# Patient Record
Sex: FEMALE | Race: OTHER | HISPANIC OR LATINO | URBAN - METROPOLITAN AREA
[De-identification: names, ages, dates, MRNs, and addresses within clinical notes are randomized per-mention and may not be internally consistent; named-entity substitution may affect disease eponyms.]

---

## 2023-01-06 ENCOUNTER — EMERGENCY (EMERGENCY)
Facility: HOSPITAL | Age: 63
LOS: 1 days | Discharge: HOME | End: 2023-01-06
Attending: STUDENT IN AN ORGANIZED HEALTH CARE EDUCATION/TRAINING PROGRAM
Payer: COMMERCIAL

## 2023-01-06 VITALS
OXYGEN SATURATION: 99 % | SYSTOLIC BLOOD PRESSURE: 128 MMHG | HEART RATE: 68 BPM | RESPIRATION RATE: 16 BRPM | HEIGHT: 59 IN | WEIGHT: 138.01 LBS | DIASTOLIC BLOOD PRESSURE: 64 MMHG | TEMPERATURE: 99 F

## 2023-01-06 LAB
ALBUMIN SERPL ELPH-MCNC: 3.2 G/DL — LOW (ref 3.5–5)
ALP SERPL-CCNC: 62 U/L — SIGNIFICANT CHANGE UP (ref 40–120)
ALT FLD-CCNC: 29 U/L DA — SIGNIFICANT CHANGE UP (ref 10–60)
ANION GAP SERPL CALC-SCNC: 10 MMOL/L — SIGNIFICANT CHANGE UP (ref 5–17)
APPEARANCE UR: CLEAR — SIGNIFICANT CHANGE UP
AST SERPL-CCNC: 17 U/L — SIGNIFICANT CHANGE UP (ref 10–40)
BACTERIA # UR AUTO: ABNORMAL /HPF
BASOPHILS # BLD AUTO: 0.06 K/UL — SIGNIFICANT CHANGE UP (ref 0–0.2)
BASOPHILS NFR BLD AUTO: 0.9 % — SIGNIFICANT CHANGE UP (ref 0–2)
BILIRUB SERPL-MCNC: 0.2 MG/DL — SIGNIFICANT CHANGE UP (ref 0.2–1.2)
BILIRUB UR-MCNC: NEGATIVE — SIGNIFICANT CHANGE UP
BUN SERPL-MCNC: 28 MG/DL — HIGH (ref 7–18)
CALCIUM SERPL-MCNC: 9.3 MG/DL — SIGNIFICANT CHANGE UP (ref 8.4–10.5)
CHLORIDE SERPL-SCNC: 104 MMOL/L — SIGNIFICANT CHANGE UP (ref 96–108)
CO2 SERPL-SCNC: 29 MMOL/L — SIGNIFICANT CHANGE UP (ref 22–31)
COD CRY URNS QL: ABNORMAL /HPF
COLOR SPEC: YELLOW — SIGNIFICANT CHANGE UP
CREAT SERPL-MCNC: 1.2 MG/DL — SIGNIFICANT CHANGE UP (ref 0.5–1.3)
DIFF PNL FLD: ABNORMAL
EGFR: 51 ML/MIN/1.73M2 — LOW
EOSINOPHIL # BLD AUTO: 0.17 K/UL — SIGNIFICANT CHANGE UP (ref 0–0.5)
EOSINOPHIL NFR BLD AUTO: 2.7 % — SIGNIFICANT CHANGE UP (ref 0–6)
EPI CELLS # UR: ABNORMAL /HPF
GLUCOSE SERPL-MCNC: 157 MG/DL — HIGH (ref 70–99)
GLUCOSE UR QL: NEGATIVE — SIGNIFICANT CHANGE UP
HCT VFR BLD CALC: 43.5 % — SIGNIFICANT CHANGE UP (ref 34.5–45)
HGB BLD-MCNC: 14 G/DL — SIGNIFICANT CHANGE UP (ref 11.5–15.5)
IMM GRANULOCYTES NFR BLD AUTO: 0.3 % — SIGNIFICANT CHANGE UP (ref 0–0.9)
KETONES UR-MCNC: NEGATIVE — SIGNIFICANT CHANGE UP
LEUKOCYTE ESTERASE UR-ACNC: NEGATIVE — SIGNIFICANT CHANGE UP
LIDOCAIN IGE QN: 244 U/L — SIGNIFICANT CHANGE UP (ref 73–393)
LYMPHOCYTES # BLD AUTO: 2.45 K/UL — SIGNIFICANT CHANGE UP (ref 1–3.3)
LYMPHOCYTES # BLD AUTO: 38.7 % — SIGNIFICANT CHANGE UP (ref 13–44)
MAGNESIUM SERPL-MCNC: 1.6 MG/DL — SIGNIFICANT CHANGE UP (ref 1.6–2.6)
MCHC RBC-ENTMCNC: 27.8 PG — SIGNIFICANT CHANGE UP (ref 27–34)
MCHC RBC-ENTMCNC: 32.2 GM/DL — SIGNIFICANT CHANGE UP (ref 32–36)
MCV RBC AUTO: 86.5 FL — SIGNIFICANT CHANGE UP (ref 80–100)
MONOCYTES # BLD AUTO: 0.51 K/UL — SIGNIFICANT CHANGE UP (ref 0–0.9)
MONOCYTES NFR BLD AUTO: 8.1 % — SIGNIFICANT CHANGE UP (ref 2–14)
NEUTROPHILS # BLD AUTO: 3.12 K/UL — SIGNIFICANT CHANGE UP (ref 1.8–7.4)
NEUTROPHILS NFR BLD AUTO: 49.3 % — SIGNIFICANT CHANGE UP (ref 43–77)
NITRITE UR-MCNC: NEGATIVE — SIGNIFICANT CHANGE UP
NRBC # BLD: 0 /100 WBCS — SIGNIFICANT CHANGE UP (ref 0–0)
PH UR: 5 — SIGNIFICANT CHANGE UP (ref 5–8)
PLATELET # BLD AUTO: 302 K/UL — SIGNIFICANT CHANGE UP (ref 150–400)
POTASSIUM SERPL-MCNC: 3.7 MMOL/L — SIGNIFICANT CHANGE UP (ref 3.5–5.3)
POTASSIUM SERPL-SCNC: 3.7 MMOL/L — SIGNIFICANT CHANGE UP (ref 3.5–5.3)
PROT SERPL-MCNC: 7.6 G/DL — SIGNIFICANT CHANGE UP (ref 6–8.3)
PROT UR-MCNC: NEGATIVE — SIGNIFICANT CHANGE UP
RBC # BLD: 5.03 M/UL — SIGNIFICANT CHANGE UP (ref 3.8–5.2)
RBC # FLD: 13 % — SIGNIFICANT CHANGE UP (ref 10.3–14.5)
RBC CASTS # UR COMP ASSIST: ABNORMAL /HPF (ref 0–2)
SODIUM SERPL-SCNC: 143 MMOL/L — SIGNIFICANT CHANGE UP (ref 135–145)
SP GR SPEC: 1.01 — SIGNIFICANT CHANGE UP (ref 1.01–1.02)
UROBILINOGEN FLD QL: NEGATIVE — SIGNIFICANT CHANGE UP
WBC # BLD: 6.33 K/UL — SIGNIFICANT CHANGE UP (ref 3.8–10.5)
WBC # FLD AUTO: 6.33 K/UL — SIGNIFICANT CHANGE UP (ref 3.8–10.5)
WBC UR QL: SIGNIFICANT CHANGE UP /HPF (ref 0–5)

## 2023-01-06 PROCEDURE — 76705 ECHO EXAM OF ABDOMEN: CPT | Mod: 26

## 2023-01-06 PROCEDURE — 99284 EMERGENCY DEPT VISIT MOD MDM: CPT

## 2023-01-06 RX ORDER — ESOMEPRAZOLE MAGNESIUM 40 MG/1
1 CAPSULE, DELAYED RELEASE ORAL
Qty: 14 | Refills: 0
Start: 2023-01-06 | End: 2023-01-19

## 2023-01-06 RX ORDER — FAMOTIDINE 10 MG/ML
20 INJECTION INTRAVENOUS ONCE
Refills: 0 | Status: COMPLETED | OUTPATIENT
Start: 2023-01-06 | End: 2023-01-06

## 2023-01-06 RX ORDER — PANTOPRAZOLE SODIUM 20 MG/1
40 TABLET, DELAYED RELEASE ORAL ONCE
Refills: 0 | Status: COMPLETED | OUTPATIENT
Start: 2023-01-06 | End: 2023-01-06

## 2023-01-06 RX ADMIN — Medication 30 MILLILITER(S): at 22:13

## 2023-01-06 RX ADMIN — FAMOTIDINE 20 MILLIGRAM(S): 10 INJECTION INTRAVENOUS at 22:14

## 2023-01-06 NOTE — ED PROVIDER NOTE - PROGRESS NOTE DETAILS
Romeo: feels improved. US with fatty liver, otherwise unremarkable. symptoms likely 2/2 gastritis. will dc with PPI and maalox. f/u with GI. return precautions discussed.

## 2023-01-06 NOTE — ED PROVIDER NOTE - OBJECTIVE STATEMENT
62-year-old female presenting with several days of abdominal pain.  Patient reports pain is worse at night after she eats and when she lays down.  Mild nausea and vomiting and today developed diarrhea.  No fever, chest pain, shortness of breath.  Only abdominal surgeries are  sections.  Denies any pain or burning with urination.

## 2023-01-06 NOTE — ED ADULT TRIAGE NOTE - AS TEMP SITE
Name: Netta Avila  : 1978  MRN: [id number]    Oncology Navigation Follow-Up Note    Contact Type:  Telephone    Notes:Writer spoke with pt who called to have [place] appt moved to a different time. Writer notified Jazzy Villanueva, [address] [place] [address] , of pt's request and asked that she contact pt to coordinate a different appt date/time. Pt denied further barriers and any unanswered questions at this time. Pt has writer's contact information and encouraged to contact writer with any concerns. Will continue to follow.      Electronically signed by Jody Severino RN on 2022 at 11:46 AM
oral

## 2023-01-06 NOTE — ED PROVIDER NOTE - PHYSICAL EXAMINATION
General: well appearing female, no acute distress   HEENT: normocephalic, atraumatic   Respiratory: normal work of breathing, lungs clear to auscultation bilaterally   Cardiac: regular rate and rhythm   Abdomen: soft, epigastric tenderness to palpation    MSK: no swelling or tenderness of lower extremities, moving all extremities spontaneously   Skin: warm, dry   Neuro: A&Ox3  Psych: appropriate affect

## 2023-01-06 NOTE — ED PROVIDER NOTE - CLINICAL SUMMARY MEDICAL DECISION MAKING FREE TEXT BOX
63-year-old female presenting with abdominal pain.  Concern for gastritis versus pancreatitis versus cholecystitis. Will attempt to get ultrasound but may need to get CT scan at this time of night.  Labs and urine.

## 2023-01-06 NOTE — ED PROVIDER NOTE - PATIENT PORTAL LINK FT
You can access the FollowMyHealth Patient Portal offered by [facility] by registering at the following website: [email or web address]. By joining [employer]’s FollowMyHealth portal, you will also be able to view your health information using other applications (apps) compatible with our system.

## 2023-01-07 VITALS
SYSTOLIC BLOOD PRESSURE: 126 MMHG | TEMPERATURE: 98 F | OXYGEN SATURATION: 97 % | RESPIRATION RATE: 16 BRPM | HEART RATE: 61 BPM | DIASTOLIC BLOOD PRESSURE: 74 MMHG

## 2023-01-07 PROCEDURE — 83735 ASSAY OF MAGNESIUM: CPT

## 2023-01-07 PROCEDURE — 80053 COMPREHEN METABOLIC PANEL: CPT

## 2023-01-07 PROCEDURE — 83690 ASSAY OF LIPASE: CPT

## 2023-01-07 PROCEDURE — 99284 EMERGENCY DEPT VISIT MOD MDM: CPT | Mod: 25

## 2023-01-07 PROCEDURE — 87086 URINE CULTURE/COLONY COUNT: CPT

## 2023-01-07 PROCEDURE — 76705 ECHO EXAM OF ABDOMEN: CPT

## 2023-01-07 PROCEDURE — 96374 THER/PROPH/DIAG INJ IV PUSH: CPT

## 2023-01-07 PROCEDURE — 85025 COMPLETE CBC W/AUTO DIFF WBC: CPT

## 2023-01-07 PROCEDURE — 36415 COLL VENOUS BLD VENIPUNCTURE: CPT

## 2023-01-07 PROCEDURE — 81001 URINALYSIS AUTO W/SCOPE: CPT

## 2023-01-07 RX ADMIN — PANTOPRAZOLE SODIUM 40 MILLIGRAM(S): 20 TABLET, DELAYED RELEASE ORAL at 00:01

## 2023-01-07 NOTE — ED ADULT NURSE NOTE - OBJECTIVE STATEMENT
62-year-old female presenting with several days of abdominal pain.  Patient reports pain is worse at night after she eats and when she lays down.  Mild nausea and vomiting and today developed diarrhea.  No fever, chest pain, shortness of breath.

## 2023-01-08 LAB
CULTURE RESULTS: SIGNIFICANT CHANGE UP
SPECIMEN SOURCE: SIGNIFICANT CHANGE UP

## 2023-09-22 ENCOUNTER — EMERGENCY (EMERGENCY)
Facility: HOSPITAL | Age: 63
LOS: 1 days | Discharge: HOME | End: 2023-09-22
Attending: EMERGENCY MEDICINE
Payer: COMMERCIAL

## 2023-09-22 VITALS
RESPIRATION RATE: 18 BRPM | HEART RATE: 60 BPM | OXYGEN SATURATION: 97 % | TEMPERATURE: 97 F | DIASTOLIC BLOOD PRESSURE: 64 MMHG | SYSTOLIC BLOOD PRESSURE: 165 MMHG

## 2023-09-22 VITALS
HEART RATE: 63 BPM | OXYGEN SATURATION: 97 % | RESPIRATION RATE: 18 BRPM | TEMPERATURE: 99 F | SYSTOLIC BLOOD PRESSURE: 113 MMHG | HEIGHT: 59 IN | WEIGHT: 125 LBS | DIASTOLIC BLOOD PRESSURE: 68 MMHG

## 2023-09-22 LAB
ALBUMIN SERPL ELPH-MCNC: 3.7 G/DL — SIGNIFICANT CHANGE UP (ref 3.5–5)
ALP SERPL-CCNC: 76 U/L — SIGNIFICANT CHANGE UP (ref 40–120)
ALT FLD-CCNC: 27 U/L DA — SIGNIFICANT CHANGE UP (ref 10–60)
ANION GAP SERPL CALC-SCNC: 8 MMOL/L — SIGNIFICANT CHANGE UP (ref 5–17)
APPEARANCE UR: CLEAR — SIGNIFICANT CHANGE UP
AST SERPL-CCNC: 19 U/L — SIGNIFICANT CHANGE UP (ref 10–40)
BASOPHILS # BLD AUTO: 0.04 K/UL — SIGNIFICANT CHANGE UP (ref 0–0.2)
BASOPHILS NFR BLD AUTO: 0.7 % — SIGNIFICANT CHANGE UP (ref 0–2)
BILIRUB SERPL-MCNC: 0.7 MG/DL — SIGNIFICANT CHANGE UP (ref 0.2–1.2)
BILIRUB UR-MCNC: NEGATIVE — SIGNIFICANT CHANGE UP
BUN SERPL-MCNC: 12 MG/DL — SIGNIFICANT CHANGE UP (ref 7–18)
CALCIUM SERPL-MCNC: 9.1 MG/DL — SIGNIFICANT CHANGE UP (ref 8.4–10.5)
CHLORIDE SERPL-SCNC: 104 MMOL/L — SIGNIFICANT CHANGE UP (ref 96–108)
CO2 SERPL-SCNC: 27 MMOL/L — SIGNIFICANT CHANGE UP (ref 22–31)
COLOR SPEC: YELLOW — SIGNIFICANT CHANGE UP
CREAT SERPL-MCNC: 0.92 MG/DL — SIGNIFICANT CHANGE UP (ref 0.5–1.3)
DIFF PNL FLD: ABNORMAL
EGFR: 70 ML/MIN/1.73M2 — SIGNIFICANT CHANGE UP
EOSINOPHIL # BLD AUTO: 0.01 K/UL — SIGNIFICANT CHANGE UP (ref 0–0.5)
EOSINOPHIL NFR BLD AUTO: 0.2 % — SIGNIFICANT CHANGE UP (ref 0–6)
GLUCOSE SERPL-MCNC: 109 MG/DL — HIGH (ref 70–99)
GLUCOSE UR QL: NEGATIVE MG/DL — SIGNIFICANT CHANGE UP
HCT VFR BLD CALC: 42.7 % — SIGNIFICANT CHANGE UP (ref 34.5–45)
HGB BLD-MCNC: 14.2 G/DL — SIGNIFICANT CHANGE UP (ref 11.5–15.5)
IMM GRANULOCYTES NFR BLD AUTO: 0.2 % — SIGNIFICANT CHANGE UP (ref 0–0.9)
KETONES UR-MCNC: 80 MG/DL
LACTATE SERPL-SCNC: 0.9 MMOL/L — SIGNIFICANT CHANGE UP (ref 0.7–2)
LEUKOCYTE ESTERASE UR-ACNC: NEGATIVE — SIGNIFICANT CHANGE UP
LIDOCAIN IGE QN: 45 U/L — SIGNIFICANT CHANGE UP (ref 13–75)
LYMPHOCYTES # BLD AUTO: 1.77 K/UL — SIGNIFICANT CHANGE UP (ref 1–3.3)
LYMPHOCYTES # BLD AUTO: 31.9 % — SIGNIFICANT CHANGE UP (ref 13–44)
MCHC RBC-ENTMCNC: 27.8 PG — SIGNIFICANT CHANGE UP (ref 27–34)
MCHC RBC-ENTMCNC: 33.3 GM/DL — SIGNIFICANT CHANGE UP (ref 32–36)
MCV RBC AUTO: 83.7 FL — SIGNIFICANT CHANGE UP (ref 80–100)
MONOCYTES # BLD AUTO: 0.4 K/UL — SIGNIFICANT CHANGE UP (ref 0–0.9)
MONOCYTES NFR BLD AUTO: 7.2 % — SIGNIFICANT CHANGE UP (ref 2–14)
NEUTROPHILS # BLD AUTO: 3.32 K/UL — SIGNIFICANT CHANGE UP (ref 1.8–7.4)
NEUTROPHILS NFR BLD AUTO: 59.8 % — SIGNIFICANT CHANGE UP (ref 43–77)
NITRITE UR-MCNC: NEGATIVE — SIGNIFICANT CHANGE UP
NRBC # BLD: 0 /100 WBCS — SIGNIFICANT CHANGE UP (ref 0–0)
PH UR: 6.5 — SIGNIFICANT CHANGE UP (ref 5–8)
PLATELET # BLD AUTO: 325 K/UL — SIGNIFICANT CHANGE UP (ref 150–400)
POTASSIUM SERPL-MCNC: 3.6 MMOL/L — SIGNIFICANT CHANGE UP (ref 3.5–5.3)
POTASSIUM SERPL-SCNC: 3.6 MMOL/L — SIGNIFICANT CHANGE UP (ref 3.5–5.3)
PROT SERPL-MCNC: 7.9 G/DL — SIGNIFICANT CHANGE UP (ref 6–8.3)
PROT UR-MCNC: ABNORMAL MG/DL
RBC # BLD: 5.1 M/UL — SIGNIFICANT CHANGE UP (ref 3.8–5.2)
RBC # FLD: 13.4 % — SIGNIFICANT CHANGE UP (ref 10.3–14.5)
SODIUM SERPL-SCNC: 139 MMOL/L — SIGNIFICANT CHANGE UP (ref 135–145)
SP GR SPEC: 1.02 — SIGNIFICANT CHANGE UP (ref 1–1.03)
TROPONIN I, HIGH SENSITIVITY RESULT: 10.9 NG/L — SIGNIFICANT CHANGE UP
UROBILINOGEN FLD QL: 1 MG/DL — SIGNIFICANT CHANGE UP (ref 0.2–1)
WBC # BLD: 5.55 K/UL — SIGNIFICANT CHANGE UP (ref 3.8–10.5)
WBC # FLD AUTO: 5.55 K/UL — SIGNIFICANT CHANGE UP (ref 3.8–10.5)

## 2023-09-22 PROCEDURE — 87077 CULTURE AEROBIC IDENTIFY: CPT

## 2023-09-22 PROCEDURE — 96374 THER/PROPH/DIAG INJ IV PUSH: CPT | Mod: XU

## 2023-09-22 PROCEDURE — 84484 ASSAY OF TROPONIN QUANT: CPT

## 2023-09-22 PROCEDURE — 99285 EMERGENCY DEPT VISIT HI MDM: CPT

## 2023-09-22 PROCEDURE — 81001 URINALYSIS AUTO W/SCOPE: CPT

## 2023-09-22 PROCEDURE — 76705 ECHO EXAM OF ABDOMEN: CPT

## 2023-09-22 PROCEDURE — 93005 ELECTROCARDIOGRAM TRACING: CPT

## 2023-09-22 PROCEDURE — 36415 COLL VENOUS BLD VENIPUNCTURE: CPT

## 2023-09-22 PROCEDURE — 74177 CT ABD & PELVIS W/CONTRAST: CPT | Mod: MA

## 2023-09-22 PROCEDURE — 80053 COMPREHEN METABOLIC PANEL: CPT

## 2023-09-22 PROCEDURE — 96375 TX/PRO/DX INJ NEW DRUG ADDON: CPT

## 2023-09-22 PROCEDURE — 83605 ASSAY OF LACTIC ACID: CPT

## 2023-09-22 PROCEDURE — 85025 COMPLETE CBC W/AUTO DIFF WBC: CPT

## 2023-09-22 PROCEDURE — 93010 ELECTROCARDIOGRAM REPORT: CPT

## 2023-09-22 PROCEDURE — 87086 URINE CULTURE/COLONY COUNT: CPT

## 2023-09-22 PROCEDURE — 83690 ASSAY OF LIPASE: CPT

## 2023-09-22 PROCEDURE — 99285 EMERGENCY DEPT VISIT HI MDM: CPT | Mod: 25

## 2023-09-22 PROCEDURE — 76705 ECHO EXAM OF ABDOMEN: CPT | Mod: 26

## 2023-09-22 PROCEDURE — 74177 CT ABD & PELVIS W/CONTRAST: CPT | Mod: 26,MA

## 2023-09-22 RX ORDER — SODIUM CHLORIDE 9 MG/ML
1000 INJECTION INTRAMUSCULAR; INTRAVENOUS; SUBCUTANEOUS ONCE
Refills: 0 | Status: COMPLETED | OUTPATIENT
Start: 2023-09-22 | End: 2023-09-22

## 2023-09-22 RX ORDER — ONDANSETRON 8 MG/1
4 TABLET, FILM COATED ORAL ONCE
Refills: 0 | Status: COMPLETED | OUTPATIENT
Start: 2023-09-22 | End: 2023-09-22

## 2023-09-22 RX ORDER — FAMOTIDINE 10 MG/ML
1 INJECTION INTRAVENOUS
Qty: 30 | Refills: 0
Start: 2023-09-22

## 2023-09-22 RX ORDER — FAMOTIDINE 10 MG/ML
20 INJECTION INTRAVENOUS ONCE
Refills: 0 | Status: COMPLETED | OUTPATIENT
Start: 2023-09-22 | End: 2023-09-22

## 2023-09-22 RX ADMIN — ONDANSETRON 4 MILLIGRAM(S): 8 TABLET, FILM COATED ORAL at 11:46

## 2023-09-22 RX ADMIN — FAMOTIDINE 20 MILLIGRAM(S): 10 INJECTION INTRAVENOUS at 14:17

## 2023-09-22 RX ADMIN — SODIUM CHLORIDE 1000 MILLILITER(S): 9 INJECTION INTRAMUSCULAR; INTRAVENOUS; SUBCUTANEOUS at 11:45

## 2023-09-22 RX ADMIN — Medication 30 MILLILITER(S): at 14:17

## 2023-09-22 NOTE — ED PROVIDER NOTE - PATIENT PORTAL LINK FT
You can access the FollowMyHealth Patient Portal offered by [facility] by registering at the following website: [email or web address]. By joining [employer]’s FollowMyHealth portal, you will also be able to view your health information using other applications (apps) compatible with our system.

## 2023-09-22 NOTE — ED PROVIDER NOTE - OBJECTIVE STATEMENT
61 y/o woman, h/o H. pylori, completed antibiotics, approx. Feb 2023, c/o 2 days of copious vomiting and epigastric pain.  No fever/diarrhea/constipation/dysuria.  Prior C-S but no other abdominal surgeries.

## 2023-09-22 NOTE — ED PROVIDER NOTE - CLINICAL SUMMARY MEDICAL DECISION MAKING FREE TEXT BOX
61 y/o woman, h/o H. pylori, completed antibiotics, approx. Feb 2023, c/o 2 days of copious vomiting and epigastric pain--labs, urine, IVF, abdominal US, will consider CT A/P.

## 2023-09-22 NOTE — ED ADULT NURSE NOTE - NSFALLUNIVINTERV_ED_ALL_ED
Bed/Stretcher in lowest position, wheels locked, appropriate side rails in place/Call bell, personal items and telephone in reach/Instruct patient to call for assistance before getting out of bed/chair/stretcher/Non-slip footwear applied when patient is off stretcher/[place] to call system/Physically safe environment - no spills, clutter or unnecessary equipment/Purposeful proactive rounding/Room/bathroom lighting operational, light cord in reach

## 2023-09-22 NOTE — ED PROVIDER NOTE - NSFOLLOWUPINSTRUCTIONS_ED_ALL_ED_FT
Dolor abdominal en los adultos  Abdominal Pain, Adult  El dolor en el abdomen (dolor abdominal) puede tener muchas causas. A menudo, el dolor abdominal no es grave y mejora sin tratamiento o con tratamiento en la casa. Sin embargo, a veces el dolor abdominal es grave.    El médico le hará preguntas sobre awa antecedentes médicos y le hará un examen físico para tratar de determinar la causa del dolor abdominal.    Siga estas instrucciones en love casa:    Medicamentos    Use los medicamentos de venta luis y los recetados solamente robinson se lo haya indicado el médico.  No tome un laxante a menos que se lo haya indicado el médico.  Instrucciones generales    Controle love afección para detectar cualquier cambio.  Jessica suficiente líquido robinson para mantener la orina de color amarillo pálido.  Concurra a todas las visitas de seguimiento robinson se lo haya indicado el médico. Dekorra es importante.  Comuníquese con un médico si:  El dolor abdominal cambia o empeora.  No tiene apetito o baja de peso sin proponérselo.  Está estreñido o tiene diarrea rosa más de 2 o 3 nikky.  Tiene dolor cuando orina o defeca.  El dolor abdominal lo despierta de noche.  El dolor empeora con las comidas, después de comer o con determinados alimentos.  Tiene vómitos y no puede retener nada de lo que ingiere.  Tiene fiebre.  Observa june en la orina.  Solicite ayuda inmediatamente si:  El dolor no desaparece tan pronto robinson el médico le dijo que era esperable.  No puede dejar de vomitar.  El dolor se siente solo en zonas del abdomen, robinson el lado derecho o la parte inferior izquierda del abdomen. Si se localiza en la alin derecha, posiblemente podría tratarse de apendicitis.  Las heces son sanguinolentas o de color carmela, o de aspecto alquitranado.  Tiene dolor intenso, cólicos o distensión abdominal.  Tiene signos de deshidratación, robinson los siguientes:  Orina de color oscuro, muy escasa o falta de orina.  Labios agrietados.  Sequedad de boca.  Ojos hundidos.  Somnolencia.  Debilidad.  Tiene dificultad para respirar o dolor en el pecho.  Resumen  A menudo, el dolor abdominal no es grave y mejora sin tratamiento o con tratamiento en la casa. Sin embargo, a veces el dolor abdominal es grave.  Controle love afección para detectar cualquier cambio.  Use los medicamentos de venta luis y los recetados solamente robinson se lo haya indicado el médico.  Comuníquese con un médico si el dolor abdominal cambia o empeora.  Busque ayuda de inmediato si tiene dolor intenso, cólicos o distensión abdominal.  Esta información no tiene robinson fin reemplazar el consejo del médico. Asegúrese de hacerle al médico cualquier pregunta que tenga.    Document Revised: 06/24/2020 Document Reviewed: 06/24/2020

## 2023-09-22 NOTE — ED ADULT NURSE NOTE - OBJECTIVE STATEMENT
pt came in the er with C/o epigastric pain and vomiting since y/d.no acute distress noted /safety maintained /family at bed side /pain 9/10 /will continue to monitor .

## 2023-09-25 LAB
CULTURE RESULTS: SIGNIFICANT CHANGE UP
SPECIMEN SOURCE: SIGNIFICANT CHANGE UP

## 2023-10-17 ENCOUNTER — EMERGENCY (EMERGENCY)
Facility: HOSPITAL | Age: 63
LOS: 1 days | Discharge: ANOTHER HOSPITAL | End: 2023-10-17
Attending: EMERGENCY MEDICINE
Payer: COMMERCIAL

## 2023-10-17 VITALS
TEMPERATURE: 99 F | OXYGEN SATURATION: 69 % | DIASTOLIC BLOOD PRESSURE: 77 MMHG | HEART RATE: 69 BPM | HEIGHT: 59 IN | SYSTOLIC BLOOD PRESSURE: 136 MMHG | RESPIRATION RATE: 18 BRPM | WEIGHT: 119.05 LBS

## 2023-10-17 PROCEDURE — 99291 CRITICAL CARE FIRST HOUR: CPT

## 2023-10-17 NOTE — ED ADULT TRIAGE NOTE - TEMPERATURE IN FAHRENHEIT (DEGREES F)
Daughter reports pt has claustrophobia. The mask makes it worse.  Pt. Just wants it off. Explained that need to wear it till fully awake and getting good oxygen levels on abg's.  Pt. Still wants bipap off. Told daughter would give her pain med and b/p med due discomfort from moving all around and has her b/p up. Once thosemeds givne. Pt did settle down and go to sleep.   98.7

## 2023-10-18 ENCOUNTER — INPATIENT (INPATIENT)
Facility: HOSPITAL | Age: 63
LOS: 8 days | Discharge: HOME | End: 2023-10-27
Attending: SURGERY | Admitting: SURGERY
Payer: COMMERCIAL

## 2023-10-18 VITALS
OXYGEN SATURATION: 96 % | HEART RATE: 51 BPM | DIASTOLIC BLOOD PRESSURE: 59 MMHG | RESPIRATION RATE: 18 BRPM | TEMPERATURE: 98 F | SYSTOLIC BLOOD PRESSURE: 139 MMHG

## 2023-10-18 VITALS
TEMPERATURE: 98 F | SYSTOLIC BLOOD PRESSURE: 103 MMHG | DIASTOLIC BLOOD PRESSURE: 65 MMHG | HEIGHT: 59 IN | OXYGEN SATURATION: 100 % | WEIGHT: 119.05 LBS | RESPIRATION RATE: 18 BRPM | HEART RATE: 55 BPM

## 2023-10-18 DIAGNOSIS — K55.069 ACUTE INFARCTION OF INTESTINE, PART AND EXTENT UNSPECIFIED: ICD-10-CM

## 2023-10-18 PROBLEM — A04.8 OTHER SPECIFIED BACTERIAL INTESTINAL INFECTIONS: Chronic | Status: ACTIVE | Noted: 2023-09-22

## 2023-10-18 LAB
ALBUMIN SERPL ELPH-MCNC: 3.8 G/DL — SIGNIFICANT CHANGE UP (ref 3.5–5)
ALBUMIN SERPL ELPH-MCNC: 3.8 G/DL — SIGNIFICANT CHANGE UP (ref 3.5–5)
ALP SERPL-CCNC: 82 U/L — SIGNIFICANT CHANGE UP (ref 40–120)
ALP SERPL-CCNC: 82 U/L — SIGNIFICANT CHANGE UP (ref 40–120)
ALT FLD-CCNC: 38 U/L DA — SIGNIFICANT CHANGE UP (ref 10–60)
ALT FLD-CCNC: 38 U/L DA — SIGNIFICANT CHANGE UP (ref 10–60)
ANION GAP SERPL CALC-SCNC: 10 MMOL/L — SIGNIFICANT CHANGE UP (ref 5–17)
ANION GAP SERPL CALC-SCNC: 10 MMOL/L — SIGNIFICANT CHANGE UP (ref 5–17)
ANION GAP SERPL CALC-SCNC: 15 MMOL/L — HIGH (ref 7–14)
ANION GAP SERPL CALC-SCNC: 15 MMOL/L — HIGH (ref 7–14)
APPEARANCE UR: CLEAR — SIGNIFICANT CHANGE UP
APPEARANCE UR: CLEAR — SIGNIFICANT CHANGE UP
APTT BLD: 29.6 SEC — SIGNIFICANT CHANGE UP (ref 24.5–35.6)
APTT BLD: 29.6 SEC — SIGNIFICANT CHANGE UP (ref 24.5–35.6)
APTT BLD: 86.4 SEC — HIGH (ref 24.5–35.6)
APTT BLD: 86.4 SEC — HIGH (ref 24.5–35.6)
APTT BLD: 99.1 SEC — HIGH (ref 24.5–35.6)
APTT BLD: 99.1 SEC — HIGH (ref 24.5–35.6)
APTT BLD: >200 SEC — CRITICAL HIGH (ref 24.5–35.6)
APTT BLD: >200 SEC — CRITICAL HIGH (ref 24.5–35.6)
AST SERPL-CCNC: 20 U/L — SIGNIFICANT CHANGE UP (ref 10–40)
AST SERPL-CCNC: 20 U/L — SIGNIFICANT CHANGE UP (ref 10–40)
BASOPHILS # BLD AUTO: 0.03 K/UL — SIGNIFICANT CHANGE UP (ref 0–0.2)
BASOPHILS # BLD AUTO: 0.03 K/UL — SIGNIFICANT CHANGE UP (ref 0–0.2)
BASOPHILS NFR BLD AUTO: 0.5 % — SIGNIFICANT CHANGE UP (ref 0–2)
BASOPHILS NFR BLD AUTO: 0.5 % — SIGNIFICANT CHANGE UP (ref 0–2)
BILIRUB SERPL-MCNC: 0.5 MG/DL — SIGNIFICANT CHANGE UP (ref 0.2–1.2)
BILIRUB SERPL-MCNC: 0.5 MG/DL — SIGNIFICANT CHANGE UP (ref 0.2–1.2)
BILIRUB UR-MCNC: NEGATIVE — SIGNIFICANT CHANGE UP
BILIRUB UR-MCNC: NEGATIVE — SIGNIFICANT CHANGE UP
BUN SERPL-MCNC: 11 MG/DL — SIGNIFICANT CHANGE UP (ref 7–23)
BUN SERPL-MCNC: 11 MG/DL — SIGNIFICANT CHANGE UP (ref 7–23)
BUN SERPL-MCNC: 14 MG/DL — SIGNIFICANT CHANGE UP (ref 7–18)
BUN SERPL-MCNC: 14 MG/DL — SIGNIFICANT CHANGE UP (ref 7–18)
CALCIUM SERPL-MCNC: 8.9 MG/DL — SIGNIFICANT CHANGE UP (ref 8.4–10.5)
CALCIUM SERPL-MCNC: 8.9 MG/DL — SIGNIFICANT CHANGE UP (ref 8.4–10.5)
CALCIUM SERPL-MCNC: 9.2 MG/DL — SIGNIFICANT CHANGE UP (ref 8.4–10.5)
CALCIUM SERPL-MCNC: 9.2 MG/DL — SIGNIFICANT CHANGE UP (ref 8.4–10.5)
CHLORIDE SERPL-SCNC: 104 MMOL/L — SIGNIFICANT CHANGE UP (ref 98–107)
CHLORIDE SERPL-SCNC: 104 MMOL/L — SIGNIFICANT CHANGE UP (ref 98–107)
CHLORIDE SERPL-SCNC: 105 MMOL/L — SIGNIFICANT CHANGE UP (ref 96–108)
CHLORIDE SERPL-SCNC: 105 MMOL/L — SIGNIFICANT CHANGE UP (ref 96–108)
CO2 SERPL-SCNC: 22 MMOL/L — SIGNIFICANT CHANGE UP (ref 22–31)
CO2 SERPL-SCNC: 22 MMOL/L — SIGNIFICANT CHANGE UP (ref 22–31)
CO2 SERPL-SCNC: 27 MMOL/L — SIGNIFICANT CHANGE UP (ref 22–31)
CO2 SERPL-SCNC: 27 MMOL/L — SIGNIFICANT CHANGE UP (ref 22–31)
COLOR SPEC: YELLOW — SIGNIFICANT CHANGE UP
COLOR SPEC: YELLOW — SIGNIFICANT CHANGE UP
CREAT SERPL-MCNC: 0.77 MG/DL — SIGNIFICANT CHANGE UP (ref 0.5–1.3)
CREAT SERPL-MCNC: 0.77 MG/DL — SIGNIFICANT CHANGE UP (ref 0.5–1.3)
CREAT SERPL-MCNC: 1.01 MG/DL — SIGNIFICANT CHANGE UP (ref 0.5–1.3)
CREAT SERPL-MCNC: 1.01 MG/DL — SIGNIFICANT CHANGE UP (ref 0.5–1.3)
DIFF PNL FLD: ABNORMAL
DIFF PNL FLD: ABNORMAL
EGFR: 62 ML/MIN/1.73M2 — SIGNIFICANT CHANGE UP
EGFR: 62 ML/MIN/1.73M2 — SIGNIFICANT CHANGE UP
EGFR: 86 ML/MIN/1.73M2 — SIGNIFICANT CHANGE UP
EGFR: 86 ML/MIN/1.73M2 — SIGNIFICANT CHANGE UP
EOSINOPHIL # BLD AUTO: 0 K/UL — SIGNIFICANT CHANGE UP (ref 0–0.5)
EOSINOPHIL # BLD AUTO: 0 K/UL — SIGNIFICANT CHANGE UP (ref 0–0.5)
EOSINOPHIL NFR BLD AUTO: 0 % — SIGNIFICANT CHANGE UP (ref 0–6)
EOSINOPHIL NFR BLD AUTO: 0 % — SIGNIFICANT CHANGE UP (ref 0–6)
GLUCOSE SERPL-MCNC: 133 MG/DL — HIGH (ref 70–99)
GLUCOSE SERPL-MCNC: 133 MG/DL — HIGH (ref 70–99)
GLUCOSE SERPL-MCNC: 147 MG/DL — HIGH (ref 70–99)
GLUCOSE SERPL-MCNC: 147 MG/DL — HIGH (ref 70–99)
GLUCOSE UR QL: NEGATIVE MG/DL — SIGNIFICANT CHANGE UP
GLUCOSE UR QL: NEGATIVE MG/DL — SIGNIFICANT CHANGE UP
HCT VFR BLD CALC: 39.2 % — SIGNIFICANT CHANGE UP (ref 34.5–45)
HCT VFR BLD CALC: 39.2 % — SIGNIFICANT CHANGE UP (ref 34.5–45)
HCT VFR BLD CALC: 42.8 % — SIGNIFICANT CHANGE UP (ref 34.5–45)
HCT VFR BLD CALC: 42.8 % — SIGNIFICANT CHANGE UP (ref 34.5–45)
HCT VFR BLD CALC: 43.5 % — SIGNIFICANT CHANGE UP (ref 34.5–45)
HCT VFR BLD CALC: 43.5 % — SIGNIFICANT CHANGE UP (ref 34.5–45)
HGB BLD-MCNC: 13.1 G/DL — SIGNIFICANT CHANGE UP (ref 11.5–15.5)
HGB BLD-MCNC: 13.1 G/DL — SIGNIFICANT CHANGE UP (ref 11.5–15.5)
HGB BLD-MCNC: 13.6 G/DL — SIGNIFICANT CHANGE UP (ref 11.5–15.5)
HGB BLD-MCNC: 13.6 G/DL — SIGNIFICANT CHANGE UP (ref 11.5–15.5)
HGB BLD-MCNC: 14.5 G/DL — SIGNIFICANT CHANGE UP (ref 11.5–15.5)
HGB BLD-MCNC: 14.5 G/DL — SIGNIFICANT CHANGE UP (ref 11.5–15.5)
IMM GRANULOCYTES NFR BLD AUTO: 0.2 % — SIGNIFICANT CHANGE UP (ref 0–0.9)
IMM GRANULOCYTES NFR BLD AUTO: 0.2 % — SIGNIFICANT CHANGE UP (ref 0–0.9)
INR BLD: 1.03 RATIO — SIGNIFICANT CHANGE UP (ref 0.85–1.18)
INR BLD: 1.03 RATIO — SIGNIFICANT CHANGE UP (ref 0.85–1.18)
INR BLD: 1.3 RATIO — HIGH (ref 0.85–1.18)
INR BLD: 1.3 RATIO — HIGH (ref 0.85–1.18)
KETONES UR-MCNC: 15 MG/DL
KETONES UR-MCNC: 15 MG/DL
LACTATE SERPL-SCNC: 1.3 MMOL/L — SIGNIFICANT CHANGE UP (ref 0.7–2)
LACTATE SERPL-SCNC: 1.3 MMOL/L — SIGNIFICANT CHANGE UP (ref 0.7–2)
LACTATE SERPL-SCNC: 1.7 MMOL/L — SIGNIFICANT CHANGE UP (ref 0.5–2)
LACTATE SERPL-SCNC: 1.7 MMOL/L — SIGNIFICANT CHANGE UP (ref 0.5–2)
LEUKOCYTE ESTERASE UR-ACNC: NEGATIVE — SIGNIFICANT CHANGE UP
LEUKOCYTE ESTERASE UR-ACNC: NEGATIVE — SIGNIFICANT CHANGE UP
LIDOCAIN IGE QN: 32 U/L — SIGNIFICANT CHANGE UP (ref 13–75)
LIDOCAIN IGE QN: 32 U/L — SIGNIFICANT CHANGE UP (ref 13–75)
LYMPHOCYTES # BLD AUTO: 1.19 K/UL — SIGNIFICANT CHANGE UP (ref 1–3.3)
LYMPHOCYTES # BLD AUTO: 1.19 K/UL — SIGNIFICANT CHANGE UP (ref 1–3.3)
LYMPHOCYTES # BLD AUTO: 20.5 % — SIGNIFICANT CHANGE UP (ref 13–44)
LYMPHOCYTES # BLD AUTO: 20.5 % — SIGNIFICANT CHANGE UP (ref 13–44)
MAGNESIUM SERPL-MCNC: 2.2 MG/DL — SIGNIFICANT CHANGE UP (ref 1.6–2.6)
MAGNESIUM SERPL-MCNC: 2.2 MG/DL — SIGNIFICANT CHANGE UP (ref 1.6–2.6)
MCHC RBC-ENTMCNC: 27.6 PG — SIGNIFICANT CHANGE UP (ref 27–34)
MCHC RBC-ENTMCNC: 27.6 PG — SIGNIFICANT CHANGE UP (ref 27–34)
MCHC RBC-ENTMCNC: 27.9 PG — SIGNIFICANT CHANGE UP (ref 27–34)
MCHC RBC-ENTMCNC: 27.9 PG — SIGNIFICANT CHANGE UP (ref 27–34)
MCHC RBC-ENTMCNC: 28.4 PG — SIGNIFICANT CHANGE UP (ref 27–34)
MCHC RBC-ENTMCNC: 28.4 PG — SIGNIFICANT CHANGE UP (ref 27–34)
MCHC RBC-ENTMCNC: 31.8 GM/DL — LOW (ref 32–36)
MCHC RBC-ENTMCNC: 31.8 GM/DL — LOW (ref 32–36)
MCHC RBC-ENTMCNC: 33.3 GM/DL — SIGNIFICANT CHANGE UP (ref 32–36)
MCHC RBC-ENTMCNC: 33.3 GM/DL — SIGNIFICANT CHANGE UP (ref 32–36)
MCHC RBC-ENTMCNC: 33.4 GM/DL — SIGNIFICANT CHANGE UP (ref 32–36)
MCHC RBC-ENTMCNC: 33.4 GM/DL — SIGNIFICANT CHANGE UP (ref 32–36)
MCV RBC AUTO: 83.6 FL — SIGNIFICANT CHANGE UP (ref 80–100)
MCV RBC AUTO: 83.6 FL — SIGNIFICANT CHANGE UP (ref 80–100)
MCV RBC AUTO: 85.1 FL — SIGNIFICANT CHANGE UP (ref 80–100)
MCV RBC AUTO: 85.1 FL — SIGNIFICANT CHANGE UP (ref 80–100)
MCV RBC AUTO: 86.8 FL — SIGNIFICANT CHANGE UP (ref 80–100)
MCV RBC AUTO: 86.8 FL — SIGNIFICANT CHANGE UP (ref 80–100)
MONOCYTES # BLD AUTO: 0.23 K/UL — SIGNIFICANT CHANGE UP (ref 0–0.9)
MONOCYTES # BLD AUTO: 0.23 K/UL — SIGNIFICANT CHANGE UP (ref 0–0.9)
MONOCYTES NFR BLD AUTO: 4 % — SIGNIFICANT CHANGE UP (ref 2–14)
MONOCYTES NFR BLD AUTO: 4 % — SIGNIFICANT CHANGE UP (ref 2–14)
NEUTROPHILS # BLD AUTO: 4.35 K/UL — SIGNIFICANT CHANGE UP (ref 1.8–7.4)
NEUTROPHILS # BLD AUTO: 4.35 K/UL — SIGNIFICANT CHANGE UP (ref 1.8–7.4)
NEUTROPHILS NFR BLD AUTO: 74.8 % — SIGNIFICANT CHANGE UP (ref 43–77)
NEUTROPHILS NFR BLD AUTO: 74.8 % — SIGNIFICANT CHANGE UP (ref 43–77)
NITRITE UR-MCNC: NEGATIVE — SIGNIFICANT CHANGE UP
NITRITE UR-MCNC: NEGATIVE — SIGNIFICANT CHANGE UP
NRBC # BLD: 0 /100 WBCS — SIGNIFICANT CHANGE UP (ref 0–0)
NRBC # FLD: 0 K/UL — SIGNIFICANT CHANGE UP (ref 0–0)
PH UR: 8 — SIGNIFICANT CHANGE UP (ref 5–8)
PH UR: 8 — SIGNIFICANT CHANGE UP (ref 5–8)
PHOSPHATE SERPL-MCNC: 3.6 MG/DL — SIGNIFICANT CHANGE UP (ref 2.5–4.5)
PHOSPHATE SERPL-MCNC: 3.6 MG/DL — SIGNIFICANT CHANGE UP (ref 2.5–4.5)
PLATELET # BLD AUTO: 304 K/UL — SIGNIFICANT CHANGE UP (ref 150–400)
PLATELET # BLD AUTO: 304 K/UL — SIGNIFICANT CHANGE UP (ref 150–400)
PLATELET # BLD AUTO: 305 K/UL — SIGNIFICANT CHANGE UP (ref 150–400)
PLATELET # BLD AUTO: 305 K/UL — SIGNIFICANT CHANGE UP (ref 150–400)
PLATELET # BLD AUTO: 314 K/UL — SIGNIFICANT CHANGE UP (ref 150–400)
PLATELET # BLD AUTO: 314 K/UL — SIGNIFICANT CHANGE UP (ref 150–400)
POTASSIUM SERPL-MCNC: 3.5 MMOL/L — SIGNIFICANT CHANGE UP (ref 3.5–5.3)
POTASSIUM SERPL-MCNC: 3.5 MMOL/L — SIGNIFICANT CHANGE UP (ref 3.5–5.3)
POTASSIUM SERPL-MCNC: 3.7 MMOL/L — SIGNIFICANT CHANGE UP (ref 3.5–5.3)
POTASSIUM SERPL-MCNC: 3.7 MMOL/L — SIGNIFICANT CHANGE UP (ref 3.5–5.3)
POTASSIUM SERPL-SCNC: 3.5 MMOL/L — SIGNIFICANT CHANGE UP (ref 3.5–5.3)
POTASSIUM SERPL-SCNC: 3.5 MMOL/L — SIGNIFICANT CHANGE UP (ref 3.5–5.3)
POTASSIUM SERPL-SCNC: 3.7 MMOL/L — SIGNIFICANT CHANGE UP (ref 3.5–5.3)
POTASSIUM SERPL-SCNC: 3.7 MMOL/L — SIGNIFICANT CHANGE UP (ref 3.5–5.3)
PROT SERPL-MCNC: 8.3 G/DL — SIGNIFICANT CHANGE UP (ref 6–8.3)
PROT SERPL-MCNC: 8.3 G/DL — SIGNIFICANT CHANGE UP (ref 6–8.3)
PROT UR-MCNC: NEGATIVE MG/DL — SIGNIFICANT CHANGE UP
PROT UR-MCNC: NEGATIVE MG/DL — SIGNIFICANT CHANGE UP
PROTHROM AB SERPL-ACNC: 11.7 SEC — SIGNIFICANT CHANGE UP (ref 9.5–13)
PROTHROM AB SERPL-ACNC: 11.7 SEC — SIGNIFICANT CHANGE UP (ref 9.5–13)
PROTHROM AB SERPL-ACNC: 14.5 SEC — HIGH (ref 9.5–13)
PROTHROM AB SERPL-ACNC: 14.5 SEC — HIGH (ref 9.5–13)
RBC # BLD: 4.69 M/UL — SIGNIFICANT CHANGE UP (ref 3.8–5.2)
RBC # BLD: 4.69 M/UL — SIGNIFICANT CHANGE UP (ref 3.8–5.2)
RBC # BLD: 4.93 M/UL — SIGNIFICANT CHANGE UP (ref 3.8–5.2)
RBC # BLD: 4.93 M/UL — SIGNIFICANT CHANGE UP (ref 3.8–5.2)
RBC # BLD: 5.11 M/UL — SIGNIFICANT CHANGE UP (ref 3.8–5.2)
RBC # BLD: 5.11 M/UL — SIGNIFICANT CHANGE UP (ref 3.8–5.2)
RBC # FLD: 13.8 % — SIGNIFICANT CHANGE UP (ref 10.3–14.5)
RBC # FLD: 13.8 % — SIGNIFICANT CHANGE UP (ref 10.3–14.5)
RBC # FLD: 14 % — SIGNIFICANT CHANGE UP (ref 10.3–14.5)
RBC # FLD: 14 % — SIGNIFICANT CHANGE UP (ref 10.3–14.5)
RBC # FLD: 14.1 % — SIGNIFICANT CHANGE UP (ref 10.3–14.5)
RBC # FLD: 14.1 % — SIGNIFICANT CHANGE UP (ref 10.3–14.5)
SODIUM SERPL-SCNC: 141 MMOL/L — SIGNIFICANT CHANGE UP (ref 135–145)
SODIUM SERPL-SCNC: 141 MMOL/L — SIGNIFICANT CHANGE UP (ref 135–145)
SODIUM SERPL-SCNC: 142 MMOL/L — SIGNIFICANT CHANGE UP (ref 135–145)
SODIUM SERPL-SCNC: 142 MMOL/L — SIGNIFICANT CHANGE UP (ref 135–145)
SP GR SPEC: 1.03 — HIGH (ref 1–1.03)
SP GR SPEC: 1.03 — HIGH (ref 1–1.03)
UROBILINOGEN FLD QL: 0.2 MG/DL — SIGNIFICANT CHANGE UP (ref 0.2–1)
UROBILINOGEN FLD QL: 0.2 MG/DL — SIGNIFICANT CHANGE UP (ref 0.2–1)
WBC # BLD: 5.81 K/UL — SIGNIFICANT CHANGE UP (ref 3.8–10.5)
WBC # BLD: 5.81 K/UL — SIGNIFICANT CHANGE UP (ref 3.8–10.5)
WBC # BLD: 6.82 K/UL — SIGNIFICANT CHANGE UP (ref 3.8–10.5)
WBC # BLD: 6.82 K/UL — SIGNIFICANT CHANGE UP (ref 3.8–10.5)
WBC # BLD: 7.29 K/UL — SIGNIFICANT CHANGE UP (ref 3.8–10.5)
WBC # BLD: 7.29 K/UL — SIGNIFICANT CHANGE UP (ref 3.8–10.5)
WBC # FLD AUTO: 5.81 K/UL — SIGNIFICANT CHANGE UP (ref 3.8–10.5)
WBC # FLD AUTO: 5.81 K/UL — SIGNIFICANT CHANGE UP (ref 3.8–10.5)
WBC # FLD AUTO: 6.82 K/UL — SIGNIFICANT CHANGE UP (ref 3.8–10.5)
WBC # FLD AUTO: 6.82 K/UL — SIGNIFICANT CHANGE UP (ref 3.8–10.5)
WBC # FLD AUTO: 7.29 K/UL — SIGNIFICANT CHANGE UP (ref 3.8–10.5)
WBC # FLD AUTO: 7.29 K/UL — SIGNIFICANT CHANGE UP (ref 3.8–10.5)

## 2023-10-18 PROCEDURE — 83690 ASSAY OF LIPASE: CPT

## 2023-10-18 PROCEDURE — 74174 CTA ABD&PLVS W/CONTRAST: CPT | Mod: 26

## 2023-10-18 PROCEDURE — 99253 IP/OBS CNSLTJ NEW/EST LOW 45: CPT

## 2023-10-18 PROCEDURE — 74177 CT ABD & PELVIS W/CONTRAST: CPT | Mod: 26,MA,59

## 2023-10-18 PROCEDURE — 74177 CT ABD & PELVIS W/CONTRAST: CPT | Mod: MA

## 2023-10-18 PROCEDURE — 96374 THER/PROPH/DIAG INJ IV PUSH: CPT

## 2023-10-18 PROCEDURE — 93005 ELECTROCARDIOGRAM TRACING: CPT

## 2023-10-18 PROCEDURE — 87086 URINE CULTURE/COLONY COUNT: CPT

## 2023-10-18 PROCEDURE — 85730 THROMBOPLASTIN TIME PARTIAL: CPT

## 2023-10-18 PROCEDURE — 85610 PROTHROMBIN TIME: CPT

## 2023-10-18 PROCEDURE — 96375 TX/PRO/DX INJ NEW DRUG ADDON: CPT

## 2023-10-18 PROCEDURE — 80053 COMPREHEN METABOLIC PANEL: CPT

## 2023-10-18 PROCEDURE — 99291 CRITICAL CARE FIRST HOUR: CPT | Mod: 25

## 2023-10-18 PROCEDURE — 36415 COLL VENOUS BLD VENIPUNCTURE: CPT

## 2023-10-18 PROCEDURE — 96376 TX/PRO/DX INJ SAME DRUG ADON: CPT

## 2023-10-18 PROCEDURE — 81001 URINALYSIS AUTO W/SCOPE: CPT

## 2023-10-18 PROCEDURE — 99285 EMERGENCY DEPT VISIT HI MDM: CPT

## 2023-10-18 PROCEDURE — 83605 ASSAY OF LACTIC ACID: CPT

## 2023-10-18 PROCEDURE — 85025 COMPLETE CBC W/AUTO DIFF WBC: CPT

## 2023-10-18 RX ORDER — PANTOPRAZOLE SODIUM 20 MG/1
40 TABLET, DELAYED RELEASE ORAL EVERY 24 HOURS
Refills: 0 | Status: DISCONTINUED | OUTPATIENT
Start: 2023-10-18 | End: 2023-10-27

## 2023-10-18 RX ORDER — HEPARIN SODIUM 5000 [USP'U]/ML
4000 INJECTION INTRAVENOUS; SUBCUTANEOUS ONCE
Refills: 0 | Status: COMPLETED | OUTPATIENT
Start: 2023-10-18 | End: 2023-10-18

## 2023-10-18 RX ORDER — ONDANSETRON 8 MG/1
4 TABLET, FILM COATED ORAL ONCE
Refills: 0 | Status: COMPLETED | OUTPATIENT
Start: 2023-10-18 | End: 2023-10-18

## 2023-10-18 RX ORDER — SODIUM CHLORIDE 9 MG/ML
1000 INJECTION, SOLUTION INTRAVENOUS
Refills: 0 | Status: DISCONTINUED | OUTPATIENT
Start: 2023-10-18 | End: 2023-10-19

## 2023-10-18 RX ORDER — MORPHINE SULFATE 50 MG/1
2 CAPSULE, EXTENDED RELEASE ORAL ONCE
Refills: 0 | Status: DISCONTINUED | OUTPATIENT
Start: 2023-10-18 | End: 2023-10-18

## 2023-10-18 RX ORDER — AMLODIPINE BESYLATE 2.5 MG/1
1 TABLET ORAL
Refills: 0 | DISCHARGE

## 2023-10-18 RX ORDER — HEPARIN SODIUM 5000 [USP'U]/ML
2000 INJECTION INTRAVENOUS; SUBCUTANEOUS EVERY 6 HOURS
Refills: 0 | Status: DISCONTINUED | OUTPATIENT
Start: 2023-10-18 | End: 2023-10-21

## 2023-10-18 RX ORDER — HEPARIN SODIUM 5000 [USP'U]/ML
4000 INJECTION INTRAVENOUS; SUBCUTANEOUS EVERY 6 HOURS
Refills: 0 | Status: DISCONTINUED | OUTPATIENT
Start: 2023-10-18 | End: 2023-10-21

## 2023-10-18 RX ORDER — SODIUM CHLORIDE 9 MG/ML
1000 INJECTION INTRAMUSCULAR; INTRAVENOUS; SUBCUTANEOUS ONCE
Refills: 0 | Status: COMPLETED | OUTPATIENT
Start: 2023-10-18 | End: 2023-10-18

## 2023-10-18 RX ORDER — METOCLOPRAMIDE HCL 10 MG
10 TABLET ORAL ONCE
Refills: 0 | Status: COMPLETED | OUTPATIENT
Start: 2023-10-18 | End: 2023-10-18

## 2023-10-18 RX ORDER — HEPARIN SODIUM 5000 [USP'U]/ML
INJECTION INTRAVENOUS; SUBCUTANEOUS
Qty: 25000 | Refills: 0 | Status: DISCONTINUED | OUTPATIENT
Start: 2023-10-18 | End: 2023-10-21

## 2023-10-18 RX ORDER — LISINOPRIL 2.5 MG/1
1 TABLET ORAL
Refills: 0 | DISCHARGE

## 2023-10-18 RX ORDER — FAMOTIDINE 10 MG/ML
1 INJECTION INTRAVENOUS
Refills: 0 | DISCHARGE

## 2023-10-18 RX ORDER — HEPARIN SODIUM 5000 [USP'U]/ML
1000 INJECTION INTRAVENOUS; SUBCUTANEOUS
Qty: 25000 | Refills: 0 | Status: DISCONTINUED | OUTPATIENT
Start: 2023-10-18 | End: 2023-10-20

## 2023-10-18 RX ADMIN — HEPARIN SODIUM 4000 UNIT(S): 5000 INJECTION INTRAVENOUS; SUBCUTANEOUS at 04:58

## 2023-10-18 RX ADMIN — HEPARIN SODIUM 8 UNIT(S)/HR: 5000 INJECTION INTRAVENOUS; SUBCUTANEOUS at 11:40

## 2023-10-18 RX ADMIN — SODIUM CHLORIDE 1000 MILLILITER(S): 9 INJECTION INTRAMUSCULAR; INTRAVENOUS; SUBCUTANEOUS at 01:56

## 2023-10-18 RX ADMIN — Medication 10 MILLIGRAM(S): at 05:46

## 2023-10-18 RX ADMIN — HEPARIN SODIUM 8 UNIT(S)/HR: 5000 INJECTION INTRAVENOUS; SUBCUTANEOUS at 20:17

## 2023-10-18 RX ADMIN — SODIUM CHLORIDE 100 MILLILITER(S): 9 INJECTION, SOLUTION INTRAVENOUS at 09:35

## 2023-10-18 RX ADMIN — HEPARIN SODIUM 10 UNIT(S)/HR: 5000 INJECTION INTRAVENOUS; SUBCUTANEOUS at 09:49

## 2023-10-18 RX ADMIN — PANTOPRAZOLE SODIUM 40 MILLIGRAM(S): 20 TABLET, DELAYED RELEASE ORAL at 11:26

## 2023-10-18 RX ADMIN — ONDANSETRON 4 MILLIGRAM(S): 8 TABLET, FILM COATED ORAL at 01:57

## 2023-10-18 RX ADMIN — MORPHINE SULFATE 2 MILLIGRAM(S): 50 CAPSULE, EXTENDED RELEASE ORAL at 01:56

## 2023-10-18 RX ADMIN — HEPARIN SODIUM 1000 UNIT(S)/HR: 5000 INJECTION INTRAVENOUS; SUBCUTANEOUS at 04:59

## 2023-10-18 RX ADMIN — MORPHINE SULFATE 2 MILLIGRAM(S): 50 CAPSULE, EXTENDED RELEASE ORAL at 02:34

## 2023-10-18 RX ADMIN — ONDANSETRON 4 MILLIGRAM(S): 8 TABLET, FILM COATED ORAL at 03:30

## 2023-10-18 RX ADMIN — SODIUM CHLORIDE 1000 MILLILITER(S): 9 INJECTION INTRAMUSCULAR; INTRAVENOUS; SUBCUTANEOUS at 02:03

## 2023-10-18 NOTE — H&P ADULT - HISTORY OF PRESENT ILLNESS
63-year-old female with history of hypertension, diabetes presents with recurrent abdominal pain for last 1 year. Associated with nausea and vomiting. Notes usually feel olive after grain/or rice ingestion. Reports 40 lbs weight loss over last year. 1 loose stool, denies blood in stool. Denies numbness and tingling in lower extremities. Seen in Sept for similar pain. Reports that in early 2023 she had an endoscopy that showed gastritis. Vascular Surgery consulted for r/o mesenteric ischemia.     Not on any blood thinners  no prev vascular surgeries  PSH- c-sections x3, Pfannenstiel incision

## 2023-10-18 NOTE — H&P ADULT - NSHPPHYSICALEXAM_GEN_ALL_CORE
PHYSICAL EXAM  General: WN/WD NAD  Neurology: A&Ox3, nonfocal, GABRIEL x 4  Respiratory: CTA B/L  CV: RRR, S1S2  Abdominal: Soft, NT, ND   mild epigastric pain to palpation  Extremities: No edema, + peripheral pulses  palp fem pulses b/l

## 2023-10-18 NOTE — H&P ADULT - ATTENDING COMMENTS
Patient transferred from [facility] with acute on chronic mesenteric ischemia. 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 SMA with evidence of new soft plaque. Afebrile, WBC/lactate WNL,   -NPO  -IVF  -Heparin gtt  -Cardiology c/s  -OR planning for stent vs mesenteric bypass

## 2023-10-18 NOTE — ED PROVIDER NOTE - CLINICAL SUMMARY MEDICAL DECISION MAKING FREE TEXT BOX
63-year-old female with history of hypertension, diabetes presents with 3 days of abdominal pain.   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 No small bowel obstruction or active bowel inflammation. No cholelithiasis or CT evidence of acute cholecystitis.  Surgical house officer consulted  Discussed above with patient/family who agree with plan to initiate heparin for anticoagulation.  patient stable for admission 63-year-old female with history of hypertension, diabetes presents with 3 days of abdominal pain.   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 No small bowel obstruction or active bowel inflammation. No cholelithiasis or CT evidence of acute cholecystitis.  Surgical house officer consulted  Discussed above with patient/family who agree with plan to initiate heparin for anticoagulation.  Surgical house officer discussed case with vascular surgeon attending Dr. Morales who recommends transfer to [facility] ED for mesenteric bypass.  Discussed above with patient and family who agree with plan for transfer.  patient stable for transfer

## 2023-10-18 NOTE — ED ADULT NURSE NOTE - NSFALLUNIVINTERV_ED_ALL_ED
Bed/Stretcher in lowest position, wheels locked, appropriate side rails in place/Call bell, personal items and telephone in reach/Instruct patient to call for assistance before getting out of bed/chair/stretcher/Non-slip footwear applied when patient is off stretcher/[place] to call system/Physically safe environment - no spills, clutter or unnecessary equipment/Purposeful proactive rounding/Room/bathroom lighting operational, light cord in reach

## 2023-10-18 NOTE — ED ADULT NURSE REASSESSMENT NOTE - NS ED NURSE REASSESS COMMENT FT1
Received pt in stretcher- received as transfer from [place] for GI/surgical consult after CT was found to have mesenteric thrombus. On assessment, Patient is A/O x 4, primarily [place] speaking, history provided by son.  Denies abdominal pain at this time, denies excessive bleeding. Patient received with heparin 25,000 U in dextrose 5% 250 mL infusing at 10 ml/hr: Dose 1000U. (order seen in chart that was printed from [place] page 12/13). Surgery at bedside, pt will remain on heparin. KATERYNA Karimi will place new order in. New PTT with be drawn at 11 am- 6 hours after heparin was originally started. Pending bed assignment Received pt in stretcher- received as transfer from [place] for GI/surgical consult after CT was found to have mesenteric thrombus. On assessment, Patient is A/O x 4, primarily Korean speaking, history provided by son.  Denies abdominal pain at this time, denies excessive bleeding. Patient received with heparin 25,000 U in dextrose 5% 250 mL infusing at 10 ml/hr: Dose 1000U- based on aPTT of 29.6 . (order seen in chart that was printed from [place] page 12/13). Surgery at bedside, pt will remain on heparin. KATERYNA Karimi will place new order in. New PTT with be drawn at 11 am- 6 hours after heparin was originally started. Pending bed assignment

## 2023-10-18 NOTE — ED PROVIDER NOTE - CLINICAL SUMMARY MEDICAL DECISION MAKING FREE TEXT BOX
63-year-old female with past medical history of hypertension, diabetes, presenting to the ED with abdominal pain for 3 days.  Patient is a transfer from [place], was found to have SMA occlusion on CT abdomen pelvis, and started on IV heparin. HD stable. Imp: Dx of SMA occlusion. Vascular surgery consulted.

## 2023-10-18 NOTE — ED PROVIDER NOTE - OBJECTIVE STATEMENT
63-year-old female with past medical history of hypertension, diabetes, presenting to the ED with abdominal pain for 3 days.  Patient is a transfer from [place], was found to have SMA occlusion on CT abdomen pelvis, and started on IV heparin.  Patient states he has been having pain over the past year, had seen a GI doctor with multiple work-up outpatient that have been unremarkable.

## 2023-10-18 NOTE — ED PROVIDER NOTE - OBJECTIVE STATEMENT
63-year-old female with history of hypertension, diabetes presents with 3 days of abdominal pain.  Reports pain mostly on the right side of the abdomen.  Associated with nausea and vomiting.  Reports 1 loose stool a few days ago.  Denies fever, urinary symptoms.  Reports similar symptoms in the past and reports she was seen in the ED 1 month ago but they did not find a cause for pain.  Reports she is followed by gastroenterology and has been prescribed Zofran to take at home for her pain.  Reports that in early 2023 she had an endoscopy that showed gastritis.  Evaluation performed in [place] language by me.

## 2023-10-18 NOTE — H&P ADULT - NSHPLABSRESULTS_GEN_ALL_CORE
ADIOLOGY & ADDITIONAL STUDIES:    FINDINGS:  LOWER CHEST: Subsegmental atelectasis.    LIVER: Within normal limits.  BILE DUCTS: Normal caliber.  GALLBLADDER: Within normal limits.  SPLEEN: Within normal limits.  PANCREAS: Mild nonspecific prominence of the pancreatic duct.  ADRENALS: Within normal limits.  KIDNEYS/URETERS: Bilateral subcentimeter cortical hypodensities too small   to characterize. No hydroureteronephrosis or calculi.    BLADDER: Within normal limits.  REPRODUCTIVE ORGANS: Unremarkable uterus.    BOWEL: No bowel obstruction. Appendix is not visualized. No evidence of   inflammation in the pericecal region.  PERITONEUM: No ascites.  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 Atherosclerotic changes   of the abdominal aorta without evidence of aneurysmal dilatation.  RETROPERITONEUM/LYMPH NODES: No lymphadenopathy.  ABDOMINAL WALL: Within normal limits.  BONES: Degenerative changes.    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  No small bowel obstruction or active bowel inflammation.  No cholelithiasis or CT evidence of acute cholecystitis.    < end of copied text >

## 2023-10-18 NOTE — CONSULT NOTE ADULT - SUBJECTIVE AND OBJECTIVE BOX
HPI:  63-year-old female with history of hypertension, diabetes presents with recurrent abdominal pain.  Reports pain mostly on the right side of the abdomen.  Associated with nausea and vomiting.  Per family has been going on for about 1 year mostly after eating grains or rice. Reports 1 loose stool a few days ago.  Denies fever, urinary symptoms.  Reports similar symptoms in the past and reports she was seen in the ED 1 month ago but they did not find a cause for pain.  Reports she is followed by gastroenterology and has been prescribed Zofran to take at home for her pain.  Reports that in early 2023 she had an endoscopy that showed gastritis.  Not on any blood thinners  no prev vascular surgeries  PSH- c-sections    < from: CT Abdomen and Pelvis w/ IV Cont (10.18.23 @ 04:18) >    FINDINGS:  LOWER CHEST: Subsegmental atelectasis.    LIVER: Within normal limits.  BILE DUCTS: Normal caliber.  GALLBLADDER: Within normal limits.  SPLEEN: Within normal limits.  PANCREAS: Mild nonspecific prominence of the pancreatic duct.  ADRENALS: Within normal limits.  KIDNEYS/URETERS: Bilateral subcentimeter cortical hypodensities too small   to characterize. No hydroureteronephrosis or calculi.    BLADDER: Within normal limits.  REPRODUCTIVE ORGANS: Unremarkable uterus.    BOWEL: No bowel obstruction. Appendix is not visualized. No evidence of   inflammation in the pericecal region.  PERITONEUM: No ascites.  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 Atherosclerotic changes   of the abdominal aorta without evidence of aneurysmal dilatation.  RETROPERITONEUM/LYMPH NODES: No lymphadenopathy.  ABDOMINAL WALL: Within normal limits.  BONES: Degenerative changes.    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  No small bowel obstruction or active bowel inflammation.  No cholelithiasis or CT evidence of acute cholecystitis.    < end of copied text >      PAST MEDICAL & SURGICAL HISTORY:  Positive H. pylori test          Vital Signs Last 24 Hrs  T(C): 36.8 (18 Oct 2023 06:09), Max: 37.1 (17 Oct 2023 22:34)  T(F): 98.3 (18 Oct 2023 06:09), Max: 98.8 (17 Oct 2023 22:34)  HR: 51 (18 Oct 2023 06:09) (51 - 69)  BP: 139/59 (18 Oct 2023 06:09) (136/77 - 139/59)  BP(mean): --  RR: 18 (18 Oct 2023 06:09) (18 - 18)  SpO2: 96% (18 Oct 2023 06:09) (69% - 96%)    Parameters below as of 18 Oct 2023 06:09  Patient On (Oxygen Delivery Method): room air                              14.5   5.81  )-----------( 304      ( 18 Oct 2023 01:36 )             43.5     10-18    142  |  105  |  14  ----------------------------<  147<H>  3.7   |  27  |  1.01    Ca    9.2      18 Oct 2023 01:36    TPro  8.3  /  Alb  3.8  /  TBili  0.5  /  DBili  x   /  AST  20  /  ALT  38  /  AlkPhos  82  10-18    PT/INR - ( 18 Oct 2023 04:43 )   PT: 11.7 sec;   INR: 1.03 ratio         PTT - ( 18 Oct 2023 04:43 )  PTT:29.6 sec    PHYSICAL EXAM  General: WN/WD NAD  Neurology: A&Ox3, nonfocal, GABRIEL x 4  Respiratory: CTA B/L  CV: RRR, S1S2  Abdominal: Soft, NT, ND   Extremities: No edema, + peripheral pulses  palp fem pulses b/l      ASSESSMENT/ PLAN:  63-year-old female with history of hypertension, diabetes presents with recurrent abdominal pain.  Reports pain mostly on the right side of the abdomen.  Associated with nausea and vomiting.  Per family has been going on for about 1 year mostly after eating grains or rice. Reports 1 loose stool a few days ago.  Denies fever, urinary symptoms.  Reports similar symptoms in the past and reports she was seen in the ED 1 month ago but they did not find a cause for pain.  Reports she is followed by gastroenterology and has been prescribed Zofran to take at home for her pain.  Reports that in early 2023 she had an endoscopy that showed gastritis.  Not on any blood thinners  no prev vascular surgeries  PSH- c-sections    < from: CT Abdomen and Pelvis w/ IV Cont (10.18.23 @ 04:18) >    FINDINGS:  LOWER CHEST: Subsegmental atelectasis.    LIVER: Within normal limits.  BILE DUCTS: Normal caliber.  GALLBLADDER: Within normal limits.  SPLEEN: Within normal limits.  PANCREAS: Mild nonspecific prominence of the pancreatic duct.  ADRENALS: Within normal limits.  KIDNEYS/URETERS: Bilateral subcentimeter cortical hypodensities too small   to characterize. No hydroureteronephrosis or calculi.    BLADDER: Within normal limits.  REPRODUCTIVE ORGANS: Unremarkable uterus.    BOWEL: No bowel obstruction. Appendix is not visualized. No evidence of   inflammation in the pericecal region.  PERITONEUM: No ascites.  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 Atherosclerotic changes   of the abdominal aorta without evidence of aneurysmal dilatation.  RETROPERITONEUM/LYMPH NODES: No lymphadenopathy.  ABDOMINAL WALL: Within normal limits.  BONES: Degenerative changes.    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  No small bowel obstruction or active bowel inflammation.  No cholelithiasis or CT evidence of acute cholecystitis.    < end of copied text >    case discussed with and imaging reviewed by Dr Morales  pt will need urgent transfer to [facility] for vascular eval; potential mesenteric bypass

## 2023-10-18 NOTE — ED ADULT NURSE REASSESSMENT NOTE - NS ED NURSE REASSESS COMMENT FT1
Heparin restarted at 8 ml/hr after being paused for an hour. Next pTT will be collected at 5:45pm. Vascular team made aware.

## 2023-10-18 NOTE — ED ADULT TRIAGE NOTE - CHIEF COMPLAINT QUOTE
xfer from [facility]- dx with mesenteric occlusion- arrives on heparin gtt @ 10mL/hr-endorsing abd pain/nausea

## 2023-10-18 NOTE — CONSULT NOTE ADULT - SUBJECTIVE AND OBJECTIVE BOX
VASCULAR SURGERY CONSULT NOTE    HPI:    63-year-old female with history of hypertension, diabetes presents with recurrent abdominal pain for last 1 year. Associated with nausea and vomiting. Notes usually feel olive after grain/or rice ingestion. Reports 40 lbs weight loss over last year. 1 loose stool, denies blood in stool. Denies numbness and tingling in lower extremities. Seen in Sept for similar pain. Reports that in early  she had an endoscopy that showed gastritis. Vascular Surgery consulted for r/o mesenteric ischemia.     Not on any blood thinners  no prev vascular surgeries  PSH- c-sections x3, Pfannenstiel incision        PAST MEDICAL & SURGICAL HISTORY:  Positive H. pylori test          MEDICATIONS  (STANDING):    MEDICATIONS  (PRN):      Allergies    No Known Allergies    Intolerances        SOCIAL HISTORY:    FAMILY HISTORY:      Physical Exam:  PHYSICAL EXAM  General: WN/WD NAD  Neurology: A&Ox3, nonfocal, GABRIEL x 4  Respiratory: CTA B/L  CV: RRR, S1S2  Abdominal: Soft, NT, ND   mild epigastric pain to palpation  Extremities: No edema, + peripheral pulses  palp fem pulses b/l    Vital Signs Last 24 Hrs  T(C): 36.8 (18 Oct 2023 07:04), Max: 37.1 (17 Oct 2023 22:34)  T(F): 98.2 (18 Oct 2023 07:04), Max: 98.8 (17 Oct 2023 22:34)  HR: 55 (18 Oct 2023 07:04) (51 - 69)  BP: 103/65 (18 Oct 2023 07:04) (103/65 - 139/59)  BP(mean): --  RR: 18 (18 Oct 2023 07:04) (18 - 18)  SpO2: 100% (18 Oct 2023 07:04) (69% - 100%)    Parameters below as of 18 Oct 2023 07:04  Patient On (Oxygen Delivery Method): room air        I&O's Summary          LABS:                        14.5   5.81  )-----------( 304      ( 18 Oct 2023 01:36 )             43.5     10-18    142  |  105  |  14  ----------------------------<  147<H>  3.7   |  27  |  1.01    Ca    9.2      18 Oct 2023 01:36    TPro  8.3  /  Alb  3.8  /  TBili  0.5  /  DBili  x   /  AST  20  /  ALT  38  /  AlkPhos  82  10-18    PT/INR - ( 18 Oct 2023 04:43 )   PT: 11.7 sec;   INR: 1.03 ratio         PTT - ( 18 Oct 2023 04:43 )  PTT:29.6 sec  Urinalysis Basic - ( 18 Oct 2023 05:00 )    Color: Yellow / Appearance: Clear / S.035 / pH: x  Gluc: x / Ketone: 15 mg/dL  / Bili: Negative / Urobili: 0.2 mg/dL   Blood: x / Protein: Negative mg/dL / Nitrite: Negative   Leuk Esterase: Negative / RBC: 3 /HPF / WBC 0 /HPF   Sq Epi: x / Non Sq Epi: x / Bacteria: Few /HPF      CAPILLARY BLOOD GLUCOSE      POCT Blood Glucose.: 106 mg/dL (18 Oct 2023 07:03)    LIVER FUNCTIONS - ( 18 Oct 2023 01:36 )  Alb: 3.8 g/dL / Pro: 8.3 g/dL / ALK PHOS: 82 U/L / ALT: 38 U/L DA / AST: 20 U/L / GGT: x             Cultures:      RADIOLOGY & ADDITIONAL STUDIES:    FINDINGS:  LOWER CHEST: Subsegmental atelectasis.    LIVER: Within normal limits.  BILE DUCTS: Normal caliber.  GALLBLADDER: Within normal limits.  SPLEEN: Within normal limits.  PANCREAS: Mild nonspecific prominence of the pancreatic duct.  ADRENALS: Within normal limits.  KIDNEYS/URETERS: Bilateral subcentimeter cortical hypodensities too small   to characterize. No hydroureteronephrosis or calculi.    BLADDER: Within normal limits.  REPRODUCTIVE ORGANS: Unremarkable uterus.    BOWEL: No bowel obstruction. Appendix is not visualized. No evidence of   inflammation in the pericecal region.  PERITONEUM: No ascites.  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 Atherosclerotic changes   of the abdominal aorta without evidence of aneurysmal dilatation.  RETROPERITONEUM/LYMPH NODES: No lymphadenopathy.  ABDOMINAL WALL: Within normal limits.  BONES: Degenerative changes.    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  No small bowel obstruction or active bowel inflammation.  No cholelithiasis or CT evidence of acute cholecystitis.    < end of copied text >        Plan:  63-year-old female with history of hypertension, diabetes presents withn intermittent abdominal pain for last 1 year associated with 40 lbs weight loss. Denies blood in stool.CTA with noted proximal SMA occlusion with reconstitution.  Vascular surgery consulted for r/o mesenteric ischemia.       plan pending d/w attending       VASCULAR SURGERY CONSULT NOTE    HPI:    63-year-old female with history of hypertension, diabetes presents with recurrent abdominal pain for last 1 year. Associated with nausea and vomiting. Notes usually feel olive after grain/or rice ingestion. Reports 40 lbs weight loss over last year. 1 loose stool, denies blood in stool. Denies numbness and tingling in lower extremities. Seen in Sept for similar pain. Reports that in early  she had an endoscopy that showed gastritis. Vascular Surgery consulted for r/o mesenteric ischemia.     Not on any blood thinners  no prev vascular surgeries  PSH- c-sections x3, Pfannenstiel incision        PAST MEDICAL & SURGICAL HISTORY:  Positive H. pylori test          MEDICATIONS  (STANDING):    MEDICATIONS  (PRN):      Allergies    No Known Allergies    Intolerances        SOCIAL HISTORY:    FAMILY HISTORY:      Physical Exam:  PHYSICAL EXAM  General: WN/WD NAD  Neurology: A&Ox3, nonfocal, GABRIEL x 4  Respiratory: CTA B/L  CV: RRR, S1S2  Abdominal: Soft, NT, ND   mild epigastric pain to palpation  Extremities: No edema, + peripheral pulses  palp fem pulses b/l    Vital Signs Last 24 Hrs  T(C): 36.8 (18 Oct 2023 07:04), Max: 37.1 (17 Oct 2023 22:34)  T(F): 98.2 (18 Oct 2023 07:04), Max: 98.8 (17 Oct 2023 22:34)  HR: 55 (18 Oct 2023 07:04) (51 - 69)  BP: 103/65 (18 Oct 2023 07:04) (103/65 - 139/59)  BP(mean): --  RR: 18 (18 Oct 2023 07:04) (18 - 18)  SpO2: 100% (18 Oct 2023 07:04) (69% - 100%)    Parameters below as of 18 Oct 2023 07:04  Patient On (Oxygen Delivery Method): room air        I&O's Summary          LABS:                        14.5   5.81  )-----------( 304      ( 18 Oct 2023 01:36 )             43.5     10-18    142  |  105  |  14  ----------------------------<  147<H>  3.7   |  27  |  1.01    Ca    9.2      18 Oct 2023 01:36    TPro  8.3  /  Alb  3.8  /  TBili  0.5  /  DBili  x   /  AST  20  /  ALT  38  /  AlkPhos  82  10-18    PT/INR - ( 18 Oct 2023 04:43 )   PT: 11.7 sec;   INR: 1.03 ratio         PTT - ( 18 Oct 2023 04:43 )  PTT:29.6 sec  Urinalysis Basic - ( 18 Oct 2023 05:00 )    Color: Yellow / Appearance: Clear / S.035 / pH: x  Gluc: x / Ketone: 15 mg/dL  / Bili: Negative / Urobili: 0.2 mg/dL   Blood: x / Protein: Negative mg/dL / Nitrite: Negative   Leuk Esterase: Negative / RBC: 3 /HPF / WBC 0 /HPF   Sq Epi: x / Non Sq Epi: x / Bacteria: Few /HPF      CAPILLARY BLOOD GLUCOSE      POCT Blood Glucose.: 106 mg/dL (18 Oct 2023 07:03)    LIVER FUNCTIONS - ( 18 Oct 2023 01:36 )  Alb: 3.8 g/dL / Pro: 8.3 g/dL / ALK PHOS: 82 U/L / ALT: 38 U/L DA / AST: 20 U/L / GGT: x             Cultures:      RADIOLOGY & ADDITIONAL STUDIES:    FINDINGS:  LOWER CHEST: Subsegmental atelectasis.    LIVER: Within normal limits.  BILE DUCTS: Normal caliber.  GALLBLADDER: Within normal limits.  SPLEEN: Within normal limits.  PANCREAS: Mild nonspecific prominence of the pancreatic duct.  ADRENALS: Within normal limits.  KIDNEYS/URETERS: Bilateral subcentimeter cortical hypodensities too small   to characterize. No hydroureteronephrosis or calculi.    BLADDER: Within normal limits.  REPRODUCTIVE ORGANS: Unremarkable uterus.    BOWEL: No bowel obstruction. Appendix is not visualized. No evidence of   inflammation in the pericecal region.  PERITONEUM: No ascites.  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 Atherosclerotic changes   of the abdominal aorta without evidence of aneurysmal dilatation.  RETROPERITONEUM/LYMPH NODES: No lymphadenopathy.  ABDOMINAL WALL: Within normal limits.  BONES: Degenerative changes.    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  No small bowel obstruction or active bowel inflammation.  No cholelithiasis or CT evidence of acute cholecystitis.    < end of copied text >        Plan:  63-year-old female with history of hypertension, diabetes presents withn intermittent abdominal pain for last 1 year associated with 40 lbs weight loss. Denies blood in stool.CTA with noted proximal SMA occlusion with reconstitution.  Vascular surgery consulted for r/o mesenteric ischemia.     - please obtain cbc, bmp, coags, and lactate stat    plan pending d/w attending

## 2023-10-18 NOTE — ED ADULT NURSE NOTE - NSFALLHARMRISKINTERV_ED_ALL_ED

## 2023-10-18 NOTE — PATIENT PROFILE ADULT - FALL HARM RISK - HARM RISK INTERVENTIONS

## 2023-10-18 NOTE — ED ADULT NURSE NOTE - CHIEF COMPLAINT QUOTE
xfer from [facility]- dx with mesenteric occlusion- arrives on heparin gtt @ 10mL/hr-endorsing abd pain/nausea

## 2023-10-18 NOTE — H&P ADULT - ASSESSMENT
Plan:  63-year-old female with history of hypertension, diabetes presents withn intermittent abdominal pain for last 1 year associated with 40 lbs weight loss. Denies blood in stool.CTA with noted proximal SMA occlusion with reconstitution.  Vascular surgery consulted for r/o mesenteric ischemia.     -please obtain cbc, bmp, coags, and lactate stat  - stat CTA of abd and pelvis  - NPO/IVF  - heparin gtt; patient specific nomogram goal 59-99  - possible OR on Friday 10/20  pending results of CTA   - admit to Dr. Morales, C Team Surgery    d/w Dr. Morales, vascular attending    C Team [id number]

## 2023-10-18 NOTE — ED ADULT NURSE REASSESSMENT NOTE - NS ED NURSE REASSESS COMMENT FT1
Report given to floor, repeat ptt and cbc ordered- report given to HEIDE Gonzalez for continuity of care.

## 2023-10-18 NOTE — ED ADULT NURSE REASSESSMENT NOTE - NS ED NURSE REASSESS COMMENT FT1
pTT was taken when patient arrived in the ED, Lab called to confirm that pTT was over 200. Vascular surgery resident Dr. Martinez responded, heparin will be stopped for an hour and restarted at a different rate.

## 2023-10-18 NOTE — ED PROVIDER NOTE - ATTENDING APP SHARED VISIT CONTRIBUTION OF CARE
Attending Statement: I have reviewed and agree with all pertinent clinical information, including history and physical exam and agree with treatment plan of the PA, except as noted.  63yoF hx of HTN, DM was transferred to [facility] for evaluation of vascular surgery concern for mesenteric ischemia on CAT scan.  Patient had a CAT scan at outside hospital when she presented with abdominal pain nausea and vomiting.  Patient was started on heparin prior to transfer currently on a heparin drip.  Labs were unremarkable.  Patient not on blood thinners at home no prior surgeries other than a  section.  Patient nontoxic well-appearing female sitting up in bed.  Ao3 normal S1-S2 No resp distress. able to speak in full and clear sentences. no wheeze, rales or stridor. soft nondistended abdomen tender in the upper mid abdomen.   plan vascular consult and admission.

## 2023-10-18 NOTE — ED ADULT NURSE NOTE - OBJECTIVE STATEMENT
63 year old female Hx of HTN & DM2,  A*Ox4, Luxembourger speaking transferred from [facility] for vascular services. Pt had 3 days of abdominal pain, nausea, vomiting. Denies fever, chills, diarrhea. Endorses RUQ pain.  Pt arrive with b/l  20g a/c,   heparin infusing at 10ml//hr via left 20g. NSR on cardiac monitor, call bell in reach, Pt Luxembourger speaking, son in law translating.

## 2023-10-18 NOTE — ED ADULT NURSE REASSESSMENT NOTE - NS ED NURSE REASSESS COMMENT FT1
Lab called with ptt result of >200, Surgery Team paged at [phone] and Primary Care team contacted on teams. Heparin paused until given ok by covering team

## 2023-10-19 ENCOUNTER — TRANSCRIPTION ENCOUNTER (OUTPATIENT)
Age: 63
End: 2023-10-19

## 2023-10-19 DIAGNOSIS — I10 ESSENTIAL (PRIMARY) HYPERTENSION: ICD-10-CM

## 2023-10-19 DIAGNOSIS — Z29.9 ENCOUNTER FOR PROPHYLACTIC MEASURES, UNSPECIFIED: ICD-10-CM

## 2023-10-19 DIAGNOSIS — E11.9 TYPE 2 DIABETES MELLITUS WITHOUT COMPLICATIONS: ICD-10-CM

## 2023-10-19 DIAGNOSIS — K29.70 GASTRITIS, UNSPECIFIED, WITHOUT BLEEDING: ICD-10-CM

## 2023-10-19 DIAGNOSIS — K55.9 VASCULAR DISORDER OF INTESTINE, UNSPECIFIED: ICD-10-CM

## 2023-10-19 DIAGNOSIS — Z01.818 ENCOUNTER FOR OTHER PREPROCEDURAL EXAMINATION: ICD-10-CM

## 2023-10-19 LAB
A1C WITH ESTIMATED AVERAGE GLUCOSE RESULT: 5.8 % — HIGH (ref 4–5.6)
A1C WITH ESTIMATED AVERAGE GLUCOSE RESULT: 5.8 % — HIGH (ref 4–5.6)
ANION GAP SERPL CALC-SCNC: 11 MMOL/L — SIGNIFICANT CHANGE UP (ref 7–14)
APTT BLD: 56 SEC — HIGH (ref 24.5–35.6)
APTT BLD: 56 SEC — HIGH (ref 24.5–35.6)
APTT BLD: 77.7 SEC — HIGH (ref 24.5–35.6)
APTT BLD: 77.7 SEC — HIGH (ref 24.5–35.6)
BLD GP AB SCN SERPL QL: NEGATIVE — SIGNIFICANT CHANGE UP
BLD GP AB SCN SERPL QL: NEGATIVE — SIGNIFICANT CHANGE UP
BUN SERPL-MCNC: 10 MG/DL — SIGNIFICANT CHANGE UP (ref 7–23)
BUN SERPL-MCNC: 10 MG/DL — SIGNIFICANT CHANGE UP (ref 7–23)
BUN SERPL-MCNC: 11 MG/DL — SIGNIFICANT CHANGE UP (ref 7–23)
BUN SERPL-MCNC: 11 MG/DL — SIGNIFICANT CHANGE UP (ref 7–23)
CALCIUM SERPL-MCNC: 8.8 MG/DL — SIGNIFICANT CHANGE UP (ref 8.4–10.5)
CALCIUM SERPL-MCNC: 8.8 MG/DL — SIGNIFICANT CHANGE UP (ref 8.4–10.5)
CALCIUM SERPL-MCNC: 9.5 MG/DL — SIGNIFICANT CHANGE UP (ref 8.4–10.5)
CALCIUM SERPL-MCNC: 9.5 MG/DL — SIGNIFICANT CHANGE UP (ref 8.4–10.5)
CHLORIDE SERPL-SCNC: 105 MMOL/L — SIGNIFICANT CHANGE UP (ref 98–107)
CO2 SERPL-SCNC: 24 MMOL/L — SIGNIFICANT CHANGE UP (ref 22–31)
CREAT SERPL-MCNC: 0.84 MG/DL — SIGNIFICANT CHANGE UP (ref 0.5–1.3)
CULTURE RESULTS: SIGNIFICANT CHANGE UP
CULTURE RESULTS: SIGNIFICANT CHANGE UP
EGFR: 77 ML/MIN/1.73M2 — SIGNIFICANT CHANGE UP
ESTIMATED AVERAGE GLUCOSE: 120 — SIGNIFICANT CHANGE UP
ESTIMATED AVERAGE GLUCOSE: 120 — SIGNIFICANT CHANGE UP
GLUCOSE SERPL-MCNC: 79 MG/DL — SIGNIFICANT CHANGE UP (ref 70–99)
GLUCOSE SERPL-MCNC: 79 MG/DL — SIGNIFICANT CHANGE UP (ref 70–99)
GLUCOSE SERPL-MCNC: 85 MG/DL — SIGNIFICANT CHANGE UP (ref 70–99)
GLUCOSE SERPL-MCNC: 85 MG/DL — SIGNIFICANT CHANGE UP (ref 70–99)
HCT VFR BLD CALC: 39.8 % — SIGNIFICANT CHANGE UP (ref 34.5–45)
HCT VFR BLD CALC: 39.8 % — SIGNIFICANT CHANGE UP (ref 34.5–45)
HCT VFR BLD CALC: 43.2 % — SIGNIFICANT CHANGE UP (ref 34.5–45)
HCT VFR BLD CALC: 43.2 % — SIGNIFICANT CHANGE UP (ref 34.5–45)
HGB BLD-MCNC: 12.8 G/DL — SIGNIFICANT CHANGE UP (ref 11.5–15.5)
HGB BLD-MCNC: 12.8 G/DL — SIGNIFICANT CHANGE UP (ref 11.5–15.5)
HGB BLD-MCNC: 13.7 G/DL — SIGNIFICANT CHANGE UP (ref 11.5–15.5)
HGB BLD-MCNC: 13.7 G/DL — SIGNIFICANT CHANGE UP (ref 11.5–15.5)
LACTATE SERPL-SCNC: 1 MMOL/L — SIGNIFICANT CHANGE UP (ref 0.5–2)
LACTATE SERPL-SCNC: 1 MMOL/L — SIGNIFICANT CHANGE UP (ref 0.5–2)
MAGNESIUM SERPL-MCNC: 2.1 MG/DL — SIGNIFICANT CHANGE UP (ref 1.6–2.6)
MAGNESIUM SERPL-MCNC: 2.1 MG/DL — SIGNIFICANT CHANGE UP (ref 1.6–2.6)
MAGNESIUM SERPL-MCNC: 2.3 MG/DL — SIGNIFICANT CHANGE UP (ref 1.6–2.6)
MAGNESIUM SERPL-MCNC: 2.3 MG/DL — SIGNIFICANT CHANGE UP (ref 1.6–2.6)
MCHC RBC-ENTMCNC: 27.6 PG — SIGNIFICANT CHANGE UP (ref 27–34)
MCHC RBC-ENTMCNC: 27.6 PG — SIGNIFICANT CHANGE UP (ref 27–34)
MCHC RBC-ENTMCNC: 27.8 PG — SIGNIFICANT CHANGE UP (ref 27–34)
MCHC RBC-ENTMCNC: 27.8 PG — SIGNIFICANT CHANGE UP (ref 27–34)
MCHC RBC-ENTMCNC: 31.7 GM/DL — LOW (ref 32–36)
MCHC RBC-ENTMCNC: 31.7 GM/DL — LOW (ref 32–36)
MCHC RBC-ENTMCNC: 32.2 GM/DL — SIGNIFICANT CHANGE UP (ref 32–36)
MCHC RBC-ENTMCNC: 32.2 GM/DL — SIGNIFICANT CHANGE UP (ref 32–36)
MCV RBC AUTO: 86.5 FL — SIGNIFICANT CHANGE UP (ref 80–100)
MCV RBC AUTO: 86.5 FL — SIGNIFICANT CHANGE UP (ref 80–100)
MCV RBC AUTO: 87.1 FL — SIGNIFICANT CHANGE UP (ref 80–100)
MCV RBC AUTO: 87.1 FL — SIGNIFICANT CHANGE UP (ref 80–100)
NRBC # BLD: 0 /100 WBCS — SIGNIFICANT CHANGE UP (ref 0–0)
NRBC # FLD: 0 K/UL — SIGNIFICANT CHANGE UP (ref 0–0)
PHOSPHATE SERPL-MCNC: 2.9 MG/DL — SIGNIFICANT CHANGE UP (ref 2.5–4.5)
PHOSPHATE SERPL-MCNC: 2.9 MG/DL — SIGNIFICANT CHANGE UP (ref 2.5–4.5)
PHOSPHATE SERPL-MCNC: 3.5 MG/DL — SIGNIFICANT CHANGE UP (ref 2.5–4.5)
PHOSPHATE SERPL-MCNC: 3.5 MG/DL — SIGNIFICANT CHANGE UP (ref 2.5–4.5)
PLATELET # BLD AUTO: 274 K/UL — SIGNIFICANT CHANGE UP (ref 150–400)
PLATELET # BLD AUTO: 274 K/UL — SIGNIFICANT CHANGE UP (ref 150–400)
PLATELET # BLD AUTO: 291 K/UL — SIGNIFICANT CHANGE UP (ref 150–400)
PLATELET # BLD AUTO: 291 K/UL — SIGNIFICANT CHANGE UP (ref 150–400)
POTASSIUM SERPL-MCNC: 3.1 MMOL/L — LOW (ref 3.5–5.3)
POTASSIUM SERPL-MCNC: 3.1 MMOL/L — LOW (ref 3.5–5.3)
POTASSIUM SERPL-MCNC: 4.4 MMOL/L — SIGNIFICANT CHANGE UP (ref 3.5–5.3)
POTASSIUM SERPL-MCNC: 4.4 MMOL/L — SIGNIFICANT CHANGE UP (ref 3.5–5.3)
POTASSIUM SERPL-SCNC: 3.1 MMOL/L — LOW (ref 3.5–5.3)
POTASSIUM SERPL-SCNC: 3.1 MMOL/L — LOW (ref 3.5–5.3)
POTASSIUM SERPL-SCNC: 4.4 MMOL/L — SIGNIFICANT CHANGE UP (ref 3.5–5.3)
POTASSIUM SERPL-SCNC: 4.4 MMOL/L — SIGNIFICANT CHANGE UP (ref 3.5–5.3)
RBC # BLD: 4.6 M/UL — SIGNIFICANT CHANGE UP (ref 3.8–5.2)
RBC # BLD: 4.6 M/UL — SIGNIFICANT CHANGE UP (ref 3.8–5.2)
RBC # BLD: 4.96 M/UL — SIGNIFICANT CHANGE UP (ref 3.8–5.2)
RBC # BLD: 4.96 M/UL — SIGNIFICANT CHANGE UP (ref 3.8–5.2)
RBC # FLD: 14.2 % — SIGNIFICANT CHANGE UP (ref 10.3–14.5)
RH IG SCN BLD-IMP: POSITIVE — SIGNIFICANT CHANGE UP
RH IG SCN BLD-IMP: POSITIVE — SIGNIFICANT CHANGE UP
SODIUM SERPL-SCNC: 140 MMOL/L — SIGNIFICANT CHANGE UP (ref 135–145)
SPECIMEN SOURCE: SIGNIFICANT CHANGE UP
SPECIMEN SOURCE: SIGNIFICANT CHANGE UP
WBC # BLD: 5.3 K/UL — SIGNIFICANT CHANGE UP (ref 3.8–10.5)
WBC # BLD: 5.3 K/UL — SIGNIFICANT CHANGE UP (ref 3.8–10.5)
WBC # BLD: 6.27 K/UL — SIGNIFICANT CHANGE UP (ref 3.8–10.5)
WBC # BLD: 6.27 K/UL — SIGNIFICANT CHANGE UP (ref 3.8–10.5)
WBC # FLD AUTO: 5.3 K/UL — SIGNIFICANT CHANGE UP (ref 3.8–10.5)
WBC # FLD AUTO: 5.3 K/UL — SIGNIFICANT CHANGE UP (ref 3.8–10.5)
WBC # FLD AUTO: 6.27 K/UL — SIGNIFICANT CHANGE UP (ref 3.8–10.5)
WBC # FLD AUTO: 6.27 K/UL — SIGNIFICANT CHANGE UP (ref 3.8–10.5)

## 2023-10-19 PROCEDURE — 99233 SBSQ HOSP IP/OBS HIGH 50: CPT | Mod: 57

## 2023-10-19 PROCEDURE — 99223 1ST HOSP IP/OBS HIGH 75: CPT

## 2023-10-19 PROCEDURE — 93306 TTE W/DOPPLER COMPLETE: CPT | Mod: 26

## 2023-10-19 PROCEDURE — 71045 X-RAY EXAM CHEST 1 VIEW: CPT | Mod: 26

## 2023-10-19 RX ORDER — SODIUM CHLORIDE 9 MG/ML
1000 INJECTION, SOLUTION INTRAVENOUS
Refills: 0 | Status: DISCONTINUED | OUTPATIENT
Start: 2023-10-19 | End: 2023-10-20

## 2023-10-19 RX ORDER — POTASSIUM CHLORIDE 20 MEQ
20 PACKET (EA) ORAL
Refills: 0 | Status: COMPLETED | OUTPATIENT
Start: 2023-10-19 | End: 2023-10-19

## 2023-10-19 RX ADMIN — Medication 50 MILLIEQUIVALENT(S): at 03:23

## 2023-10-19 RX ADMIN — HEPARIN SODIUM 7 UNIT(S)/HR: 5000 INJECTION INTRAVENOUS; SUBCUTANEOUS at 01:10

## 2023-10-19 RX ADMIN — SODIUM CHLORIDE 100 MILLILITER(S): 9 INJECTION, SOLUTION INTRAVENOUS at 11:55

## 2023-10-19 RX ADMIN — HEPARIN SODIUM 7 UNIT(S)/HR: 5000 INJECTION INTRAVENOUS; SUBCUTANEOUS at 08:29

## 2023-10-19 RX ADMIN — Medication 50 MILLIEQUIVALENT(S): at 08:29

## 2023-10-19 RX ADMIN — HEPARIN SODIUM 8 UNIT(S)/HR: 5000 INJECTION INTRAVENOUS; SUBCUTANEOUS at 11:56

## 2023-10-19 RX ADMIN — PANTOPRAZOLE SODIUM 40 MILLIGRAM(S): 20 TABLET, DELAYED RELEASE ORAL at 11:58

## 2023-10-19 RX ADMIN — SODIUM CHLORIDE 100 MILLILITER(S): 9 INJECTION, SOLUTION INTRAVENOUS at 17:39

## 2023-10-19 RX ADMIN — HEPARIN SODIUM 8 UNIT(S)/HR: 5000 INJECTION INTRAVENOUS; SUBCUTANEOUS at 20:16

## 2023-10-19 RX ADMIN — Medication 50 MILLIEQUIVALENT(S): at 05:44

## 2023-10-19 NOTE — CONSULT NOTE ADULT - PROBLEM SELECTOR RECOMMENDATION 4
Type 2 DM hx, A1C: 5.8 indicating prediabetes  Was taken off metformin in March 2023 by PCP  Can check FS once daily while inpatient but otherwise no indication for perioperative insulin use  goal FS<180

## 2023-10-19 NOTE — PROGRESS NOTE ADULT - ASSESSMENT
63-year-old female with history of hypertension, diabetes presents withn intermittent abdominal pain for last 1 year associated with 40 lbs weight loss. Denies blood in stool.CTA with noted proximal SMA occlusion with reconstitution patient admitted to vascular and started on heparin infusion, OR planning for tomorrow    - NPO/IVF  - heparin gtt; patient specific nomogram goal 59-99, patient under goal this am, increasing drop from 7 to 8  - medicine consult called  - cardiology consul for preop optimazation  - echo ordered, patient currently in echo lab  - chest x ray ordered  - type and screen ordered to be done with next set of PTT  - added on a1c for diabetes

## 2023-10-19 NOTE — PROGRESS NOTE ADULT - SUBJECTIVE AND OBJECTIVE BOX
[facility] pre-admissions is requesting packet. Please complete RN letter to complete packet   Morning Surgical Progress Note  Patient is a 63y old  Female who presents with a chief complaint of r/o mesenteric ischemia (18 Oct 2023 09:00)    SUBJECTIVE: Patient seen and examined at bedside with surgical team and patients daughter, patient without complaints. Denies pain at this time    Vital Signs Last 24 Hrs  T(C): 36.9 (19 Oct 2023 07:10), Max: 36.9 (18 Oct 2023 15:30)  T(F): 98.4 (19 Oct 2023 07:10), Max: 98.4 (18 Oct 2023 15:30)  HR: 57 (19 Oct 2023 07:10) (41 - 66)  BP: 141/55 (19 Oct 2023 07:10) (114/68 - 150/65)  BP(mean): --  RR: 16 (19 Oct 2023 07:10) (15 - 18)  SpO2: 99% (19 Oct 2023 07:10) (99% - 100%)    Parameters below as of 19 Oct 2023 07:10  Patient On (Oxygen Delivery Method): room air    I&O's Detail    Medications  MEDICATIONS  (STANDING):  heparin  Infusion 1000 Unit(s)/Hr (8 mL/Hr) IV Continuous <Continuous>  lactated ringers. 1000 milliLiter(s) (100 mL/Hr) IV Continuous <Continuous>  pantoprazole  Injectable 40 milliGRAM(s) IV Push every 24 hours    MEDICATIONS  (PRN):    Physical Exam  Constitutional: A&Ox3, NAD  Gastrointestinal: Soft nontender, nondistended  Extremities: Moving all extremities, no edema  Skin: No Rashes, Hematoma, Ecchymosis  LABS:                        13.7   5.30  )-----------( 291      ( 19 Oct 2023 07:55 )             43.2     10-19    140  |  105  |  10  ----------------------------<  85  3.1<L>   |  24  |  0.84    Ca    8.8      19 Oct 2023 01:09  Phos  3.5     10-19  Mg     2.30     10-19    TPro  8.3  /  Alb  3.8  /  TBili  0.5  /  DBili  x   /  AST  20  /  ALT  38  /  AlkPhos  82  10-18    PT/INR - ( 18 Oct 2023 09:04 )   PT: 14.5 sec;   INR: 1.30 ratio         PTT - ( 19 Oct 2023 07:55 )  PTT:56.0 sec  LIVER FUNCTIONS - ( 18 Oct 2023 01:36 )  Alb: 3.8 g/dL / Pro: 8.3 g/dL / ALK PHOS: 82 U/L / ALT: 38 U/L DA / AST: 20 U/L / GGT: x           Urinalysis Basic - ( 19 Oct 2023 01:09 )    Color: x / Appearance: x / SG: x / pH: x  Gluc: 85 mg/dL / Ketone: x  / Bili: x / Urobili: x   Blood: x / Protein: x / Nitrite: x   Leuk Esterase: x / RBC: x / WBC x   Sq Epi: x / Non Sq Epi: x / Bacteria: x

## 2023-10-19 NOTE — CONSULT NOTE ADULT - ASSESSMENT
63-year-old female with history of hypertension, diabetes presents with recurrent abdominal pain    EKG: NSR no acute changes    1. Pre-op assessment  -Denies hx of MI or stents. No other cardiac hx.  ->4 METS Able to walk up a flight of stairs without chest pain or SOB  -EKG: NSR no acute changes  -TTE normal LV function  -optimized from cardiac standpoint for OR with vascular, RCRI class II, 6.0% risk of 30 day MACE      2. SMA occlusion  -vascular on board planned for OR     3. DVT prophylaxis  -hep gtt

## 2023-10-19 NOTE — CONSULT NOTE ADULT - PROBLEM SELECTOR RECOMMENDATION 6
on heparin gtt  NPO per primary team  Nutrition eval given 40lb weight loss: c/f protein calorie malnutrition once able to take PO

## 2023-10-19 NOTE — CONSULT NOTE ADULT - PROBLEM SELECTOR RECOMMENDATION 3
goal SBP<140  Home meds: lisinopril 40, amlodipine 10mg, HCTZ: 25  Continue to hold all antihypertensives for now  If SBP>150, can resume norvasc first

## 2023-10-19 NOTE — CONSULT NOTE ADULT - SUBJECTIVE AND OBJECTIVE BOX
CHIEF COMPLAINT: Patient is a 63y old  Female who presents with a chief complaint of r/o mesenteric ischemia (19 Oct 2023 09:13)      HPI: HPI:  63-year-old female with history of hypertension, diabetes presents with recurrent abdominal pain for last 1 year. Associated with nausea and vomiting. Notes usually feel olive after grain/or rice ingestion. Reports 40 lbs weight loss over last year. 1 loose stool, denies blood in stool. Denies numbness and tingling in lower extremities. Seen in Sept for similar pain. Reports that in early 2023 she had an endoscopy that showed gastritis. Vascular Surgery consulted for r/o mesenteric ischemia.     Not on any blood thinners  no prev vascular surgeries  PSH- c-sections x3, Pfannenstiel incision     (18 Oct 2023 09:00)    Denies hx of CVA, complications with anesthesia. No hx of chest pain or heart disease. Currently reports vague abd discomfort and small amount of watery, nonbloody diarrhea this morning.  Had hx of Hpylori gastritis which was treated for 6 weeks in Feb 2023. No longer taking meds for diabetes-stopped in March by PCP.    Pain Symptoms if applicable:             	                         none	   mild         moderate         severe  	                            0	    1-3	     4-6	         7-10  Pain:  Location:	  Modifying factors:	  Associated symptoms:	    Allergies    No Known Allergies    Intolerances        HOME MEDICATIONS: [x] Reviewed    MEDICATIONS  (STANDING):  dextrose 5% + sodium chloride 0.45%. 1000 milliLiter(s) (100 mL/Hr) IV Continuous <Continuous>  heparin  Infusion 1000 Unit(s)/Hr (8 mL/Hr) IV Continuous <Continuous>  pantoprazole  Injectable 40 milliGRAM(s) IV Push every 24 hours    MEDICATIONS  (PRN):      PAST MEDICAL & SURGICAL HISTORY:  Positive H. pylori test      [ ] Reviewed     SOCIAL HISTORY:  [x] Substance abuse: denies  [x] Tobacco: denies  [x] Alcohol use: denies    FAMILY HISTORY:  [x] No pertinent family history in first degree relatives   diabetes in mother and Alzheimer's father    REVIEW OF SYSTEMS:    [x] All other ROS negative  [  ] Unable to obtain due to poor mental status    Vital Signs Last 24 Hrs  T(C): 36.7 (19 Oct 2023 11:36), Max: 36.9 (18 Oct 2023 15:30)  T(F): 98.1 (19 Oct 2023 11:36), Max: 98.4 (18 Oct 2023 15:30)  HR: 83 (19 Oct 2023 11:36) (41 - 83)  BP: 138/58 (19 Oct 2023 11:36) (114/68 - 143/68)  BP(mean): --  RR: 16 (19 Oct 2023 11:36) (15 - 18)  SpO2: 100% (19 Oct 2023 11:36) (99% - 100%)    Parameters below as of 19 Oct 2023 11:36  Patient On (Oxygen Delivery Method): room air        PHYSICAL EXAM:    GENERAL: NAD, on room air  HEENT: PERRL, conjunctiva and sclera clear  ENMT: Moist mucous membranes  NECK: Supple, No JVD  RESPIRATORY: Clear to auscultation bilaterally; No rales, rhonchi, wheezing, or rubs  CARDIOVASCULAR: Regular rate and rhythm; No murmurs, rubs, or gallops appreciated  GASTROINTESTINAL: Soft, Nontender, Nondistended; Bowel sounds present  GENITOURINARY: no cannon  EXTREMITIES:  WWP, no edema  NERVOUS SYSTEM:  Alert & Oriented X3; Moving all 4 extremities; No gross deficits  PSYCH: calm cooperative  SKIN: No rashes or lesions    LABS:                        13.7   5.30  )-----------( 291      ( 19 Oct 2023 07:55 )             43.2     10-19    140  |  105  |  11  ----------------------------<  79  4.4   |  24  |  0.84    Ca    9.5      19 Oct 2023 07:55  Phos  2.9     10-19  Mg     2.10     10-19    TPro  8.3  /  Alb  3.8  /  TBili  0.5  /  DBili  x   /  AST  20  /  ALT  38  /  AlkPhos  82  10-18    PT/INR - ( 18 Oct 2023 09:04 )   PT: 14.5 sec;   INR: 1.30 ratio         PTT - ( 19 Oct 2023 07:55 )  PTT:56.0 sec  Urinalysis Basic - ( 19 Oct 2023 07:55 )    Color: x / Appearance: x / SG: x / pH: x  Gluc: 79 mg/dL / Ketone: x  / Bili: x / Urobili: x   Blood: x / Protein: x / Nitrite: x   Leuk Esterase: x / RBC: x / WBC x   Sq Epi: x / Non Sq Epi: x / Bacteria: x      CAPILLARY BLOOD GLUCOSE      POCT Blood Glucose.: 106 mg/dL (18 Oct 2023 07:03)      RADIOLOGY & ADDITIONAL STUDIES:    EKG:   Personally Reviewed:  [x] YES     Imaging:   Personally Reviewed:  [x] YES               [ ] Consultant(s) Notes Reviewed  [x] Care Discussed with Consultants/Other Providers:

## 2023-10-19 NOTE — CONSULT NOTE ADULT - ASSESSMENT
63yr old woman PMH hypertension, diabetes (no longer on meds), H.pylori gastritis s/p treatment Feb 2023 presents with recurrent abdominal pain with associated 40lb weight loss for 1 year. Found to have proximal SMA occlusion with reconstitution c/f mesenteric ischemia.

## 2023-10-19 NOTE — CONSULT NOTE ADULT - PROBLEM SELECTOR RECOMMENDATION 9
RCRI: 1 given high risk surgery indicating 6% 30 day risk of death, MI or cardiac arrest  EKG reviewed: nonischemic, TTE without WMA, nml LVSF, CXR: clear lungs  No hx of cardiac disease, angina, respiratory symptoms, METS>4  No further workup required prior to OR  Patient is low risk for high risk procedure

## 2023-10-19 NOTE — CONSULT NOTE ADULT - PROBLEM SELECTOR RECOMMENDATION 2
chronic given duration of symptoms, lactate WNL  Vascular planning mesenteric stent vs bypass  on heparin gtt

## 2023-10-19 NOTE — CONSULT NOTE ADULT - SUBJECTIVE AND OBJECTIVE BOX
Winston House MD  Interventional Cardiology / Advance Heart Failure and Cardiac Transplant Specialist  [place] Office : [address] [place] [id number]  Tel:   [place] Office : [address] [facility] [address]  Tel: [phone]      HISTORY OF PRESENTING ILLNESS:  63-year-old female with history of hypertension, diabetes presents with recurrent abdominal pain for last 1 year. Associated with nausea and vomiting. Notes usually feel olive after grain/or rice ingestion. Reports 40 lbs weight loss over last year. 1 loose stool, denies blood in stool. Denies numbness and tingling in lower extremities. Seen in Sept for similar pain. Reports that in early 2023 she had an endoscopy that showed gastritis. 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 Vascular on board planned for OR. Cardiology consulted for pre-op assessment. Spoke to patient and daughter at bedside. Denies hx of MI or stents. No other cardiac hx. Able to walk up a flight of stairs without chest pain or SOB  	  MEDICATIONS:  heparin  Infusion 1000 Unit(s)/Hr IV Continuous <Continuous>          pantoprazole  Injectable 40 milliGRAM(s) IV Push every 24 hours      dextrose 5% + sodium chloride 0.45%. 1000 milliLiter(s) IV Continuous <Continuous>      PAST MEDICAL/SURGICAL HISTORY  PAST MEDICAL & SURGICAL HISTORY:  Positive H. pylori test          SOCIAL HISTORY: Substance Use (street drugs): ( x ) never used  (  ) other:    FAMILY HISTORY:      REVIEW OF SYSTEMS:  CONSTITUTIONAL: No fever, weight loss, or fatigue  EYES: No eye pain, visual disturbances, or discharge  ENMT:  No difficulty hearing, tinnitus, vertigo; No sinus or throat pain  BREASTS: No pain, masses, or nipple discharge  GASTROINTESTINAL: No abdominal or epigastric pain. No nausea, vomiting, or hematemesis; No diarrhea or constipation. No melena or hematochezia.  GENITOURINARY: No dysuria, frequency, hematuria, or incontinence  NEUROLOGICAL: No headaches, memory loss, loss of strength, numbness, or tremors  ENDOCRINE: No heat or cold intolerance; No hair loss  MUSCULOSKELETAL: No joint pain or swelling; No muscle, back, or extremity pain  PSYCHIATRIC: No depression, anxiety, mood swings, or difficulty sleeping  HEME/LYMPH: No easy bruising, or bleeding gums  All others negative    PHYSICAL EXAM:  T(C): 36.7 (10-19-23 @ 11:36), Max: 36.9 (10-18-23 @ 15:30)  HR: 83 (10-19-23 @ 11:36) (41 - 83)  BP: 138/58 (10-19-23 @ 11:36) (114/68 - 143/68)  RR: 16 (10-19-23 @ 11:36) (15 - 18)  SpO2: 100% (10-19-23 @ 11:36) (99% - 100%)  Wt(kg): --  I&O's Summary        GENERAL: NAD  EYES: EOMI, PERRLA, conjunctiva and sclera clear  ENMT: No tonsillar erythema, exudates, or enlargement  Cardiovascular: Normal S1 S2, No JVD, No murmurs, No edema  Respiratory: Lungs clear to auscultation	  Gastrointestinal:  Soft, Non-tender, + BS	  Extremities: No edema                                     13.7   5.30  )-----------( 291      ( 19 Oct 2023 07:55 )             43.2     10-19    140  |  105  |  11  ----------------------------<  79  4.4   |  24  |  0.84    Ca    9.5      19 Oct 2023 07:55  Phos  2.9     10-19  Mg     2.10     10-19    TPro  8.3  /  Alb  3.8  /  TBili  0.5  /  DBili  x   /  AST  20  /  ALT  38  /  AlkPhos  82  10-18    proBNP:   Lipid Profile:   HgA1c:   TSH:     Consultant(s) Notes Reviewed:  [x ] YES  [ ] NO    Care Discussed with Consultants/Other Providers [ x] YES  [ ] NO    Imaging Personally Reviewed independently:  [x] YES  [ ] NO    All labs, radiologic studies, vitals, orders and medications list reviewed. Patient is seen and examined at bedside. Case discussed with medical team.

## 2023-10-20 LAB
ALBUMIN SERPL ELPH-MCNC: 3.6 G/DL — SIGNIFICANT CHANGE UP (ref 3.3–5)
ALBUMIN SERPL ELPH-MCNC: 3.6 G/DL — SIGNIFICANT CHANGE UP (ref 3.3–5)
ALP SERPL-CCNC: 39 U/L — LOW (ref 40–120)
ALP SERPL-CCNC: 39 U/L — LOW (ref 40–120)
ALT FLD-CCNC: 28 U/L — SIGNIFICANT CHANGE UP (ref 4–33)
ALT FLD-CCNC: 28 U/L — SIGNIFICANT CHANGE UP (ref 4–33)
ANION GAP SERPL CALC-SCNC: 12 MMOL/L — SIGNIFICANT CHANGE UP (ref 7–14)
ANION GAP SERPL CALC-SCNC: 12 MMOL/L — SIGNIFICANT CHANGE UP (ref 7–14)
ANION GAP SERPL CALC-SCNC: 14 MMOL/L — SIGNIFICANT CHANGE UP (ref 7–14)
ANION GAP SERPL CALC-SCNC: 14 MMOL/L — SIGNIFICANT CHANGE UP (ref 7–14)
APTT BLD: 103.5 SEC — HIGH (ref 24.5–35.6)
APTT BLD: 103.5 SEC — HIGH (ref 24.5–35.6)
APTT BLD: 27.8 SEC — SIGNIFICANT CHANGE UP (ref 24.5–35.6)
APTT BLD: 27.8 SEC — SIGNIFICANT CHANGE UP (ref 24.5–35.6)
APTT BLD: 86.7 SEC — HIGH (ref 24.5–35.6)
APTT BLD: 86.7 SEC — HIGH (ref 24.5–35.6)
AST SERPL-CCNC: 36 U/L — HIGH (ref 4–32)
AST SERPL-CCNC: 36 U/L — HIGH (ref 4–32)
BILIRUB SERPL-MCNC: 1 MG/DL — SIGNIFICANT CHANGE UP (ref 0.2–1.2)
BILIRUB SERPL-MCNC: 1 MG/DL — SIGNIFICANT CHANGE UP (ref 0.2–1.2)
BLD GP AB SCN SERPL QL: NEGATIVE — SIGNIFICANT CHANGE UP
BLD GP AB SCN SERPL QL: NEGATIVE — SIGNIFICANT CHANGE UP
BLOOD GAS ARTERIAL - LYTES,HGB,ICA,LACT RESULT: SIGNIFICANT CHANGE UP
BLOOD GAS ARTERIAL COMPREHENSIVE RESULT: SIGNIFICANT CHANGE UP
BLOOD GAS ARTERIAL COMPREHENSIVE RESULT: SIGNIFICANT CHANGE UP
BUN SERPL-MCNC: 10 MG/DL — SIGNIFICANT CHANGE UP (ref 7–23)
CALCIUM SERPL-MCNC: 8 MG/DL — LOW (ref 8.4–10.5)
CALCIUM SERPL-MCNC: 8 MG/DL — LOW (ref 8.4–10.5)
CALCIUM SERPL-MCNC: 8.9 MG/DL — SIGNIFICANT CHANGE UP (ref 8.4–10.5)
CALCIUM SERPL-MCNC: 8.9 MG/DL — SIGNIFICANT CHANGE UP (ref 8.4–10.5)
CHLORIDE SERPL-SCNC: 103 MMOL/L — SIGNIFICANT CHANGE UP (ref 98–107)
CHLORIDE SERPL-SCNC: 103 MMOL/L — SIGNIFICANT CHANGE UP (ref 98–107)
CHLORIDE SERPL-SCNC: 104 MMOL/L — SIGNIFICANT CHANGE UP (ref 98–107)
CHLORIDE SERPL-SCNC: 104 MMOL/L — SIGNIFICANT CHANGE UP (ref 98–107)
CO2 SERPL-SCNC: 22 MMOL/L — SIGNIFICANT CHANGE UP (ref 22–31)
CO2 SERPL-SCNC: 22 MMOL/L — SIGNIFICANT CHANGE UP (ref 22–31)
CO2 SERPL-SCNC: 24 MMOL/L — SIGNIFICANT CHANGE UP (ref 22–31)
CO2 SERPL-SCNC: 24 MMOL/L — SIGNIFICANT CHANGE UP (ref 22–31)
CREAT SERPL-MCNC: 0.8 MG/DL — SIGNIFICANT CHANGE UP (ref 0.5–1.3)
CREAT SERPL-MCNC: 0.8 MG/DL — SIGNIFICANT CHANGE UP (ref 0.5–1.3)
CREAT SERPL-MCNC: 0.83 MG/DL — SIGNIFICANT CHANGE UP (ref 0.5–1.3)
CREAT SERPL-MCNC: 0.83 MG/DL — SIGNIFICANT CHANGE UP (ref 0.5–1.3)
EGFR: 78 ML/MIN/1.73M2 — SIGNIFICANT CHANGE UP
EGFR: 78 ML/MIN/1.73M2 — SIGNIFICANT CHANGE UP
EGFR: 82 ML/MIN/1.73M2 — SIGNIFICANT CHANGE UP
EGFR: 82 ML/MIN/1.73M2 — SIGNIFICANT CHANGE UP
GAS PNL BLDA: SIGNIFICANT CHANGE UP
GLUCOSE BLDC GLUCOMTR-MCNC: 145 MG/DL — HIGH (ref 70–99)
GLUCOSE BLDC GLUCOMTR-MCNC: 145 MG/DL — HIGH (ref 70–99)
GLUCOSE BLDC GLUCOMTR-MCNC: 98 MG/DL — SIGNIFICANT CHANGE UP (ref 70–99)
GLUCOSE BLDC GLUCOMTR-MCNC: 98 MG/DL — SIGNIFICANT CHANGE UP (ref 70–99)
GLUCOSE SERPL-MCNC: 149 MG/DL — HIGH (ref 70–99)
GLUCOSE SERPL-MCNC: 149 MG/DL — HIGH (ref 70–99)
GLUCOSE SERPL-MCNC: 95 MG/DL — SIGNIFICANT CHANGE UP (ref 70–99)
GLUCOSE SERPL-MCNC: 95 MG/DL — SIGNIFICANT CHANGE UP (ref 70–99)
HCT VFR BLD CALC: 30.3 % — LOW (ref 34.5–45)
HCT VFR BLD CALC: 30.3 % — LOW (ref 34.5–45)
HCT VFR BLD CALC: 40.1 % — SIGNIFICANT CHANGE UP (ref 34.5–45)
HCT VFR BLD CALC: 40.1 % — SIGNIFICANT CHANGE UP (ref 34.5–45)
HCV AB S/CO SERPL IA: 0.07 S/CO — SIGNIFICANT CHANGE UP (ref 0–0.99)
HCV AB S/CO SERPL IA: 0.07 S/CO — SIGNIFICANT CHANGE UP (ref 0–0.99)
HCV AB SERPL-IMP: SIGNIFICANT CHANGE UP
HCV AB SERPL-IMP: SIGNIFICANT CHANGE UP
HGB BLD-MCNC: 10 G/DL — LOW (ref 11.5–15.5)
HGB BLD-MCNC: 10 G/DL — LOW (ref 11.5–15.5)
HGB BLD-MCNC: 13.1 G/DL — SIGNIFICANT CHANGE UP (ref 11.5–15.5)
HGB BLD-MCNC: 13.1 G/DL — SIGNIFICANT CHANGE UP (ref 11.5–15.5)
INR BLD: 1.39 RATIO — HIGH (ref 0.85–1.18)
INR BLD: 1.39 RATIO — HIGH (ref 0.85–1.18)
MAGNESIUM SERPL-MCNC: 1.4 MG/DL — LOW (ref 1.6–2.6)
MAGNESIUM SERPL-MCNC: 1.4 MG/DL — LOW (ref 1.6–2.6)
MAGNESIUM SERPL-MCNC: 2.1 MG/DL — SIGNIFICANT CHANGE UP (ref 1.6–2.6)
MAGNESIUM SERPL-MCNC: 2.1 MG/DL — SIGNIFICANT CHANGE UP (ref 1.6–2.6)
MCHC RBC-ENTMCNC: 28.1 PG — SIGNIFICANT CHANGE UP (ref 27–34)
MCHC RBC-ENTMCNC: 28.1 PG — SIGNIFICANT CHANGE UP (ref 27–34)
MCHC RBC-ENTMCNC: 28.2 PG — SIGNIFICANT CHANGE UP (ref 27–34)
MCHC RBC-ENTMCNC: 28.2 PG — SIGNIFICANT CHANGE UP (ref 27–34)
MCHC RBC-ENTMCNC: 32.7 GM/DL — SIGNIFICANT CHANGE UP (ref 32–36)
MCHC RBC-ENTMCNC: 32.7 GM/DL — SIGNIFICANT CHANGE UP (ref 32–36)
MCHC RBC-ENTMCNC: 33 GM/DL — SIGNIFICANT CHANGE UP (ref 32–36)
MCHC RBC-ENTMCNC: 33 GM/DL — SIGNIFICANT CHANGE UP (ref 32–36)
MCV RBC AUTO: 85.4 FL — SIGNIFICANT CHANGE UP (ref 80–100)
MCV RBC AUTO: 85.4 FL — SIGNIFICANT CHANGE UP (ref 80–100)
MCV RBC AUTO: 85.9 FL — SIGNIFICANT CHANGE UP (ref 80–100)
MCV RBC AUTO: 85.9 FL — SIGNIFICANT CHANGE UP (ref 80–100)
NRBC # BLD: 0 /100 WBCS — SIGNIFICANT CHANGE UP (ref 0–0)
NRBC # FLD: 0 K/UL — SIGNIFICANT CHANGE UP (ref 0–0)
PHOSPHATE SERPL-MCNC: 4 MG/DL — SIGNIFICANT CHANGE UP (ref 2.5–4.5)
PHOSPHATE SERPL-MCNC: 4 MG/DL — SIGNIFICANT CHANGE UP (ref 2.5–4.5)
PHOSPHATE SERPL-MCNC: 4.9 MG/DL — HIGH (ref 2.5–4.5)
PHOSPHATE SERPL-MCNC: 4.9 MG/DL — HIGH (ref 2.5–4.5)
PLATELET # BLD AUTO: 194 K/UL — SIGNIFICANT CHANGE UP (ref 150–400)
PLATELET # BLD AUTO: 194 K/UL — SIGNIFICANT CHANGE UP (ref 150–400)
PLATELET # BLD AUTO: 282 K/UL — SIGNIFICANT CHANGE UP (ref 150–400)
PLATELET # BLD AUTO: 282 K/UL — SIGNIFICANT CHANGE UP (ref 150–400)
POTASSIUM SERPL-MCNC: 3.5 MMOL/L — SIGNIFICANT CHANGE UP (ref 3.5–5.3)
POTASSIUM SERPL-MCNC: 3.5 MMOL/L — SIGNIFICANT CHANGE UP (ref 3.5–5.3)
POTASSIUM SERPL-MCNC: 4.1 MMOL/L — SIGNIFICANT CHANGE UP (ref 3.5–5.3)
POTASSIUM SERPL-MCNC: 4.1 MMOL/L — SIGNIFICANT CHANGE UP (ref 3.5–5.3)
POTASSIUM SERPL-SCNC: 3.5 MMOL/L — SIGNIFICANT CHANGE UP (ref 3.5–5.3)
POTASSIUM SERPL-SCNC: 3.5 MMOL/L — SIGNIFICANT CHANGE UP (ref 3.5–5.3)
POTASSIUM SERPL-SCNC: 4.1 MMOL/L — SIGNIFICANT CHANGE UP (ref 3.5–5.3)
POTASSIUM SERPL-SCNC: 4.1 MMOL/L — SIGNIFICANT CHANGE UP (ref 3.5–5.3)
PROT SERPL-MCNC: 5.4 G/DL — LOW (ref 6–8.3)
PROT SERPL-MCNC: 5.4 G/DL — LOW (ref 6–8.3)
PROTHROM AB SERPL-ACNC: 15.4 SEC — HIGH (ref 9.5–13)
PROTHROM AB SERPL-ACNC: 15.4 SEC — HIGH (ref 9.5–13)
RBC # BLD: 3.55 M/UL — LOW (ref 3.8–5.2)
RBC # BLD: 3.55 M/UL — LOW (ref 3.8–5.2)
RBC # BLD: 4.67 M/UL — SIGNIFICANT CHANGE UP (ref 3.8–5.2)
RBC # BLD: 4.67 M/UL — SIGNIFICANT CHANGE UP (ref 3.8–5.2)
RBC # FLD: 13.7 % — SIGNIFICANT CHANGE UP (ref 10.3–14.5)
RH IG SCN BLD-IMP: POSITIVE — SIGNIFICANT CHANGE UP
RH IG SCN BLD-IMP: POSITIVE — SIGNIFICANT CHANGE UP
SODIUM SERPL-SCNC: 139 MMOL/L — SIGNIFICANT CHANGE UP (ref 135–145)
SODIUM SERPL-SCNC: 139 MMOL/L — SIGNIFICANT CHANGE UP (ref 135–145)
SODIUM SERPL-SCNC: 140 MMOL/L — SIGNIFICANT CHANGE UP (ref 135–145)
SODIUM SERPL-SCNC: 140 MMOL/L — SIGNIFICANT CHANGE UP (ref 135–145)
TROPONIN T, HIGH SENSITIVITY RESULT: 34 NG/L — SIGNIFICANT CHANGE UP
TROPONIN T, HIGH SENSITIVITY RESULT: 34 NG/L — SIGNIFICANT CHANGE UP
WBC # BLD: 5.14 K/UL — SIGNIFICANT CHANGE UP (ref 3.8–10.5)
WBC # BLD: 5.14 K/UL — SIGNIFICANT CHANGE UP (ref 3.8–10.5)
WBC # BLD: 9.73 K/UL — SIGNIFICANT CHANGE UP (ref 3.8–10.5)
WBC # BLD: 9.73 K/UL — SIGNIFICANT CHANGE UP (ref 3.8–10.5)
WBC # FLD AUTO: 5.14 K/UL — SIGNIFICANT CHANGE UP (ref 3.8–10.5)
WBC # FLD AUTO: 5.14 K/UL — SIGNIFICANT CHANGE UP (ref 3.8–10.5)
WBC # FLD AUTO: 9.73 K/UL — SIGNIFICANT CHANGE UP (ref 3.8–10.5)
WBC # FLD AUTO: 9.73 K/UL — SIGNIFICANT CHANGE UP (ref 3.8–10.5)

## 2023-10-20 PROCEDURE — 35633 BPG ILIO-MESENTERIC: CPT | Mod: 82

## 2023-10-20 PROCEDURE — 71045 X-RAY EXAM CHEST 1 VIEW: CPT | Mod: 26

## 2023-10-20 PROCEDURE — 35633 BPG ILIO-MESENTERIC: CPT

## 2023-10-20 PROCEDURE — 74018 RADEX ABDOMEN 1 VIEW: CPT | Mod: 26

## 2023-10-20 PROCEDURE — 99254 IP/OBS CNSLTJ NEW/EST MOD 60: CPT

## 2023-10-20 PROCEDURE — 75625 CONTRAST EXAM ABDOMINL AORTA: CPT | Mod: 26

## 2023-10-20 PROCEDURE — 93010 ELECTROCARDIOGRAM REPORT: CPT

## 2023-10-20 PROCEDURE — 75605 CONTRAST EXAM THORACIC AORTA: CPT | Mod: 26

## 2023-10-20 DEVICE — CATH SIZING AUROUS PIGTAIL 5FR X 0.035": Type: IMPLANTABLE DEVICE | Site: LEFT | Status: FUNCTIONAL

## 2023-10-20 DEVICE — CLIP APPLIER COVIDIEN SURGICLIP III 9" SM: Type: IMPLANTABLE DEVICE | Site: LEFT | Status: FUNCTIONAL

## 2023-10-20 DEVICE — BIOGLUE 5ML SYR: Type: IMPLANTABLE DEVICE | Site: LEFT | Status: FUNCTIONAL

## 2023-10-20 DEVICE — GUIDEWIRE ADVANTAGE .014INX300CM: Type: IMPLANTABLE DEVICE | Site: LEFT | Status: FUNCTIONAL

## 2023-10-20 DEVICE — SHEATH INTRODUCER TERUMO PINNACLE CORONARY 6FR X 10CM X 0.038" MINI WIRE: Type: IMPLANTABLE DEVICE | Site: LEFT | Status: FUNCTIONAL

## 2023-10-20 DEVICE — GWIRE VASC ENTRY MINISTICK MAX 4FRX10CM: Type: IMPLANTABLE DEVICE | Site: LEFT | Status: FUNCTIONAL

## 2023-10-20 DEVICE — GWIRE BENSTON X260: Type: IMPLANTABLE DEVICE | Site: LEFT | Status: FUNCTIONAL

## 2023-10-20 DEVICE — CLIP APPLIER COVIDIEN SURGICLIP 13" LARGE: Type: IMPLANTABLE DEVICE | Site: LEFT | Status: FUNCTIONAL

## 2023-10-20 DEVICE — SET ACCESS MICROPUNCTURE PEDAL: Type: IMPLANTABLE DEVICE | Site: LEFT | Status: FUNCTIONAL

## 2023-10-20 DEVICE — KIT CVC 2LUM 7FRX16CM BLU FLX TIP: Type: IMPLANTABLE DEVICE | Site: LEFT | Status: FUNCTIONAL

## 2023-10-20 DEVICE — GWIRE ROSEN STR .035X260CM: Type: IMPLANTABLE DEVICE | Site: LEFT | Status: FUNCTIONAL

## 2023-10-20 DEVICE — CATH ANGIO GLIDECATH MP 5FR X 100CM: Type: IMPLANTABLE DEVICE | Site: LEFT | Status: FUNCTIONAL

## 2023-10-20 DEVICE — CATH ANG SOS OMNI 2 5FRX80CM: Type: IMPLANTABLE DEVICE | Site: LEFT | Status: FUNCTIONAL

## 2023-10-20 DEVICE — IMPLANTABLE DEVICE: Type: IMPLANTABLE DEVICE | Site: LEFT | Status: FUNCTIONAL

## 2023-10-20 DEVICE — CLIP APPLIER COVIDIEN SURGICLIP 11.5" MEDIUM: Type: IMPLANTABLE DEVICE | Site: LEFT | Status: FUNCTIONAL

## 2023-10-20 DEVICE — CATH QUICK CROSS .014X135CM: Type: IMPLANTABLE DEVICE | Site: LEFT | Status: FUNCTIONAL

## 2023-10-20 DEVICE — CATH ANGIO GLIDECATH ANGLE 5FR X 100CM: Type: IMPLANTABLE DEVICE | Site: LEFT | Status: FUNCTIONAL

## 2023-10-20 DEVICE — GUIDEWIRE RADIFOCUS GLIDEWIRE ANGLED 0.035" X 260CM STIFF: Type: IMPLANTABLE DEVICE | Site: LEFT | Status: FUNCTIONAL

## 2023-10-20 DEVICE — CATH OMNI FLSH 0.035IN 5FRX65: Type: IMPLANTABLE DEVICE | Site: LEFT | Status: FUNCTIONAL

## 2023-10-20 DEVICE — GRAFT VASC PROPATEN 6MMX60X70CM TW REMOV RING: Type: IMPLANTABLE DEVICE | Site: LEFT | Status: FUNCTIONAL

## 2023-10-20 DEVICE — CATH QUICK CROSS .035X135CM: Type: IMPLANTABLE DEVICE | Site: LEFT | Status: FUNCTIONAL

## 2023-10-20 DEVICE — SURGIFOAM PAD 8CM X 12.5CM X 2MM (100C): Type: IMPLANTABLE DEVICE | Site: LEFT | Status: FUNCTIONAL

## 2023-10-20 RX ORDER — POTASSIUM CHLORIDE 20 MEQ
10 PACKET (EA) ORAL
Refills: 0 | Status: COMPLETED | OUTPATIENT
Start: 2023-10-20 | End: 2023-10-21

## 2023-10-20 RX ORDER — DEXTROSE 50 % IN WATER 50 %
15 SYRINGE (ML) INTRAVENOUS ONCE
Refills: 0 | Status: DISCONTINUED | OUTPATIENT
Start: 2023-10-20 | End: 2023-10-23

## 2023-10-20 RX ORDER — INSULIN LISPRO 100/ML
VIAL (ML) SUBCUTANEOUS EVERY 6 HOURS
Refills: 0 | Status: DISCONTINUED | OUTPATIENT
Start: 2023-10-20 | End: 2023-10-26

## 2023-10-20 RX ORDER — SODIUM CHLORIDE 9 MG/ML
1000 INJECTION, SOLUTION INTRAVENOUS
Refills: 0 | Status: DISCONTINUED | OUTPATIENT
Start: 2023-10-20 | End: 2023-10-21

## 2023-10-20 RX ORDER — HYDROMORPHONE HYDROCHLORIDE 2 MG/ML
0.5 INJECTION INTRAMUSCULAR; INTRAVENOUS; SUBCUTANEOUS
Refills: 0 | Status: DISCONTINUED | OUTPATIENT
Start: 2023-10-20 | End: 2023-10-20

## 2023-10-20 RX ORDER — PROPOFOL 10 MG/ML
50 INJECTION, EMULSION INTRAVENOUS
Qty: 1000 | Refills: 0 | Status: DISCONTINUED | OUTPATIENT
Start: 2023-10-20 | End: 2023-10-21

## 2023-10-20 RX ORDER — SODIUM CHLORIDE 9 MG/ML
1000 INJECTION, SOLUTION INTRAVENOUS ONCE
Refills: 0 | Status: COMPLETED | OUTPATIENT
Start: 2023-10-20 | End: 2023-10-20

## 2023-10-20 RX ORDER — CEFAZOLIN SODIUM 1 G
2000 VIAL (EA) INJECTION EVERY 8 HOURS
Refills: 0 | Status: COMPLETED | OUTPATIENT
Start: 2023-10-20 | End: 2023-10-22

## 2023-10-20 RX ORDER — FENTANYL CITRATE 50 UG/ML
50 INJECTION INTRAVENOUS
Refills: 0 | Status: DISCONTINUED | OUTPATIENT
Start: 2023-10-20 | End: 2023-10-20

## 2023-10-20 RX ORDER — SODIUM CHLORIDE 9 MG/ML
1000 INJECTION, SOLUTION INTRAVENOUS
Refills: 0 | Status: DISCONTINUED | OUTPATIENT
Start: 2023-10-20 | End: 2023-10-23

## 2023-10-20 RX ORDER — CHLORHEXIDINE GLUCONATE 213 G/1000ML
15 SOLUTION TOPICAL EVERY 12 HOURS
Refills: 0 | Status: DISCONTINUED | OUTPATIENT
Start: 2023-10-20 | End: 2023-10-21

## 2023-10-20 RX ORDER — DEXTROSE 50 % IN WATER 50 %
12.5 SYRINGE (ML) INTRAVENOUS ONCE
Refills: 0 | Status: DISCONTINUED | OUTPATIENT
Start: 2023-10-20 | End: 2023-10-27

## 2023-10-20 RX ORDER — MAGNESIUM SULFATE 500 MG/ML
4 VIAL (ML) INJECTION ONCE
Refills: 0 | Status: COMPLETED | OUTPATIENT
Start: 2023-10-20 | End: 2023-10-20

## 2023-10-20 RX ORDER — DEXTROSE 50 % IN WATER 50 %
25 SYRINGE (ML) INTRAVENOUS ONCE
Refills: 0 | Status: DISCONTINUED | OUTPATIENT
Start: 2023-10-20 | End: 2023-10-23

## 2023-10-20 RX ORDER — DEXTROSE 50 % IN WATER 50 %
25 SYRINGE (ML) INTRAVENOUS ONCE
Refills: 0 | Status: DISCONTINUED | OUTPATIENT
Start: 2023-10-20 | End: 2023-10-27

## 2023-10-20 RX ORDER — HYDROMORPHONE HYDROCHLORIDE 2 MG/ML
0.5 INJECTION INTRAMUSCULAR; INTRAVENOUS; SUBCUTANEOUS EVERY 4 HOURS
Refills: 0 | Status: DISCONTINUED | OUTPATIENT
Start: 2023-10-20 | End: 2023-10-27

## 2023-10-20 RX ORDER — ACETAMINOPHEN 500 MG
1000 TABLET ORAL EVERY 6 HOURS
Refills: 0 | Status: COMPLETED | OUTPATIENT
Start: 2023-10-20 | End: 2023-10-21

## 2023-10-20 RX ORDER — HYDROMORPHONE HYDROCHLORIDE 2 MG/ML
1 INJECTION INTRAMUSCULAR; INTRAVENOUS; SUBCUTANEOUS EVERY 4 HOURS
Refills: 0 | Status: DISCONTINUED | OUTPATIENT
Start: 2023-10-20 | End: 2023-10-27

## 2023-10-20 RX ORDER — GLUCAGON INJECTION, SOLUTION 0.5 MG/.1ML
1 INJECTION, SOLUTION SUBCUTANEOUS ONCE
Refills: 0 | Status: DISCONTINUED | OUTPATIENT
Start: 2023-10-20 | End: 2023-10-27

## 2023-10-20 RX ADMIN — Medication 400 MILLIGRAM(S): at 23:12

## 2023-10-20 RX ADMIN — Medication 100 MILLIEQUIVALENT(S): at 23:42

## 2023-10-20 RX ADMIN — HYDROMORPHONE HYDROCHLORIDE 0.5 MILLIGRAM(S): 2 INJECTION INTRAMUSCULAR; INTRAVENOUS; SUBCUTANEOUS at 21:40

## 2023-10-20 RX ADMIN — HYDROMORPHONE HYDROCHLORIDE 0.5 MILLIGRAM(S): 2 INJECTION INTRAMUSCULAR; INTRAVENOUS; SUBCUTANEOUS at 22:00

## 2023-10-20 RX ADMIN — PROPOFOL 16.2 MICROGRAM(S)/KG/MIN: 10 INJECTION, EMULSION INTRAVENOUS at 21:56

## 2023-10-20 RX ADMIN — Medication 100 MILLIGRAM(S): at 22:32

## 2023-10-20 RX ADMIN — Medication 100 MILLIEQUIVALENT(S): at 22:45

## 2023-10-20 RX ADMIN — SODIUM CHLORIDE 1000 MILLILITER(S): 9 INJECTION, SOLUTION INTRAVENOUS at 22:49

## 2023-10-20 RX ADMIN — HYDROMORPHONE HYDROCHLORIDE 0.5 MILLIGRAM(S): 2 INJECTION INTRAMUSCULAR; INTRAVENOUS; SUBCUTANEOUS at 22:20

## 2023-10-20 RX ADMIN — Medication 25 GRAM(S): at 22:51

## 2023-10-20 RX ADMIN — SODIUM CHLORIDE 100 MILLILITER(S): 9 INJECTION, SOLUTION INTRAVENOUS at 21:38

## 2023-10-20 RX ADMIN — Medication 1000 MILLIGRAM(S): at 23:35

## 2023-10-20 RX ADMIN — HEPARIN SODIUM 7 UNIT(S)/HR: 5000 INJECTION INTRAVENOUS; SUBCUTANEOUS at 02:02

## 2023-10-20 NOTE — PRE-OP CHECKLIST - SELECT TESTS ORDERED
BMP/CBC/INR/Type and Cross/Type and Screen FS 98 @ 10:06am/BMP/CBC/INR/Type and Cross/Type and Screen/POCT Blood Glucose

## 2023-10-20 NOTE — CONSULT NOTE ADULT - ASSESSMENT
ASSESSMENT:  63y Female ***    NEUROLOGIC   - Pain control:   -     RESPIRATORY   - Monitor SpO2 goal >92%  - Incentive spirometry     CARDIOVASCULAR   - Monitor hemodynamics   -     GASTROINTESTINAL   - Diet:   -     /RENAL   - IV fluids: LR @ 125cc/hr  - Strict I/Os  - Monitor BMP qd  - Maintain cannon catheter, strict Is/Os  - Monitor electrolytes, replete PRN    HEMATOLOGIC  - Monitor H/H   - DVT ppx: Lovenox/SQH & SCD's    INFECTIOUS DISEASE  - Monitor fever / WBC    ENDOCRINE  - Monitor gluc    LINES  - IJ CVC (  /  )  - A line (  /  )  - Cannon (  /  )  - PIV     DISPO:   --------------------------------------------------------------------------------------    Critical Care Diagnoses:     ASSESSMENT:  63-year-old female with history of hypertension, diabetes who is now s/p failed SMA angiogram via L brachial artery cutdown and open R iliac to SMA bypass with PTFE graft on 10/20/23. Pt with 1 year history of recurrent abdominal pain and 40lb weight loss. Pt transferred to [facility] for operative management. Postoperatively, patient remains intubated and SICU consulted for q1 neurovascular checks, vent management, and HD monitoring.    NEUROLOGIC   - Sedated on prop  - q1 neurovascular checks      RESPIRATORY   - Intubated: 12/420/5/50 on volume AC  - Plan to wean off vent tomorrow AM    CARDIOVASCULAR   - Monitor hemodynamics   -     GASTROINTESTINAL   - Diet:   -     /RENAL   - IV fluids: LR @ 125cc/hr  - Strict I/Os  - Monitor BMP qd  - Maintain cannon catheter, strict Is/Os  - Monitor electrolytes, replete PRN    HEMATOLOGIC  - Monitor H/H   - DVT ppx: Lovenox/SQH & SCD's    INFECTIOUS DISEASE  - Monitor fever / WBC    ENDOCRINE  - Monitor gluc    LINES  - IJ CVC (  /  )  - A line (  /  )  - Cannon (  /  )  - PIV     DISPO:   --------------------------------------------------------------------------------------    Critical Care Diagnoses:     ASSESSMENT:  63-year-old female with history of hypertension, diabetes who is now s/p failed SMA angiogram via L brachial artery cutdown and open R iliac to SMA bypass with PTFE graft on 10/20/23. Pt with 1 year history of recurrent abdominal pain and 40lb weight loss. Pt transferred to [facility] for operative management. Postoperatively, patient remains intubated and SICU consulted for q1 neurovascular checks, vent management, and HD monitoring.    NEUROLOGIC   - Sedated on prop  - q1 neurovascular checks  -Pain: Tylenol/dilaudid PRN      RESPIRATORY   - Intubated: 12/420/5/50 on volume AC  - Plan to wean off vent tomorrow AM    CARDIOVASCULAR   - Monitor hemodynamics   - MAP goals >65  -Holding home amlodipine/lisinopril     GASTROINTESTINAL   - Diet: NPO/NGT      /RENAL   - IV fluids: LR @ 100cc/hr  - Strict I/Os  - Monitor BMP qd  - Maintain cannon catheter, strict Is/Os  - Monitor electrolytes, replete PRN    HEMATOLOGIC  - Monitor H/H   - DVT ppx: Holding DVT ppx until POD 1 per primary & SCD's  -F/U post op labs    INFECTIOUS DISEASE  - Monitor fever / WBC  - Ancef for 48 hours post op    ENDOCRINE  - Monitor gluc  - ISS    LINES  - R IJ double lumen   - A line   - Cannon   - PIV     DISPO: SICU  --------------------------------------------------------------------------------------    Critical Care Diagnoses:

## 2023-10-20 NOTE — PROGRESS NOTE ADULT - ASSESSMENT
63-year-old female with history of hypertension, diabetes presents withn intermittent abdominal pain for last 1 year associated with 40 lbs weight loss. Denies blood in stool.CTA with noted proximal SMA occlusion with reconstitution patient admitted to vascular and started on heparin infusion, OR today    - NPO/IVF  - OR today  - heparin gtt; patient specific nomogram goal 59-99, patient under goal this am, increasing drop from 7 to 8  - meds and cards cleared  - echo ordered, patient currently in echo lab  - pre-op'd

## 2023-10-20 NOTE — DIETITIAN INITIAL EVALUATION ADULT - PERTINENT LABORATORY DATA
10-20    139  |  103  |  10  ----------------------------<  95  4.1   |  24  |  0.83    Ca    8.9      20 Oct 2023 03:45  Phos  4.0     10-20  Mg     2.10     10-20    A1C with Estimated Average Glucose Result: 5.8 % (10-19-23 @ 07:55)

## 2023-10-20 NOTE — DIETITIAN INITIAL EVALUATION ADULT - ETIOLOGY
In setting of chronic illness / altered GI fxn leading to weight loss / poor PO intake over the past ~1yr

## 2023-10-20 NOTE — CONSULT NOTE ADULT - SUBJECTIVE AND OBJECTIVE BOX
=====================================================  SICU Consultation Note  =====================================================  Patient is a 63y old  Female who presents with a chief complaint of Acute bowel infarction (20 Oct 2023 10:17)    63-year-old female with history of hypertension, diabetes who is now s/p failed SMA angiogram via L brachial artery cutdown and open R iliac to SMA bypass with PTFE graft on 10/20/23. Pt with 1 year history of recurrent abdominal pain and 40lb weight loss. Pt transferred to [facility] for operative management. Postoperatively, patient remains intubated and SICU consulted for q1 neurovascular checks, vent management, and HD monitoring.       Surgery Information   Case Duration:      EBL:  200     IV Fluids: 3L LR, 1.5L albumin           HISTORY  63y Female  Allergies: No Known Allergies    PAST MEDICAL & SURGICAL HISTORY:  Positive H. pylori test        FAMILY HISTORY:      ALLERGIES  No Known Allergies      SOCIAL HISTORY:      HOME MEDICATIONS:   dextrose 5% + sodium chloride 0.45%. 1000 milliLiter(s) IV Continuous <Continuous>  fentaNYL    Injectable 50 MICROGram(s) IV Push every 15 minutes PRN  HYDROmorphone  Injectable 0.5 milliGRAM(s) IV Push every 10 minutes PRN  pantoprazole  Injectable 40 milliGRAM(s) IV Push every 24 hours  propofol Infusion 50 MICROgram(s)/kG/Min IV Continuous <Continuous>      CURRENT MEDICATIONS:   --------------------------------------------------------------------------------------  Neurologic Medications  fentaNYL    Injectable 50 MICROGram(s) IV Push every 15 minutes PRN Severe Pain (7 - 10)  HYDROmorphone  Injectable 0.5 milliGRAM(s) IV Push every 10 minutes PRN Moderate Pain (4 - 6)  propofol Infusion 50 MICROgram(s)/kG/Min IV Continuous <Continuous>    Respiratory Medications    Cardiovascular Medications    Gastrointestinal Medications  dextrose 5% + sodium chloride 0.45%. 1000 milliLiter(s) IV Continuous <Continuous>  pantoprazole  Injectable 40 milliGRAM(s) IV Push every 24 hours    Genitourinary Medications    Hematologic/Oncologic Medications    Antimicrobial/Immunologic Medications    Endocrine/Metabolic Medications    Topical/Other Medications    --------------------------------------------------------------------------------------    VITAL SIGNS, INS/OUTS (last 24 hours):    T(C): 37.2 (10-20-23 @ 21:25), Max: 37.2 (10-20-23 @ 21:25)  HR: 86 (10-20-23 @ 21:40) (47 - 89)  BP: 157/54 (10-20-23 @ 21:40) (146/56 - 169/64)  ABP: 147/56 (10-20-23 @ 21:40) (142/55 - 147/56)  ABP(mean): 90 (10-20-23 @ 21:40) (87 - 90)  RR: 13 (10-20-23 @ 21:40) (12 - 21)  SpO2: 100% (10-20-23 @ 21:40) (98% - 100%)  --------------------------------------------------------------------------------------  ((Insert SICU Vitals / Is+Os here)) ***    --------------------------------------------------------------------------------------    EXAM  NEUROLOGY  RASS:   	GCS:    Exam: Normal, NAD, alert, oriented x 3, no focal deficits.     HEENT  Exam: Normocephalic, atraumatic.  EOMI     RESPIRATORY  Exam: Lungs clear to auscultation, Normal expansion/effort.    Mechanical Ventilation:     CARDIOVASCULAR  Exam: S1, S2.  Regular rate and rhythm.     GI/NUTRITION  Exam: Abdomen soft, Non-tender, Non-distended.  Gastrostomy / Jejunostomy / Nasogastric tube in place.  Colostomy / Ileostomy.    Wound:    Current Diet:  NPO    VASCULAR  Exam: Extremities warm, pink, well-perfused.     MUSCULOSKELETAL  Exam: All extremities moving spontaneously without limitations.      SKIN:  Exam: Good skin turgor, no skin breakdown.      METABOLIC/FLUIDS/ELECTROLYTES  dextrose 5% + sodium chloride 0.45%. 1000 milliLiter(s) IV Continuous <Continuous>      HEMATOLOGIC  [x] DVT Prophylaxis:   Transfusions:	[] PRBC	[] Platelets		[] FFP	[] Cryoprecipitate    INFECTIOUS DISEASE  Antimicrobials/Immunologic Medications:    Day #  	of    ***    Tubes/Lines/Drains  ***  [x] Peripheral IV  [] Central Venous Line     	[] R	[] L	[] IJ	[] Fem	[] SC	Date Placed:   [] Arterial Line		[] R	[] L	[] Fem	[] Rad	[] Ax	Date Placed:   [] PICC:         	[] Midline		[] Mediport  [] Urinary Catheter		Date Placed:     LABS  --------------------------------------------------------------------------------------  ((Insert SICU Labs here))***    --------------------------------------------------------------------------------------    OTHER LABS    IMAGING RESULTS  Echo:   CT:   Xray:      =====================================================  SICU Consultation Note  =====================================================  Patient is a 63y old  Female who presents with a chief complaint of Acute bowel infarction (20 Oct 2023 10:17)    63-year-old female with history of hypertension, diabetes who is now s/p failed SMA angiogram via L brachial artery cutdown and open R iliac to SMA bypass with PTFE graft on 10/20/23. Pt with 1 year history of recurrent abdominal pain and 40lb weight loss. Pt transferred to [facility] for operative management. Postoperatively, patient remains intubated and SICU consulted for q1 neurovascular checks, vent management, and HD monitoring.       Surgery Information   Case Duration:      EBL:  200     IV Fluids: 3L LR, 1.5L albumin           HISTORY  63y Female  Allergies: No Known Allergies    PAST MEDICAL & SURGICAL HISTORY:  Positive H. pylori test        FAMILY HISTORY:      ALLERGIES  No Known Allergies      SOCIAL HISTORY:      HOME MEDICATIONS:   dextrose 5% + sodium chloride 0.45%. 1000 milliLiter(s) IV Continuous <Continuous>  fentaNYL    Injectable 50 MICROGram(s) IV Push every 15 minutes PRN  HYDROmorphone  Injectable 0.5 milliGRAM(s) IV Push every 10 minutes PRN  pantoprazole  Injectable 40 milliGRAM(s) IV Push every 24 hours  propofol Infusion 50 MICROgram(s)/kG/Min IV Continuous <Continuous>      CURRENT MEDICATIONS:   --------------------------------------------------------------------------------------  Neurologic Medications  fentaNYL    Injectable 50 MICROGram(s) IV Push every 15 minutes PRN Severe Pain (7 - 10)  HYDROmorphone  Injectable 0.5 milliGRAM(s) IV Push every 10 minutes PRN Moderate Pain (4 - 6)  propofol Infusion 50 MICROgram(s)/kG/Min IV Continuous <Continuous>    Respiratory Medications    Cardiovascular Medications    Gastrointestinal Medications  dextrose 5% + sodium chloride 0.45%. 1000 milliLiter(s) IV Continuous <Continuous>  pantoprazole  Injectable 40 milliGRAM(s) IV Push every 24 hours    Genitourinary Medications    Hematologic/Oncologic Medications    Antimicrobial/Immunologic Medications    Endocrine/Metabolic Medications    Topical/Other Medications    --------------------------------------------------------------------------------------    VITAL SIGNS, INS/OUTS (last 24 hours):    T(C): 37.2 (10-20-23 @ 21:25), Max: 37.2 (10-20-23 @ 21:25)  HR: 86 (10-20-23 @ 21:40) (47 - 89)  BP: 157/54 (10-20-23 @ 21:40) (146/56 - 169/64)  ABP: 147/56 (10-20-23 @ 21:40) (142/55 - 147/56)  ABP(mean): 90 (10-20-23 @ 21:40) (87 - 90)  RR: 13 (10-20-23 @ 21:40) (12 - 21)  SpO2: 100% (10-20-23 @ 21:40) (98% - 100%)  --------------------------------------------------------------------------------------    --------------------------------------------------------------------------------------    EXAM  NEUROLOGY  Exam: Sedated on prop     HEENT  Exam: Normocephalic, atraumatic.  EOMI     RESPIRATORY  Exam: Normal expansion on ventilator.    Mechanical Ventilation: 12/420/5/50    CARDIOVASCULAR  Exam: S1, S2.  Regular rate and rhythm.     GI/NUTRITION  Exam: Abdomen soft, Non-tender, Non-distended. Aquacel in place and cdi.  Current Diet:  NPO    VASCULAR  Exam: Extremities warm, pink, well-perfused. L brachial access site dressing cdi  Pulse exam:   LLE: Palp DP/PT  RLE: Palp DP/PT  LUE: (+) ulnar/radial/brachial    MUSCULOSKELETAL  Exam: All extremities moving spontaneously without limitations.      SKIN:  Exam: Good skin turgor, no skin breakdown.      METABOLIC/FLUIDS/ELECTROLYTES  dextrose 5% + sodium chloride 0.45%. 1000 milliLiter(s) IV Continuous <Continuous>      HEMATOLOGIC  [x] DVT Prophylaxis:   Transfusions:	[] PRBC	[] Platelets		[] FFP	[] Cryoprecipitate    INFECTIOUS DISEASE  Antimicrobials/Immunologic Medications:    Day #  	of    ***    Tubes/Lines/Drains  ***  [x] Peripheral IV  [] Central Venous Line     	[] R	[] L	[] IJ	[] Fem	[] SC	Date Placed:   [] Arterial Line		[] R	[] L	[] Fem	[] Rad	[] Ax	Date Placed:   [] PICC:         	[] Midline		[] Mediport  [] Urinary Catheter		Date Placed:     LABS  --------------------------------------------------------------------------------------                        10.0   9.73  )-----------( 194      ( 20 Oct 2023 21:50 )             30.3   10-20    139  |  103  |  10  ----------------------------<  95  4.1   |  24  |  0.83    Ca    8.9      20 Oct 2023 03:45  Phos  4.0     10-20  Mg     2.10     10-20        --------------------------------------------------------------------------------------    OTHER LABS    IMAGING RESULTS  Echo:   CT:   Xray:

## 2023-10-20 NOTE — DIETITIAN INITIAL EVALUATION ADULT - REASON FOR ADMISSION
47 year old female s/p ventral hernia repair with component separation POD 5    Plan  -pain control  -encourage IS use  -encourage ambulation  advance to regular diet   -monitor MEMO drain output  -monitor H/H  -DVT prophylaxis Acute bowel infarction

## 2023-10-20 NOTE — PRE-OP CHECKLIST - RESPIRATORY RATE (BREATHS/MIN)
He needs o2 2 l/min but only when he uses bipap.  He will f/u with his sleep pulm yearly for titration   18 16

## 2023-10-20 NOTE — DIETITIAN INITIAL EVALUATION ADULT - OTHER INFO
Nutrition consult placed for MST Score 2 or Greater.    63-year-old female with history of hypertension, diabetes presents withn intermittent abdominal pain for last 1 year associated with 40 lbs weight loss. Denies blood in stool.CTA with noted proximal SMA occlusion with reconstitution patient admitted to vascular and started on heparin infusion, OR today.    Patient not in room at time of visit. Contacted daughter Bethany [phone].  Spoke to daughter who was with patient in surgical waiting area anticipated surgery.  Per daughter and patient, weight Dec 2022-162 pounds. Stated that condition started with back pain which progressed to epigastric pain; found with Hpylori and gastritis and received ABT.  Patient continued to have GI issues - nausea, vomiting for several months, which affected her PO intake, and she has been eating poorly over the past several months as a result.  Per patient, usual body weight PTA - 120 pounds.  Current weight 10/18 - 54kg (118.8 pounds).  Patient with wt loss of 43.2 pounds /19.6kg, 27% weight loss in <1 year, which is significant.  Education given on nutritional goals following surgery; amenable to accepting oral supplement as appropriate in accordance with prescribed diet progression from vascular surgery team.  No h/o chewing or swallowing difficulty.  No food allergies on chart or reported by Pt.

## 2023-10-20 NOTE — DIETITIAN INITIAL EVALUATION ADULT - DIET TYPE
Advance/progress diet as tolerated following surgery per vascular team recommendations.  Once advanced beyond clears, suggest adding Glucerna Therapeutic Nutrition 240mls 3x daily (660kcals, 30g protein) to optimize nutritional intake.

## 2023-10-20 NOTE — CONSULT NOTE ADULT - ATTENDING COMMENTS
I agree with the detailed interval history, physical, and plan, which I have reviewed and edited where appropriate'; also agree with notes/assessment with my team on service.  I have personally examined the patient.  I was physically present for the key portions of the evaluation and management (E/M) service provided.  I reviewed all the pertinent data.  The patient is a critical care patient with life threatening hemodynamic and metabolic instability in SICU.  The SICU team has a constant risk benefit analyzes discussion and coordinating care with the primary team and all consultants.   The patient is in SICU with the chief complaint and diagnosis mentioned in the note.   The plan will be specified in the note.  63-year-old female s/p failed SMA angiogram via L brachial artery cutdown and open R iliac to SMA bypass with PTFE graft. Postoperatively, patient remains intubated and SICU consulted for q1 neurovascular checks, vent management, and HD monitoring.  EXAM  NEUROLOGY  Exam: Sedated   RESPIRATORY  Exam: clear  Mechanical Ventilation: 12/420/5/50  CARDIOVASCULAR  Exam: Regular rate   GI/NUTRITION  Exam: Abdomen soft  NEUROLOGIC   - propofol  - q1 neurovascular checks  -Tylenol/dilaudid PRN  RESPIRATORY   - Intubated: 12/420/5/50 on volume AC  CARDIOVASCULAR   - MAP goals >65  -Holding home amlodipine/lisinopril   GASTROINTESTINAL   - Diet: NPO/NGT  /RENAL   - IV fluids: LR @ 100cc/hr  HEMATOLOGIC  - Monitor H/H   - DVT ppx: Holding DVT ppx   INFECTIOUS DISEASE  - Monitor fever   - Ancef   ENDOCRINE  - Monitor glucose  - ISS  DISPO: SICU

## 2023-10-20 NOTE — PROGRESS NOTE ADULT - SUBJECTIVE AND OBJECTIVE BOX
SURGERY DAILY PROGRESS NOTE:       SUBJECTIVE/ROS: Patient seen and evaluated on AM rounds.   Pt denies pain or any complaints at this time.        OBJECTIVE:  Vital Signs Last 24 Hrs  T(C): 36.7 (20 Oct 2023 05:40), Max: 36.9 (19 Oct 2023 07:10)  T(F): 98 (20 Oct 2023 05:40), Max: 98.4 (19 Oct 2023 07:10)  HR: 54 (20 Oct 2023 05:40) (47 - 83)  BP: 160/58 (20 Oct 2023 05:40) (138/54 - 162/66)  BP(mean): --  RR: 18 (20 Oct 2023 05:40) (16 - 18)  SpO2: 100% (20 Oct 2023 05:40) (98% - 100%)    Parameters below as of 20 Oct 2023 02:10  Patient On (Oxygen Delivery Method): room air      I&O's Detail    Daily     Daily   MEDICATIONS  (STANDING):  dextrose 5% + sodium chloride 0.45%. 1000 milliLiter(s) (100 mL/Hr) IV Continuous <Continuous>  heparin  Infusion 1000 Unit(s)/Hr (7 mL/Hr) IV Continuous <Continuous>  pantoprazole  Injectable 40 milliGRAM(s) IV Push every 24 hours    MEDICATIONS  (PRN):      LABS:                        13.1   5.14  )-----------( 282      ( 20 Oct 2023 03:45 )             40.1     10-20    139  |  103  |  10  ----------------------------<  95  4.1   |  24  |  0.83    Ca    8.9      20 Oct 2023 03:45  Phos  4.0     10-20  Mg     2.10     10-20      PT/INR - ( 18 Oct 2023 09:04 )   PT: 14.5 sec;   INR: 1.30 ratio         PTT - ( 20 Oct 2023 03:45 )  PTT:86.7 sec  Urinalysis Basic - ( 20 Oct 2023 03:45 )    Color: x / Appearance: x / SG: x / pH: x  Gluc: 95 mg/dL / Ketone: x  / Bili: x / Urobili: x   Blood: x / Protein: x / Nitrite: x   Leuk Esterase: x / RBC: x / WBC x   Sq Epi: x / Non Sq Epi: x / Bacteria: x                  PHYSICAL EXAM:  General: well developed, well nourished, NAD  HEENT: NCAT, trachea midline  Respiratory: respirations non labored  Gastrointestinal: soft, nontender, nondistended  Extremities: FROM, warm  Neurological: non-focal

## 2023-10-20 NOTE — DIETITIAN INITIAL EVALUATION ADULT - PERTINENT MEDS FT
MEDICATIONS  (STANDING):  dextrose 5% + sodium chloride 0.45%. 1000 milliLiter(s) (100 mL/Hr) IV Continuous <Continuous>  pantoprazole  Injectable 40 milliGRAM(s) IV Push every 24 hours    MEDICATIONS  (PRN):

## 2023-10-20 NOTE — DIETITIAN INITIAL EVALUATION ADULT - ADD RECOMMEND
1) Monitor weights, PO intake/diet tolerance following surgery/diet advancement, skin integrity, pertinent labs.

## 2023-10-21 LAB
ALBUMIN SERPL ELPH-MCNC: 3.5 G/DL — SIGNIFICANT CHANGE UP (ref 3.3–5)
ALBUMIN SERPL ELPH-MCNC: 3.5 G/DL — SIGNIFICANT CHANGE UP (ref 3.3–5)
ALP SERPL-CCNC: 41 U/L — SIGNIFICANT CHANGE UP (ref 40–120)
ALP SERPL-CCNC: 41 U/L — SIGNIFICANT CHANGE UP (ref 40–120)
ALT FLD-CCNC: 31 U/L — SIGNIFICANT CHANGE UP (ref 4–33)
ALT FLD-CCNC: 31 U/L — SIGNIFICANT CHANGE UP (ref 4–33)
ANION GAP SERPL CALC-SCNC: 14 MMOL/L — SIGNIFICANT CHANGE UP (ref 7–14)
ANION GAP SERPL CALC-SCNC: 14 MMOL/L — SIGNIFICANT CHANGE UP (ref 7–14)
AST SERPL-CCNC: 41 U/L — HIGH (ref 4–32)
AST SERPL-CCNC: 41 U/L — HIGH (ref 4–32)
BILIRUB SERPL-MCNC: 1 MG/DL — SIGNIFICANT CHANGE UP (ref 0.2–1.2)
BILIRUB SERPL-MCNC: 1 MG/DL — SIGNIFICANT CHANGE UP (ref 0.2–1.2)
BLOOD GAS ARTERIAL - LYTES,HGB,ICA,LACT RESULT: SIGNIFICANT CHANGE UP
BLOOD GAS ARTERIAL - LYTES,HGB,ICA,LACT RESULT: SIGNIFICANT CHANGE UP
BLOOD GAS ARTERIAL COMPREHENSIVE RESULT: SIGNIFICANT CHANGE UP
BLOOD GAS ARTERIAL COMPREHENSIVE RESULT: SIGNIFICANT CHANGE UP
BUN SERPL-MCNC: 8 MG/DL — SIGNIFICANT CHANGE UP (ref 7–23)
BUN SERPL-MCNC: 8 MG/DL — SIGNIFICANT CHANGE UP (ref 7–23)
CALCIUM SERPL-MCNC: 8.2 MG/DL — LOW (ref 8.4–10.5)
CALCIUM SERPL-MCNC: 8.2 MG/DL — LOW (ref 8.4–10.5)
CHLORIDE SERPL-SCNC: 105 MMOL/L — SIGNIFICANT CHANGE UP (ref 98–107)
CHLORIDE SERPL-SCNC: 105 MMOL/L — SIGNIFICANT CHANGE UP (ref 98–107)
CO2 SERPL-SCNC: 23 MMOL/L — SIGNIFICANT CHANGE UP (ref 22–31)
CO2 SERPL-SCNC: 23 MMOL/L — SIGNIFICANT CHANGE UP (ref 22–31)
CREAT SERPL-MCNC: 0.81 MG/DL — SIGNIFICANT CHANGE UP (ref 0.5–1.3)
CREAT SERPL-MCNC: 0.81 MG/DL — SIGNIFICANT CHANGE UP (ref 0.5–1.3)
EGFR: 81 ML/MIN/1.73M2 — SIGNIFICANT CHANGE UP
EGFR: 81 ML/MIN/1.73M2 — SIGNIFICANT CHANGE UP
GLUCOSE BLDC GLUCOMTR-MCNC: 111 MG/DL — HIGH (ref 70–99)
GLUCOSE BLDC GLUCOMTR-MCNC: 111 MG/DL — HIGH (ref 70–99)
GLUCOSE BLDC GLUCOMTR-MCNC: 118 MG/DL — HIGH (ref 70–99)
GLUCOSE BLDC GLUCOMTR-MCNC: 118 MG/DL — HIGH (ref 70–99)
GLUCOSE BLDC GLUCOMTR-MCNC: 123 MG/DL — HIGH (ref 70–99)
GLUCOSE BLDC GLUCOMTR-MCNC: 123 MG/DL — HIGH (ref 70–99)
GLUCOSE BLDC GLUCOMTR-MCNC: 99 MG/DL — SIGNIFICANT CHANGE UP (ref 70–99)
GLUCOSE BLDC GLUCOMTR-MCNC: 99 MG/DL — SIGNIFICANT CHANGE UP (ref 70–99)
GLUCOSE SERPL-MCNC: 138 MG/DL — HIGH (ref 70–99)
GLUCOSE SERPL-MCNC: 138 MG/DL — HIGH (ref 70–99)
HCT VFR BLD CALC: 30.5 % — LOW (ref 34.5–45)
HCT VFR BLD CALC: 30.5 % — LOW (ref 34.5–45)
HGB BLD-MCNC: 10.3 G/DL — LOW (ref 11.5–15.5)
HGB BLD-MCNC: 10.3 G/DL — LOW (ref 11.5–15.5)
MAGNESIUM SERPL-MCNC: 3.4 MG/DL — HIGH (ref 1.6–2.6)
MAGNESIUM SERPL-MCNC: 3.4 MG/DL — HIGH (ref 1.6–2.6)
MCHC RBC-ENTMCNC: 28.4 PG — SIGNIFICANT CHANGE UP (ref 27–34)
MCHC RBC-ENTMCNC: 28.4 PG — SIGNIFICANT CHANGE UP (ref 27–34)
MCHC RBC-ENTMCNC: 33.8 GM/DL — SIGNIFICANT CHANGE UP (ref 32–36)
MCHC RBC-ENTMCNC: 33.8 GM/DL — SIGNIFICANT CHANGE UP (ref 32–36)
MCV RBC AUTO: 84 FL — SIGNIFICANT CHANGE UP (ref 80–100)
MCV RBC AUTO: 84 FL — SIGNIFICANT CHANGE UP (ref 80–100)
NRBC # BLD: 0 /100 WBCS — SIGNIFICANT CHANGE UP (ref 0–0)
NRBC # BLD: 0 /100 WBCS — SIGNIFICANT CHANGE UP (ref 0–0)
NRBC # FLD: 0 K/UL — SIGNIFICANT CHANGE UP (ref 0–0)
NRBC # FLD: 0 K/UL — SIGNIFICANT CHANGE UP (ref 0–0)
PHOSPHATE SERPL-MCNC: 4.7 MG/DL — HIGH (ref 2.5–4.5)
PHOSPHATE SERPL-MCNC: 4.7 MG/DL — HIGH (ref 2.5–4.5)
PLATELET # BLD AUTO: 204 K/UL — SIGNIFICANT CHANGE UP (ref 150–400)
PLATELET # BLD AUTO: 204 K/UL — SIGNIFICANT CHANGE UP (ref 150–400)
POTASSIUM SERPL-MCNC: 3.7 MMOL/L — SIGNIFICANT CHANGE UP (ref 3.5–5.3)
POTASSIUM SERPL-MCNC: 3.7 MMOL/L — SIGNIFICANT CHANGE UP (ref 3.5–5.3)
POTASSIUM SERPL-SCNC: 3.7 MMOL/L — SIGNIFICANT CHANGE UP (ref 3.5–5.3)
POTASSIUM SERPL-SCNC: 3.7 MMOL/L — SIGNIFICANT CHANGE UP (ref 3.5–5.3)
PROT SERPL-MCNC: 5.5 G/DL — LOW (ref 6–8.3)
PROT SERPL-MCNC: 5.5 G/DL — LOW (ref 6–8.3)
RBC # BLD: 3.63 M/UL — LOW (ref 3.8–5.2)
RBC # BLD: 3.63 M/UL — LOW (ref 3.8–5.2)
RBC # FLD: 13.7 % — SIGNIFICANT CHANGE UP (ref 10.3–14.5)
RBC # FLD: 13.7 % — SIGNIFICANT CHANGE UP (ref 10.3–14.5)
SODIUM SERPL-SCNC: 142 MMOL/L — SIGNIFICANT CHANGE UP (ref 135–145)
SODIUM SERPL-SCNC: 142 MMOL/L — SIGNIFICANT CHANGE UP (ref 135–145)
WBC # BLD: 10.44 K/UL — SIGNIFICANT CHANGE UP (ref 3.8–10.5)
WBC # BLD: 10.44 K/UL — SIGNIFICANT CHANGE UP (ref 3.8–10.5)
WBC # FLD AUTO: 10.44 K/UL — SIGNIFICANT CHANGE UP (ref 3.8–10.5)
WBC # FLD AUTO: 10.44 K/UL — SIGNIFICANT CHANGE UP (ref 3.8–10.5)

## 2023-10-21 PROCEDURE — 71045 X-RAY EXAM CHEST 1 VIEW: CPT | Mod: 26

## 2023-10-21 PROCEDURE — 99291 CRITICAL CARE FIRST HOUR: CPT | Mod: 24

## 2023-10-21 RX ORDER — SODIUM CHLORIDE 9 MG/ML
1000 INJECTION, SOLUTION INTRAVENOUS
Refills: 0 | Status: DISCONTINUED | OUTPATIENT
Start: 2023-10-21 | End: 2023-10-23

## 2023-10-21 RX ORDER — HEPARIN SODIUM 5000 [USP'U]/ML
5000 INJECTION INTRAVENOUS; SUBCUTANEOUS EVERY 8 HOURS
Refills: 0 | Status: DISCONTINUED | OUTPATIENT
Start: 2023-10-21 | End: 2023-10-27

## 2023-10-21 RX ORDER — POTASSIUM CHLORIDE 20 MEQ
20 PACKET (EA) ORAL ONCE
Refills: 0 | Status: COMPLETED | OUTPATIENT
Start: 2023-10-21 | End: 2023-10-21

## 2023-10-21 RX ADMIN — Medication 100 MILLIGRAM(S): at 05:12

## 2023-10-21 RX ADMIN — HEPARIN SODIUM 5000 UNIT(S): 5000 INJECTION INTRAVENOUS; SUBCUTANEOUS at 22:10

## 2023-10-21 RX ADMIN — Medication 100 MILLIEQUIVALENT(S): at 05:46

## 2023-10-21 RX ADMIN — HEPARIN SODIUM 5000 UNIT(S): 5000 INJECTION INTRAVENOUS; SUBCUTANEOUS at 13:30

## 2023-10-21 RX ADMIN — HYDROMORPHONE HYDROCHLORIDE 0.5 MILLIGRAM(S): 2 INJECTION INTRAMUSCULAR; INTRAVENOUS; SUBCUTANEOUS at 06:03

## 2023-10-21 RX ADMIN — PANTOPRAZOLE SODIUM 40 MILLIGRAM(S): 20 TABLET, DELAYED RELEASE ORAL at 12:39

## 2023-10-21 RX ADMIN — Medication 100 MILLIEQUIVALENT(S): at 00:15

## 2023-10-21 RX ADMIN — HYDROMORPHONE HYDROCHLORIDE 0.5 MILLIGRAM(S): 2 INJECTION INTRAMUSCULAR; INTRAVENOUS; SUBCUTANEOUS at 17:41

## 2023-10-21 RX ADMIN — HYDROMORPHONE HYDROCHLORIDE 0.5 MILLIGRAM(S): 2 INJECTION INTRAMUSCULAR; INTRAVENOUS; SUBCUTANEOUS at 22:10

## 2023-10-21 RX ADMIN — Medication 400 MILLIGRAM(S): at 17:47

## 2023-10-21 RX ADMIN — HYDROMORPHONE HYDROCHLORIDE 0.5 MILLIGRAM(S): 2 INJECTION INTRAMUSCULAR; INTRAVENOUS; SUBCUTANEOUS at 06:20

## 2023-10-21 RX ADMIN — HYDROMORPHONE HYDROCHLORIDE 0.5 MILLIGRAM(S): 2 INJECTION INTRAMUSCULAR; INTRAVENOUS; SUBCUTANEOUS at 23:00

## 2023-10-21 RX ADMIN — Medication 400 MILLIGRAM(S): at 12:39

## 2023-10-21 RX ADMIN — CHLORHEXIDINE GLUCONATE 15 MILLILITER(S): 213 SOLUTION TOPICAL at 05:11

## 2023-10-21 RX ADMIN — Medication 1000 MILLIGRAM(S): at 18:05

## 2023-10-21 RX ADMIN — Medication 1000 MILLIGRAM(S): at 13:15

## 2023-10-21 RX ADMIN — Medication 400 MILLIGRAM(S): at 05:11

## 2023-10-21 RX ADMIN — Medication 100 MILLIGRAM(S): at 22:10

## 2023-10-21 RX ADMIN — SODIUM CHLORIDE 75 MILLILITER(S): 9 INJECTION, SOLUTION INTRAVENOUS at 20:03

## 2023-10-21 RX ADMIN — HYDROMORPHONE HYDROCHLORIDE 0.5 MILLIGRAM(S): 2 INJECTION INTRAMUSCULAR; INTRAVENOUS; SUBCUTANEOUS at 18:05

## 2023-10-21 RX ADMIN — Medication 100 MILLIGRAM(S): at 13:29

## 2023-10-21 NOTE — PROGRESS NOTE ADULT - SUBJECTIVE AND OBJECTIVE BOX
POST ANESTHESIA EVALUATION    63y Female POSTOP DAY 1    MENTAL STATUS: Patient participation [x  ] Awake     [  ] Arousable     [  ] Sedated    AIRWAY PATENCY: [  ] Satisfactory  [ X] Other: Remains intubated on mechanical ventilation    Vital Signs Last 24 Hrs  T(C): 36.7 (21 Oct 2023 12:00), Max: 37.2 (20 Oct 2023 21:25)  T(F): 98 (21 Oct 2023 12:00), Max: 99 (20 Oct 2023 21:25)  HR: 65 (21 Oct 2023 12:00) (48 - 89)  BP: 134/54 (21 Oct 2023 00:30) (134/54 - 160/58)  BP(mean): 72 (21 Oct 2023 00:30) (72 - 88)  RR: 16 (21 Oct 2023 12:00) (12 - 21)  SpO2: 100% (21 Oct 2023 12:00) (100% - 100%)    Parameters below as of 21 Oct 2023 08:00  Patient On (Oxygen Delivery Method): ventilator    O2 Concentration (%): 40  I&O's Summary    20 Oct 2023 07:01  -  21 Oct 2023 07:00  --------------------------------------------------------  IN: 3096.4 mL / OUT: 1970 mL / NET: 1126.4 mL          NAUSEA/ VOMITTING:  [ x ] NONE  [  ] CONTROLLED [  ] OTHER     PAIN: [ x ] CONTROLLED WITH CURRENT REGIMEN  [  ] OTHER    [ x ] NO APPARENT ANESTHESIA COMPLICATIONS      Comments:

## 2023-10-21 NOTE — PROGRESS NOTE ADULT - ASSESSMENT
Hospitalist Progress Note Daily Progress Note: 1/7/2021 Subjective: The patient is seen for follow up. Patient states that he is feeling well this morning. No new complaints. Case management continues to work on placement Problem List: 
Problem List as of 1/7/2021 Date Reviewed: 12/27/2020 Codes Class Noted - Resolved Liver cirrhosis (HCC) ICD-10-CM: K74.60 ICD-9-CM: 571.5  12/26/2020 - Present Pulmonary nodule ICD-10-CM: R91.1 ICD-9-CM: 793.11  12/26/2020 - Present Intractable back pain ICD-10-CM: M54.9 ICD-9-CM: 724.5  12/26/2020 - Present Closed compression fracture of L4 lumbar vertebra, [facility] ICD-10-CM: N48.749X ICD-9-CM: 805.4  12/26/2020 - Present Hypokalemia ICD-10-CM: E87.6 ICD-9-CM: 276.8  12/26/2020 - Present Medications reviewed Current Facility-Administered Medications Medication Dose Route Frequency  oxyCODONE IR (ROXICODONE) tablet 5 mg  5 mg Oral Q4H PRN  
 oxyCODONE IR (ROXICODONE) tablet 10 mg  10 mg Oral Q4H PRN  
 fluticasone propionate (FLONASE) 50 mcg/actuation nasal spray 2 Spray  2 Spray Both Nostrils DAILY  folic acid (FOLVITE) tablet 1 mg  1 mg Oral DAILY  furosemide (LASIX) tablet 40 mg  40 mg Oral DAILY  lactulose (CHRONULAC) 10 gram/15 mL solution 45 mL  30 g Oral TID  propranoloL (INDERAL) tablet 10 mg  10 mg Oral BID  albuterol-ipratropium (DUO-NEB) 2.5 MG-0.5 MG/3 ML  3 mL Nebulization Q6H RT  
 sodium chloride (NS) flush 5-40 mL  5-40 mL IntraVENous PRN  
 acetaminophen (TYLENOL) tablet 650 mg  650 mg Oral Q6H PRN Or  
 acetaminophen (TYLENOL) suppository 650 mg  650 mg Rectal Q6H PRN  polyethylene glycol (MIRALAX) packet 17 g  17 g Oral DAILY PRN  promethazine (PHENERGAN) tablet 12.5 mg  12.5 mg Oral Q6H PRN  Or  
 ondansetron (ZOFRAN) injection 4 mg  4 mg IntraVENous Q6H PRN  
 63-year-old female with history of hypertension, diabetes presents with recurrent abdominal pain    EKG: NSR no acute changes    1. Post-op assessment  -EKG: NSR no acute changes  -TTE normal LV function  -open exlap w/ right iliac to SMA bypass w/ PTFE grafts in SICU intubated being weaned off sedation        2. SMA occlusion  -vascular on board s/p open exlap w/ right iliac to SMA bypass w/ PTFE grafts.      3. DVT prophylaxis  -hep gtt    isosorbide mononitrate ER (IMDUR) tablet 30 mg  30 mg Oral DAILY  pantoprazole (PROTONIX) tablet 40 mg  40 mg Oral ACB  morphine injection 2 mg  2 mg IntraVENous Q4H PRN  
 heparin (porcine) injection 5,000 Units  5,000 Units SubCUTAneous Q8H  
 rifAXIMin (XIFAXAN) tablet 550 mg  550 mg Oral TID Review of Systems: A comprehensive review of systems was negative except for that written in the HPI. Objective:  
Physical Exam:  
 
Visit Vitals BP (!) 100/48 Pulse 98 Temp 98.9 °F (37.2 °C) Resp 18 Ht 6' 0.84\" (1.85 m) Wt 105.2 kg (231 lb 14.8 oz) SpO2 100% BMI 30.74 kg/m² O2 Flow Rate (L/min): 2 l/min O2 Device: Nasal cannula Temp (24hrs), Av.1 °F (37.3 °C), Min:98.9 °F (37.2 °C), Max:99.3 °F (37.4 °C) No intake/output data recorded.  1901 -  0700 In: 300 [P.O.:300] Out: 2000 [Urine:1300] General:   Awake and alert Lungs:   Clear to auscultation bilaterally. Chest wall:  No tenderness or deformity. Heart:  Regular rate and rhythm, S1, S2 normal, no murmur, click, rub or gallop. Abdomen:    Protuberant with obvious ascites Extremities: Extremities normal, atraumatic, no cyanosis or edema. Pulses: 2+ and symmetric all extremities. Skin: Skin color, texture, turgor normal. No rashes or lesions Neurologic: CNII-XII intact. No gross focal deficits Data Review:  
   
Recent Days: 
Recent Labs 21 
1026 21 
1035 WBC 5.8 6.3 HGB 8.1* 8.3* HCT 26.8* 28.0*  
* 149* Recent Labs 21 
1026 21 
1035  138  
K 3.3* 3.1*  
* 109* CO2 25 23 * 201* BUN 21* 27* CREA 1.67* 1.80* CA 8.2* 8.1*  
MG 2.2  -- No results for input(s): PH, PCO2, PO2, HCO3, FIO2 in the last 72 hours. 24 Hour Results: 
Recent Results (from the past 24 hour(s)) C REACTIVE PROTEIN, QT Collection Time: 21  2:32 AM  
Result Value Ref Range C-Reactive protein 5.86 (H) 0.00 - 0.60 mg/dL PROCALCITONIN Collection Time: 01/07/21  2:32 AM  
Result Value Ref Range Procalcitonin 0.12 (H) 0 ng/mL XR CHEST PORT Final Result IMPRESSION:  
  
Single portable AP view compared to 12/30/2020. Left cardiac pacemaker/AICD  
intact and unchanged with one electrode in the region of the right ventricle. Mild cardiomegaly. No mediastinal widening or shift. There is new patchy consolidation in the right lower lobe and mild streaky  
parenchymal change in the right mid zone and left lower zone new from the prior  
study. No radiopaque foreign bodies. No edema or effusion or pneumothorax. NM INFLAM PROC LTD Final Result XR CHEST PORT Final Result Impression: No acute cardiopulmonary findings. IR PARACENTESIS ABD W IMAGE Final Result Impression:   
Successful paracentesis. NM BONE SCAN WH BODY Final Result US RETROPERITONEUM COMP Final Result Impression: No hydronephrosis. Ascites. Cirrhotic morphology. Splenomegaly. XR CHEST PORT Final Result Impression: No acute findings. CT ABD PELV WO CONT Final Result IMPRESSION:  
  
Liver cirrhosis. Mild splenomegaly. Upper abdominal and paraesophageal varices. Ascites. No bowel obstruction. Right lower lobe pulmonary nodule may represent tumor or other. Follow up is  
recommended to exclude malignancy. The results were called and verbally  
communicated to Dr. Dossie Kocher, on 12/26/2020 at 6:07 AM. Thoracolumbar compression fractures of indeterminate age, may be old. Small nonobstructive renal stones. Cholelithiasis. Small umbilical hernia. Assessment: 
Bacteremia with Streptococcus mitis/oralis and Streptococcus anginosus, clinical significance unclear. Completed course of Unasyn on 1/6. Thoracolumbar compression fractures Alcoholic cirrhosis with portal hypertension and recurrent ascites Hepatic encephalopathy Acute kidney disease stage III Hypokalemia Anemia of chronic disease Plan: 
Continue lactulose Await SNF placement Care Plan discussed with:  Total time spent with patient: 30 minutes. Tamanna Muro

## 2023-10-21 NOTE — PROGRESS NOTE ADULT - SUBJECTIVE AND OBJECTIVE BOX
Winston House MD  Interventional Cardiology / Advance Heart Failure and Cardiac Transplant Specialist  [place] Office : [address] [place] [id number]  Tel:   [place] Office : [address] [facility] [address]  Tel: [phone]      Subjective/Overnight events: Pt is lying in bed in ICU intubated  	  MEDICATIONS:  heparin   Injectable 5000 Unit(s) SubCutaneous every 8 hours    ceFAZolin   IVPB 2000 milliGRAM(s) IV Intermittent every 8 hours      acetaminophen   IVPB .. 1000 milliGRAM(s) IV Intermittent every 6 hours  HYDROmorphone  Injectable 1 milliGRAM(s) IV Push every 4 hours PRN  HYDROmorphone  Injectable 0.5 milliGRAM(s) IV Push every 4 hours PRN    pantoprazole  Injectable 40 milliGRAM(s) IV Push every 24 hours    dextrose 50% Injectable 25 Gram(s) IV Push once  dextrose 50% Injectable 12.5 Gram(s) IV Push once  dextrose 50% Injectable 25 Gram(s) IV Push once  dextrose Oral Gel 15 Gram(s) Oral once PRN  glucagon  Injectable 1 milliGRAM(s) IntraMuscular once  insulin lispro (ADMELOG) corrective regimen sliding scale   SubCutaneous every 6 hours    chlorhexidine 0.12% Liquid 15 milliLiter(s) Oral Mucosa every 12 hours  dextrose 5% + sodium chloride 0.45%. 1000 milliLiter(s) IV Continuous <Continuous>  dextrose 5%. 1000 milliLiter(s) IV Continuous <Continuous>  dextrose 5%. 1000 milliLiter(s) IV Continuous <Continuous>      PAST MEDICAL/SURGICAL HISTORY  PAST MEDICAL & SURGICAL HISTORY:  Positive H. pylori test          SOCIAL HISTORY: Substance Use (street drugs): ( x ) never used  (  ) other:    FAMILY HISTORY:          PHYSICAL EXAM:  T(C): 36.7 (10-21-23 @ 12:00), Max: 37.2 (10-20-23 @ 21:25)  HR: 65 (10-21-23 @ 12:00) (48 - 89)  BP: 134/54 (10-21-23 @ 00:30) (134/54 - 160/58)  RR: 16 (10-21-23 @ 12:00) (12 - 21)  SpO2: 100% (10-21-23 @ 12:00) (100% - 100%)  Wt(kg): --  I&O's Summary    20 Oct 2023 07:01  -  21 Oct 2023 07:00  --------------------------------------------------------  IN: 3096.4 mL / OUT: 1970 mL / NET: 1126.4 mL          GENERAL: NAD  EYES:  conjunctiva and sclera clear  ENMT: intubaed  Cardiovascular: Normal S1 S2, No JVD, No murmurs, No edema  Respiratory: Lungs clear to auscultation	  Gastrointestinal:  s/p surgery  Extremities: No edema                                     10.3   10.44 )-----------( 204      ( 21 Oct 2023 02:40 )             30.5     10-21    142  |  105  |  8   ----------------------------<  138<H>  3.7   |  23  |  0.81    Ca    8.2<L>      21 Oct 2023 02:40  Phos  4.7     10-21  Mg     3.40     10-21    TPro  5.5<L>  /  Alb  3.5  /  TBili  1.0  /  DBili  x   /  AST  41<H>  /  ALT  31  /  AlkPhos  41  10-21    proBNP:   Lipid Profile:   HgA1c:   TSH:     Consultant(s) Notes Reviewed:  [x ] YES  [ ] NO    Care Discussed with Consultants/Other Providers [ x] YES  [ ] NO    Imaging Personally Reviewed independently:  [x] YES  [ ] NO    All labs, radiologic studies, vitals, orders and medications list reviewed. Patient is seen and examined at bedside. Case discussed with medical team.

## 2023-10-21 NOTE — CHART NOTE - NSCHARTNOTEFT_GEN_A_CORE
Vascular Surgery Post-Op Note, PCN:     Pre-Op Dx: Acute bowel infarction    Superior mesenteric artery thrombosis    Procedure: attempted celiac artery stent via left brachial cutdown converted to ex-lap, right iliac to SMA bypass with PTFE.     Subjective: Patient examined in PACU. Patient intubated and sedated.     Vital Signs Last 24 Hrs  T(C): 36.8 (21 Oct 2023 00:00), Max: 37.2 (20 Oct 2023 21:25)  T(F): 98.2 (21 Oct 2023 00:00), Max: 99 (20 Oct 2023 21:25)  HR: 70 (21 Oct 2023 00:00) (47 - 89)  BP: 143/56 (21 Oct 2023 00:00) (143/56 - 169/64)  BP(mean): 75 (21 Oct 2023 00:00) (75 - 88)  RR: 12 (21 Oct 2023 00:00) (12 - 21)  SpO2: 100% (21 Oct 2023 00:00) (98% - 100%)    Parameters below as of 20 Oct 2023 21:25  Patient On (Oxygen Delivery Method): ventilator        Physical Exam:  General: sedated  Pulmonary: intubated  Cardiovascular: NSR, off pressors   Abdominal: soft, NT/ND  : cannon with yellow urine   Extremities: WWP, L bracial incisions with minimal serosanguinous strikethrough; R groin aquacel c/d/i  Neuro: sedated  Pulses:   Right:  DP [x]2+ [ ]1+ [ ]doppler    Left:  Radial [ ]2+ [x]1+ [ ]doppler  DP [x]2+ [ ]1+ [ ]doppler    LABS:                        10.0   9.73  )-----------( 194      ( 20 Oct 2023 21:50 )             30.3     10-20    140  |  104  |  10  ----------------------------<  149<H>  3.5   |  22  |  0.80    Ca    8.0<L>      20 Oct 2023 21:50  Phos  4.9     10-20  Mg     1.40     10-20    TPro  5.4<L>  /  Alb  3.6  /  TBili  1.0  /  DBili  x   /  AST  36<H>  /  ALT  28  /  AlkPhos  39<L>  10-20    PT/INR - ( 20 Oct 2023 21:50 )   PT: 15.4 sec;   INR: 1.39 ratio         PTT - ( 20 Oct 2023 21:50 )  PTT:27.8 sec  CAPILLARY BLOOD GLUCOSE      POCT Blood Glucose.: 123 mg/dL (21 Oct 2023 00:28)  POCT Blood Glucose.: 145 mg/dL (20 Oct 2023 21:27)  POCT Blood Glucose.: 98 mg/dL (20 Oct 2023 10:06)    LIVER FUNCTIONS - ( 20 Oct 2023 21:50 )  Alb: 3.6 g/dL / Pro: 5.4 g/dL / ALK PHOS: 39 U/L / ALT: 28 U/L / AST: 36 U/L / GGT: x           Urinalysis Basic - ( 20 Oct 2023 21:50 )    Color: x / Appearance: x / SG: x / pH: x  Gluc: 149 mg/dL / Ketone: x  / Bili: x / Urobili: x   Blood: x / Protein: x / Nitrite: x   Leuk Esterase: x / RBC: x / WBC x   Sq Epi: x / Non Sq Epi: x / Bacteria: x        Radiology and Additional Studies:    Assessment:63y Female s/p attempted celiac artery stent via left brachial cutdown converted to ex-lap, right iliac to SMA bypass with PTFE.     Plan:  - Admitted to SICU  - q1h NV checks, particularly L radial pulses d/t diminished pulse postop  - sedated and intubated with plan to ween off vent AM  - Strict I&Os  - Started Ancef (10/20- )    Vascular Surgery   [id number]

## 2023-10-21 NOTE — PROGRESS NOTE ADULT - SUBJECTIVE AND OBJECTIVE BOX
SICU Daily Progress Note  =====================================================  24 hour events:  - q1 neurovascular checks, monitor L. radial closely due to deminished pulses post-op  - sedated and intubated with plan to ween off vent AM  - Strict I&Os  - Started Ancef (10/20- )      ALLERGIES  No Known Allergies      CURRENT MEDICATIONS:   --------------------------------------------------------------------------------------  Neurologic Medications  fentaNYL    Injectable 50 MICROGram(s) IV Push every 15 minutes PRN Severe Pain (7 - 10)  HYDROmorphone  Injectable 0.5 milliGRAM(s) IV Push every 10 minutes PRN Moderate Pain (4 - 6)  propofol Infusion 50 MICROgram(s)/kG/Min IV Continuous <Continuous>    Respiratory Medications    Cardiovascular Medications    Gastrointestinal Medications  dextrose 5% + sodium chloride 0.45%. 1000 milliLiter(s) IV Continuous <Continuous>  pantoprazole  Injectable 40 milliGRAM(s) IV Push every 24 hours    Genitourinary Medications    Hematologic/Oncologic Medications    Antimicrobial/Immunologic Medications    Endocrine/Metabolic Medications    Topical/Other Medications    --------------------------------------------------------------------------------------    VITAL SIGNS, INS/OUTS (last 24 hours):  -----------------------------  ICU Vital Signs Last 24 Hrs  T(C): 37.2 (20 Oct 2023 21:25), Max: 37.2 (20 Oct 2023 21:25)  T(F): 99 (20 Oct 2023 21:25), Max: 99 (20 Oct 2023 21:25)  HR: 76 (20 Oct 2023 23:15) (47 - 89)  BP: 154/61 (20 Oct 2023 23:15) (146/56 - 169/64)  BP(mean): 84 (20 Oct 2023 23:15) (76 - 84)  ABP: 169/68 (20 Oct 2023 23:15) (114/76 - 173/67)  ABP(mean): 102 (20 Oct 2023 23:15) (87 - 289)  RR: 12 (20 Oct 2023 23:15) (12 - 21)  SpO2: 100% (20 Oct 2023 23:15) (98% - 100%)    O2 Parameters below as of 20 Oct 2023 21:25  Patient On (Oxygen Delivery Method): ventilator        I&O's Summary    20 Oct 2023 07:01  -  21 Oct 2023 00:06  --------------------------------------------------------  IN: 1624.4 mL / OUT: 420 mL / NET: 1204.4 mL      --------------------------------------------------------------------------------------    EXAM  NEUROLOGY  Exam: Sedated on prop     HEENT  Exam: Normocephalic, atraumatic.  EOMI     RESPIRATORY  Exam: Normal expansion on ventilator.    Mechanical Ventilation: 12/420/5/50    CARDIOVASCULAR  Exam: S1, S2.  Regular rate and rhythm.     GI/NUTRITION  Exam: Abdomen soft, Non-tender, Non-distended. Aquacel in place and cdi.  Current Diet:  NPO    VASCULAR  Exam: Extremities warm, pink, well-perfused. L brachial access site dressing cdi  Pulse exam:   LLE: Palp DP/PT  RLE: Palp DP/PT  LUE: (+) ulnar/radial/brachial    MUSCULOSKELETAL  Exam: All extremities moving spontaneously without limitations.      SKIN:  Exam: Good skin turgor, no skin breakdown.      METABOLIC/FLUIDS/ELECTROLYTES  dextrose 5% + sodium chloride 0.45%. 1000 milliLiter(s) IV Continuous <Continuous>      HEMATOLOGIC  [x] DVT Prophylaxis:   Transfusions:	[] PRBC	[] Platelets		[] FFP	[] Cryoprecipitate    INFECTIOUS DISEASE  Antimicrobials/Immunologic Medications:    Day #  	of    ***    Tubes/Lines/Drains  ***  [x] Peripheral IV  [] Central Venous Line     	[] R	[] L	[] IJ	[] Fem	[] SC	Date Placed:   [] Arterial Line		[] R	[] L	[] Fem	[] Rad	[] Ax	Date Placed:   [] PICC:         	[] Midline		[] Mediport  [] Urinary Catheter		Date Placed:     LABS  --------------------------------------------------------------------------------------             10-20    140  |  104  |  10  ----------------------------<  149<H>  3.5   |  22  |  0.80    Ca    8.0<L>      20 Oct 2023 21:50  Phos  4.9     10-20  Mg     1.40     10-20    TPro  5.4<L>  /  Alb  3.6  /  TBili  1.0  /  DBili  x   /  AST  36<H>  /  ALT  28  /  AlkPhos  39<L>  10-20

## 2023-10-21 NOTE — CHART NOTE - NSCHARTNOTEFT_GEN_A_CORE
SICU PA Note    Pt discussed with Dr. Morales today during our SICU rounds, now extubated.  Plan at this time is to continue with SQH for DVT prophylaxis, no need for therapeutic anticoagulation at this time.  Team to begin ASA 81mg when pt is able to tolerate oral intake.     MOUNIKA AUGUSTIN

## 2023-10-21 NOTE — BRIEF OPERATIVE NOTE - NSICDXBRIEFPROCEDURE_GEN_ALL_CORE_FT
PROCEDURES:  Bypass of superior mesenteric artery 21-Oct-2023 15:18:43  Ga Martinez  
PROCEDURES:  Bypass of superior mesenteric artery 21-Oct-2023 15:18:43  Ga Martinez

## 2023-10-21 NOTE — BRIEF OPERATIVE NOTE - NSICDXBRIEFPREOP_GEN_ALL_CORE_FT
PRE-OP DIAGNOSIS:  Acute mesenteric ischemia 21-Oct-2023 15:19:32  Ga Martinez  
PRE-OP DIAGNOSIS:  Acute mesenteric ischemia 21-Oct-2023 15:19:32  Ga Martinez

## 2023-10-21 NOTE — PROGRESS NOTE ADULT - SUBJECTIVE AND OBJECTIVE BOX
SURGERY DAILY PROGRESS NOTE:       SUBJECTIVE/ROS: Patient seen and evaluated on AM rounds.          OBJECTIVE:  Vital Signs Last 24 Hrs  T(C): 36.6 (21 Oct 2023 08:00), Max: 37.2 (20 Oct 2023 21:25)  T(F): 97.8 (21 Oct 2023 08:00), Max: 99 (20 Oct 2023 21:25)  HR: 52 (21 Oct 2023 09:00) (48 - 89)  BP: 134/54 (21 Oct 2023 00:30) (134/54 - 160/58)  BP(mean): 72 (21 Oct 2023 00:30) (72 - 88)  RR: 12 (21 Oct 2023 09:00) (12 - 21)  SpO2: 100% (21 Oct 2023 09:00) (100% - 100%)    Parameters below as of 21 Oct 2023 08:00  Patient On (Oxygen Delivery Method): ventilator    O2 Concentration (%): 40  I&O's Detail    20 Oct 2023 07:01  -  21 Oct 2023 07:00  --------------------------------------------------------  IN:    IV PiggyBack: 100 mL    IV PiggyBack: 700 mL    Lactated Ringers: 1100 mL    Lactated Ringers Bolus: 1000 mL    Propofol: 196.4 mL  Total IN: 3096.4 mL    OUT:    Indwelling Catheter - Urethral (mL): 1970 mL    Nasogastric/Oral tube (mL): 0 mL  Total OUT: 1970 mL    Total NET: 1126.4 mL        Daily     Daily   MEDICATIONS  (STANDING):  acetaminophen   IVPB .. 1000 milliGRAM(s) IV Intermittent every 6 hours  ceFAZolin   IVPB 2000 milliGRAM(s) IV Intermittent every 8 hours  chlorhexidine 0.12% Liquid 15 milliLiter(s) Oral Mucosa every 12 hours  dextrose 5%. 1000 milliLiter(s) (100 mL/Hr) IV Continuous <Continuous>  dextrose 5%. 1000 milliLiter(s) (50 mL/Hr) IV Continuous <Continuous>  dextrose 50% Injectable 25 Gram(s) IV Push once  dextrose 50% Injectable 12.5 Gram(s) IV Push once  dextrose 50% Injectable 25 Gram(s) IV Push once  glucagon  Injectable 1 milliGRAM(s) IntraMuscular once  insulin lispro (ADMELOG) corrective regimen sliding scale   SubCutaneous every 6 hours  lactated ringers. 1000 milliLiter(s) (100 mL/Hr) IV Continuous <Continuous>  pantoprazole  Injectable 40 milliGRAM(s) IV Push every 24 hours  propofol Infusion 50 MICROgram(s)/kG/Min (16.2 mL/Hr) IV Continuous <Continuous>    MEDICATIONS  (PRN):  dextrose Oral Gel 15 Gram(s) Oral once PRN Blood Glucose LESS THAN 70 milliGRAM(s)/deciliter  HYDROmorphone  Injectable 0.5 milliGRAM(s) IV Push every 4 hours PRN Moderate Pain (4 - 6)  HYDROmorphone  Injectable 1 milliGRAM(s) IV Push every 4 hours PRN Severe Pain (7 - 10)      LABS:                        10.3   10.44 )-----------( 204      ( 21 Oct 2023 02:40 )             30.5     10-21    142  |  105  |  8   ----------------------------<  138<H>  3.7   |  23  |  0.81    Ca    8.2<L>      21 Oct 2023 02:40  Phos  4.7     10-21  Mg     3.40     10-21    TPro  5.5<L>  /  Alb  3.5  /  TBili  1.0  /  DBili  x   /  AST  41<H>  /  ALT  31  /  AlkPhos  41  10-21    PT/INR - ( 20 Oct 2023 21:50 )   PT: 15.4 sec;   INR: 1.39 ratio         PTT - ( 20 Oct 2023 21:50 )  PTT:27.8 sec  Urinalysis Basic - ( 21 Oct 2023 02:40 )    Color: x / Appearance: x / SG: x / pH: x  Gluc: 138 mg/dL / Ketone: x  / Bili: x / Urobili: x   Blood: x / Protein: x / Nitrite: x   Leuk Esterase: x / RBC: x / WBC x   Sq Epi: x / Non Sq Epi: x / Bacteria: x                  PHYSICAL EXAM:  General: Looks well, intubated  Respiratory: respirations non labored on vent  Gastrointestinal: soft, nontender, nondistended  Extremities: FROM, warm  Neurological: non-focal  Vascular:          SURGERY DAILY PROGRESS NOTE:       SUBJECTIVE/ROS: Patient seen and evaluated on AM rounds.          OBJECTIVE:  Vital Signs Last 24 Hrs  T(C): 36.6 (21 Oct 2023 08:00), Max: 37.2 (20 Oct 2023 21:25)  T(F): 97.8 (21 Oct 2023 08:00), Max: 99 (20 Oct 2023 21:25)  HR: 52 (21 Oct 2023 09:00) (48 - 89)  BP: 134/54 (21 Oct 2023 00:30) (134/54 - 160/58)  BP(mean): 72 (21 Oct 2023 00:30) (72 - 88)  RR: 12 (21 Oct 2023 09:00) (12 - 21)  SpO2: 100% (21 Oct 2023 09:00) (100% - 100%)    Parameters below as of 21 Oct 2023 08:00  Patient On (Oxygen Delivery Method): ventilator    O2 Concentration (%): 40  I&O's Detail    20 Oct 2023 07:01  -  21 Oct 2023 07:00  --------------------------------------------------------  IN:    IV PiggyBack: 100 mL    IV PiggyBack: 700 mL    Lactated Ringers: 1100 mL    Lactated Ringers Bolus: 1000 mL    Propofol: 196.4 mL  Total IN: 3096.4 mL    OUT:    Indwelling Catheter - Urethral (mL): 1970 mL    Nasogastric/Oral tube (mL): 0 mL  Total OUT: 1970 mL    Total NET: 1126.4 mL        Daily     Daily   MEDICATIONS  (STANDING):  acetaminophen   IVPB .. 1000 milliGRAM(s) IV Intermittent every 6 hours  ceFAZolin   IVPB 2000 milliGRAM(s) IV Intermittent every 8 hours  chlorhexidine 0.12% Liquid 15 milliLiter(s) Oral Mucosa every 12 hours  dextrose 5%. 1000 milliLiter(s) (100 mL/Hr) IV Continuous <Continuous>  dextrose 5%. 1000 milliLiter(s) (50 mL/Hr) IV Continuous <Continuous>  dextrose 50% Injectable 25 Gram(s) IV Push once  dextrose 50% Injectable 12.5 Gram(s) IV Push once  dextrose 50% Injectable 25 Gram(s) IV Push once  glucagon  Injectable 1 milliGRAM(s) IntraMuscular once  insulin lispro (ADMELOG) corrective regimen sliding scale   SubCutaneous every 6 hours  lactated ringers. 1000 milliLiter(s) (100 mL/Hr) IV Continuous <Continuous>  pantoprazole  Injectable 40 milliGRAM(s) IV Push every 24 hours  propofol Infusion 50 MICROgram(s)/kG/Min (16.2 mL/Hr) IV Continuous <Continuous>    MEDICATIONS  (PRN):  dextrose Oral Gel 15 Gram(s) Oral once PRN Blood Glucose LESS THAN 70 milliGRAM(s)/deciliter  HYDROmorphone  Injectable 0.5 milliGRAM(s) IV Push every 4 hours PRN Moderate Pain (4 - 6)  HYDROmorphone  Injectable 1 milliGRAM(s) IV Push every 4 hours PRN Severe Pain (7 - 10)      LABS:                        10.3   10.44 )-----------( 204      ( 21 Oct 2023 02:40 )             30.5     10-21    142  |  105  |  8   ----------------------------<  138<H>  3.7   |  23  |  0.81    Ca    8.2<L>      21 Oct 2023 02:40  Phos  4.7     10-21  Mg     3.40     10-21    TPro  5.5<L>  /  Alb  3.5  /  TBili  1.0  /  DBili  x   /  AST  41<H>  /  ALT  31  /  AlkPhos  41  10-21    PT/INR - ( 20 Oct 2023 21:50 )   PT: 15.4 sec;   INR: 1.39 ratio         PTT - ( 20 Oct 2023 21:50 )  PTT:27.8 sec  Urinalysis Basic - ( 21 Oct 2023 02:40 )    Color: x / Appearance: x / SG: x / pH: x  Gluc: 138 mg/dL / Ketone: x  / Bili: x / Urobili: x   Blood: x / Protein: x / Nitrite: x   Leuk Esterase: x / RBC: x / WBC x   Sq Epi: x / Non Sq Epi: x / Bacteria: x                  PHYSICAL EXAM:  General: Looks well, intubated  Respiratory: respirations non labored on vent  Gastrointestinal: soft, nontender, nondistended  Extremities: FROM, warm  Neurological: non-focal  Vascular: Right arm cold to touch, radial pulse audible on doppler

## 2023-10-21 NOTE — BRIEF OPERATIVE NOTE - NSICDXBRIEFPOSTOP_GEN_ALL_CORE_FT
POST-OP DIAGNOSIS:  Acute mesenteric ischemia 21-Oct-2023 15:20:10  Ga Martinez  
POST-OP DIAGNOSIS:  Acute mesenteric ischemia 21-Oct-2023 15:20:10  Ga Martinez

## 2023-10-21 NOTE — PROGRESS NOTE ADULT - ASSESSMENT
63-year-old female with history of hypertension, diabetes presents withn intermittent abdominal pain for last 1 year associated with 40 lbs weight loss. 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 Now POD1 of open exlap w/ right iliac to SMA bypass w/ PTFE grafts.    Plan   - NPO/IVF  - heparin gtt; patient specific nomogram goal 59-99, patient under goal this am, increasing drop from 7 to 8  - 24 more hours of abx  - Keep cannon   - Keep Ng tube  - dispo: pending    Vascular surgery      63-year-old female with history of hypertension, diabetes presents withn intermittent abdominal pain for last 1 year associated with 40 lbs weight loss. 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 Now POD1 of open exlap w/ right iliac to SMA bypass w/ PTFE grafts.    Plan   - NPO/IVF  - 24 more hours of abx  - Keep cannon   - Keep Ng tube  - dispo: pending    Vascular surgery

## 2023-10-21 NOTE — PROGRESS NOTE ADULT - ASSESSMENT
63-year-old female with history of hypertension, diabetes who is now s/p failed SMA angiogram via L brachial artery cutdown and open R iliac to SMA bypass with PTFE graft on 10/20/23. Pt with 1 year history of recurrent abdominal pain and 40lb weight loss. Pt transferred to [facility] for operative management. Postoperatively, patient remains intubated and SICU consulted for q1 neurovascular checks, vent management, and HD monitoring.      NEUROLOGIC   - Sedated on prop  - q1 neurovascular checks  - Pain: Tylenol/dilaudid PRN      RESPIRATORY   - Intubated: 12/420/5/50 on volume AC  - Plan to wean off vent tomorrow AM    CARDIOVASCULAR   - Monitor hemodynamics   - MAP goals >65  - Holding home amlodipine/lisinopril     GASTROINTESTINAL   - Diet: NPO/NGT      /RENAL   - IV fluids: LR @ 100cc/hr  - Strict I/Os  - Monitor BMP qd  - Maintain cannon catheter, strict Is/Os  - Monitor electrolytes, replete PRN    HEMATOLOGIC  - Monitor H/H   - DVT ppx: Holding DVT ppx until POD 1 per primary & SCD's  - F/U post op labs    INFECTIOUS DISEASE  - Monitor fever / WBC  - Ancef for 48 hours post op    ENDOCRINE  - Monitor gluc  - ISS    LINES  - R IJ double lumen   - A line   - Cannon   - PIV     DISPO: SICU

## 2023-10-21 NOTE — BRIEF OPERATIVE NOTE - OPERATION/FINDINGS
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   Laparotomy performed.   Right iliac to SMA bypass with PTFE graft.   Patient intubated post op
Attempted celiac artery stent via left brachial cut down, exlap, right iliac to SMA bypass with PTFE.

## 2023-10-21 NOTE — PROGRESS NOTE ADULT - CRITICAL CARE ATTENDING COMMENT
NEUROLOGIC   - Sedated on prop->wean in anticipation of SBT  - q1 neurovascular checks  - Pain: Tylenol/dilaudid PRN      RESPIRATORY   - Intubated: 12/420/5/50 on volume AC  - PSV once off propofol, extubation anticipated today    CARDIOVASCULAR   - Monitor hemodynamics   - MAP goals >65  - Holding home amlodipine/lisinopril     GASTROINTESTINAL   - Diet: NPO/NGT      /RENAL   - IV fluids: LR @ 100cc/hr-->change to D51/2NS at 75mL/hr  - Strict I/Os  - Monitor BMP qd  - Maintain cannon catheter, strict Is/Os  - Monitor electrolytes, replete PRN    HEMATOLOGIC  - Monitor H/H   - on therapeutic A-C per vasc  - F/U post op labs    INFECTIOUS DISEASE  - Monitor fever / WBC  - Ancef for 48 hours post op    ENDOCRINE  - Monitor gluc  - ISS    LINES  - R IJ double lumen   - A line   - Cannon   - PIV     DISPO: SICU NEUROLOGIC   - Sedated on prop->wean in anticipation of SBT  - q1 neurovascular checks  - Pain: Tylenol/dilaudid PRN      RESPIRATORY   - Intubated: 12/420/5/50 on volume AC  - PSV once off propofol, extubation anticipated today    CARDIOVASCULAR   - Monitor hemodynamics   - MAP goals >65  - Holding home amlodipine/lisinopril     GASTROINTESTINAL   - Diet: NPO/NGT      /RENAL   - IV fluids: LR @ 100cc/hr-->change to D51/2NS at 75mL/hr  - Strict I/Os  - Monitor BMP qd  - Maintain cannon catheter, strict Is/Os  - Monitor electrolytes, replete PRN    HEMATOLOGIC  - Monitor H/H   -clarify A-C plan with vasc  - F/U post op labs    INFECTIOUS DISEASE  - Monitor fever / WBC  - Ancef for 48 hours post op    ENDOCRINE  - Monitor gluc  - ISS    LINES  - R IJ double lumen   - A line   - Cannon   - PIV     DISPO: SICU

## 2023-10-22 LAB
ANION GAP SERPL CALC-SCNC: 9 MMOL/L — SIGNIFICANT CHANGE UP (ref 7–14)
ANION GAP SERPL CALC-SCNC: 9 MMOL/L — SIGNIFICANT CHANGE UP (ref 7–14)
BUN SERPL-MCNC: 6 MG/DL — LOW (ref 7–23)
BUN SERPL-MCNC: 6 MG/DL — LOW (ref 7–23)
CALCIUM SERPL-MCNC: 8 MG/DL — LOW (ref 8.4–10.5)
CALCIUM SERPL-MCNC: 8 MG/DL — LOW (ref 8.4–10.5)
CHLORIDE SERPL-SCNC: 106 MMOL/L — SIGNIFICANT CHANGE UP (ref 98–107)
CHLORIDE SERPL-SCNC: 106 MMOL/L — SIGNIFICANT CHANGE UP (ref 98–107)
CO2 SERPL-SCNC: 25 MMOL/L — SIGNIFICANT CHANGE UP (ref 22–31)
CO2 SERPL-SCNC: 25 MMOL/L — SIGNIFICANT CHANGE UP (ref 22–31)
CREAT SERPL-MCNC: 0.72 MG/DL — SIGNIFICANT CHANGE UP (ref 0.5–1.3)
CREAT SERPL-MCNC: 0.72 MG/DL — SIGNIFICANT CHANGE UP (ref 0.5–1.3)
EGFR: 93 ML/MIN/1.73M2 — SIGNIFICANT CHANGE UP
EGFR: 93 ML/MIN/1.73M2 — SIGNIFICANT CHANGE UP
GLUCOSE BLDC GLUCOMTR-MCNC: 112 MG/DL — HIGH (ref 70–99)
GLUCOSE BLDC GLUCOMTR-MCNC: 112 MG/DL — HIGH (ref 70–99)
GLUCOSE BLDC GLUCOMTR-MCNC: 120 MG/DL — HIGH (ref 70–99)
GLUCOSE BLDC GLUCOMTR-MCNC: 120 MG/DL — HIGH (ref 70–99)
GLUCOSE BLDC GLUCOMTR-MCNC: 82 MG/DL — SIGNIFICANT CHANGE UP (ref 70–99)
GLUCOSE BLDC GLUCOMTR-MCNC: 82 MG/DL — SIGNIFICANT CHANGE UP (ref 70–99)
GLUCOSE BLDC GLUCOMTR-MCNC: 88 MG/DL — SIGNIFICANT CHANGE UP (ref 70–99)
GLUCOSE BLDC GLUCOMTR-MCNC: 88 MG/DL — SIGNIFICANT CHANGE UP (ref 70–99)
GLUCOSE BLDC GLUCOMTR-MCNC: 93 MG/DL — SIGNIFICANT CHANGE UP (ref 70–99)
GLUCOSE BLDC GLUCOMTR-MCNC: 93 MG/DL — SIGNIFICANT CHANGE UP (ref 70–99)
GLUCOSE SERPL-MCNC: 130 MG/DL — HIGH (ref 70–99)
GLUCOSE SERPL-MCNC: 130 MG/DL — HIGH (ref 70–99)
HCT VFR BLD CALC: 29.7 % — LOW (ref 34.5–45)
HCT VFR BLD CALC: 29.7 % — LOW (ref 34.5–45)
HGB BLD-MCNC: 9.8 G/DL — LOW (ref 11.5–15.5)
HGB BLD-MCNC: 9.8 G/DL — LOW (ref 11.5–15.5)
INR BLD: 1.34 RATIO — HIGH (ref 0.85–1.18)
INR BLD: 1.34 RATIO — HIGH (ref 0.85–1.18)
MAGNESIUM SERPL-MCNC: 2.1 MG/DL — SIGNIFICANT CHANGE UP (ref 1.6–2.6)
MAGNESIUM SERPL-MCNC: 2.1 MG/DL — SIGNIFICANT CHANGE UP (ref 1.6–2.6)
MCHC RBC-ENTMCNC: 28.2 PG — SIGNIFICANT CHANGE UP (ref 27–34)
MCHC RBC-ENTMCNC: 28.2 PG — SIGNIFICANT CHANGE UP (ref 27–34)
MCHC RBC-ENTMCNC: 33 GM/DL — SIGNIFICANT CHANGE UP (ref 32–36)
MCHC RBC-ENTMCNC: 33 GM/DL — SIGNIFICANT CHANGE UP (ref 32–36)
MCV RBC AUTO: 85.3 FL — SIGNIFICANT CHANGE UP (ref 80–100)
MCV RBC AUTO: 85.3 FL — SIGNIFICANT CHANGE UP (ref 80–100)
NRBC # BLD: 0 /100 WBCS — SIGNIFICANT CHANGE UP (ref 0–0)
NRBC # BLD: 0 /100 WBCS — SIGNIFICANT CHANGE UP (ref 0–0)
NRBC # FLD: 0 K/UL — SIGNIFICANT CHANGE UP (ref 0–0)
NRBC # FLD: 0 K/UL — SIGNIFICANT CHANGE UP (ref 0–0)
PHOSPHATE SERPL-MCNC: 3.9 MG/DL — SIGNIFICANT CHANGE UP (ref 2.5–4.5)
PHOSPHATE SERPL-MCNC: 3.9 MG/DL — SIGNIFICANT CHANGE UP (ref 2.5–4.5)
PLATELET # BLD AUTO: 191 K/UL — SIGNIFICANT CHANGE UP (ref 150–400)
PLATELET # BLD AUTO: 191 K/UL — SIGNIFICANT CHANGE UP (ref 150–400)
POTASSIUM SERPL-MCNC: 3.3 MMOL/L — LOW (ref 3.5–5.3)
POTASSIUM SERPL-MCNC: 3.3 MMOL/L — LOW (ref 3.5–5.3)
POTASSIUM SERPL-SCNC: 3.3 MMOL/L — LOW (ref 3.5–5.3)
POTASSIUM SERPL-SCNC: 3.3 MMOL/L — LOW (ref 3.5–5.3)
PROTHROM AB SERPL-ACNC: 15 SEC — HIGH (ref 9.5–13)
PROTHROM AB SERPL-ACNC: 15 SEC — HIGH (ref 9.5–13)
RBC # BLD: 3.48 M/UL — LOW (ref 3.8–5.2)
RBC # BLD: 3.48 M/UL — LOW (ref 3.8–5.2)
RBC # FLD: 14.2 % — SIGNIFICANT CHANGE UP (ref 10.3–14.5)
RBC # FLD: 14.2 % — SIGNIFICANT CHANGE UP (ref 10.3–14.5)
SODIUM SERPL-SCNC: 140 MMOL/L — SIGNIFICANT CHANGE UP (ref 135–145)
SODIUM SERPL-SCNC: 140 MMOL/L — SIGNIFICANT CHANGE UP (ref 135–145)
WBC # BLD: 9.03 K/UL — SIGNIFICANT CHANGE UP (ref 3.8–10.5)
WBC # BLD: 9.03 K/UL — SIGNIFICANT CHANGE UP (ref 3.8–10.5)
WBC # FLD AUTO: 9.03 K/UL — SIGNIFICANT CHANGE UP (ref 3.8–10.5)
WBC # FLD AUTO: 9.03 K/UL — SIGNIFICANT CHANGE UP (ref 3.8–10.5)

## 2023-10-22 PROCEDURE — 99232 SBSQ HOSP IP/OBS MODERATE 35: CPT | Mod: 24,GC

## 2023-10-22 RX ORDER — POTASSIUM CHLORIDE 20 MEQ
10 PACKET (EA) ORAL
Refills: 0 | Status: COMPLETED | OUTPATIENT
Start: 2023-10-22 | End: 2023-10-22

## 2023-10-22 RX ORDER — ACETAMINOPHEN 500 MG
1000 TABLET ORAL EVERY 6 HOURS
Refills: 0 | Status: COMPLETED | OUTPATIENT
Start: 2023-10-22 | End: 2023-10-23

## 2023-10-22 RX ORDER — HYDROMORPHONE HYDROCHLORIDE 2 MG/ML
1 INJECTION INTRAMUSCULAR; INTRAVENOUS; SUBCUTANEOUS ONCE
Refills: 0 | Status: DISCONTINUED | OUTPATIENT
Start: 2023-10-22 | End: 2023-10-22

## 2023-10-22 RX ORDER — ACETAMINOPHEN 500 MG
1000 TABLET ORAL EVERY 6 HOURS
Refills: 0 | Status: DISCONTINUED | OUTPATIENT
Start: 2023-10-22 | End: 2023-10-22

## 2023-10-22 RX ORDER — ASPIRIN/CALCIUM CARB/MAGNESIUM 324 MG
300 TABLET ORAL DAILY
Refills: 0 | Status: DISCONTINUED | OUTPATIENT
Start: 2023-10-22 | End: 2023-10-23

## 2023-10-22 RX ORDER — SODIUM CHLORIDE 9 MG/ML
1000 INJECTION, SOLUTION INTRAVENOUS
Refills: 0 | Status: DISCONTINUED | OUTPATIENT
Start: 2023-10-22 | End: 2023-10-23

## 2023-10-22 RX ADMIN — HYDROMORPHONE HYDROCHLORIDE 1 MILLIGRAM(S): 2 INJECTION INTRAMUSCULAR; INTRAVENOUS; SUBCUTANEOUS at 23:16

## 2023-10-22 RX ADMIN — Medication 100 MILLIEQUIVALENT(S): at 05:24

## 2023-10-22 RX ADMIN — HYDROMORPHONE HYDROCHLORIDE 1 MILLIGRAM(S): 2 INJECTION INTRAMUSCULAR; INTRAVENOUS; SUBCUTANEOUS at 16:08

## 2023-10-22 RX ADMIN — HYDROMORPHONE HYDROCHLORIDE 1 MILLIGRAM(S): 2 INJECTION INTRAMUSCULAR; INTRAVENOUS; SUBCUTANEOUS at 15:05

## 2023-10-22 RX ADMIN — HYDROMORPHONE HYDROCHLORIDE 1 MILLIGRAM(S): 2 INJECTION INTRAMUSCULAR; INTRAVENOUS; SUBCUTANEOUS at 22:46

## 2023-10-22 RX ADMIN — Medication 1000 MILLIGRAM(S): at 11:49

## 2023-10-22 RX ADMIN — HYDROMORPHONE HYDROCHLORIDE 1 MILLIGRAM(S): 2 INJECTION INTRAMUSCULAR; INTRAVENOUS; SUBCUTANEOUS at 08:45

## 2023-10-22 RX ADMIN — Medication 400 MILLIGRAM(S): at 11:27

## 2023-10-22 RX ADMIN — HYDROMORPHONE HYDROCHLORIDE 1 MILLIGRAM(S): 2 INJECTION INTRAMUSCULAR; INTRAVENOUS; SUBCUTANEOUS at 08:21

## 2023-10-22 RX ADMIN — HEPARIN SODIUM 5000 UNIT(S): 5000 INJECTION INTRAVENOUS; SUBCUTANEOUS at 07:12

## 2023-10-22 RX ADMIN — HYDROMORPHONE HYDROCHLORIDE 0.5 MILLIGRAM(S): 2 INJECTION INTRAMUSCULAR; INTRAVENOUS; SUBCUTANEOUS at 05:23

## 2023-10-22 RX ADMIN — Medication 1000 MILLIGRAM(S): at 17:30

## 2023-10-22 RX ADMIN — SODIUM CHLORIDE 50 MILLILITER(S): 9 INJECTION, SOLUTION INTRAVENOUS at 11:40

## 2023-10-22 RX ADMIN — SODIUM CHLORIDE 75 MILLILITER(S): 9 INJECTION, SOLUTION INTRAVENOUS at 13:38

## 2023-10-22 RX ADMIN — Medication 100 MILLIEQUIVALENT(S): at 02:21

## 2023-10-22 RX ADMIN — HEPARIN SODIUM 5000 UNIT(S): 5000 INJECTION INTRAVENOUS; SUBCUTANEOUS at 13:30

## 2023-10-22 RX ADMIN — Medication 100 MILLIEQUIVALENT(S): at 04:24

## 2023-10-22 RX ADMIN — SODIUM CHLORIDE 75 MILLILITER(S): 9 INJECTION, SOLUTION INTRAVENOUS at 08:55

## 2023-10-22 RX ADMIN — Medication 100 MILLIGRAM(S): at 13:30

## 2023-10-22 RX ADMIN — Medication 400 MILLIGRAM(S): at 17:18

## 2023-10-22 RX ADMIN — HEPARIN SODIUM 5000 UNIT(S): 5000 INJECTION INTRAVENOUS; SUBCUTANEOUS at 22:46

## 2023-10-22 RX ADMIN — Medication 100 MILLIGRAM(S): at 07:09

## 2023-10-22 RX ADMIN — Medication 300 MILLIGRAM(S): at 15:44

## 2023-10-22 RX ADMIN — PANTOPRAZOLE SODIUM 40 MILLIGRAM(S): 20 TABLET, DELAYED RELEASE ORAL at 11:28

## 2023-10-22 RX ADMIN — HYDROMORPHONE HYDROCHLORIDE 0.5 MILLIGRAM(S): 2 INJECTION INTRAMUSCULAR; INTRAVENOUS; SUBCUTANEOUS at 04:23

## 2023-10-22 NOTE — PROGRESS NOTE ADULT - SUBJECTIVE AND OBJECTIVE BOX
Wisnton House MD  Interventional Cardiology / Advance Heart Failure and Cardiac Transplant Specialist  [place] Office : [phone] [place] [id number]  Tel:   [place] Office : [address] [facility] [address]  Tel: [phone]      Subjective/Overnight events: Pt is lying in bed remains in SICU now extubated. awake and no acute distress  	  MEDICATIONS:  heparin   Injectable 5000 Unit(s) SubCutaneous every 8 hours    ceFAZolin   IVPB 2000 milliGRAM(s) IV Intermittent every 8 hours      acetaminophen   IVPB .. 1000 milliGRAM(s) IV Intermittent every 6 hours  HYDROmorphone  Injectable 1 milliGRAM(s) IV Push every 4 hours PRN  HYDROmorphone  Injectable 0.5 milliGRAM(s) IV Push every 4 hours PRN    pantoprazole  Injectable 40 milliGRAM(s) IV Push every 24 hours    dextrose 50% Injectable 25 Gram(s) IV Push once  dextrose 50% Injectable 12.5 Gram(s) IV Push once  dextrose 50% Injectable 25 Gram(s) IV Push once  dextrose Oral Gel 15 Gram(s) Oral once PRN  glucagon  Injectable 1 milliGRAM(s) IntraMuscular once  insulin lispro (ADMELOG) corrective regimen sliding scale   SubCutaneous every 6 hours    dextrose 5% + sodium chloride 0.45%. 1000 milliLiter(s) IV Continuous <Continuous>  dextrose 5%. 1000 milliLiter(s) IV Continuous <Continuous>  dextrose 5%. 1000 milliLiter(s) IV Continuous <Continuous>  sodium chloride 0.9% 1000 milliLiter(s) IV Continuous <Continuous>      PAST MEDICAL/SURGICAL HISTORY  PAST MEDICAL & SURGICAL HISTORY:  Positive H. pylori test          SOCIAL HISTORY: Substance Use (street drugs): ( x ) never used  (  ) other:    FAMILY HISTORY:          PHYSICAL EXAM:  T(C): 37.5 (10-22-23 @ 08:00), Max: 37.5 (10-22-23 @ 08:00)  HR: 74 (10-22-23 @ 12:00) (57 - 78)  BP: --  RR: 23 (10-22-23 @ 12:00) (17 - 30)  SpO2: 96% (10-22-23 @ 12:00) (96% - 100%)  Wt(kg): --  I&O's Summary    21 Oct 2023 07:01  -  22 Oct 2023 07:00  --------------------------------------------------------  IN: 1075 mL / OUT: 630 mL / NET: 445 mL    22 Oct 2023 07:01  -  22 Oct 2023 12:16  --------------------------------------------------------  IN: 355 mL / OUT: 160 mL / NET: 195 mL        GENERAL: NAD  EYES:  conjunctiva and sclera clear  ENMT: s/p NGT  Cardiovascular: Normal S1 S2, No JVD, No murmurs, No edema  Respiratory: Lungs clear to auscultation	  Gastrointestinal:  s/p surgery  Extremities: No edema                                       9.8    9.03  )-----------( 191      ( 22 Oct 2023 00:44 )             29.7     10-22    140  |  106  |  6<L>  ----------------------------<  130<H>  3.3<L>   |  25  |  0.72    Ca    8.0<L>      22 Oct 2023 00:44  Phos  3.9     10-22  Mg     2.10     10-22    TPro  5.5<L>  /  Alb  3.5  /  TBili  1.0  /  DBili  x   /  AST  41<H>  /  ALT  31  /  AlkPhos  41  10-21    proBNP:   Lipid Profile:   HgA1c:   TSH:     Consultant(s) Notes Reviewed:  [x ] YES  [ ] NO    Care Discussed with Consultants/Other Providers [ x] YES  [ ] NO    Imaging Personally Reviewed independently:  [x] YES  [ ] NO    All labs, radiologic studies, vitals, orders and medications list reviewed. Patient is seen and examined at bedside. Case discussed with medical team.

## 2023-10-22 NOTE — PROGRESS NOTE ADULT - SUBJECTIVE AND OBJECTIVE BOX
[facility] Vascular Surgery Daily Progress Note  =====================================================    SUBJECTIVE: Patient seen and examined at bedside on AM rounds. Patient reports that they're feeling well. Denies fever, chills, N/V, chest pain, SOB    ALLERGIES:  No Known Allergies      --------------------------------------------------------------------------------------    MEDICATIONS:    Neurologic Medications  acetaminophen   IVPB .. 1000 milliGRAM(s) IV Intermittent every 6 hours  HYDROmorphone  Injectable 1 milliGRAM(s) IV Push every 4 hours PRN Severe Pain (7 - 10)  HYDROmorphone  Injectable 0.5 milliGRAM(s) IV Push every 4 hours PRN Moderate Pain (4 - 6)    Respiratory Medications    Cardiovascular Medications    Gastrointestinal Medications  dextrose 5% + sodium chloride 0.45%. 1000 milliLiter(s) IV Continuous <Continuous>  dextrose 5%. 1000 milliLiter(s) IV Continuous <Continuous>  dextrose 5%. 1000 milliLiter(s) IV Continuous <Continuous>  pantoprazole  Injectable 40 milliGRAM(s) IV Push every 24 hours    Genitourinary Medications    Hematologic/Oncologic Medications  heparin   Injectable 5000 Unit(s) SubCutaneous every 8 hours    Antimicrobial/Immunologic Medications  ceFAZolin   IVPB 2000 milliGRAM(s) IV Intermittent every 8 hours    Endocrine/Metabolic Medications  dextrose 50% Injectable 12.5 Gram(s) IV Push once  dextrose 50% Injectable 25 Gram(s) IV Push once  dextrose 50% Injectable 25 Gram(s) IV Push once  dextrose Oral Gel 15 Gram(s) Oral once PRN Blood Glucose LESS THAN 70 milliGRAM(s)/deciliter  glucagon  Injectable 1 milliGRAM(s) IntraMuscular once  insulin lispro (ADMELOG) corrective regimen sliding scale   SubCutaneous every 6 hours    Topical/Other Medications    --------------------------------------------------------------------------------------    VITAL SIGNS:  T(C): 36.8 (10-22-23 @ 00:00), Max: 36.9 (10-21-23 @ 16:00)  HR: 70 (10-22-23 @ 04:00) (48 - 75)  BP: --  RR: 20 (10-22-23 @ 04:00) (12 - 22)  SpO2: 100% (10-22-23 @ 04:00) (98% - 100%)  --------------------------------------------------------------------------------------    INS AND OUTS:    10-21-23 @ 07:01  -  10-22-23 @ 07:00  --------------------------------------------------------  IN: 725 mL / OUT: 380 mL / NET: 345 mL      --------------------------------------------------------------------------------------      EXAM    General: NAD, resting in bed comfortably.  HEENT: NG tube placed,  R IJ central line   Cardiac: regular rate, warm and well perfused  Respiratory: Nonlabored respirations, normal cw expansion, nasal cannula   Abdomen: soft, appropriately tender and distended.   Extremities: normal strength, FROM, no deformities, A line   Vascular: mid line abdominal incision dressing is c/d/i, left brachial incision is c/d/i   Massiel: clear yellow urine in collecting bag      --------------------------------------------------------------------------------------    LABS                          9.8    9.03  )-----------( 191      ( 22 Oct 2023 00:44 )             29.7     10-22    140  |  106  |  6<L>  ----------------------------<  130<H>  3.3<L>   |  25  |  0.72    Ca    8.0<L>      22 Oct 2023 00:44  Phos  3.9     10-22  Mg     2.10     10-22    TPro  5.5<L>  /  Alb  3.5  /  TBili  1.0  /  DBili  x   /  AST  41<H>  /  ALT  31  /  AlkPhos  41  10-21    PT/INR - ( 22 Oct 2023 00:44 )   PT: 15.0 sec;   INR: 1.34 ratio         PTT - ( 20 Oct 2023 21:50 )  PTT:27.8 sec  Urinalysis Basic - ( 22 Oct 2023 00:44 )    Color: x / Appearance: x / SG: x / pH: x  Gluc: 130 mg/dL / Ketone: x  / Bili: x / Urobili: x   Blood: x / Protein: x / Nitrite: x   Leuk Esterase: x / RBC: x / WBC x   Sq Epi: x / Non Sq Epi: x / Bacteria: x

## 2023-10-22 NOTE — PROGRESS NOTE ADULT - ASSESSMENT
Assessment: 63-year-old female with history of hypertension, diabetes who is now s/p failed SMA angiogram via L brachial artery cutdown and open R iliac to SMA bypass with PTFE graft on 10/20/23. Pt with 1 year history of recurrent abdominal pain and 40lb weight loss. Pt transferred to [facility] for operative management. Postoperatively, patient remains intubated and SICU consulted for q1 neurovascular checks, vent management, and HD monitoring.    Plan:  NEUROLOGIC   - A&O x3  - q1 neurovascular checks  - Pain: Tylenol/dilaudid PRN      RESPIRATORY   - Extubated  - Saturating well on room air    CARDIOVASCULAR   - Monitor hemodynamics   - MAP goals >65  - Holding home amlodipine/lisinopril   - Start ASA once tolerating PO intake    GASTROINTESTINAL   - Diet: NPO/NGT    /RENAL   - IV fluids: LR @ 100cc/hr  - Strict I/Os  - Monitor BMP qd  - Maintain cannon catheter, strict Is/Os  - Monitor electrolytes, replete PRN    HEMATOLOGIC  - Monitor H/H   - DVT ppx: Holding DVT ppx until POD 1 per primary & SCD's    INFECTIOUS DISEASE  - Monitor fever / WBC  - Ancef for 48 hours post op    ENDOCRINE  - Monitor gluc  - ISS    LINES  - R IJ double lumen   - A line   - Cannon   - PIV     DISPO: SICU     Assessment: 63-year-old female with history of hypertension, diabetes who is now s/p failed SMA angiogram via L brachial artery cutdown and open R iliac to SMA bypass with PTFE graft on 10/20/23. Pt with 1 year history of recurrent abdominal pain and 40lb weight loss. Pt transferred to [facility] for operative management. Postoperatively, patient remains intubated and SICU consulted for q1 neurovascular checks, vent management, and HD monitoring.    Plan:  NEUROLOGIC   - A&O x3  - q1 neurovascular checks  - Pain: Tylenol/dilaudid PRN      RESPIRATORY   - Extubated  - Saturating well on room air    CARDIOVASCULAR   - Monitor hemodynamics   - MAP goals >65  - Holding home amlodipine/lisinopril   - ASA     GASTROINTESTINAL   - Diet: NPO  - Plan to pull NGT today    /RENAL   - IV fluids: D5 NS @ 75cc/hr  - Banana bag today  - Strict I/Os  - Monitor BMP qd  - Maintain cannon catheter, strict Is/Os -- pull cannon today  - Monitor electrolytes, replete PRN    HEMATOLOGIC  - Monitor H/H   - DVT ppx: SQH & SCD's    INFECTIOUS DISEASE  - Monitor fever / WBC  - Ancef for 48 hours post op    ENDOCRINE  - Monitor gluc  - ISS    LINES  - R IJ double lumen   - A line   - Cannon   - PIV     DISPO: SICU

## 2023-10-22 NOTE — PROGRESS NOTE ADULT - ASSESSMENT
63-year-old female with history of hypertension, diabetes presents withn intermittent abdominal pain for last 1 year associated with 40 lbs weight loss. Denies blood in stool. 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 Now s/p open exlap w/ right iliac to SMA bypass w/ PTFE grafts. Now s/p extubated and doing well.     Plan   - Continue primary care per SICU   - NPO/IVF  -Holding DVT ppx until POD 1 per primary & SCD's  - Ancef for 48 hours post op  - Keep cannon   - Keep Ng tube  - Dressings come off POD5   - dispo: pending    Vascular surgery  63-year-old female with history of hypertension, diabetes presents withn intermittent abdominal pain for last 1 year associated with 40 lbs weight loss. Denies blood in stool. 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 Now s/p open exlap w/ right iliac to SMA bypass w/ PTFE grafts. Now s/p extubated and doing well.     Plan   - Continue primary care per SICU   - NPO/IVF  - Ancef for 48 hours post op  - Keep cannon   - Keep Ng tube  - Dressings come off POD5   - dispo: pending    Vascular surgery

## 2023-10-22 NOTE — PROGRESS NOTE ADULT - ASSESSMENT
63-year-old female with history of hypertension, diabetes presents with recurrent abdominal pain    EKG: NSR no acute changes    1. Post-op assessment  -EKG: NSR no acute changes  -TTE normal LV function  -open exlap w/ right iliac to SMA bypass w/ PTFE grafts in SICU now s/p extubation. NGT in place. hemodynamically stable      2. SMA occlusion  -vascular on board s/p open exlap w/ right iliac to SMA bypass w/ PTFE grafts.      3. DVT prophylaxis  -hep subq

## 2023-10-22 NOTE — CHART NOTE - NSCHARTNOTEFT_GEN_A_CORE
SICU Transfer Note    Transfer from: SICU  Transfer to: Floor Bed (L3LD 303 B)  Accepting physican: Dr. Laura Morales    SICU COURSE:      ASSESSMENT & PLAN:       For Follow-Up:      Vital Signs Last 24 Hrs  T(C): 37.4 (22 Oct 2023 19:00), Max: 37.9 (22 Oct 2023 12:00)  T(F): 99.3 (22 Oct 2023 19:00), Max: 100.3 (22 Oct 2023 12:00)  HR: 89 (22 Oct 2023 19:00) (57 - 89)  BP: 128/73 (22 Oct 2023 19:00) (128/73 - 137/77)  BP(mean): 81 (22 Oct 2023 17:29) (81 - 95)  RR: 19 (22 Oct 2023 19:00) (18 - 30)  SpO2: 98% (22 Oct 2023 19:00) (95% - 100%)    Parameters below as of 22 Oct 2023 19:00  Patient On (Oxygen Delivery Method): room air      I&O's Summary    21 Oct 2023 07:01  -  22 Oct 2023 07:00  --------------------------------------------------------  IN: 1075 mL / OUT: 630 mL / NET: 445 mL    22 Oct 2023 07:01  -  22 Oct 2023 21:48  --------------------------------------------------------  IN: 925 mL / OUT: 680 mL / NET: 245 mL          MEDICATIONS  (STANDING):  acetaminophen   IVPB .. 1000 milliGRAM(s) IV Intermittent every 6 hours  aspirin Suppository 300 milliGRAM(s) Rectal daily  dextrose 5% + sodium chloride 0.45%. 1000 milliLiter(s) (75 mL/Hr) IV Continuous <Continuous>  dextrose 5%. 1000 milliLiter(s) (50 mL/Hr) IV Continuous <Continuous>  dextrose 5%. 1000 milliLiter(s) (100 mL/Hr) IV Continuous <Continuous>  dextrose 50% Injectable 25 Gram(s) IV Push once  dextrose 50% Injectable 12.5 Gram(s) IV Push once  dextrose 50% Injectable 25 Gram(s) IV Push once  glucagon  Injectable 1 milliGRAM(s) IntraMuscular once  heparin   Injectable 5000 Unit(s) SubCutaneous every 8 hours  insulin lispro (ADMELOG) corrective regimen sliding scale   SubCutaneous every 6 hours  pantoprazole  Injectable 40 milliGRAM(s) IV Push every 24 hours  sodium chloride 0.9% 1000 milliLiter(s) (50 mL/Hr) IV Continuous <Continuous>    MEDICATIONS  (PRN):  dextrose Oral Gel 15 Gram(s) Oral once PRN Blood Glucose LESS THAN 70 milliGRAM(s)/deciliter  HYDROmorphone  Injectable 0.5 milliGRAM(s) IV Push every 4 hours PRN Moderate Pain (4 - 6)  HYDROmorphone  Injectable 1 milliGRAM(s) IV Push every 4 hours PRN Severe Pain (7 - 10)        LABS                                            9.8                   Neurophils% (auto):   x      (10-22 @ 00:44):    9.03 )-----------(191          Lymphocytes% (auto):  x                                             29.7                   Eosinphils% (auto):   x        Manual%: Neutrophils x    ; Lymphocytes x    ; Eosinophils x    ; Bands%: x    ; Blasts x                                    140    |  106    |  6                   Calcium: 8.0   / iCa: x      (10-22 @ 00:44)    ----------------------------<  130       Magnesium: 2.10                             3.3     |  25     |  0.72             Phosphorous: 3.9        ( 10-22 @ 00:44 )   PT: 15.0 sec;   INR: 1.34 ratio  aPTT: x SICU Transfer Note    Transfer from: SICU  Transfer to: Floor Bed (L3LD 303 B)  Accepting physican: Dr. Laura Morales    SICU COURSE: PER SICU -  63-year-old female with history of hypertension, diabetes who is now s/p failed SMA angiogram via L brachial artery cutdown and open R iliac to SMA bypass with PTFE graft on 10/20/23. Pt with 1 year history of recurrent abdominal pain and 40lb weight loss. Pt transferred to [facility] for operative management. Postoperatively, patient remained intubated and SICU consulted for q1 neurovascular checks, vent management, and HD monitoring. Patient was transferred to SICU. On 10/22 Patient tolerated extubation, was hemodynamically stable (off pressors), bowels appeared perfused, diet was advanced, Rankin DC'd, passed TOV, NGT out (still NPO) and PT was consulted. Patient was subsequently transferred to the floor on 10/22.       ASSESSMENT & PLAN:       - In SICU, given aspirin suppository, continue  - NGT dc'd by SICU -> still NPO, continue IVF  - Ancef for 48 hours post op  - Rankin dc'd , passed TOV  - Dressings come off POD5   - q4 vascular checks   - OOB  - FU PT c/s  - dispo: pending    Vascular surgery           Vital Signs Last 24 Hrs  T(C): 37.4 (22 Oct 2023 19:00), Max: 37.9 (22 Oct 2023 12:00)  T(F): 99.3 (22 Oct 2023 19:00), Max: 100.3 (22 Oct 2023 12:00)  HR: 89 (22 Oct 2023 19:00) (57 - 89)  BP: 128/73 (22 Oct 2023 19:00) (128/73 - 137/77)  BP(mean): 81 (22 Oct 2023 17:29) (81 - 95)  RR: 19 (22 Oct 2023 19:00) (18 - 30)  SpO2: 98% (22 Oct 2023 19:00) (95% - 100%)    Parameters below as of 22 Oct 2023 19:00  Patient On (Oxygen Delivery Method): room air      I&O's Summary    21 Oct 2023 07:01  -  22 Oct 2023 07:00  --------------------------------------------------------  IN: 1075 mL / OUT: 630 mL / NET: 445 mL    22 Oct 2023 07:01  -  22 Oct 2023 21:48  --------------------------------------------------------  IN: 925 mL / OUT: 680 mL / NET: 245 mL          MEDICATIONS  (STANDING):  acetaminophen   IVPB .. 1000 milliGRAM(s) IV Intermittent every 6 hours  aspirin Suppository 300 milliGRAM(s) Rectal daily  dextrose 5% + sodium chloride 0.45%. 1000 milliLiter(s) (75 mL/Hr) IV Continuous <Continuous>  dextrose 5%. 1000 milliLiter(s) (50 mL/Hr) IV Continuous <Continuous>  dextrose 5%. 1000 milliLiter(s) (100 mL/Hr) IV Continuous <Continuous>  dextrose 50% Injectable 25 Gram(s) IV Push once  dextrose 50% Injectable 12.5 Gram(s) IV Push once  dextrose 50% Injectable 25 Gram(s) IV Push once  glucagon  Injectable 1 milliGRAM(s) IntraMuscular once  heparin   Injectable 5000 Unit(s) SubCutaneous every 8 hours  insulin lispro (ADMELOG) corrective regimen sliding scale   SubCutaneous every 6 hours  pantoprazole  Injectable 40 milliGRAM(s) IV Push every 24 hours  sodium chloride 0.9% 1000 milliLiter(s) (50 mL/Hr) IV Continuous <Continuous>    MEDICATIONS  (PRN):  dextrose Oral Gel 15 Gram(s) Oral once PRN Blood Glucose LESS THAN 70 milliGRAM(s)/deciliter  HYDROmorphone  Injectable 0.5 milliGRAM(s) IV Push every 4 hours PRN Moderate Pain (4 - 6)  HYDROmorphone  Injectable 1 milliGRAM(s) IV Push every 4 hours PRN Severe Pain (7 - 10)        LABS                                            9.8                   Neurophils% (auto):   x      (10-22 @ 00:44):    9.03 )-----------(191          Lymphocytes% (auto):  x                                             29.7                   Eosinphils% (auto):   x        Manual%: Neutrophils x    ; Lymphocytes x    ; Eosinophils x    ; Bands%: x    ; Blasts x                                    140    |  106    |  6                   Calcium: 8.0   / iCa: x      (10-22 @ 00:44)    ----------------------------<  130       Magnesium: 2.10                             3.3     |  25     |  0.72             Phosphorous: 3.9        ( 10-22 @ 00:44 )   PT: 15.0 sec;   INR: 1.34 ratio  aPTT: x

## 2023-10-23 LAB
ANION GAP SERPL CALC-SCNC: 12 MMOL/L — SIGNIFICANT CHANGE UP (ref 7–14)
ANION GAP SERPL CALC-SCNC: 12 MMOL/L — SIGNIFICANT CHANGE UP (ref 7–14)
BUN SERPL-MCNC: 8 MG/DL — SIGNIFICANT CHANGE UP (ref 7–23)
BUN SERPL-MCNC: 8 MG/DL — SIGNIFICANT CHANGE UP (ref 7–23)
CALCIUM SERPL-MCNC: 8.6 MG/DL — SIGNIFICANT CHANGE UP (ref 8.4–10.5)
CALCIUM SERPL-MCNC: 8.6 MG/DL — SIGNIFICANT CHANGE UP (ref 8.4–10.5)
CHLORIDE SERPL-SCNC: 104 MMOL/L — SIGNIFICANT CHANGE UP (ref 98–107)
CHLORIDE SERPL-SCNC: 104 MMOL/L — SIGNIFICANT CHANGE UP (ref 98–107)
CO2 SERPL-SCNC: 23 MMOL/L — SIGNIFICANT CHANGE UP (ref 22–31)
CO2 SERPL-SCNC: 23 MMOL/L — SIGNIFICANT CHANGE UP (ref 22–31)
CREAT SERPL-MCNC: 0.68 MG/DL — SIGNIFICANT CHANGE UP (ref 0.5–1.3)
CREAT SERPL-MCNC: 0.68 MG/DL — SIGNIFICANT CHANGE UP (ref 0.5–1.3)
EGFR: 97 ML/MIN/1.73M2 — SIGNIFICANT CHANGE UP
EGFR: 97 ML/MIN/1.73M2 — SIGNIFICANT CHANGE UP
GLUCOSE BLDC GLUCOMTR-MCNC: 76 MG/DL — SIGNIFICANT CHANGE UP (ref 70–99)
GLUCOSE BLDC GLUCOMTR-MCNC: 76 MG/DL — SIGNIFICANT CHANGE UP (ref 70–99)
GLUCOSE BLDC GLUCOMTR-MCNC: 82 MG/DL — SIGNIFICANT CHANGE UP (ref 70–99)
GLUCOSE BLDC GLUCOMTR-MCNC: 82 MG/DL — SIGNIFICANT CHANGE UP (ref 70–99)
GLUCOSE BLDC GLUCOMTR-MCNC: 83 MG/DL — SIGNIFICANT CHANGE UP (ref 70–99)
GLUCOSE BLDC GLUCOMTR-MCNC: 83 MG/DL — SIGNIFICANT CHANGE UP (ref 70–99)
GLUCOSE BLDC GLUCOMTR-MCNC: 84 MG/DL — SIGNIFICANT CHANGE UP (ref 70–99)
GLUCOSE BLDC GLUCOMTR-MCNC: 84 MG/DL — SIGNIFICANT CHANGE UP (ref 70–99)
GLUCOSE BLDC GLUCOMTR-MCNC: 87 MG/DL — SIGNIFICANT CHANGE UP (ref 70–99)
GLUCOSE BLDC GLUCOMTR-MCNC: 87 MG/DL — SIGNIFICANT CHANGE UP (ref 70–99)
GLUCOSE SERPL-MCNC: 80 MG/DL — SIGNIFICANT CHANGE UP (ref 70–99)
GLUCOSE SERPL-MCNC: 80 MG/DL — SIGNIFICANT CHANGE UP (ref 70–99)
HCT VFR BLD CALC: 33.2 % — LOW (ref 34.5–45)
HCT VFR BLD CALC: 33.2 % — LOW (ref 34.5–45)
HGB BLD-MCNC: 10.6 G/DL — LOW (ref 11.5–15.5)
HGB BLD-MCNC: 10.6 G/DL — LOW (ref 11.5–15.5)
MAGNESIUM SERPL-MCNC: 1.9 MG/DL — SIGNIFICANT CHANGE UP (ref 1.6–2.6)
MAGNESIUM SERPL-MCNC: 1.9 MG/DL — SIGNIFICANT CHANGE UP (ref 1.6–2.6)
MCHC RBC-ENTMCNC: 27.9 PG — SIGNIFICANT CHANGE UP (ref 27–34)
MCHC RBC-ENTMCNC: 27.9 PG — SIGNIFICANT CHANGE UP (ref 27–34)
MCHC RBC-ENTMCNC: 31.9 GM/DL — LOW (ref 32–36)
MCHC RBC-ENTMCNC: 31.9 GM/DL — LOW (ref 32–36)
MCV RBC AUTO: 87.4 FL — SIGNIFICANT CHANGE UP (ref 80–100)
MCV RBC AUTO: 87.4 FL — SIGNIFICANT CHANGE UP (ref 80–100)
NRBC # BLD: 0 /100 WBCS — SIGNIFICANT CHANGE UP (ref 0–0)
NRBC # BLD: 0 /100 WBCS — SIGNIFICANT CHANGE UP (ref 0–0)
NRBC # FLD: 0 K/UL — SIGNIFICANT CHANGE UP (ref 0–0)
NRBC # FLD: 0 K/UL — SIGNIFICANT CHANGE UP (ref 0–0)
PHOSPHATE SERPL-MCNC: 2.2 MG/DL — LOW (ref 2.5–4.5)
PHOSPHATE SERPL-MCNC: 2.2 MG/DL — LOW (ref 2.5–4.5)
PLATELET # BLD AUTO: 233 K/UL — SIGNIFICANT CHANGE UP (ref 150–400)
PLATELET # BLD AUTO: 233 K/UL — SIGNIFICANT CHANGE UP (ref 150–400)
POTASSIUM SERPL-MCNC: 3.3 MMOL/L — LOW (ref 3.5–5.3)
POTASSIUM SERPL-MCNC: 3.3 MMOL/L — LOW (ref 3.5–5.3)
POTASSIUM SERPL-SCNC: 3.3 MMOL/L — LOW (ref 3.5–5.3)
POTASSIUM SERPL-SCNC: 3.3 MMOL/L — LOW (ref 3.5–5.3)
RBC # BLD: 3.8 M/UL — SIGNIFICANT CHANGE UP (ref 3.8–5.2)
RBC # BLD: 3.8 M/UL — SIGNIFICANT CHANGE UP (ref 3.8–5.2)
RBC # FLD: 14.2 % — SIGNIFICANT CHANGE UP (ref 10.3–14.5)
RBC # FLD: 14.2 % — SIGNIFICANT CHANGE UP (ref 10.3–14.5)
SODIUM SERPL-SCNC: 139 MMOL/L — SIGNIFICANT CHANGE UP (ref 135–145)
SODIUM SERPL-SCNC: 139 MMOL/L — SIGNIFICANT CHANGE UP (ref 135–145)
WBC # BLD: 10.58 K/UL — HIGH (ref 3.8–10.5)
WBC # BLD: 10.58 K/UL — HIGH (ref 3.8–10.5)
WBC # FLD AUTO: 10.58 K/UL — HIGH (ref 3.8–10.5)
WBC # FLD AUTO: 10.58 K/UL — HIGH (ref 3.8–10.5)

## 2023-10-23 RX ORDER — DEXTROSE MONOHYDRATE, SODIUM CHLORIDE, AND POTASSIUM CHLORIDE 50; .745; 4.5 G/1000ML; G/1000ML; G/1000ML
1000 INJECTION, SOLUTION INTRAVENOUS
Refills: 0 | Status: DISCONTINUED | OUTPATIENT
Start: 2023-10-23 | End: 2023-10-25

## 2023-10-23 RX ORDER — POTASSIUM CHLORIDE 20 MEQ
10 PACKET (EA) ORAL
Refills: 0 | Status: COMPLETED | OUTPATIENT
Start: 2023-10-23 | End: 2023-10-23

## 2023-10-23 RX ORDER — ASPIRIN/CALCIUM CARB/MAGNESIUM 324 MG
81 TABLET ORAL DAILY
Refills: 0 | Status: DISCONTINUED | OUTPATIENT
Start: 2023-10-23 | End: 2023-10-27

## 2023-10-23 RX ORDER — POTASSIUM PHOSPHATE, MONOBASIC POTASSIUM PHOSPHATE, DIBASIC 236; 224 MG/ML; MG/ML
30 INJECTION, SOLUTION INTRAVENOUS ONCE
Refills: 0 | Status: COMPLETED | OUTPATIENT
Start: 2023-10-23 | End: 2023-10-23

## 2023-10-23 RX ADMIN — Medication 100 MILLIEQUIVALENT(S): at 08:50

## 2023-10-23 RX ADMIN — HYDROMORPHONE HYDROCHLORIDE 1 MILLIGRAM(S): 2 INJECTION INTRAMUSCULAR; INTRAVENOUS; SUBCUTANEOUS at 20:10

## 2023-10-23 RX ADMIN — HYDROMORPHONE HYDROCHLORIDE 1 MILLIGRAM(S): 2 INJECTION INTRAMUSCULAR; INTRAVENOUS; SUBCUTANEOUS at 21:00

## 2023-10-23 RX ADMIN — DEXTROSE MONOHYDRATE, SODIUM CHLORIDE, AND POTASSIUM CHLORIDE 75 MILLILITER(S): 50; .745; 4.5 INJECTION, SOLUTION INTRAVENOUS at 08:50

## 2023-10-23 RX ADMIN — Medication 400 MILLIGRAM(S): at 00:15

## 2023-10-23 RX ADMIN — POTASSIUM PHOSPHATE, MONOBASIC POTASSIUM PHOSPHATE, DIBASIC 83.33 MILLIMOLE(S): 236; 224 INJECTION, SOLUTION INTRAVENOUS at 11:30

## 2023-10-23 RX ADMIN — HYDROMORPHONE HYDROCHLORIDE 1 MILLIGRAM(S): 2 INJECTION INTRAMUSCULAR; INTRAVENOUS; SUBCUTANEOUS at 06:10

## 2023-10-23 RX ADMIN — HYDROMORPHONE HYDROCHLORIDE 1 MILLIGRAM(S): 2 INJECTION INTRAMUSCULAR; INTRAVENOUS; SUBCUTANEOUS at 16:11

## 2023-10-23 RX ADMIN — Medication 400 MILLIGRAM(S): at 06:11

## 2023-10-23 RX ADMIN — HYDROMORPHONE HYDROCHLORIDE 1 MILLIGRAM(S): 2 INJECTION INTRAMUSCULAR; INTRAVENOUS; SUBCUTANEOUS at 15:11

## 2023-10-23 RX ADMIN — Medication 0: at 06:10

## 2023-10-23 RX ADMIN — Medication 81 MILLIGRAM(S): at 15:11

## 2023-10-23 RX ADMIN — Medication 100 MILLIEQUIVALENT(S): at 11:09

## 2023-10-23 RX ADMIN — Medication 100 MILLIEQUIVALENT(S): at 10:00

## 2023-10-23 RX ADMIN — HEPARIN SODIUM 5000 UNIT(S): 5000 INJECTION INTRAVENOUS; SUBCUTANEOUS at 06:11

## 2023-10-23 RX ADMIN — HYDROMORPHONE HYDROCHLORIDE 1 MILLIGRAM(S): 2 INJECTION INTRAMUSCULAR; INTRAVENOUS; SUBCUTANEOUS at 06:40

## 2023-10-23 RX ADMIN — PANTOPRAZOLE SODIUM 40 MILLIGRAM(S): 20 TABLET, DELAYED RELEASE ORAL at 11:58

## 2023-10-23 RX ADMIN — Medication 1000 MILLIGRAM(S): at 06:41

## 2023-10-23 RX ADMIN — Medication 1000 MILLIGRAM(S): at 00:45

## 2023-10-23 RX ADMIN — Medication 0: at 00:10

## 2023-10-23 RX ADMIN — HEPARIN SODIUM 5000 UNIT(S): 5000 INJECTION INTRAVENOUS; SUBCUTANEOUS at 22:46

## 2023-10-23 RX ADMIN — HEPARIN SODIUM 5000 UNIT(S): 5000 INJECTION INTRAVENOUS; SUBCUTANEOUS at 13:06

## 2023-10-23 NOTE — PHYSICAL THERAPY INITIAL EVALUATION ADULT - ADDITIONAL COMMENTS
Pt lives with her family in a house with 4-5 stairs to enter, Pt lives with her family in a house with 4-5 stairs to enter and a flight of stairs to her bedroom. prior to admission Pt was independent with all mobility and ambulated without an assistive device.     Pt. left comfortable in bed with all tubes/lines intact, head of the bed elevated, +bed alarm, call bell in reach and in NAD.

## 2023-10-23 NOTE — PROGRESS NOTE ADULT - ASSESSMENT
Assessment:  63-year-old female with history of hypertension, diabetes presents withn intermittent abdominal pain for last 1 year associated with 40 lbs weight loss. Denies blood in stool. 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 Now s/p open exlap w/ right iliac to SMA bypass w/ PTFE grafts. Now s/p extubated and doing well.     Plan   - AC  - Pain control PRN  - NPO/IVF  - Ancef for 48 hours post op  - Rankin d/c'ed   - NGT d/c'ed  - Dressings come off POD5   - Dispo: pending    Vascular surgery  [id number] 63-year-old female with history of HTN and DM presented with intermittent abdominal pain for last 1 year associated with 40 lbs weight loss. 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 Patient is now s/p open exlap w/ right iliac to SMA bypass w/ PTFE grafts on 10/20.     Plan   - Diet: keep NPO/IVF  - Pain control PRN tylenol and dilaudid prn  - s/p Ancef for 48 hours post op  - passed TOV   - ASA   - Dressings come off POD5   - OOB/ambulate as tolerated   - DVT ppx: [facility]  - Dispo: pending    Vascular surgery  [id number]

## 2023-10-23 NOTE — PROGRESS NOTE ADULT - ASSESSMENT
63-year-old female with history of hypertension, diabetes presents with recurrent abdominal pain    EKG: NSR no acute changes    1. Post-op assessment  -EKG: NSR no acute changes  -TTE normal LV function  -open exlap w/ right iliac to SMA bypass w/ PTFE grafts doing well      2. SMA occlusion  -vascular on board s/p open exlap w/ right iliac to SMA bypass w/ PTFE grafts.      3. DVT prophylaxis  -hep subq

## 2023-10-23 NOTE — PROGRESS NOTE ADULT - SUBJECTIVE AND OBJECTIVE BOX
Winston House MD  Interventional Cardiology / Endovascular Specialist  [place] Office : [address] [place] [address] Tel:   [place] Office : [address] [facility] [address]  Tel: [phone]      Subjective/Overnight events: Patient lying in bed. No acute distress.   	  MEDICATIONS:  aspirin enteric coated 81 milliGRAM(s) Oral daily  heparin   Injectable 5000 Unit(s) SubCutaneous every 8 hours        HYDROmorphone  Injectable 1 milliGRAM(s) IV Push every 4 hours PRN  HYDROmorphone  Injectable 0.5 milliGRAM(s) IV Push every 4 hours PRN    pantoprazole  Injectable 40 milliGRAM(s) IV Push every 24 hours    dextrose 50% Injectable 25 Gram(s) IV Push once  dextrose 50% Injectable 12.5 Gram(s) IV Push once  glucagon  Injectable 1 milliGRAM(s) IntraMuscular once  insulin lispro (ADMELOG) corrective regimen sliding scale   SubCutaneous every 6 hours    dextrose 5% + sodium chloride 0.45% with potassium chloride 20 mEq/L 1000 milliLiter(s) IV Continuous <Continuous>      PAST MEDICAL/SURGICAL HISTORY  PAST MEDICAL & SURGICAL HISTORY:  Positive H. pylori test          SOCIAL HISTORY: Substance Use (street drugs): ( x ) never used  (  ) other:    FAMILY HISTORY:        PHYSICAL EXAM:  T(C): 36.8 (10-23-23 @ 09:46), Max: 37.8 (10-22-23 @ 16:00)  HR: 63 (10-23-23 @ 09:46) (63 - 89)  BP: 144/65 (10-23-23 @ 09:46) (101/52 - 144/65)  RR: 18 (10-23-23 @ 09:46) (18 - 26)  SpO2: 100% (10-23-23 @ 09:46) (95% - 100%)  Wt(kg): --  I&O's Summary    22 Oct 2023 07:01  -  23 Oct 2023 07:00  --------------------------------------------------------  IN: 925 mL / OUT: 1730 mL / NET: -805 mL    23 Oct 2023 07:01  -  23 Oct 2023 14:42  --------------------------------------------------------  IN: 0 mL / OUT: 1000 mL / NET: -1000 mL          GENERAL: NAD  EYES:  conjunctiva and sclera clear  Cardiovascular: Normal S1 S2, No JVD, No murmurs, No edema  Respiratory: Lungs clear to auscultation	  Gastrointestinal:  s/p surgery  Extremities: No edema                                   10.6   10.58 )-----------( 233      ( 23 Oct 2023 06:00 )             33.2     10-23    139  |  104  |  8   ----------------------------<  80  3.3<L>   |  23  |  0.68    Ca    8.6      23 Oct 2023 06:00  Phos  2.2     10-23  Mg     1.90     10-23      proBNP:   Lipid Profile:   HgA1c:   TSH:     Consultant(s) Notes Reviewed:  [x ] YES  [ ] NO    Care Discussed with Consultants/Other Providers [ x] YES  [ ] NO    Imaging Personally Reviewed independently:  [x] YES  [ ] NO    All labs, radiologic studies, vitals, orders and medications list reviewed. Patient is seen and examined at bedside. Case discussed with medical team.

## 2023-10-23 NOTE — PROGRESS NOTE ADULT - SUBJECTIVE AND OBJECTIVE BOX
C Team Surgery Progress Note     S: Patient resting comfortably on morning rounds. Pain well-controlled currently. Reports feeling "gassy." Denies n/v. Negative for flatus/BM.      MEDICATIONS  (STANDING):  aspirin Suppository 300 milliGRAM(s) Rectal daily  dextrose 5% + sodium chloride 0.45%. 1000 milliLiter(s) (75 mL/Hr) IV Continuous <Continuous>  dextrose 50% Injectable 25 Gram(s) IV Push once  dextrose 50% Injectable 12.5 Gram(s) IV Push once  glucagon  Injectable 1 milliGRAM(s) IntraMuscular once  heparin   Injectable 5000 Unit(s) SubCutaneous every 8 hours  insulin lispro (ADMELOG) corrective regimen sliding scale   SubCutaneous every 6 hours  pantoprazole  Injectable 40 milliGRAM(s) IV Push every 24 hours    MEDICATIONS  (PRN):  HYDROmorphone  Injectable 1 milliGRAM(s) IV Push every 4 hours PRN Severe Pain (7 - 10)  HYDROmorphone  Injectable 0.5 milliGRAM(s) IV Push every 4 hours PRN Moderate Pain (4 - 6)      T(C): 36.9 (10-23-23 @ 06:05), Max: 37.9 (10-22-23 @ 12:00)  HR: 73 (10-23-23 @ 06:05) (68 - 89)  BP: 101/52 (10-23-23 @ 06:05) (101/52 - 137/77)  RR: 18 (10-23-23 @ 06:05) (18 - 30)  SpO2: 100% (10-23-23 @ 06:05) (95% - 100%)        10-22-23 @ 07:01  -  10-23-23 @ 07:00  --------------------------------------------------------  IN: 925 mL / OUT: 1730 mL / NET: -805 mL        Physical Exam:  General: NAD, AOx3  Respiratory: No labored breathing  Abd: soft, mildly distended, nontender, midline dressing c/d  CV: pulse regularly present  Pulses exam: 2+ b/l femoral pulses, L radial and ulnar signals on doppler, L brachial incision is c/d    LABS:                        9.8    9.03  )-----------( 191      ( 22 Oct 2023 00:44 )             29.7     10-22    140  |  106  |  6<L>  ----------------------------<  130<H>  3.3<L>   |  25  |  0.72    Ca    8.0<L>      22 Oct 2023 00:44  Phos  3.9     10-22  Mg     2.10     10-22

## 2023-10-23 NOTE — PHYSICAL THERAPY INITIAL EVALUATION ADULT - MANUAL MUSCLE TESTING RESULTS, REHAB EVAL
left upper extremity: at least 3/5 except left first finger 2-/5, right upper extremity: at least 3/5, bilateral lower extremities: at least 3/5/grossly assessed due to

## 2023-10-23 NOTE — PHYSICAL THERAPY INITIAL EVALUATION ADULT - GENERAL OBSERVATIONS, REHAB EVAL
Colonoscopy with a polyp Pt received semi-supine, all lines/tubes intact, NAD. HR: 75 beats per minute Pt received semi-supine, all lines/tubes intact, NAD. HR: 75 beats per minute. Pt preferred her daughter at bedside to translate [place].

## 2023-10-24 LAB
ANION GAP SERPL CALC-SCNC: 12 MMOL/L — SIGNIFICANT CHANGE UP (ref 7–14)
ANION GAP SERPL CALC-SCNC: 12 MMOL/L — SIGNIFICANT CHANGE UP (ref 7–14)
BUN SERPL-MCNC: 7 MG/DL — SIGNIFICANT CHANGE UP (ref 7–23)
BUN SERPL-MCNC: 7 MG/DL — SIGNIFICANT CHANGE UP (ref 7–23)
CALCIUM SERPL-MCNC: 8.3 MG/DL — LOW (ref 8.4–10.5)
CALCIUM SERPL-MCNC: 8.3 MG/DL — LOW (ref 8.4–10.5)
CHLORIDE SERPL-SCNC: 101 MMOL/L — SIGNIFICANT CHANGE UP (ref 98–107)
CHLORIDE SERPL-SCNC: 101 MMOL/L — SIGNIFICANT CHANGE UP (ref 98–107)
CO2 SERPL-SCNC: 21 MMOL/L — LOW (ref 22–31)
CO2 SERPL-SCNC: 21 MMOL/L — LOW (ref 22–31)
CREAT SERPL-MCNC: 0.58 MG/DL — SIGNIFICANT CHANGE UP (ref 0.5–1.3)
CREAT SERPL-MCNC: 0.58 MG/DL — SIGNIFICANT CHANGE UP (ref 0.5–1.3)
EGFR: 100 ML/MIN/1.73M2 — SIGNIFICANT CHANGE UP
EGFR: 100 ML/MIN/1.73M2 — SIGNIFICANT CHANGE UP
GLUCOSE BLDC GLUCOMTR-MCNC: 100 MG/DL — HIGH (ref 70–99)
GLUCOSE BLDC GLUCOMTR-MCNC: 100 MG/DL — HIGH (ref 70–99)
GLUCOSE BLDC GLUCOMTR-MCNC: 103 MG/DL — HIGH (ref 70–99)
GLUCOSE BLDC GLUCOMTR-MCNC: 103 MG/DL — HIGH (ref 70–99)
GLUCOSE BLDC GLUCOMTR-MCNC: 89 MG/DL — SIGNIFICANT CHANGE UP (ref 70–99)
GLUCOSE BLDC GLUCOMTR-MCNC: 93 MG/DL — SIGNIFICANT CHANGE UP (ref 70–99)
GLUCOSE BLDC GLUCOMTR-MCNC: 93 MG/DL — SIGNIFICANT CHANGE UP (ref 70–99)
GLUCOSE SERPL-MCNC: 102 MG/DL — HIGH (ref 70–99)
GLUCOSE SERPL-MCNC: 102 MG/DL — HIGH (ref 70–99)
HCT VFR BLD CALC: 29.1 % — LOW (ref 34.5–45)
HCT VFR BLD CALC: 29.1 % — LOW (ref 34.5–45)
HGB BLD-MCNC: 9.9 G/DL — LOW (ref 11.5–15.5)
HGB BLD-MCNC: 9.9 G/DL — LOW (ref 11.5–15.5)
MAGNESIUM SERPL-MCNC: 1.7 MG/DL — SIGNIFICANT CHANGE UP (ref 1.6–2.6)
MAGNESIUM SERPL-MCNC: 1.7 MG/DL — SIGNIFICANT CHANGE UP (ref 1.6–2.6)
MCHC RBC-ENTMCNC: 28.4 PG — SIGNIFICANT CHANGE UP (ref 27–34)
MCHC RBC-ENTMCNC: 28.4 PG — SIGNIFICANT CHANGE UP (ref 27–34)
MCHC RBC-ENTMCNC: 34 GM/DL — SIGNIFICANT CHANGE UP (ref 32–36)
MCHC RBC-ENTMCNC: 34 GM/DL — SIGNIFICANT CHANGE UP (ref 32–36)
MCV RBC AUTO: 83.6 FL — SIGNIFICANT CHANGE UP (ref 80–100)
MCV RBC AUTO: 83.6 FL — SIGNIFICANT CHANGE UP (ref 80–100)
NRBC # BLD: 0 /100 WBCS — SIGNIFICANT CHANGE UP (ref 0–0)
NRBC # BLD: 0 /100 WBCS — SIGNIFICANT CHANGE UP (ref 0–0)
NRBC # FLD: 0 K/UL — SIGNIFICANT CHANGE UP (ref 0–0)
NRBC # FLD: 0 K/UL — SIGNIFICANT CHANGE UP (ref 0–0)
PHOSPHATE SERPL-MCNC: 2.6 MG/DL — SIGNIFICANT CHANGE UP (ref 2.5–4.5)
PHOSPHATE SERPL-MCNC: 2.6 MG/DL — SIGNIFICANT CHANGE UP (ref 2.5–4.5)
PLATELET # BLD AUTO: 258 K/UL — SIGNIFICANT CHANGE UP (ref 150–400)
PLATELET # BLD AUTO: 258 K/UL — SIGNIFICANT CHANGE UP (ref 150–400)
POTASSIUM SERPL-MCNC: 3.4 MMOL/L — LOW (ref 3.5–5.3)
POTASSIUM SERPL-MCNC: 3.4 MMOL/L — LOW (ref 3.5–5.3)
POTASSIUM SERPL-SCNC: 3.4 MMOL/L — LOW (ref 3.5–5.3)
POTASSIUM SERPL-SCNC: 3.4 MMOL/L — LOW (ref 3.5–5.3)
RBC # BLD: 3.48 M/UL — LOW (ref 3.8–5.2)
RBC # BLD: 3.48 M/UL — LOW (ref 3.8–5.2)
RBC # FLD: 14.1 % — SIGNIFICANT CHANGE UP (ref 10.3–14.5)
RBC # FLD: 14.1 % — SIGNIFICANT CHANGE UP (ref 10.3–14.5)
SODIUM SERPL-SCNC: 134 MMOL/L — LOW (ref 135–145)
SODIUM SERPL-SCNC: 134 MMOL/L — LOW (ref 135–145)
WBC # BLD: 7.75 K/UL — SIGNIFICANT CHANGE UP (ref 3.8–10.5)
WBC # BLD: 7.75 K/UL — SIGNIFICANT CHANGE UP (ref 3.8–10.5)
WBC # FLD AUTO: 7.75 K/UL — SIGNIFICANT CHANGE UP (ref 3.8–10.5)
WBC # FLD AUTO: 7.75 K/UL — SIGNIFICANT CHANGE UP (ref 3.8–10.5)

## 2023-10-24 PROCEDURE — 93931 UPPER EXTREMITY STUDY: CPT | Mod: 26,LT

## 2023-10-24 RX ORDER — POTASSIUM CHLORIDE 20 MEQ
10 PACKET (EA) ORAL
Refills: 0 | Status: COMPLETED | OUTPATIENT
Start: 2023-10-24 | End: 2023-10-24

## 2023-10-24 RX ORDER — ACETAMINOPHEN 500 MG
1000 TABLET ORAL ONCE
Refills: 0 | Status: COMPLETED | OUTPATIENT
Start: 2023-10-24 | End: 2023-10-24

## 2023-10-24 RX ADMIN — Medication 100 MILLIEQUIVALENT(S): at 13:10

## 2023-10-24 RX ADMIN — HYDROMORPHONE HYDROCHLORIDE 1 MILLIGRAM(S): 2 INJECTION INTRAMUSCULAR; INTRAVENOUS; SUBCUTANEOUS at 22:03

## 2023-10-24 RX ADMIN — Medication 100 MILLIEQUIVALENT(S): at 11:05

## 2023-10-24 RX ADMIN — HEPARIN SODIUM 5000 UNIT(S): 5000 INJECTION INTRAVENOUS; SUBCUTANEOUS at 06:52

## 2023-10-24 RX ADMIN — PANTOPRAZOLE SODIUM 40 MILLIGRAM(S): 20 TABLET, DELAYED RELEASE ORAL at 12:37

## 2023-10-24 RX ADMIN — HYDROMORPHONE HYDROCHLORIDE 1 MILLIGRAM(S): 2 INJECTION INTRAMUSCULAR; INTRAVENOUS; SUBCUTANEOUS at 01:29

## 2023-10-24 RX ADMIN — HYDROMORPHONE HYDROCHLORIDE 1 MILLIGRAM(S): 2 INJECTION INTRAMUSCULAR; INTRAVENOUS; SUBCUTANEOUS at 14:17

## 2023-10-24 RX ADMIN — HYDROMORPHONE HYDROCHLORIDE 1 MILLIGRAM(S): 2 INJECTION INTRAMUSCULAR; INTRAVENOUS; SUBCUTANEOUS at 00:59

## 2023-10-24 RX ADMIN — Medication 81 MILLIGRAM(S): at 12:38

## 2023-10-24 RX ADMIN — HEPARIN SODIUM 5000 UNIT(S): 5000 INJECTION INTRAVENOUS; SUBCUTANEOUS at 15:10

## 2023-10-24 RX ADMIN — Medication 100 MILLIEQUIVALENT(S): at 15:10

## 2023-10-24 RX ADMIN — Medication 400 MILLIGRAM(S): at 18:53

## 2023-10-24 RX ADMIN — HYDROMORPHONE HYDROCHLORIDE 1 MILLIGRAM(S): 2 INJECTION INTRAMUSCULAR; INTRAVENOUS; SUBCUTANEOUS at 08:00

## 2023-10-24 RX ADMIN — HYDROMORPHONE HYDROCHLORIDE 1 MILLIGRAM(S): 2 INJECTION INTRAMUSCULAR; INTRAVENOUS; SUBCUTANEOUS at 13:47

## 2023-10-24 RX ADMIN — HEPARIN SODIUM 5000 UNIT(S): 5000 INJECTION INTRAVENOUS; SUBCUTANEOUS at 21:33

## 2023-10-24 RX ADMIN — HYDROMORPHONE HYDROCHLORIDE 1 MILLIGRAM(S): 2 INJECTION INTRAMUSCULAR; INTRAVENOUS; SUBCUTANEOUS at 06:52

## 2023-10-24 RX ADMIN — HYDROMORPHONE HYDROCHLORIDE 1 MILLIGRAM(S): 2 INJECTION INTRAMUSCULAR; INTRAVENOUS; SUBCUTANEOUS at 21:33

## 2023-10-24 RX ADMIN — Medication 1000 MILLIGRAM(S): at 19:23

## 2023-10-24 NOTE — OCCUPATIONAL THERAPY INITIAL EVALUATION ADULT - RANGE OF MOTION EXAMINATION, UPPER EXTREMITY
except left index finger with limited flexion at the PIP/DIP/bilateral UE Active ROM was WFL  (within functional limits)

## 2023-10-24 NOTE — OCCUPATIONAL THERAPY INITIAL EVALUATION ADULT - PLANNED THERAPY INTERVENTIONS, OT EVAL
ADL retraining/balance training/bed mobility training/fine motor coordination training/motor coordination training/neuromuscular re-education/ROM/strengthening

## 2023-10-24 NOTE — PROGRESS NOTE ADULT - ASSESSMENT
63-year-old female with history of HTN and DM presented with intermittent abdominal pain for last 1 year associated with 40 lbs weight loss. 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 Patient is now s/p open exlap w/ right iliac to SMA bypass w/ PTFE grafts on 10/20.     Plan   - Diet: keep NPO/IVF  - Pain control PRN tylenol and dilaudid prn  - s/p Ancef for 48 hours post op  - passed TOV   - ASA   - Dressings come off POD5   - await GI function  - OOB/ambulate as tolerated   - DVT ppx: SQH  - Dispo: pending      Vascular surgery  [id number]

## 2023-10-24 NOTE — OCCUPATIONAL THERAPY INITIAL EVALUATION ADULT - GENERAL OBSERVATIONS, REHAB EVAL
Patient is alert and following directions. Vitals: Patient is alert and following directions. Vitals: WFL

## 2023-10-24 NOTE — PROGRESS NOTE ADULT - SUBJECTIVE AND OBJECTIVE BOX
C Team Surgery Progress Note     S: Patient resting comfortably on morning rounds. Pain well-controlled currently. Denies n/v. Negative for flatus/BM.      MEDICATIONS  (STANDING):  aspirin enteric coated 81 milliGRAM(s) Oral daily  dextrose 5% + sodium chloride 0.45% with potassium chloride 20 mEq/L 1000 milliLiter(s) (75 mL/Hr) IV Continuous <Continuous>  dextrose 50% Injectable 25 Gram(s) IV Push once  dextrose 50% Injectable 12.5 Gram(s) IV Push once  glucagon  Injectable 1 milliGRAM(s) IntraMuscular once  heparin   Injectable 5000 Unit(s) SubCutaneous every 8 hours  insulin lispro (ADMELOG) corrective regimen sliding scale   SubCutaneous every 6 hours  pantoprazole  Injectable 40 milliGRAM(s) IV Push every 24 hours  potassium chloride  10 mEq/100 mL IVPB 10 milliEquivalent(s) IV Intermittent every 1 hour    MEDICATIONS  (PRN):  HYDROmorphone  Injectable 1 milliGRAM(s) IV Push every 4 hours PRN Severe Pain (7 - 10)  HYDROmorphone  Injectable 0.5 milliGRAM(s) IV Push every 4 hours PRN Moderate Pain (4 - 6)    ----------------------------------------------------------    Vital Signs Last 24 Hrs  T(C): 36.8 (24 Oct 2023 06:51), Max: 37.3 (23 Oct 2023 21:45)  T(F): 98.3 (24 Oct 2023 06:51), Max: 99.2 (23 Oct 2023 21:45)  HR: 72 (24 Oct 2023 06:51) (63 - 88)  BP: 154/67 (24 Oct 2023 06:51) (135/60 - 157/69)  BP(mean): --  RR: 18 (24 Oct 2023 06:51) (17 - 18)  SpO2: 99% (24 Oct 2023 06:51) (95% - 100%)    Parameters below as of 24 Oct 2023 01:37  Patient On (Oxygen Delivery Method): room air      I&O's Summary    23 Oct 2023 07:01  -  24 Oct 2023 07:00  --------------------------------------------------------  IN: 0 mL / OUT: 2350 mL / NET: -2350 mL      -----------------------------------------------------------------      Physical Exam:  General: NAD, AOx3  Respiratory: No labored breathing  Abd: soft, mildly distended, nontender, midline dressing c/d  CV: pulse regularly present  Pulses exam: 2+ b/l femoral pulses, L radial and ulnar signals on doppler, L brachial incision is c/d    LABS:                        9.9    7.75  )-----------( 258      ( 24 Oct 2023 07:15 )             29.1     10-24    134<L>  |  101  |  7   ----------------------------<  102<H>  3.4<L>   |  21<L>  |  0.58    Ca    8.3<L>      24 Oct 2023 07:15  Phos  2.6     10-24  Mg     1.70     10-24

## 2023-10-24 NOTE — OCCUPATIONAL THERAPY INITIAL EVALUATION ADULT - LIVES WITH, PROFILE
family in a home with steps to manage./children family in a home with 1 flight of steps to manage./children

## 2023-10-24 NOTE — PROGRESS NOTE ADULT - SUBJECTIVE AND OBJECTIVE BOX
Winston House MD  Interventional Cardiology / Endovascular Specialist  [place] Office : [address] [place] [address] Tel:   [place] Office : [address] [facility] [address]  Tel: [phone]      Subjective/Overnight events: Patient lying in bed comfortably. No acute distress. Denies chest pain, SOB or palpitations  	  MEDICATIONS:  aspirin enteric coated 81 milliGRAM(s) Oral daily  heparin   Injectable 5000 Unit(s) SubCutaneous every 8 hours        HYDROmorphone  Injectable 0.5 milliGRAM(s) IV Push every 4 hours PRN  HYDROmorphone  Injectable 1 milliGRAM(s) IV Push every 4 hours PRN    pantoprazole  Injectable 40 milliGRAM(s) IV Push every 24 hours    dextrose 50% Injectable 25 Gram(s) IV Push once  dextrose 50% Injectable 12.5 Gram(s) IV Push once  glucagon  Injectable 1 milliGRAM(s) IntraMuscular once  insulin lispro (ADMELOG) corrective regimen sliding scale   SubCutaneous every 6 hours    dextrose 5% + sodium chloride 0.45% with potassium chloride 20 mEq/L 1000 milliLiter(s) IV Continuous <Continuous>  potassium chloride  10 mEq/100 mL IVPB 10 milliEquivalent(s) IV Intermittent every 1 hour      PAST MEDICAL/SURGICAL HISTORY  PAST MEDICAL & SURGICAL HISTORY:  Positive H. pylori test          SOCIAL HISTORY: Substance Use (street drugs): ( x ) never used  (  ) other:    FAMILY HISTORY:    faith    PHYSICAL EXAM:  T(C): 36.8 (10-24-23 @ 12:00), Max: 37.3 (10-23-23 @ 21:45)  HR: 78 (10-24-23 @ 12:00) (72 - 88)  BP: 138/58 (10-24-23 @ 12:00) (135/60 - 157/69)  RR: 17 (10-24-23 @ 12:00) (17 - 18)  SpO2: 100% (10-24-23 @ 12:00) (95% - 100%)  Wt(kg): --  I&O's Summary    23 Oct 2023 07:01  -  24 Oct 2023 07:00  --------------------------------------------------------  IN: 0 mL / OUT: 2350 mL / NET: -2350 mL    24 Oct 2023 07:01  -  24 Oct 2023 14:07  --------------------------------------------------------  IN: 0 mL / OUT: 450 mL / NET: -450 mL            GENERAL: NAD  EYES:  conjunctiva and sclera clear  Cardiovascular: Normal S1 S2, No JVD, No murmurs, No edema  Respiratory: Lungs clear to auscultation	  Gastrointestinal:  s/p surgery  Extremities: No edema                                       9.9    7.75  )-----------( 258      ( 24 Oct 2023 07:15 )             29.1     10-24    134<L>  |  101  |  7   ----------------------------<  102<H>  3.4<L>   |  21<L>  |  0.58    Ca    8.3<L>      24 Oct 2023 07:15  Phos  2.6     10-24  Mg     1.70     10-24      proBNP:   Lipid Profile:   HgA1c:   TSH:     Consultant(s) Notes Reviewed:  [x ] YES  [ ] NO    Care Discussed with Consultants/Other Providers [ x] YES  [ ] NO    Imaging Personally Reviewed independently:  [x] YES  [ ] NO    All labs, radiologic studies, vitals, orders and medications list reviewed. Patient is seen and examined at bedside. Case discussed with medical team.

## 2023-10-25 ENCOUNTER — TRANSCRIPTION ENCOUNTER (OUTPATIENT)
Age: 63
End: 2023-10-25

## 2023-10-25 LAB
ANION GAP SERPL CALC-SCNC: 11 MMOL/L — SIGNIFICANT CHANGE UP (ref 7–14)
ANION GAP SERPL CALC-SCNC: 11 MMOL/L — SIGNIFICANT CHANGE UP (ref 7–14)
BUN SERPL-MCNC: 7 MG/DL — SIGNIFICANT CHANGE UP (ref 7–23)
BUN SERPL-MCNC: 7 MG/DL — SIGNIFICANT CHANGE UP (ref 7–23)
CALCIUM SERPL-MCNC: 8.9 MG/DL — SIGNIFICANT CHANGE UP (ref 8.4–10.5)
CALCIUM SERPL-MCNC: 8.9 MG/DL — SIGNIFICANT CHANGE UP (ref 8.4–10.5)
CHLORIDE SERPL-SCNC: 103 MMOL/L — SIGNIFICANT CHANGE UP (ref 98–107)
CHLORIDE SERPL-SCNC: 103 MMOL/L — SIGNIFICANT CHANGE UP (ref 98–107)
CO2 SERPL-SCNC: 22 MMOL/L — SIGNIFICANT CHANGE UP (ref 22–31)
CO2 SERPL-SCNC: 22 MMOL/L — SIGNIFICANT CHANGE UP (ref 22–31)
CREAT SERPL-MCNC: 0.65 MG/DL — SIGNIFICANT CHANGE UP (ref 0.5–1.3)
CREAT SERPL-MCNC: 0.65 MG/DL — SIGNIFICANT CHANGE UP (ref 0.5–1.3)
EGFR: 98 ML/MIN/1.73M2 — SIGNIFICANT CHANGE UP
EGFR: 98 ML/MIN/1.73M2 — SIGNIFICANT CHANGE UP
GLUCOSE BLDC GLUCOMTR-MCNC: 101 MG/DL — HIGH (ref 70–99)
GLUCOSE BLDC GLUCOMTR-MCNC: 101 MG/DL — HIGH (ref 70–99)
GLUCOSE BLDC GLUCOMTR-MCNC: 102 MG/DL — HIGH (ref 70–99)
GLUCOSE BLDC GLUCOMTR-MCNC: 102 MG/DL — HIGH (ref 70–99)
GLUCOSE BLDC GLUCOMTR-MCNC: 107 MG/DL — HIGH (ref 70–99)
GLUCOSE BLDC GLUCOMTR-MCNC: 107 MG/DL — HIGH (ref 70–99)
GLUCOSE BLDC GLUCOMTR-MCNC: 111 MG/DL — HIGH (ref 70–99)
GLUCOSE BLDC GLUCOMTR-MCNC: 111 MG/DL — HIGH (ref 70–99)
GLUCOSE BLDC GLUCOMTR-MCNC: 90 MG/DL — SIGNIFICANT CHANGE UP (ref 70–99)
GLUCOSE BLDC GLUCOMTR-MCNC: 90 MG/DL — SIGNIFICANT CHANGE UP (ref 70–99)
GLUCOSE SERPL-MCNC: 109 MG/DL — HIGH (ref 70–99)
GLUCOSE SERPL-MCNC: 109 MG/DL — HIGH (ref 70–99)
HCT VFR BLD CALC: 30.3 % — LOW (ref 34.5–45)
HCT VFR BLD CALC: 30.3 % — LOW (ref 34.5–45)
HGB BLD-MCNC: 10.1 G/DL — LOW (ref 11.5–15.5)
HGB BLD-MCNC: 10.1 G/DL — LOW (ref 11.5–15.5)
MAGNESIUM SERPL-MCNC: 1.8 MG/DL — SIGNIFICANT CHANGE UP (ref 1.6–2.6)
MAGNESIUM SERPL-MCNC: 1.8 MG/DL — SIGNIFICANT CHANGE UP (ref 1.6–2.6)
MCHC RBC-ENTMCNC: 28.5 PG — SIGNIFICANT CHANGE UP (ref 27–34)
MCHC RBC-ENTMCNC: 28.5 PG — SIGNIFICANT CHANGE UP (ref 27–34)
MCHC RBC-ENTMCNC: 33.3 GM/DL — SIGNIFICANT CHANGE UP (ref 32–36)
MCHC RBC-ENTMCNC: 33.3 GM/DL — SIGNIFICANT CHANGE UP (ref 32–36)
MCV RBC AUTO: 85.6 FL — SIGNIFICANT CHANGE UP (ref 80–100)
MCV RBC AUTO: 85.6 FL — SIGNIFICANT CHANGE UP (ref 80–100)
NRBC # BLD: 0 /100 WBCS — SIGNIFICANT CHANGE UP (ref 0–0)
NRBC # BLD: 0 /100 WBCS — SIGNIFICANT CHANGE UP (ref 0–0)
NRBC # FLD: 0 K/UL — SIGNIFICANT CHANGE UP (ref 0–0)
NRBC # FLD: 0 K/UL — SIGNIFICANT CHANGE UP (ref 0–0)
PHOSPHATE SERPL-MCNC: 3.2 MG/DL — SIGNIFICANT CHANGE UP (ref 2.5–4.5)
PHOSPHATE SERPL-MCNC: 3.2 MG/DL — SIGNIFICANT CHANGE UP (ref 2.5–4.5)
PLATELET # BLD AUTO: 313 K/UL — SIGNIFICANT CHANGE UP (ref 150–400)
PLATELET # BLD AUTO: 313 K/UL — SIGNIFICANT CHANGE UP (ref 150–400)
POTASSIUM SERPL-MCNC: 4 MMOL/L — SIGNIFICANT CHANGE UP (ref 3.5–5.3)
POTASSIUM SERPL-MCNC: 4 MMOL/L — SIGNIFICANT CHANGE UP (ref 3.5–5.3)
POTASSIUM SERPL-SCNC: 4 MMOL/L — SIGNIFICANT CHANGE UP (ref 3.5–5.3)
POTASSIUM SERPL-SCNC: 4 MMOL/L — SIGNIFICANT CHANGE UP (ref 3.5–5.3)
RBC # BLD: 3.54 M/UL — LOW (ref 3.8–5.2)
RBC # BLD: 3.54 M/UL — LOW (ref 3.8–5.2)
RBC # FLD: 14.1 % — SIGNIFICANT CHANGE UP (ref 10.3–14.5)
RBC # FLD: 14.1 % — SIGNIFICANT CHANGE UP (ref 10.3–14.5)
SODIUM SERPL-SCNC: 136 MMOL/L — SIGNIFICANT CHANGE UP (ref 135–145)
SODIUM SERPL-SCNC: 136 MMOL/L — SIGNIFICANT CHANGE UP (ref 135–145)
WBC # BLD: 6.32 K/UL — SIGNIFICANT CHANGE UP (ref 3.8–10.5)
WBC # BLD: 6.32 K/UL — SIGNIFICANT CHANGE UP (ref 3.8–10.5)
WBC # FLD AUTO: 6.32 K/UL — SIGNIFICANT CHANGE UP (ref 3.8–10.5)
WBC # FLD AUTO: 6.32 K/UL — SIGNIFICANT CHANGE UP (ref 3.8–10.5)

## 2023-10-25 RX ADMIN — PANTOPRAZOLE SODIUM 40 MILLIGRAM(S): 20 TABLET, DELAYED RELEASE ORAL at 12:49

## 2023-10-25 RX ADMIN — HYDROMORPHONE HYDROCHLORIDE 1 MILLIGRAM(S): 2 INJECTION INTRAMUSCULAR; INTRAVENOUS; SUBCUTANEOUS at 05:00

## 2023-10-25 RX ADMIN — Medication 81 MILLIGRAM(S): at 12:48

## 2023-10-25 RX ADMIN — HYDROMORPHONE HYDROCHLORIDE 1 MILLIGRAM(S): 2 INJECTION INTRAMUSCULAR; INTRAVENOUS; SUBCUTANEOUS at 05:30

## 2023-10-25 RX ADMIN — HEPARIN SODIUM 5000 UNIT(S): 5000 INJECTION INTRAVENOUS; SUBCUTANEOUS at 22:09

## 2023-10-25 RX ADMIN — HYDROMORPHONE HYDROCHLORIDE 1 MILLIGRAM(S): 2 INJECTION INTRAMUSCULAR; INTRAVENOUS; SUBCUTANEOUS at 12:48

## 2023-10-25 RX ADMIN — HYDROMORPHONE HYDROCHLORIDE 1 MILLIGRAM(S): 2 INJECTION INTRAMUSCULAR; INTRAVENOUS; SUBCUTANEOUS at 13:18

## 2023-10-25 RX ADMIN — HEPARIN SODIUM 5000 UNIT(S): 5000 INJECTION INTRAVENOUS; SUBCUTANEOUS at 05:00

## 2023-10-25 RX ADMIN — HEPARIN SODIUM 5000 UNIT(S): 5000 INJECTION INTRAVENOUS; SUBCUTANEOUS at 16:05

## 2023-10-25 RX ADMIN — HYDROMORPHONE HYDROCHLORIDE 1 MILLIGRAM(S): 2 INJECTION INTRAMUSCULAR; INTRAVENOUS; SUBCUTANEOUS at 19:54

## 2023-10-25 NOTE — PROGRESS NOTE ADULT - ASSESSMENT
63-year-old female with history of HTN and DM presented with intermittent abdominal pain for last 1 year associated with 40 lbs weight loss. 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 Patient is now s/p open exlap w/ right iliac to SMA bypass w/ PTFE grafts on 10/20. Midline incision healing well, LEs well perfused. LUE with no signs/symptoms of ischemia, duplex with stenosis likely secondary to access site closure but distal flow intact. Passing gas.    Plan   - Diet: adv to CLD  - Pain control PRN tylenol and dilaudid prn  - s/p Ancef for 48 hours post op  - ASA   - OOB/ambulate as tolerated   - DVT ppx: SQH  - Dispo: pending      Vascular surgery  [id number]

## 2023-10-25 NOTE — DISCHARGE NOTE NURSING/CASE MANAGEMENT/SOCIAL WORK - PATIENT PORTAL LINK FT
You can access the FollowMyHealth Patient Portal offered by [facility] by registering at the following website: [email or web address]. By joining [facility]’s FollowMyHealth portal, you will also be able to view your health information using other applications (apps) compatible with our system.

## 2023-10-25 NOTE — PROGRESS NOTE ADULT - SUBJECTIVE AND OBJECTIVE BOX
Winston House MD  Interventional Cardiology / Endovascular Specialist  [place] Office : [address] [place] [address] [place]. [address]  Tel:   [place] Office : [address] [facility] [place] [address]  Tel: [phone]    Subjective/Overnight events: Patient lying in bed comfortably. No acute distress. Denies chest pain, SOB or palpitations  	  MEDICATIONS:  aspirin enteric coated 81 milliGRAM(s) Oral daily  heparin   Injectable 5000 Unit(s) SubCutaneous every 8 hours        HYDROmorphone  Injectable 1 milliGRAM(s) IV Push every 4 hours PRN  HYDROmorphone  Injectable 0.5 milliGRAM(s) IV Push every 4 hours PRN    pantoprazole  Injectable 40 milliGRAM(s) IV Push every 24 hours    dextrose 50% Injectable 25 Gram(s) IV Push once  dextrose 50% Injectable 12.5 Gram(s) IV Push once  glucagon  Injectable 1 milliGRAM(s) IntraMuscular once  insulin lispro (ADMELOG) corrective regimen sliding scale   SubCutaneous every 6 hours        PAST MEDICAL/SURGICAL HISTORY  PAST MEDICAL & SURGICAL HISTORY:  Positive H. pylori test          SOCIAL HISTORY: Substance Use (street drugs): ( x ) never used  (  ) other:    FAMILY HISTORY:      REVIEW OF SYSTEMS:  CONSTITUTIONAL: No fever, weight loss, or fatigue  EYES: No eye pain, visual disturbances, or discharge  ENMT:  No difficulty hearing, tinnitus, vertigo; No sinus or throat pain  BREASTS: No pain, masses, or nipple discharge  GASTROINTESTINAL: No abdominal or epigastric pain. No nausea, vomiting, or hematemesis; No diarrhea or constipation. No melena or hematochezia.  GENITOURINARY: No dysuria, frequency, hematuria, or incontinence  NEUROLOGICAL: No headaches, memory loss, loss of strength, numbness, or tremors  ENDOCRINE: No heat or cold intolerance; No hair loss  MUSCULOSKELETAL: No joint pain or swelling; No muscle, back, or extremity pain  PSYCHIATRIC: No depression, anxiety, mood swings, or difficulty sleeping  HEME/LYMPH: No easy bruising, or bleeding gums  All others negative    PHYSICAL EXAM:  T(C): 36.7 (10-25-23 @ 21:52), Max: 37.1 (10-25-23 @ 18:12)  HR: 60 (10-25-23 @ 21:52) (60 - 74)  BP: 125/61 (10-25-23 @ 21:52) (125/61 - 170/76)  RR: 18 (10-25-23 @ 21:52) (17 - 18)  SpO2: 98% (10-25-23 @ 21:52) (97% - 100%)  Wt(kg): --  I&O's Summary    24 Oct 2023 07:01  -  25 Oct 2023 07:00  --------------------------------------------------------  IN: 0 mL / OUT: 1350 mL / NET: -1350 mL    25 Oct 2023 07:01  -  25 Oct 2023 22:43  --------------------------------------------------------  IN: 0 mL / OUT: 400 mL / NET: -400 mL      GENERAL: NAD  EYES:  conjunctiva and sclera clear  Cardiovascular: Normal S1 S2, No JVD, No murmurs, No edema  Respiratory: Lungs clear to auscultation	  Gastrointestinal:  s/p surgery  Extremities: No edema                             10.1   6.32  )-----------( 313      ( 25 Oct 2023 06:12 )             30.3     10-25    136  |  103  |  7   ----------------------------<  109<H>  4.0   |  22  |  0.65    Ca    8.9      25 Oct 2023 06:12  Phos  3.2     10-25  Mg     1.80     10-25      proBNP:   Lipid Profile:   HgA1c:   TSH:     Consultant(s) Notes Reviewed:  [x ] YES  [ ] NO    Care Discussed with Consultants/Other Providers [ x] YES  [ ] NO    Imaging Personally Reviewed independently:  [x] YES  [ ] NO    All labs, radiologic studies, vitals, orders and medications list reviewed. Patient is seen and examined at bedside. Case discussed with medical team.

## 2023-10-25 NOTE — PROGRESS NOTE ADULT - SUBJECTIVE AND OBJECTIVE BOX
SURGERY DAILY PROGRESS NOTE    SUBJECTIVE: Patient seen and examined on AM rounds. Reports that her pain is well-controlled and she is now passing gas. Notes continued weakness/contracture of the left first finger. Ambulating liberally. Denies chest pain, SOB, palpitations, HA, fever, chills, N/V.      OBJECTIVE:  Vital Signs Last 24 Hrs  T(C): 36.9 (25 Oct 2023 04:57), Max: 37.1 (24 Oct 2023 18:40)  T(F): 98.4 (25 Oct 2023 04:57), Max: 98.8 (24 Oct 2023 18:40)  HR: 67 (25 Oct 2023 04:57) (64 - 78)  BP: 170/76 (25 Oct 2023 04:57) (132/58 - 170/76)  BP(mean): --  RR: 18 (25 Oct 2023 04:57) (17 - 18)  SpO2: 97% (25 Oct 2023 04:57) (91% - 100%)    Parameters below as of 25 Oct 2023 01:42  Patient On (Oxygen Delivery Method): room air        I&O's Summary    24 Oct 2023 07:01  -  25 Oct 2023 07:00  --------------------------------------------------------  IN: 0 mL / OUT: 1350 mL / NET: -1350 mL    25 Oct 2023 07:01  -  25 Oct 2023 08:07  --------------------------------------------------------  IN: 0 mL / OUT: 400 mL / NET: -400 mL        Physical Exam:  General Appearance: Appears well, NAD   Neck: Supple, previous central line site C/D/I  Chest: Nonlabored breathing on RA  CV: Hemodynamically stable  Abdomen: Soft, nontender, mildly distended. Midline Mepilex dressing with minimal strikethrough, removed. Underlying incision C/D/I with staples, gauze and tape dressing applied  Vascular: 2+ femoral bilaterally, +ds L radial and ulnar arteries  Extremities: WWP. Left brachial incision C/D/I, dressing changed      LABS:                        10.1   6.32  )-----------( 313      ( 25 Oct 2023 06:12 )             30.3     10-25    136  |  103  |  7   ----------------------------<  109<H>  4.0   |  22  |  0.65    Ca    8.9      25 Oct 2023 06:12  Phos  2.6     10-24  Mg     1.80     10-25        Urinalysis Basic - ( 25 Oct 2023 06:12 )    Color: x / Appearance: x / SG: x / pH: x  Gluc: 109 mg/dL / Ketone: x  / Bili: x / Urobili: x   Blood: x / Protein: x / Nitrite: x   Leuk Esterase: x / RBC: x / WBC x   Sq Epi: x / Non Sq Epi: x / Bacteria: x        RADIOLOGY & ADDITIONAL STUDIES:

## 2023-10-25 NOTE — DISCHARGE NOTE NURSING/CASE MANAGEMENT/SOCIAL WORK - NSDCPETBCESMAN_GEN_ALL_CORE
[facility]  Orthopedics  Herman Garsia MD  2019     Name: Emiliano Augiar  MRN: [id number]  Age: 53 year old  : 1966  Referring provider: Referred Self     Chief Complaint: Pain of the Left Ankle; Pain of the Left Hip; and Pain of the Left Knee    History of Present Illness:   Emiliano Aguiar is a 53 year old male with a history of CIPD who presents today for evaluation of left ankle, hip and knee pain. The patient reports for the past 5 years he has experienced left ankle and hip and knee pain that began around the same time as CIPD. Today he specifically reports distal left thigh and lateral left hip and left ankle. The patient also associates weakness with the pain. He was seen by neurology earlier today where clinical findings did not align with the pain symptoms that he presents with today. Increased activity level, ascending stairs, and bending exacerbates the pain. He also reports that the pain symptoms are exacerbated around the same time as his monthly IVIG treatments for CIPD. Pain is localized over the posterior aspect of the left knee with radiation up to the left hip and down to the left ankle. 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 He has tried to treat the pain with chiropractic intervention.     Review of Systems:   A 10-point review of systems was obtained and is negative except for as noted in the HPI.     Medications:     Current Outpatient Medications:      acetaminophen (TYLENOL) 500 MG tablet, Take 1,000 mg by mouth every 6 hours as needed , Disp: , Rfl:      calcium carbonate (CALCI-CHEW) 1250 (500 CA) MG CHEW, , Disp: , Rfl:      Calcium Carbonate Antacid (TUMS PO), Take by mouth as needed, Disp: , Rfl:      diphenhydrAMINE (BENADRYL) 25 MG capsule, Take 25 mg by mouth every 6 hours as needed , Disp: , Rfl:      DULoxetine (CYMBALTA) 30 MG capsule, Take 1 capsule daily for 2 weeks, then 2  capsules daily (in the morning) (Patient not taking: Reported on 4/3/2019), Disp: 60 capsule, Rfl: 1     Immune Globulin, Human, (HIZENTRA) 10 GM/50ML SOLN, Inject 160 mLs (32 g) Subcutaneous once a week (Patient not taking: Reported on 6/13/2019), Disp: 640 mL, Rfl: 3    Allergies:  Gammagard    Past Medical History:  Anxiety state  Bipolar affective  Depressive disorder  GERD  CIPD    Past Surgical History:  No pertinent surgical history reported.     Social History:  Patient lives with wife. Doesn't exercise. Works as a  with people with disabilities. He admits to prior tobacco use and admits to weekend alcohol use.     Family History:  No pertinent family history reported.    Physical Examination:  Blood pressure 127/75, pulse 64, weight 92.5 kg (204 lb).  General: Patient is alert, No acute distress, pleasant and conversational.  Skin: Intact without erythema or ecchymosis.   Left Knee:  No effusion or soft tissue swelling. Range of motion 0-135 degrees without restriction or reported pain, though some crepitus noted. No medial or lateral facet joint tenderness. No posterior medial or posterior lateral joint line tenderness. Negative bounce test. Negative forced flexion test. Negative Halina's. No ligamentous laxity or pain with valgus or varus stress. Negative Lachman's, anterior drawer, and posterior drawer. Full Isometric quad strength, extensor mechanism in place. Neurovascularly intact in the lower extremity. Hip and ankle with full active range of motion and are non-tender.    Gait: Antalgic gait    General: Alert, pleasant, no distress. sitting comfortably in chair  Back/Spine: No tenderness to palpation of spinous processes, or paraspinous musculature of lumbar spine. Full range of motion with flexion, extension, twisting, and side bending without pain. Straight leg raise negative bilaterally. Mild hamstring tightness noted. no pain in back with OSMANI testing bilaterally.    Neuro: Sensation intact to light tough on bilateral lower extremities. Can stand on toes and heel walk without difficulty. Able to squat without difficulty.     PHQ-9 SCORE 9/26/2007 6/13/2019   PHQ-9 Total Score 9 -   PHQ-9 Total Score - 13     Imaging:   Radiographs of the bilateral knees - 3 views (06/13/2019)  Impression:  1. No acute osseous abnormality.  2. No substantial degenerative change.    I have independently reviewed the above imaging studies; the results were discussed with the patient.     Assessment:   53 year old male with left knee instability and buckling. 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    Plan:   Discussed the diagnosis and treatment options with the patient.   - Recommended course of physical therapy and use of a knee sleeve for symptomatic relief  - Follow up in 6 weeks     Additionally discussed elevated PHQ-9 with patient. He contracts for safety and denies feeling depressed, but does admit to feeling more anxious about the progression of his disease recently. He was provided contact information and a behavioral health referral should he be interested in follow up.     Herman Garsia MD    Scribe Disclosure:  I, Elizabeth Barber, am serving as a scribe to document services personally performed by Herman Garsia MD at this visit, based upon the provider's statements to me. All documentation has been reviewed by the aforementioned provider prior to being entered into the official medical record.   If you are a smoker, it is important for your health to stop smoking. Please be aware that second hand smoke is also harmful.

## 2023-10-26 ENCOUNTER — TRANSCRIPTION ENCOUNTER (OUTPATIENT)
Age: 63
End: 2023-10-26

## 2023-10-26 LAB
ANION GAP SERPL CALC-SCNC: 13 MMOL/L — SIGNIFICANT CHANGE UP (ref 7–14)
ANION GAP SERPL CALC-SCNC: 13 MMOL/L — SIGNIFICANT CHANGE UP (ref 7–14)
BUN SERPL-MCNC: 7 MG/DL — SIGNIFICANT CHANGE UP (ref 7–23)
BUN SERPL-MCNC: 7 MG/DL — SIGNIFICANT CHANGE UP (ref 7–23)
CALCIUM SERPL-MCNC: 9.3 MG/DL — SIGNIFICANT CHANGE UP (ref 8.4–10.5)
CALCIUM SERPL-MCNC: 9.3 MG/DL — SIGNIFICANT CHANGE UP (ref 8.4–10.5)
CHLORIDE SERPL-SCNC: 103 MMOL/L — SIGNIFICANT CHANGE UP (ref 98–107)
CHLORIDE SERPL-SCNC: 103 MMOL/L — SIGNIFICANT CHANGE UP (ref 98–107)
CO2 SERPL-SCNC: 21 MMOL/L — LOW (ref 22–31)
CO2 SERPL-SCNC: 21 MMOL/L — LOW (ref 22–31)
CREAT SERPL-MCNC: 0.66 MG/DL — SIGNIFICANT CHANGE UP (ref 0.5–1.3)
CREAT SERPL-MCNC: 0.66 MG/DL — SIGNIFICANT CHANGE UP (ref 0.5–1.3)
EGFR: 97 ML/MIN/1.73M2 — SIGNIFICANT CHANGE UP
EGFR: 97 ML/MIN/1.73M2 — SIGNIFICANT CHANGE UP
GLUCOSE BLDC GLUCOMTR-MCNC: 101 MG/DL — HIGH (ref 70–99)
GLUCOSE BLDC GLUCOMTR-MCNC: 101 MG/DL — HIGH (ref 70–99)
GLUCOSE BLDC GLUCOMTR-MCNC: 87 MG/DL — SIGNIFICANT CHANGE UP (ref 70–99)
GLUCOSE BLDC GLUCOMTR-MCNC: 87 MG/DL — SIGNIFICANT CHANGE UP (ref 70–99)
GLUCOSE BLDC GLUCOMTR-MCNC: 88 MG/DL — SIGNIFICANT CHANGE UP (ref 70–99)
GLUCOSE BLDC GLUCOMTR-MCNC: 88 MG/DL — SIGNIFICANT CHANGE UP (ref 70–99)
GLUCOSE BLDC GLUCOMTR-MCNC: 97 MG/DL — SIGNIFICANT CHANGE UP (ref 70–99)
GLUCOSE BLDC GLUCOMTR-MCNC: 97 MG/DL — SIGNIFICANT CHANGE UP (ref 70–99)
GLUCOSE BLDC GLUCOMTR-MCNC: 99 MG/DL — SIGNIFICANT CHANGE UP (ref 70–99)
GLUCOSE BLDC GLUCOMTR-MCNC: 99 MG/DL — SIGNIFICANT CHANGE UP (ref 70–99)
GLUCOSE SERPL-MCNC: 97 MG/DL — SIGNIFICANT CHANGE UP (ref 70–99)
GLUCOSE SERPL-MCNC: 97 MG/DL — SIGNIFICANT CHANGE UP (ref 70–99)
HCT VFR BLD CALC: 33.5 % — LOW (ref 34.5–45)
HCT VFR BLD CALC: 33.5 % — LOW (ref 34.5–45)
HGB BLD-MCNC: 10.9 G/DL — LOW (ref 11.5–15.5)
HGB BLD-MCNC: 10.9 G/DL — LOW (ref 11.5–15.5)
MAGNESIUM SERPL-MCNC: 1.7 MG/DL — SIGNIFICANT CHANGE UP (ref 1.6–2.6)
MAGNESIUM SERPL-MCNC: 1.7 MG/DL — SIGNIFICANT CHANGE UP (ref 1.6–2.6)
MCHC RBC-ENTMCNC: 28.1 PG — SIGNIFICANT CHANGE UP (ref 27–34)
MCHC RBC-ENTMCNC: 28.1 PG — SIGNIFICANT CHANGE UP (ref 27–34)
MCHC RBC-ENTMCNC: 32.5 GM/DL — SIGNIFICANT CHANGE UP (ref 32–36)
MCHC RBC-ENTMCNC: 32.5 GM/DL — SIGNIFICANT CHANGE UP (ref 32–36)
MCV RBC AUTO: 86.3 FL — SIGNIFICANT CHANGE UP (ref 80–100)
MCV RBC AUTO: 86.3 FL — SIGNIFICANT CHANGE UP (ref 80–100)
NRBC # BLD: 0 /100 WBCS — SIGNIFICANT CHANGE UP (ref 0–0)
NRBC # BLD: 0 /100 WBCS — SIGNIFICANT CHANGE UP (ref 0–0)
NRBC # FLD: 0 K/UL — SIGNIFICANT CHANGE UP (ref 0–0)
NRBC # FLD: 0 K/UL — SIGNIFICANT CHANGE UP (ref 0–0)
PHOSPHATE SERPL-MCNC: 3.7 MG/DL — SIGNIFICANT CHANGE UP (ref 2.5–4.5)
PHOSPHATE SERPL-MCNC: 3.7 MG/DL — SIGNIFICANT CHANGE UP (ref 2.5–4.5)
PLATELET # BLD AUTO: 406 K/UL — HIGH (ref 150–400)
PLATELET # BLD AUTO: 406 K/UL — HIGH (ref 150–400)
POTASSIUM SERPL-MCNC: 3.6 MMOL/L — SIGNIFICANT CHANGE UP (ref 3.5–5.3)
POTASSIUM SERPL-MCNC: 3.6 MMOL/L — SIGNIFICANT CHANGE UP (ref 3.5–5.3)
POTASSIUM SERPL-SCNC: 3.6 MMOL/L — SIGNIFICANT CHANGE UP (ref 3.5–5.3)
POTASSIUM SERPL-SCNC: 3.6 MMOL/L — SIGNIFICANT CHANGE UP (ref 3.5–5.3)
RBC # BLD: 3.88 M/UL — SIGNIFICANT CHANGE UP (ref 3.8–5.2)
RBC # BLD: 3.88 M/UL — SIGNIFICANT CHANGE UP (ref 3.8–5.2)
RBC # FLD: 14 % — SIGNIFICANT CHANGE UP (ref 10.3–14.5)
RBC # FLD: 14 % — SIGNIFICANT CHANGE UP (ref 10.3–14.5)
SODIUM SERPL-SCNC: 137 MMOL/L — SIGNIFICANT CHANGE UP (ref 135–145)
SODIUM SERPL-SCNC: 137 MMOL/L — SIGNIFICANT CHANGE UP (ref 135–145)
WBC # BLD: 5.78 K/UL — SIGNIFICANT CHANGE UP (ref 3.8–10.5)
WBC # BLD: 5.78 K/UL — SIGNIFICANT CHANGE UP (ref 3.8–10.5)
WBC # FLD AUTO: 5.78 K/UL — SIGNIFICANT CHANGE UP (ref 3.8–10.5)
WBC # FLD AUTO: 5.78 K/UL — SIGNIFICANT CHANGE UP (ref 3.8–10.5)

## 2023-10-26 RX ORDER — ONDANSETRON 8 MG/1
4 TABLET, FILM COATED ORAL ONCE
Refills: 0 | Status: COMPLETED | OUTPATIENT
Start: 2023-10-26 | End: 2023-10-26

## 2023-10-26 RX ORDER — INSULIN LISPRO 100/ML
VIAL (ML) SUBCUTANEOUS AT BEDTIME
Refills: 0 | Status: DISCONTINUED | OUTPATIENT
Start: 2023-10-26 | End: 2023-10-27

## 2023-10-26 RX ORDER — OXYCODONE HYDROCHLORIDE 5 MG/1
1 TABLET ORAL
Qty: 20 | Refills: 0
Start: 2023-10-26 | End: 2023-10-30

## 2023-10-26 RX ORDER — ASPIRIN/CALCIUM CARB/MAGNESIUM 324 MG
1 TABLET ORAL
Qty: 0 | Refills: 0 | DISCHARGE
Start: 2023-10-26

## 2023-10-26 RX ORDER — SIMETHICONE 80 MG/1
80 TABLET, CHEWABLE ORAL ONCE
Refills: 0 | Status: COMPLETED | OUTPATIENT
Start: 2023-10-26 | End: 2023-10-26

## 2023-10-26 RX ORDER — MAGNESIUM SULFATE 500 MG/ML
2 VIAL (ML) INJECTION ONCE
Refills: 0 | Status: COMPLETED | OUTPATIENT
Start: 2023-10-26 | End: 2023-10-26

## 2023-10-26 RX ORDER — POTASSIUM CHLORIDE 20 MEQ
20 PACKET (EA) ORAL
Refills: 0 | Status: COMPLETED | OUTPATIENT
Start: 2023-10-26 | End: 2023-10-26

## 2023-10-26 RX ORDER — ATORVASTATIN CALCIUM 80 MG/1
80 TABLET, FILM COATED ORAL AT BEDTIME
Refills: 0 | Status: DISCONTINUED | OUTPATIENT
Start: 2023-10-26 | End: 2023-10-27

## 2023-10-26 RX ORDER — ATORVASTATIN CALCIUM 80 MG/1
1 TABLET, FILM COATED ORAL
Qty: 30 | Refills: 2
Start: 2023-10-26

## 2023-10-26 RX ORDER — INSULIN LISPRO 100/ML
VIAL (ML) SUBCUTANEOUS
Refills: 0 | Status: DISCONTINUED | OUTPATIENT
Start: 2023-10-26 | End: 2023-10-27

## 2023-10-26 RX ADMIN — HEPARIN SODIUM 5000 UNIT(S): 5000 INJECTION INTRAVENOUS; SUBCUTANEOUS at 21:20

## 2023-10-26 RX ADMIN — Medication 25 GRAM(S): at 09:07

## 2023-10-26 RX ADMIN — Medication 20 MILLIEQUIVALENT(S): at 09:08

## 2023-10-26 RX ADMIN — HYDROMORPHONE HYDROCHLORIDE 1 MILLIGRAM(S): 2 INJECTION INTRAMUSCULAR; INTRAVENOUS; SUBCUTANEOUS at 02:36

## 2023-10-26 RX ADMIN — ONDANSETRON 4 MILLIGRAM(S): 8 TABLET, FILM COATED ORAL at 21:20

## 2023-10-26 RX ADMIN — HEPARIN SODIUM 5000 UNIT(S): 5000 INJECTION INTRAVENOUS; SUBCUTANEOUS at 06:57

## 2023-10-26 RX ADMIN — HYDROMORPHONE HYDROCHLORIDE 1 MILLIGRAM(S): 2 INJECTION INTRAMUSCULAR; INTRAVENOUS; SUBCUTANEOUS at 07:27

## 2023-10-26 RX ADMIN — Medication 20 MILLIEQUIVALENT(S): at 12:09

## 2023-10-26 RX ADMIN — HYDROMORPHONE HYDROCHLORIDE 1 MILLIGRAM(S): 2 INJECTION INTRAMUSCULAR; INTRAVENOUS; SUBCUTANEOUS at 06:57

## 2023-10-26 RX ADMIN — HYDROMORPHONE HYDROCHLORIDE 1 MILLIGRAM(S): 2 INJECTION INTRAMUSCULAR; INTRAVENOUS; SUBCUTANEOUS at 21:35

## 2023-10-26 RX ADMIN — HYDROMORPHONE HYDROCHLORIDE 0.5 MILLIGRAM(S): 2 INJECTION INTRAMUSCULAR; INTRAVENOUS; SUBCUTANEOUS at 13:56

## 2023-10-26 RX ADMIN — HYDROMORPHONE HYDROCHLORIDE 1 MILLIGRAM(S): 2 INJECTION INTRAMUSCULAR; INTRAVENOUS; SUBCUTANEOUS at 02:06

## 2023-10-26 RX ADMIN — HEPARIN SODIUM 5000 UNIT(S): 5000 INJECTION INTRAVENOUS; SUBCUTANEOUS at 15:29

## 2023-10-26 RX ADMIN — Medication 81 MILLIGRAM(S): at 12:10

## 2023-10-26 RX ADMIN — ATORVASTATIN CALCIUM 80 MILLIGRAM(S): 80 TABLET, FILM COATED ORAL at 21:20

## 2023-10-26 RX ADMIN — HYDROMORPHONE HYDROCHLORIDE 1 MILLIGRAM(S): 2 INJECTION INTRAMUSCULAR; INTRAVENOUS; SUBCUTANEOUS at 21:20

## 2023-10-26 RX ADMIN — HYDROMORPHONE HYDROCHLORIDE 0.5 MILLIGRAM(S): 2 INJECTION INTRAMUSCULAR; INTRAVENOUS; SUBCUTANEOUS at 14:26

## 2023-10-26 RX ADMIN — SIMETHICONE 80 MILLIGRAM(S): 80 TABLET, CHEWABLE ORAL at 21:20

## 2023-10-26 RX ADMIN — PANTOPRAZOLE SODIUM 40 MILLIGRAM(S): 20 TABLET, DELAYED RELEASE ORAL at 12:09

## 2023-10-26 NOTE — DISCHARGE NOTE PROVIDER - CARE PROVIDER_API CALL
aLura Morales  Vascular Surgery  [address] [place] [address]  Phone: [phone]  Fax: [phone]  Follow Up Time: 2 weeks   Laura Morales  Vascular Surgery  [address] [place] [address]  Phone: [phone]  Fax: [phone]  Follow Up Time: 2 weeks   Laura Morales  Vascular Surgery  [address] [place] [address]  Phone: [phone]  Fax: [phone]  Follow Up Time: 2 weeks

## 2023-10-26 NOTE — PROGRESS NOTE ADULT - ASSESSMENT
63-year-old female with history of HTN and DM presented with intermittent abdominal pain for last 1 year associated with 40 lbs weight loss. 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 Patient is now s/p open exlap w/ right iliac to SMA bypass w/ PTFE grafts on 10/20. Midline incision healing well, LEs well perfused. LUE with no signs/symptoms of ischemia, duplex with stenosis likely secondary to access site closure but distal flow intact. Passing gas and stool.    Plan   - Diet: consistent carb  - Pain control PRN tylenol and dilaudid prn  - s/p Ancef for 48 hours post op  - ASA   - OOB/ambulate as tolerated   - DVT ppx: SQH  - Dispo: pending      Vascular surgery  [id number] 63-year-old female with history of HTN and DM presented with intermittent abdominal pain for last 1 year associated with 40 lbs weight loss. 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 Patient is now s/p open exlap w/ right iliac to SMA bypass w/ PTFE grafts on 10/20. Midline incision healing well, LEs well perfused. LUE with no signs/symptoms of ischemia, duplex with stenosis likely secondary to access site closure but distal flow intact. Passing gas and stool.    Plan   - Diet: consistent carb  - Pain control PRN tylenol and dilaudid prn  - ASA   - OOB/ambulate as tolerated   - DVT ppx: SQH  - Dispo: pending      Vascular surgery  [id number]

## 2023-10-26 NOTE — DISCHARGE NOTE PROVIDER - NSDCFUADDINST_GEN_ALL_CORE_FT
WOUND CARE:  Please keep incisions clean and dry. Please do not Scrub or rub incisions. Do not use lotion or powder on incisions.   BATHING: You may shower and/or sponge bathe. You may use warm soapy water in the shower and rinse, pat dry.  ACTIVITY: No heavy lifting or straining. Otherwise, you may return to your usual level of physical activity. If you are taking narcotic pain medication DO NOT drive a car, operate machinery or make important decisions.  DIET: Return to your usual diet.  NOTIFY YOUR SURGEON IF YOU HAVE: any bleeding that does not stop, any pus draining from your wound(s), any fever (over 100.4 F) persistent nausea/vomiting, or if your pain is not controlled on your discharge pain medications, unable to urinate.  Please follow up with your primary care physician in one week regarding your hospitalization, bring copies of your discharge paperwork.  Please follow up with your surgeon, Dr. Morales as an outpatient, please call to schedule appointment

## 2023-10-26 NOTE — DISCHARGE NOTE PROVIDER - NSDCCPCAREPLAN_GEN_ALL_CORE_FT
PRINCIPAL DISCHARGE DIAGNOSIS  Diagnosis: Superior mesenteric artery thrombosis  Assessment and Plan of Treatment: s/p Iliac to SMA bypass      SECONDARY DISCHARGE DIAGNOSES  Diagnosis: Diabetes  Assessment and Plan of Treatment:     Diagnosis: Gastritis  Assessment and Plan of Treatment:     Diagnosis: HTN (hypertension)  Assessment and Plan of Treatment:

## 2023-10-26 NOTE — PROGRESS NOTE ADULT - SUBJECTIVE AND OBJECTIVE BOX
Winston House MD  Interventional Cardiology / Endovascular Specialist  [place] Office : [address] [place] [address] [place]. [address]  Tel:   [place] Office : [address] [facility] [place] [address]  Tel: [phone]    Subjective/Overnight events: Patient lying in bed comfortably. No acute distress. Denies chest pain, SOB or palpitations  	  MEDICATIONS:  aspirin enteric coated 81 milliGRAM(s) Oral daily  heparin   Injectable 5000 Unit(s) SubCutaneous every 8 hours        HYDROmorphone  Injectable 1 milliGRAM(s) IV Push every 4 hours PRN  HYDROmorphone  Injectable 0.5 milliGRAM(s) IV Push every 4 hours PRN    pantoprazole  Injectable 40 milliGRAM(s) IV Push every 24 hours    atorvastatin 80 milliGRAM(s) Oral at bedtime  dextrose 50% Injectable 12.5 Gram(s) IV Push once  dextrose 50% Injectable 25 Gram(s) IV Push once  glucagon  Injectable 1 milliGRAM(s) IntraMuscular once  insulin lispro (ADMELOG) corrective regimen sliding scale   SubCutaneous three times a day before meals  insulin lispro (ADMELOG) corrective regimen sliding scale   SubCutaneous at bedtime        PAST MEDICAL/SURGICAL HISTORY  PAST MEDICAL & SURGICAL HISTORY:  Positive H. pylori test          SOCIAL HISTORY: Substance Use (street drugs): ( x ) never used  (  ) other:    FAMILY HISTORY:      REVIEW OF SYSTEMS:  CONSTITUTIONAL: No fever, weight loss, or fatigue  EYES: No eye pain, visual disturbances, or discharge  ENMT:  No difficulty hearing, tinnitus, vertigo; No sinus or throat pain  BREASTS: No pain, masses, or nipple discharge  GASTROINTESTINAL: No abdominal or epigastric pain. No nausea, vomiting, or hematemesis; No diarrhea or constipation. No melena or hematochezia.  GENITOURINARY: No dysuria, frequency, hematuria, or incontinence  NEUROLOGICAL: No headaches, memory loss, loss of strength, numbness, or tremors  ENDOCRINE: No heat or cold intolerance; No hair loss  MUSCULOSKELETAL: No joint pain or swelling; No muscle, back, or extremity pain  PSYCHIATRIC: No depression, anxiety, mood swings, or difficulty sleeping  HEME/LYMPH: No easy bruising, or bleeding gums  All others negative    PHYSICAL EXAM:  T(C): 37.2 (10-26-23 @ 21:15), Max: 37.2 (10-26-23 @ 17:22)  HR: 73 (10-26-23 @ 21:15) (65 - 76)  BP: 168/73 (10-26-23 @ 21:15) (143/63 - 168/73)  RR: 17 (10-26-23 @ 21:15) (17 - 18)  SpO2: 100% (10-26-23 @ 21:15) (98% - 100%)  Wt(kg): --  I&O's Summary    25 Oct 2023 07:01  -  26 Oct 2023 07:00  --------------------------------------------------------  IN: 0 mL / OUT: 400 mL / NET: -400 mL      GENERAL: NAD  EYES:  conjunctiva and sclera clear  Cardiovascular: Normal S1 S2, No JVD, No murmurs, No edema  Respiratory: Lungs clear to auscultation	  Gastrointestinal:  s/p surgery  Extremities: No edema                         10.9   5.78  )-----------( 406      ( 26 Oct 2023 06:52 )             33.5     10-26    137  |  103  |  7   ----------------------------<  97  3.6   |  21<L>  |  0.66    Ca    9.3      26 Oct 2023 06:52  Phos  3.7     10-26  Mg     1.70     10-26      proBNP:   Lipid Profile:   HgA1c:   TSH:     Consultant(s) Notes Reviewed:  [x ] YES  [ ] NO    Care Discussed with Consultants/Other Providers [ x] YES  [ ] NO    Imaging Personally Reviewed independently:  [x] YES  [ ] NO    All labs, radiologic studies, vitals, orders and medications list reviewed. Patient is seen and examined at bedside. Case discussed with medical team.

## 2023-10-26 NOTE — DISCHARGE NOTE PROVIDER - CARE PROVIDERS DIRECT ADDRESSES
[email or web address]

## 2023-10-26 NOTE — DISCHARGE NOTE PROVIDER - NSDCMRMEDTOKEN_GEN_ALL_CORE_FT
amLODIPine 10 mg oral tablet: 1 tab(s) orally  aspirin 81 mg oral delayed release tablet: 1 tab(s) orally once a day  famotidine 40 mg oral tablet: 1 tab(s) orally  lisinopril 40 mg oral tablet: 1 tab(s) orally  Rolling Walker: 1 Unit DME   amLODIPine 10 mg oral tablet: 1 tab(s) orally  aspirin 81 mg oral delayed release tablet: 1 tab(s) orally once a day  famotidine 40 mg oral tablet: 1 tab(s) orally  lisinopril 40 mg oral tablet: 1 tab(s) orally  oxyCODONE 5 mg oral tablet: 1 tab(s) orally every 6 hours as needed for  severe pain MDD: 4 tabs  Rolling Walker: 1 Unit DME   acetaminophen 500 mg oral tablet: 2 tab(s) orally every 6 hours  amLODIPine 10 mg oral tablet: 1 tab(s) orally  aspirin 81 mg oral delayed release tablet: 1 tab(s) orally once a day  atorvastatin 80 mg oral tablet: 1 tab(s) orally once a day (at bedtime)  famotidine 40 mg oral tablet: 1 tab(s) orally  lisinopril 40 mg oral tablet: 1 tab(s) orally  oxyCODONE 5 mg oral tablet: 1 tab(s) orally every 6 hours as needed for  severe pain MDD: 4 tabs  Rolling Walker: 1 Unit DME   acetaminophen 500 mg oral tablet: 2 tab(s) orally every 6 hours  amLODIPine 10 mg oral tablet: 1 tab(s) orally  aspirin 81 mg oral delayed release tablet: 1 tab(s) orally once a day  atorvastatin 80 mg oral tablet: 1 tab(s) orally once a day (at bedtime)  famotidine 40 mg oral tablet: 1 tab(s) orally  lisinopril 40 mg oral tablet: 1 tab(s) orally  oxyCODONE 5 mg oral tablet: 1 tab(s) orally every 6 hours as needed for  severe pain MDD: 4 tabs  rivaroxaban 20 mg oral tablet: 1 tab(s) orally once a day (before a meal)  Rolling Walker: 1 Unit DME

## 2023-10-26 NOTE — DISCHARGE NOTE PROVIDER - NSDCFUADDAPPT_GEN_ALL_CORE_FT
Please call to make an appointment to follow up with your primary care physician regarding your recent hospitalization.

## 2023-10-26 NOTE — DISCHARGE NOTE PROVIDER - PROVIDER TOKENS
PROVIDER:[TOKEN:[[id number]],FOLLOWUP:[2 weeks]] PROVIDER:[TOKEN:[[id number]],FOLLOWUP:[2 weeks]] PROVIDER:[TOKEN:[[id number]],FOLLOWUP:[2 weeks]]

## 2023-10-26 NOTE — DISCHARGE NOTE PROVIDER - DETAILS OF MALNUTRITION DIAGNOSIS/DIAGNOSES
This patient has been assessed with a concern for Malnutrition and was treated during this hospitalization for the following Nutrition diagnosis/diagnoses:     -  10/20/2023: Severe protein-calorie malnutrition

## 2023-10-26 NOTE — PROGRESS NOTE ADULT - ATTENDING COMMENTS
Patient transferred from [facility] with acute on chronic mesenteric ischemia. 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 SMA with evidence of new soft plaque. Afebrile, WBC/lactate WNL,   -NPO  -IVF  -Heparin gtt  -Cardiology c/s- no additional testing recommended preop; echo reviewed  -OR planning for stent vs mesenteric bypass  -Risks/benefits of surgery d/w patient and daughter (Angeline) including death, bowel ischemia, bleeding, infection, need for additional surgery.
tolerated extubation  hemodynamically stable  bowel appears perfused  diet advance per vascular surgery  will start banana bag for chronic malnutrition  PT consult
Patient seen/examined. Doing well s/p right DEBI-SMA bypass. Tolerating diet today. Small BM. Ambulating. Denies post-prandial abdominal pain Abdomen soft. Sensorimotor function intact in LUE.   -Aspirin  -Start statin  -D/c planning
I have examined this patient, reviewed pertinent labs and imaging on SICU rounds.    60   minutes in total were spent in providing direct critical care for the diagnoses, assessment and plan outlined below.  This patient suffers from a critical illness that acutely impairs one or more vital organ systems such that there is a high probability of life threatening or imminent deterioration of the patient's condition.  These diagnoses are unrelated to the surgical procedure.    Additionally, time spent in teaching or the performance of separately billable procedures was not counted toward my critical care time.  There is no overlap.  Time included review of vitals, labs, imaging, discussion with consultants.    63-year-old female with history of hypertension, diabetes who is now s/p failed SMA angiogram via L brachial artery cutdown and open R iliac to SMA bypass with PTFE graft on 10/20/23. Pt with 1 year history of recurrent abdominal pain and 40lb weight loss. Pt transferred to [facility] for operative management. Postoperatively, patient remains intubated and SICU consulted for q1 neurovascular checks, vent management, and HD monitoring    ICU Vital Signs Last 24 Hrs  T(C): 36.6 (21 Oct 2023 08:00), Max: 37.2 (20 Oct 2023 21:25)  T(F): 97.8 (21 Oct 2023 08:00), Max: 99 (20 Oct 2023 21:25)  HR: 59 (21 Oct 2023 10:32) (48 - 89)  BP: 134/54 (21 Oct 2023 00:30) (134/54 - 160/58)  BP(mean): 72 (21 Oct 2023 00:30) (72 - 88)  ABP: 135/53 (21 Oct 2023 10:00) (114/76 - 175/72)  ABP(mean): 85 (21 Oct 2023 10:00) (71 - 289)  RR: 12 (21 Oct 2023 10:00) (12 - 21)  SpO2: 100% (21 Oct 2023 10:32) (100% - 100%)    O2 Parameters below as of 21 Oct 2023 08:00  Patient On (Oxygen Delivery Method): ventilator    O2 Concentration (%): 40    sedated  decreased BS left but overall clear to auscultation  RRR  abd soft                          10.3   10.44 )-----------( 204      ( 21 Oct 2023 02:40 )             30.5   10-21    142  |  105  |  8   ----------------------------<  138<H>  3.7   |  23  |  0.81    Ca    8.2<L>      21 Oct 2023 02:40  Phos  4.7     10-21  Mg     3.40     10-21    TPro  5.5<L>  /  Alb  3.5  /  TBili  1.0  /  DBili  x   /  AST  41<H>  /  ALT  31  /  AlkPhos  41  10-21  ABG - ( 21 Oct 2023 02:40 )  pH, Arterial: 7.37  pH, Blood: x     /  pCO2: 40    /  pO2: 161   / HCO3: 23    / Base Excess: -2.0  /  SaO2: 99.9            CXR reviewed, right mainstem intubation    MEDICATIONS  (STANDING):  acetaminophen   IVPB .. 1000 milliGRAM(s) IV Intermittent every 6 hours  ceFAZolin   IVPB 2000 milliGRAM(s) IV Intermittent every 8 hours  chlorhexidine 0.12% Liquid 15 milliLiter(s) Oral Mucosa every 12 hours  dextrose 5% + sodium chloride 0.45%. 1000 milliLiter(s) (75 mL/Hr) IV Continuous <Continuous>  dextrose 5%. 1000 milliLiter(s) (100 mL/Hr) IV Continuous <Continuous>  dextrose 5%. 1000 milliLiter(s) (50 mL/Hr) IV Continuous <Continuous>  dextrose 50% Injectable 25 Gram(s) IV Push once  dextrose 50% Injectable 12.5 Gram(s) IV Push once  dextrose 50% Injectable 25 Gram(s) IV Push once  glucagon  Injectable 1 milliGRAM(s) IntraMuscular once  heparin   Injectable 5000 Unit(s) SubCutaneous every 8 hours  insulin lispro (ADMELOG) corrective regimen sliding scale   SubCutaneous every 6 hours  pantoprazole  Injectable 40 milliGRAM(s) IV Push every 24 hours

## 2023-10-26 NOTE — PROGRESS NOTE ADULT - SUBJECTIVE AND OBJECTIVE BOX
C Team Surgery Progress Note     S: Patient resting comfortably on morning rounds. Pain well-controlled currently. Patient is having liquid bowel movements, still passing gas. Reports back pain. Patient is ambulating. Reported some abdominal pain with solid foods yesterday, told to self-regulate. Denies n/v.      MEDICATIONS  (STANDING):  aspirin enteric coated 81 milliGRAM(s) Oral daily  dextrose 50% Injectable 25 Gram(s) IV Push once  dextrose 50% Injectable 12.5 Gram(s) IV Push once  glucagon  Injectable 1 milliGRAM(s) IntraMuscular once  heparin   Injectable 5000 Unit(s) SubCutaneous every 8 hours  insulin lispro (ADMELOG) corrective regimen sliding scale   SubCutaneous every 6 hours  pantoprazole  Injectable 40 milliGRAM(s) IV Push every 24 hours    MEDICATIONS  (PRN):  HYDROmorphone  Injectable 1 milliGRAM(s) IV Push every 4 hours PRN Severe Pain (7 - 10)  HYDROmorphone  Injectable 0.5 milliGRAM(s) IV Push every 4 hours PRN Moderate Pain (4 - 6)      T(C): 36.6 (10-26-23 @ 06:38), Max: 37.1 (10-25-23 @ 18:12)  HR: 76 (10-26-23 @ 06:38) (60 - 76)  BP: 156/84 (10-26-23 @ 06:38) (125/61 - 165/77)  RR: 18 (10-26-23 @ 06:38) (17 - 18)  SpO2: 100% (10-26-23 @ 06:38) (97% - 100%)        10-25-23 @ 07:01  -  10-26-23 @ 07:00  --------------------------------------------------------  IN: 0 mL / OUT: 400 mL / NET: -400 mL        Physical Exam:  General Appearance: Appears well, NAD   Neck: Supple, previous central line site C/D/I  Chest: Nonlabored breathing on RA  CV: Hemodynamically stable  Abdomen: Soft, nontender, mildly distended. Dressings c/d. Underlying incision C/D/I with staples, gauze and tape dressing changed  Vascular: 2+ femoral bilaterally, +ds L radial and ulnar arteries  Extremities: WWP. Left brachial incision C/D/I, dressing changed    LABS:                        10.1   6.32  )-----------( 313      ( 25 Oct 2023 06:12 )             30.3     10-25    136  |  103  |  7   ----------------------------<  109<H>  4.0   |  22  |  0.65    Ca    8.9      25 Oct 2023 06:12  Phos  3.2     10-25  Mg     1.80     10-25

## 2023-10-26 NOTE — DISCHARGE NOTE PROVIDER - NSDCFUSCHEDAPPT_GEN_ALL_CORE_FT
[facility]  VASCULAR [address]  Scheduled Appointment: 11/06/2023     [facility]  VASCULAR [address]  Scheduled Appointment: 11/06/2023     [facility]  VASCULAR [address]  Scheduled Appointment: 12/01/2023    Domenic Rodríguez  [facility]  [place]  Scheduled Appointment: 12/15/2023     [facility]  VASCULAR [address]  Scheduled Appointment: 12/01/2023    Domenic Rodríguez  [facility]  [place]  Scheduled Appointment: 12/15/2023     [facility]  VASCULAR [address] [place]  Scheduled Appointment: 12/01/2023    Domenic Rodríguez  [facility]  [place]  Scheduled Appointment: 12/15/2023

## 2023-10-26 NOTE — DISCHARGE NOTE PROVIDER - HOSPITAL COURSE
63-year-old female with history of hypertension, diabetes presents with recurrent abdominal pain for last 1 year. Associated with nausea and vomiting. Notes usually feel olive after grain/or rice ingestion. Reports 40 lbs weight loss over last year. 1 loose stool, denies blood in stool. Denies numbness and tingling in lower extremities. Seen in Sept for similar pain. Reports that in early 2023 she had an endoscopy that showed gastritis. Vascular Surgery consulted for r/o mesenteric ischemia.     CT Angio Abdomen and Pelvis w/ IV Cont was done in the ED, revealing:   * Near complete occlusion of the SMA at its origin with surrounding perivascular stranding, new compared to prior exam. Minimal hypo-enhancement the small bowel in the right. Findings are consistent with acute ischemia.    Patient was admitted and taken to the OR for Iliac to SMA bypass by the vascular team. She tolerated the procedure well and was transferred to the surgical ICU in stable condition for further monitoring. Patient was extubated on POD #1 without issue. NGT was removed. Rankin catheter was removed and she voided without difficulty. She remained HD stable post-operatively and was transferred to the surgical floor on POD #2. PT was consulted for OOB / ambulation and eventually recommended patient to go home with OP PT. Once bowel function returned, patients diet was advanced as tolerated.     On POD #6, patient was found to be stable for discharge to home. Her pain was well-controlled with oral medications, she was tolerated regular diet, and she was voiding and ambulating without difficulty. She will follow-up with Dr. Morales in 2 weeks for her post-op visit.    63-year-old female with history of hypertension, diabetes presents with recurrent abdominal pain for last 1 year. Associated with nausea and vomiting. Notes usually feel olive after grain/or rice ingestion. Reports 40 lbs weight loss over last year. 1 loose stool, denies blood in stool. Denies numbness and tingling in lower extremities. Seen in Sept for similar pain. Reports that in early 2023 she had an endoscopy that showed gastritis. Vascular Surgery consulted for r/o mesenteric ischemia.     CT Angio Abdomen and Pelvis w/ IV Cont was done in the ED, revealing:   * Near complete occlusion of the SMA at its origin with surrounding perivascular stranding, new compared to prior exam. Minimal hypo-enhancement the small bowel in the right. Findings are consistent with acute ischemia.    Patient was admitted and taken to the OR for Iliac to SMA bypass by the vascular team. She tolerated the procedure well and was transferred to the surgical ICU in stable condition for further monitoring. Patient was extubated on POD #1 without issue. NGT was removed. Rankin catheter was removed and she voided without difficulty. She remained HD stable post-operatively and was transferred to the surgical floor on POD #2. PT was consulted for OOB / ambulation and eventually recommended patient to go home with OP PT. Once bowel function returned, patients diet was advanced as tolerated.     On POD #6, patient was found to be stable for discharge to home. Her pain was well-controlled with oral medications, she was tolerated regular diet, and she was voiding and ambulating without difficulty. She will follow-up with Dr. Morales in 2 weeks for her post-op visit.

## 2023-10-27 ENCOUNTER — TRANSCRIPTION ENCOUNTER (OUTPATIENT)
Age: 63
End: 2023-10-27

## 2023-10-27 VITALS
TEMPERATURE: 98 F | HEART RATE: 67 BPM | OXYGEN SATURATION: 100 % | RESPIRATION RATE: 18 BRPM | SYSTOLIC BLOOD PRESSURE: 166 MMHG | DIASTOLIC BLOOD PRESSURE: 73 MMHG

## 2023-10-27 PROBLEM — Z00.00 ENCOUNTER FOR PREVENTIVE HEALTH EXAMINATION: Status: ACTIVE | Noted: 2023-10-27

## 2023-10-27 LAB
ANION GAP SERPL CALC-SCNC: 14 MMOL/L — SIGNIFICANT CHANGE UP (ref 7–14)
ANION GAP SERPL CALC-SCNC: 14 MMOL/L — SIGNIFICANT CHANGE UP (ref 7–14)
BUN SERPL-MCNC: 10 MG/DL — SIGNIFICANT CHANGE UP (ref 7–23)
BUN SERPL-MCNC: 10 MG/DL — SIGNIFICANT CHANGE UP (ref 7–23)
CALCIUM SERPL-MCNC: 9.1 MG/DL — SIGNIFICANT CHANGE UP (ref 8.4–10.5)
CALCIUM SERPL-MCNC: 9.1 MG/DL — SIGNIFICANT CHANGE UP (ref 8.4–10.5)
CHLORIDE SERPL-SCNC: 99 MMOL/L — SIGNIFICANT CHANGE UP (ref 98–107)
CHLORIDE SERPL-SCNC: 99 MMOL/L — SIGNIFICANT CHANGE UP (ref 98–107)
CO2 SERPL-SCNC: 22 MMOL/L — SIGNIFICANT CHANGE UP (ref 22–31)
CO2 SERPL-SCNC: 22 MMOL/L — SIGNIFICANT CHANGE UP (ref 22–31)
CREAT SERPL-MCNC: 0.69 MG/DL — SIGNIFICANT CHANGE UP (ref 0.5–1.3)
CREAT SERPL-MCNC: 0.69 MG/DL — SIGNIFICANT CHANGE UP (ref 0.5–1.3)
EGFR: 96 ML/MIN/1.73M2 — SIGNIFICANT CHANGE UP
EGFR: 96 ML/MIN/1.73M2 — SIGNIFICANT CHANGE UP
GLUCOSE BLDC GLUCOMTR-MCNC: 105 MG/DL — HIGH (ref 70–99)
GLUCOSE BLDC GLUCOMTR-MCNC: 105 MG/DL — HIGH (ref 70–99)
GLUCOSE BLDC GLUCOMTR-MCNC: 97 MG/DL — SIGNIFICANT CHANGE UP (ref 70–99)
GLUCOSE BLDC GLUCOMTR-MCNC: 97 MG/DL — SIGNIFICANT CHANGE UP (ref 70–99)
GLUCOSE BLDC GLUCOMTR-MCNC: 99 MG/DL — SIGNIFICANT CHANGE UP (ref 70–99)
GLUCOSE BLDC GLUCOMTR-MCNC: 99 MG/DL — SIGNIFICANT CHANGE UP (ref 70–99)
GLUCOSE SERPL-MCNC: 93 MG/DL — SIGNIFICANT CHANGE UP (ref 70–99)
GLUCOSE SERPL-MCNC: 93 MG/DL — SIGNIFICANT CHANGE UP (ref 70–99)
HCT VFR BLD CALC: 32.6 % — LOW (ref 34.5–45)
HCT VFR BLD CALC: 32.6 % — LOW (ref 34.5–45)
HGB BLD-MCNC: 10.6 G/DL — LOW (ref 11.5–15.5)
HGB BLD-MCNC: 10.6 G/DL — LOW (ref 11.5–15.5)
MAGNESIUM SERPL-MCNC: 1.8 MG/DL — SIGNIFICANT CHANGE UP (ref 1.6–2.6)
MAGNESIUM SERPL-MCNC: 1.8 MG/DL — SIGNIFICANT CHANGE UP (ref 1.6–2.6)
MCHC RBC-ENTMCNC: 28.1 PG — SIGNIFICANT CHANGE UP (ref 27–34)
MCHC RBC-ENTMCNC: 28.1 PG — SIGNIFICANT CHANGE UP (ref 27–34)
MCHC RBC-ENTMCNC: 32.5 GM/DL — SIGNIFICANT CHANGE UP (ref 32–36)
MCHC RBC-ENTMCNC: 32.5 GM/DL — SIGNIFICANT CHANGE UP (ref 32–36)
MCV RBC AUTO: 86.5 FL — SIGNIFICANT CHANGE UP (ref 80–100)
MCV RBC AUTO: 86.5 FL — SIGNIFICANT CHANGE UP (ref 80–100)
NRBC # BLD: 0 /100 WBCS — SIGNIFICANT CHANGE UP (ref 0–0)
NRBC # BLD: 0 /100 WBCS — SIGNIFICANT CHANGE UP (ref 0–0)
NRBC # FLD: 0 K/UL — SIGNIFICANT CHANGE UP (ref 0–0)
NRBC # FLD: 0 K/UL — SIGNIFICANT CHANGE UP (ref 0–0)
PHOSPHATE SERPL-MCNC: 3.7 MG/DL — SIGNIFICANT CHANGE UP (ref 2.5–4.5)
PHOSPHATE SERPL-MCNC: 3.7 MG/DL — SIGNIFICANT CHANGE UP (ref 2.5–4.5)
PLATELET # BLD AUTO: 469 K/UL — HIGH (ref 150–400)
PLATELET # BLD AUTO: 469 K/UL — HIGH (ref 150–400)
POTASSIUM SERPL-MCNC: 3.9 MMOL/L — SIGNIFICANT CHANGE UP (ref 3.5–5.3)
POTASSIUM SERPL-MCNC: 3.9 MMOL/L — SIGNIFICANT CHANGE UP (ref 3.5–5.3)
POTASSIUM SERPL-SCNC: 3.9 MMOL/L — SIGNIFICANT CHANGE UP (ref 3.5–5.3)
POTASSIUM SERPL-SCNC: 3.9 MMOL/L — SIGNIFICANT CHANGE UP (ref 3.5–5.3)
RBC # BLD: 3.77 M/UL — LOW (ref 3.8–5.2)
RBC # BLD: 3.77 M/UL — LOW (ref 3.8–5.2)
RBC # FLD: 14.2 % — SIGNIFICANT CHANGE UP (ref 10.3–14.5)
RBC # FLD: 14.2 % — SIGNIFICANT CHANGE UP (ref 10.3–14.5)
SODIUM SERPL-SCNC: 135 MMOL/L — SIGNIFICANT CHANGE UP (ref 135–145)
SODIUM SERPL-SCNC: 135 MMOL/L — SIGNIFICANT CHANGE UP (ref 135–145)
WBC # BLD: 7.56 K/UL — SIGNIFICANT CHANGE UP (ref 3.8–10.5)
WBC # BLD: 7.56 K/UL — SIGNIFICANT CHANGE UP (ref 3.8–10.5)
WBC # FLD AUTO: 7.56 K/UL — SIGNIFICANT CHANGE UP (ref 3.8–10.5)
WBC # FLD AUTO: 7.56 K/UL — SIGNIFICANT CHANGE UP (ref 3.8–10.5)

## 2023-10-27 RX ORDER — OXYCODONE HYDROCHLORIDE 5 MG/1
10 TABLET ORAL EVERY 6 HOURS
Refills: 0 | Status: DISCONTINUED | OUTPATIENT
Start: 2023-10-27 | End: 2023-10-27

## 2023-10-27 RX ORDER — SIMETHICONE 80 MG/1
80 TABLET, CHEWABLE ORAL ONCE
Refills: 0 | Status: COMPLETED | OUTPATIENT
Start: 2023-10-27 | End: 2023-10-27

## 2023-10-27 RX ORDER — POTASSIUM CHLORIDE 20 MEQ
20 PACKET (EA) ORAL ONCE
Refills: 0 | Status: COMPLETED | OUTPATIENT
Start: 2023-10-27 | End: 2023-10-27

## 2023-10-27 RX ORDER — OXYCODONE HYDROCHLORIDE 5 MG/1
5 TABLET ORAL EVERY 4 HOURS
Refills: 0 | Status: DISCONTINUED | OUTPATIENT
Start: 2023-10-27 | End: 2023-10-27

## 2023-10-27 RX ORDER — MAGNESIUM SULFATE 500 MG/ML
2 VIAL (ML) INJECTION ONCE
Refills: 0 | Status: COMPLETED | OUTPATIENT
Start: 2023-10-27 | End: 2023-10-27

## 2023-10-27 RX ADMIN — HEPARIN SODIUM 5000 UNIT(S): 5000 INJECTION INTRAVENOUS; SUBCUTANEOUS at 13:05

## 2023-10-27 RX ADMIN — HEPARIN SODIUM 5000 UNIT(S): 5000 INJECTION INTRAVENOUS; SUBCUTANEOUS at 05:31

## 2023-10-27 RX ADMIN — Medication 25 GRAM(S): at 12:11

## 2023-10-27 RX ADMIN — OXYCODONE HYDROCHLORIDE 5 MILLIGRAM(S): 5 TABLET ORAL at 12:11

## 2023-10-27 RX ADMIN — OXYCODONE HYDROCHLORIDE 5 MILLIGRAM(S): 5 TABLET ORAL at 17:18

## 2023-10-27 RX ADMIN — SIMETHICONE 80 MILLIGRAM(S): 80 TABLET, CHEWABLE ORAL at 18:43

## 2023-10-27 RX ADMIN — PANTOPRAZOLE SODIUM 40 MILLIGRAM(S): 20 TABLET, DELAYED RELEASE ORAL at 13:05

## 2023-10-27 RX ADMIN — OXYCODONE HYDROCHLORIDE 5 MILLIGRAM(S): 5 TABLET ORAL at 16:48

## 2023-10-27 RX ADMIN — HYDROMORPHONE HYDROCHLORIDE 1 MILLIGRAM(S): 2 INJECTION INTRAMUSCULAR; INTRAVENOUS; SUBCUTANEOUS at 01:55

## 2023-10-27 RX ADMIN — Medication 81 MILLIGRAM(S): at 13:05

## 2023-10-27 RX ADMIN — Medication 20 MILLIEQUIVALENT(S): at 12:11

## 2023-10-27 RX ADMIN — HYDROMORPHONE HYDROCHLORIDE 1 MILLIGRAM(S): 2 INJECTION INTRAMUSCULAR; INTRAVENOUS; SUBCUTANEOUS at 06:36

## 2023-10-27 RX ADMIN — HYDROMORPHONE HYDROCHLORIDE 1 MILLIGRAM(S): 2 INJECTION INTRAMUSCULAR; INTRAVENOUS; SUBCUTANEOUS at 06:21

## 2023-10-27 RX ADMIN — HYDROMORPHONE HYDROCHLORIDE 1 MILLIGRAM(S): 2 INJECTION INTRAMUSCULAR; INTRAVENOUS; SUBCUTANEOUS at 01:38

## 2023-10-27 RX ADMIN — OXYCODONE HYDROCHLORIDE 5 MILLIGRAM(S): 5 TABLET ORAL at 12:41

## 2023-10-27 NOTE — PROGRESS NOTE ADULT - SUBJECTIVE AND OBJECTIVE BOX
Winston House MD  Interventional Cardiology / Endovascular Specialist  [place] Office : [phone] [place] [address]. [phone]  Tel:   [place] Office : [phone] [facility] [place] [address]  Tel: [phone]    Subjective/Overnight events: Patient lying in bed comfortably. No acute distress. Denies chest pain, SOB or palpitations  	  MEDICATIONS:  aspirin enteric coated 81 milliGRAM(s) Oral daily  heparin   Injectable 5000 Unit(s) SubCutaneous every 8 hours        oxyCODONE    IR 10 milliGRAM(s) Oral every 6 hours PRN  oxyCODONE    IR 5 milliGRAM(s) Oral every 4 hours PRN    pantoprazole  Injectable 40 milliGRAM(s) IV Push every 24 hours    atorvastatin 80 milliGRAM(s) Oral at bedtime  dextrose 50% Injectable 25 Gram(s) IV Push once  dextrose 50% Injectable 12.5 Gram(s) IV Push once  insulin lispro (ADMELOG) corrective regimen sliding scale   SubCutaneous three times a day before meals  insulin lispro (ADMELOG) corrective regimen sliding scale   SubCutaneous at bedtime        PAST MEDICAL/SURGICAL HISTORY  PAST MEDICAL & SURGICAL HISTORY:  Positive H. pylori test          SOCIAL HISTORY: Substance Use (street drugs): ( x ) never used  (  ) other:    FAMILY HISTORY:      REVIEW OF SYSTEMS:  CONSTITUTIONAL: No fever, weight loss, or fatigue  EYES: No eye pain, visual disturbances, or discharge  ENMT:  No difficulty hearing, tinnitus, vertigo; No sinus or throat pain  BREASTS: No pain, masses, or nipple discharge  GASTROINTESTINAL: No abdominal or epigastric pain. No nausea, vomiting, or hematemesis; No diarrhea or constipation. No melena or hematochezia.  GENITOURINARY: No dysuria, frequency, hematuria, or incontinence  NEUROLOGICAL: No headaches, memory loss, loss of strength, numbness, or tremors  ENDOCRINE: No heat or cold intolerance; No hair loss  MUSCULOSKELETAL: No joint pain or swelling; No muscle, back, or extremity pain  PSYCHIATRIC: No depression, anxiety, mood swings, or difficulty sleeping  HEME/LYMPH: No easy bruising, or bleeding gums  All others negative    PHYSICAL EXAM:  T(C): 36.9 (10-27-23 @ 10:12), Max: 37.2 (10-26-23 @ 17:22)  HR: 68 (10-27-23 @ 10:12) (68 - 73)  BP: 147/68 (10-27-23 @ 10:12) (146/76 - 168/73)  RR: 18 (10-27-23 @ 10:12) (17 - 18)  SpO2: 100% (10-27-23 @ 10:12) (95% - 100%)  Wt(kg): --  I&O's Summary    26 Oct 2023 07:01  -  27 Oct 2023 07:00  --------------------------------------------------------  IN: 0 mL / OUT: 525 mL / NET: -525 mL    27 Oct 2023 07:01  -  27 Oct 2023 13:44  --------------------------------------------------------  IN: 0 mL / OUT: 305 mL / NET: -305 mL      GENERAL: NAD  EYES:  conjunctiva and sclera clear  Cardiovascular: Normal S1 S2, No JVD, No murmurs, No edema  Respiratory: Lungs clear to auscultation	  Gastrointestinal:  s/p surgery  Extremities: No edema                         10.6   7.56  )-----------( 469      ( 27 Oct 2023 06:31 )             32.6     10-27    135  |  99  |  10  ----------------------------<  93  3.9   |  22  |  0.69    Ca    9.1      27 Oct 2023 06:31  Phos  3.7     10-27  Mg     1.80     10-27      proBNP:   Lipid Profile:   HgA1c:   TSH:     Consultant(s) Notes Reviewed:  [x ] YES  [ ] NO    Care Discussed with Consultants/Other Providers [ x] YES  [ ] NO    Imaging Personally Reviewed independently:  [x] YES  [ ] NO    All labs, radiologic studies, vitals, orders and medications list reviewed. Patient is seen and examined at bedside. Case discussed with medical team.

## 2023-10-27 NOTE — PROGRESS NOTE ADULT - SUBJECTIVE AND OBJECTIVE BOX
C Team Surgery Progress Note     S: Patient resting comfortably on morning rounds. Pain well-controlled currently. Patient is passing gas and having BMs. She does report some bloating since starting regular diet. Still Reporting mild back pain. Patient is ambulating. Denies n/v.       MEDICATIONS  (STANDING):  aspirin enteric coated 81 milliGRAM(s) Oral daily  atorvastatin 80 milliGRAM(s) Oral at bedtime  dextrose 50% Injectable 25 Gram(s) IV Push once  dextrose 50% Injectable 12.5 Gram(s) IV Push once  glucagon  Injectable 1 milliGRAM(s) IntraMuscular once  heparin   Injectable 5000 Unit(s) SubCutaneous every 8 hours  insulin lispro (ADMELOG) corrective regimen sliding scale   SubCutaneous at bedtime  insulin lispro (ADMELOG) corrective regimen sliding scale   SubCutaneous three times a day before meals  pantoprazole  Injectable 40 milliGRAM(s) IV Push every 24 hours    MEDICATIONS  (PRN):  HYDROmorphone  Injectable 0.5 milliGRAM(s) IV Push every 4 hours PRN Moderate Pain (4 - 6)  HYDROmorphone  Injectable 1 milliGRAM(s) IV Push every 4 hours PRN Severe Pain (7 - 10)      Vital Signs Last 24 Hrs  T(C): 36.9 (27 Oct 2023 06:15), Max: 37.2 (26 Oct 2023 17:22)  T(F): 98.5 (27 Oct 2023 06:15), Max: 98.9 (26 Oct 2023 17:22)  HR: 69 (27 Oct 2023 06:15) (65 - 73)  BP: 159/64 (27 Oct 2023 06:15) (143/63 - 168/73)  BP(mean): --  RR: 17 (27 Oct 2023 06:15) (17 - 18)  SpO2: 95% (27 Oct 2023 06:15) (95% - 100%)    Parameters below as of 27 Oct 2023 06:15  Patient On (Oxygen Delivery Method): room air      Physical Exam:  General Appearance: Appears well, NAD   Neck: Supple, previous central line site C/D/I  Chest: Nonlabored breathing on RA  CV: Hemodynamically stable  Abdomen: Soft, nontender, mildly distended. Dressings c/d. Underlying incision C/D/I with staples, gauze and tape dressing changed  Vascular: 2+ femoral bilaterally, +ds L radial and ulnar arteries  Extremities: WWP. Left brachial incision C/D/I, dressing changed      LABS:                        10.6   7.56  )-----------( 469      ( 27 Oct 2023 06:31 )             32.6     10-27    135  |  99  |  10  ----------------------------<  93  3.9   |  22  |  0.69    Ca    9.1      27 Oct 2023 06:31  Phos  3.7     10-27  Mg     1.80     10-27

## 2023-10-27 NOTE — PROGRESS NOTE ADULT - PROVIDER SPECIALTY LIST ADULT
Anesthesia
Cardiology
Vascular Surgery
Cardiology
Vascular Surgery
Cardiology
SICU
SICU
Vascular Surgery
Surgery
Vascular Surgery

## 2023-10-27 NOTE — PROGRESS NOTE ADULT - NUTRITIONAL ASSESSMENT
This patient has been assessed with a concern for Malnutrition and has been determined to have a diagnosis/diagnoses of Severe protein-calorie malnutrition.    This patient is being managed with:   Diet Consistent Carbohydrate w/Evening Snack-  Entered: Oct 25 2023  4:42PM  
This patient has been assessed with a concern for Malnutrition and has been determined to have a diagnosis/diagnoses of Severe protein-calorie malnutrition.    This patient is being managed with:   Diet NPO-  Entered: Oct 18 2023  8:55AM  
This patient has been assessed with a concern for Malnutrition and has been determined to have a diagnosis/diagnoses of Severe protein-calorie malnutrition.    This patient is being managed with:   Diet Consistent Carbohydrate Clear Liquid-  Entered: Oct 25 2023  7:50AM  
This patient has been assessed with a concern for Malnutrition and has been determined to have a diagnosis/diagnoses of Severe protein-calorie malnutrition.    This patient is being managed with:   Diet NPO-  Entered: Oct 18 2023  8:55AM  
This patient has been assessed with a concern for Malnutrition and has been determined to have a diagnosis/diagnoses of Severe protein-calorie malnutrition.    This patient is being managed with:   Diet NPO-  Except Medications  With Chewing Gum  With Hard Candy  With Ice Chips/Sips of Water  Entered: Oct 23 2023  1:19PM  
This patient has been assessed with a concern for Malnutrition and has been determined to have a diagnosis/diagnoses of Severe protein-calorie malnutrition.    This patient is being managed with:   Diet NPO-  Entered: Oct 18 2023  8:55AM  
This patient has been assessed with a concern for Malnutrition and has been determined to have a diagnosis/diagnoses of Severe protein-calorie malnutrition.    This patient is being managed with:   Diet NPO-  Entered: Oct 18 2023  8:55AM  
This patient has been assessed with a concern for Malnutrition and has been determined to have a diagnosis/diagnoses of Severe protein-calorie malnutrition.    This patient is being managed with:   Diet Consistent Carbohydrate w/Evening Snack-  Entered: Oct 25 2023  4:42PM

## 2023-10-27 NOTE — PROGRESS NOTE ADULT - ASSESSMENT
63-year-old female with history of HTN and DM presented with intermittent abdominal pain for last 1 year associated with 40 lbs weight loss. 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 Patient is now s/p open exlap w/ right iliac to SMA bypass w/ PTFE grafts on 10/20. Midline incision healing well, LEs well perfused. LUE with no signs/symptoms of ischemia, duplex with stenosis likely secondary to access site closure but distal flow intact. Passing gas and stool.    Plan   - Pain control PRN tylenol and oxy PRN   - Diet: consistent carb as tolerated, patient instructed to self-regulate  - ASA   - OOB/ambulate as tolerated   - DVT ppx: SQH  - Dispo: home - possibly today      Vascular surgery  [id number]

## 2023-10-28 ENCOUNTER — TRANSCRIPTION ENCOUNTER (OUTPATIENT)
Age: 63
End: 2023-10-28

## 2023-10-28 ENCOUNTER — INPATIENT (INPATIENT)
Facility: HOSPITAL | Age: 63
LOS: 11 days | Discharge: HOME | End: 2023-11-09
Attending: SURGERY | Admitting: SURGERY
Payer: SELF-PAY

## 2023-10-28 ENCOUNTER — RESULT REVIEW (OUTPATIENT)
Age: 63
End: 2023-10-28

## 2023-10-28 VITALS
HEART RATE: 73 BPM | TEMPERATURE: 98 F | SYSTOLIC BLOOD PRESSURE: 130 MMHG | HEIGHT: 59 IN | DIASTOLIC BLOOD PRESSURE: 59 MMHG | OXYGEN SATURATION: 100 % | RESPIRATION RATE: 16 BRPM

## 2023-10-28 DIAGNOSIS — R10.9 UNSPECIFIED ABDOMINAL PAIN: ICD-10-CM

## 2023-10-28 DIAGNOSIS — Z95.828 PRESENCE OF OTHER VASCULAR IMPLANTS AND GRAFTS: Chronic | ICD-10-CM

## 2023-10-28 LAB
ALBUMIN SERPL ELPH-MCNC: 3.9 G/DL — SIGNIFICANT CHANGE UP (ref 3.3–5)
ALBUMIN SERPL ELPH-MCNC: 3.9 G/DL — SIGNIFICANT CHANGE UP (ref 3.3–5)
ALP SERPL-CCNC: 113 U/L — SIGNIFICANT CHANGE UP (ref 40–120)
ALP SERPL-CCNC: 113 U/L — SIGNIFICANT CHANGE UP (ref 40–120)
ALT FLD-CCNC: 41 U/L — HIGH (ref 4–33)
ALT FLD-CCNC: 41 U/L — HIGH (ref 4–33)
ANION GAP SERPL CALC-SCNC: 16 MMOL/L — HIGH (ref 7–14)
ANION GAP SERPL CALC-SCNC: 16 MMOL/L — HIGH (ref 7–14)
APPEARANCE UR: ABNORMAL
APPEARANCE UR: ABNORMAL
APTT BLD: 28.1 SEC — SIGNIFICANT CHANGE UP (ref 24.5–35.6)
APTT BLD: 28.1 SEC — SIGNIFICANT CHANGE UP (ref 24.5–35.6)
AST SERPL-CCNC: 37 U/L — HIGH (ref 4–32)
AST SERPL-CCNC: 37 U/L — HIGH (ref 4–32)
BACTERIA # UR AUTO: ABNORMAL /HPF
BACTERIA # UR AUTO: ABNORMAL /HPF
BASE EXCESS BLDV CALC-SCNC: 0.3 MMOL/L — SIGNIFICANT CHANGE UP (ref -2–3)
BASE EXCESS BLDV CALC-SCNC: 0.3 MMOL/L — SIGNIFICANT CHANGE UP (ref -2–3)
BASOPHILS # BLD AUTO: 0.03 K/UL — SIGNIFICANT CHANGE UP (ref 0–0.2)
BASOPHILS # BLD AUTO: 0.03 K/UL — SIGNIFICANT CHANGE UP (ref 0–0.2)
BASOPHILS NFR BLD AUTO: 0.4 % — SIGNIFICANT CHANGE UP (ref 0–2)
BASOPHILS NFR BLD AUTO: 0.4 % — SIGNIFICANT CHANGE UP (ref 0–2)
BILIRUB SERPL-MCNC: 0.6 MG/DL — SIGNIFICANT CHANGE UP (ref 0.2–1.2)
BILIRUB SERPL-MCNC: 0.6 MG/DL — SIGNIFICANT CHANGE UP (ref 0.2–1.2)
BILIRUB UR-MCNC: NEGATIVE — SIGNIFICANT CHANGE UP
BILIRUB UR-MCNC: NEGATIVE — SIGNIFICANT CHANGE UP
BLD GP AB SCN SERPL QL: NEGATIVE — SIGNIFICANT CHANGE UP
BLD GP AB SCN SERPL QL: NEGATIVE — SIGNIFICANT CHANGE UP
BLOOD GAS ARTERIAL - LYTES,HGB,ICA,LACT RESULT: SIGNIFICANT CHANGE UP
BLOOD GAS VENOUS COMPREHENSIVE RESULT: SIGNIFICANT CHANGE UP
BLOOD GAS VENOUS COMPREHENSIVE RESULT: SIGNIFICANT CHANGE UP
BUN SERPL-MCNC: 9 MG/DL — SIGNIFICANT CHANGE UP (ref 7–23)
BUN SERPL-MCNC: 9 MG/DL — SIGNIFICANT CHANGE UP (ref 7–23)
CALCIUM SERPL-MCNC: 9.2 MG/DL — SIGNIFICANT CHANGE UP (ref 8.4–10.5)
CALCIUM SERPL-MCNC: 9.2 MG/DL — SIGNIFICANT CHANGE UP (ref 8.4–10.5)
CAST: 2 /LPF — SIGNIFICANT CHANGE UP (ref 0–4)
CAST: 2 /LPF — SIGNIFICANT CHANGE UP (ref 0–4)
CHLORIDE BLDV-SCNC: 100 MMOL/L — SIGNIFICANT CHANGE UP (ref 96–108)
CHLORIDE BLDV-SCNC: 100 MMOL/L — SIGNIFICANT CHANGE UP (ref 96–108)
CHLORIDE SERPL-SCNC: 95 MMOL/L — LOW (ref 98–107)
CHLORIDE SERPL-SCNC: 95 MMOL/L — LOW (ref 98–107)
CO2 BLDV-SCNC: 26.7 MMOL/L — HIGH (ref 22–26)
CO2 BLDV-SCNC: 26.7 MMOL/L — HIGH (ref 22–26)
CO2 SERPL-SCNC: 23 MMOL/L — SIGNIFICANT CHANGE UP (ref 22–31)
CO2 SERPL-SCNC: 23 MMOL/L — SIGNIFICANT CHANGE UP (ref 22–31)
COLOR SPEC: YELLOW — SIGNIFICANT CHANGE UP
COLOR SPEC: YELLOW — SIGNIFICANT CHANGE UP
CREAT SERPL-MCNC: 0.69 MG/DL — SIGNIFICANT CHANGE UP (ref 0.5–1.3)
CREAT SERPL-MCNC: 0.69 MG/DL — SIGNIFICANT CHANGE UP (ref 0.5–1.3)
DIFF PNL FLD: ABNORMAL
DIFF PNL FLD: ABNORMAL
EGFR: 96 ML/MIN/1.73M2 — SIGNIFICANT CHANGE UP
EGFR: 96 ML/MIN/1.73M2 — SIGNIFICANT CHANGE UP
EOSINOPHIL # BLD AUTO: 0.06 K/UL — SIGNIFICANT CHANGE UP (ref 0–0.5)
EOSINOPHIL # BLD AUTO: 0.06 K/UL — SIGNIFICANT CHANGE UP (ref 0–0.5)
EOSINOPHIL NFR BLD AUTO: 0.8 % — SIGNIFICANT CHANGE UP (ref 0–6)
EOSINOPHIL NFR BLD AUTO: 0.8 % — SIGNIFICANT CHANGE UP (ref 0–6)
FLUAV AG NPH QL: SIGNIFICANT CHANGE UP
FLUAV AG NPH QL: SIGNIFICANT CHANGE UP
FLUBV AG NPH QL: SIGNIFICANT CHANGE UP
FLUBV AG NPH QL: SIGNIFICANT CHANGE UP
GAS PNL BLDV: 132 MMOL/L — LOW (ref 136–145)
GAS PNL BLDV: 132 MMOL/L — LOW (ref 136–145)
GLUCOSE BLDV-MCNC: 117 MG/DL — HIGH (ref 70–99)
GLUCOSE BLDV-MCNC: 117 MG/DL — HIGH (ref 70–99)
GLUCOSE SERPL-MCNC: 111 MG/DL — HIGH (ref 70–99)
GLUCOSE SERPL-MCNC: 111 MG/DL — HIGH (ref 70–99)
GLUCOSE UR QL: NEGATIVE MG/DL — SIGNIFICANT CHANGE UP
GLUCOSE UR QL: NEGATIVE MG/DL — SIGNIFICANT CHANGE UP
HCO3 BLDV-SCNC: 25 MMOL/L — SIGNIFICANT CHANGE UP (ref 22–29)
HCO3 BLDV-SCNC: 25 MMOL/L — SIGNIFICANT CHANGE UP (ref 22–29)
HCT VFR BLD CALC: 34.7 % — SIGNIFICANT CHANGE UP (ref 34.5–45)
HCT VFR BLD CALC: 34.7 % — SIGNIFICANT CHANGE UP (ref 34.5–45)
HCT VFR BLDA CALC: 35 % — SIGNIFICANT CHANGE UP (ref 34.5–46.5)
HCT VFR BLDA CALC: 35 % — SIGNIFICANT CHANGE UP (ref 34.5–46.5)
HGB BLD CALC-MCNC: 11.8 G/DL — SIGNIFICANT CHANGE UP (ref 11.7–16.1)
HGB BLD CALC-MCNC: 11.8 G/DL — SIGNIFICANT CHANGE UP (ref 11.7–16.1)
HGB BLD-MCNC: 11.5 G/DL — SIGNIFICANT CHANGE UP (ref 11.5–15.5)
HGB BLD-MCNC: 11.5 G/DL — SIGNIFICANT CHANGE UP (ref 11.5–15.5)
IANC: 5.55 K/UL — SIGNIFICANT CHANGE UP (ref 1.8–7.4)
IANC: 5.55 K/UL — SIGNIFICANT CHANGE UP (ref 1.8–7.4)
IMM GRANULOCYTES NFR BLD AUTO: 1.3 % — HIGH (ref 0–0.9)
IMM GRANULOCYTES NFR BLD AUTO: 1.3 % — HIGH (ref 0–0.9)
INR BLD: 1.06 RATIO — SIGNIFICANT CHANGE UP (ref 0.85–1.18)
INR BLD: 1.06 RATIO — SIGNIFICANT CHANGE UP (ref 0.85–1.18)
KETONES UR-MCNC: 40 MG/DL
KETONES UR-MCNC: 40 MG/DL
LACTATE BLDV-MCNC: 1.4 MMOL/L — SIGNIFICANT CHANGE UP (ref 0.5–2)
LACTATE BLDV-MCNC: 1.4 MMOL/L — SIGNIFICANT CHANGE UP (ref 0.5–2)
LEUKOCYTE ESTERASE UR-ACNC: ABNORMAL
LEUKOCYTE ESTERASE UR-ACNC: ABNORMAL
LYMPHOCYTES # BLD AUTO: 1.23 K/UL — SIGNIFICANT CHANGE UP (ref 1–3.3)
LYMPHOCYTES # BLD AUTO: 1.23 K/UL — SIGNIFICANT CHANGE UP (ref 1–3.3)
LYMPHOCYTES # BLD AUTO: 16.4 % — SIGNIFICANT CHANGE UP (ref 13–44)
LYMPHOCYTES # BLD AUTO: 16.4 % — SIGNIFICANT CHANGE UP (ref 13–44)
MCHC RBC-ENTMCNC: 28.1 PG — SIGNIFICANT CHANGE UP (ref 27–34)
MCHC RBC-ENTMCNC: 28.1 PG — SIGNIFICANT CHANGE UP (ref 27–34)
MCHC RBC-ENTMCNC: 33.1 GM/DL — SIGNIFICANT CHANGE UP (ref 32–36)
MCHC RBC-ENTMCNC: 33.1 GM/DL — SIGNIFICANT CHANGE UP (ref 32–36)
MCV RBC AUTO: 84.8 FL — SIGNIFICANT CHANGE UP (ref 80–100)
MCV RBC AUTO: 84.8 FL — SIGNIFICANT CHANGE UP (ref 80–100)
MONOCYTES # BLD AUTO: 0.54 K/UL — SIGNIFICANT CHANGE UP (ref 0–0.9)
MONOCYTES # BLD AUTO: 0.54 K/UL — SIGNIFICANT CHANGE UP (ref 0–0.9)
MONOCYTES NFR BLD AUTO: 7.2 % — SIGNIFICANT CHANGE UP (ref 2–14)
MONOCYTES NFR BLD AUTO: 7.2 % — SIGNIFICANT CHANGE UP (ref 2–14)
NEUTROPHILS # BLD AUTO: 5.55 K/UL — SIGNIFICANT CHANGE UP (ref 1.8–7.4)
NEUTROPHILS # BLD AUTO: 5.55 K/UL — SIGNIFICANT CHANGE UP (ref 1.8–7.4)
NEUTROPHILS NFR BLD AUTO: 73.9 % — SIGNIFICANT CHANGE UP (ref 43–77)
NEUTROPHILS NFR BLD AUTO: 73.9 % — SIGNIFICANT CHANGE UP (ref 43–77)
NITRITE UR-MCNC: NEGATIVE — SIGNIFICANT CHANGE UP
NITRITE UR-MCNC: NEGATIVE — SIGNIFICANT CHANGE UP
NRBC # BLD: 0 /100 WBCS — SIGNIFICANT CHANGE UP (ref 0–0)
NRBC # BLD: 0 /100 WBCS — SIGNIFICANT CHANGE UP (ref 0–0)
NRBC # FLD: 0 K/UL — SIGNIFICANT CHANGE UP (ref 0–0)
NRBC # FLD: 0 K/UL — SIGNIFICANT CHANGE UP (ref 0–0)
PCO2 BLDV: 42 MMHG — SIGNIFICANT CHANGE UP (ref 39–52)
PCO2 BLDV: 42 MMHG — SIGNIFICANT CHANGE UP (ref 39–52)
PH BLDV: 7.39 — SIGNIFICANT CHANGE UP (ref 7.32–7.43)
PH BLDV: 7.39 — SIGNIFICANT CHANGE UP (ref 7.32–7.43)
PH UR: 6 — SIGNIFICANT CHANGE UP (ref 5–8)
PH UR: 6 — SIGNIFICANT CHANGE UP (ref 5–8)
PLATELET # BLD AUTO: 602 K/UL — HIGH (ref 150–400)
PLATELET # BLD AUTO: 602 K/UL — HIGH (ref 150–400)
PO2 BLDV: 33 MMHG — SIGNIFICANT CHANGE UP (ref 25–45)
PO2 BLDV: 33 MMHG — SIGNIFICANT CHANGE UP (ref 25–45)
POTASSIUM BLDV-SCNC: 4.4 MMOL/L — SIGNIFICANT CHANGE UP (ref 3.5–5.1)
POTASSIUM BLDV-SCNC: 4.4 MMOL/L — SIGNIFICANT CHANGE UP (ref 3.5–5.1)
POTASSIUM SERPL-MCNC: 4.4 MMOL/L — SIGNIFICANT CHANGE UP (ref 3.5–5.3)
POTASSIUM SERPL-MCNC: 4.4 MMOL/L — SIGNIFICANT CHANGE UP (ref 3.5–5.3)
POTASSIUM SERPL-SCNC: 4.4 MMOL/L — SIGNIFICANT CHANGE UP (ref 3.5–5.3)
POTASSIUM SERPL-SCNC: 4.4 MMOL/L — SIGNIFICANT CHANGE UP (ref 3.5–5.3)
PROT SERPL-MCNC: 7.4 G/DL — SIGNIFICANT CHANGE UP (ref 6–8.3)
PROT SERPL-MCNC: 7.4 G/DL — SIGNIFICANT CHANGE UP (ref 6–8.3)
PROT UR-MCNC: SIGNIFICANT CHANGE UP MG/DL
PROT UR-MCNC: SIGNIFICANT CHANGE UP MG/DL
PROTHROM AB SERPL-ACNC: 11.9 SEC — SIGNIFICANT CHANGE UP (ref 9.5–13)
PROTHROM AB SERPL-ACNC: 11.9 SEC — SIGNIFICANT CHANGE UP (ref 9.5–13)
RBC # BLD: 4.09 M/UL — SIGNIFICANT CHANGE UP (ref 3.8–5.2)
RBC # BLD: 4.09 M/UL — SIGNIFICANT CHANGE UP (ref 3.8–5.2)
RBC # FLD: 14.4 % — SIGNIFICANT CHANGE UP (ref 10.3–14.5)
RBC # FLD: 14.4 % — SIGNIFICANT CHANGE UP (ref 10.3–14.5)
RBC CASTS # UR COMP ASSIST: 4 /HPF — SIGNIFICANT CHANGE UP (ref 0–4)
RBC CASTS # UR COMP ASSIST: 4 /HPF — SIGNIFICANT CHANGE UP (ref 0–4)
RH IG SCN BLD-IMP: POSITIVE — SIGNIFICANT CHANGE UP
RH IG SCN BLD-IMP: POSITIVE — SIGNIFICANT CHANGE UP
RSV RNA NPH QL NAA+NON-PROBE: SIGNIFICANT CHANGE UP
RSV RNA NPH QL NAA+NON-PROBE: SIGNIFICANT CHANGE UP
SAO2 % BLDV: 53.5 % — LOW (ref 67–88)
SAO2 % BLDV: 53.5 % — LOW (ref 67–88)
SARS-COV-2 RNA SPEC QL NAA+PROBE: SIGNIFICANT CHANGE UP
SARS-COV-2 RNA SPEC QL NAA+PROBE: SIGNIFICANT CHANGE UP
SODIUM SERPL-SCNC: 134 MMOL/L — LOW (ref 135–145)
SODIUM SERPL-SCNC: 134 MMOL/L — LOW (ref 135–145)
SP GR SPEC: 1.02 — SIGNIFICANT CHANGE UP (ref 1–1.03)
SP GR SPEC: 1.02 — SIGNIFICANT CHANGE UP (ref 1–1.03)
SQUAMOUS # UR AUTO: 14 /HPF — HIGH (ref 0–5)
SQUAMOUS # UR AUTO: 14 /HPF — HIGH (ref 0–5)
UROBILINOGEN FLD QL: 1 MG/DL — SIGNIFICANT CHANGE UP (ref 0.2–1)
UROBILINOGEN FLD QL: 1 MG/DL — SIGNIFICANT CHANGE UP (ref 0.2–1)
WBC # BLD: 7.51 K/UL — SIGNIFICANT CHANGE UP (ref 3.8–10.5)
WBC # BLD: 7.51 K/UL — SIGNIFICANT CHANGE UP (ref 3.8–10.5)
WBC # FLD AUTO: 7.51 K/UL — SIGNIFICANT CHANGE UP (ref 3.8–10.5)
WBC # FLD AUTO: 7.51 K/UL — SIGNIFICANT CHANGE UP (ref 3.8–10.5)
WBC UR QL: 8 /HPF — HIGH (ref 0–5)
WBC UR QL: 8 /HPF — HIGH (ref 0–5)

## 2023-10-28 PROCEDURE — 74018 RADEX ABDOMEN 1 VIEW: CPT | Mod: 26

## 2023-10-28 PROCEDURE — 74174 CTA ABD&PLVS W/CONTRAST: CPT | Mod: 26,MA

## 2023-10-28 PROCEDURE — 71045 X-RAY EXAM CHEST 1 VIEW: CPT | Mod: 26

## 2023-10-28 PROCEDURE — 35633 BPG ILIO-MESENTERIC: CPT | Mod: 78

## 2023-10-28 PROCEDURE — 99291 CRITICAL CARE FIRST HOUR: CPT

## 2023-10-28 PROCEDURE — 88304 TISSUE EXAM BY PATHOLOGIST: CPT | Mod: 26

## 2023-10-28 DEVICE — LIGATING CLIPS WECK HORIZON LARGE (ORANGE) 24: Type: IMPLANTABLE DEVICE | Status: FUNCTIONAL

## 2023-10-28 DEVICE — SURGIFLO MATRIX WITH THROMBIN KIT: Type: IMPLANTABLE DEVICE | Status: FUNCTIONAL

## 2023-10-28 DEVICE — GRAFT VASC PROPATEN 6MMX60X80CM TW REMOV RING: Type: IMPLANTABLE DEVICE | Status: FUNCTIONAL

## 2023-10-28 DEVICE — SURGICEL 2 X 14": Type: IMPLANTABLE DEVICE | Status: FUNCTIONAL

## 2023-10-28 RX ORDER — CHLORHEXIDINE GLUCONATE 213 G/1000ML
1 SOLUTION TOPICAL DAILY
Refills: 0 | Status: DISCONTINUED | OUTPATIENT
Start: 2023-10-28 | End: 2023-11-02

## 2023-10-28 RX ORDER — METOCLOPRAMIDE HCL 10 MG
10 TABLET ORAL ONCE
Refills: 0 | Status: COMPLETED | OUTPATIENT
Start: 2023-10-28 | End: 2023-10-28

## 2023-10-28 RX ORDER — ACETAMINOPHEN 500 MG
1000 TABLET ORAL EVERY 6 HOURS
Refills: 0 | Status: COMPLETED | OUTPATIENT
Start: 2023-10-28 | End: 2023-10-29

## 2023-10-28 RX ORDER — SODIUM CHLORIDE 9 MG/ML
1000 INJECTION, SOLUTION INTRAVENOUS ONCE
Refills: 0 | Status: COMPLETED | OUTPATIENT
Start: 2023-10-28 | End: 2023-10-28

## 2023-10-28 RX ORDER — PANTOPRAZOLE SODIUM 20 MG/1
40 TABLET, DELAYED RELEASE ORAL DAILY
Refills: 0 | Status: DISCONTINUED | OUTPATIENT
Start: 2023-10-28 | End: 2023-11-09

## 2023-10-28 RX ORDER — HYDROMORPHONE HYDROCHLORIDE 2 MG/ML
1 INJECTION INTRAMUSCULAR; INTRAVENOUS; SUBCUTANEOUS EVERY 4 HOURS
Refills: 0 | Status: DISCONTINUED | OUTPATIENT
Start: 2023-10-28 | End: 2023-11-03

## 2023-10-28 RX ORDER — HYDROMORPHONE HYDROCHLORIDE 2 MG/ML
0.5 INJECTION INTRAMUSCULAR; INTRAVENOUS; SUBCUTANEOUS
Refills: 0 | Status: DISCONTINUED | OUTPATIENT
Start: 2023-10-28 | End: 2023-10-28

## 2023-10-28 RX ORDER — HEPARIN SODIUM 5000 [USP'U]/ML
4000 INJECTION INTRAVENOUS; SUBCUTANEOUS EVERY 6 HOURS
Refills: 0 | Status: DISCONTINUED | OUTPATIENT
Start: 2023-10-28 | End: 2023-10-28

## 2023-10-28 RX ORDER — HYDROMORPHONE HYDROCHLORIDE 2 MG/ML
1 INJECTION INTRAMUSCULAR; INTRAVENOUS; SUBCUTANEOUS
Refills: 0 | Status: DISCONTINUED | OUTPATIENT
Start: 2023-10-28 | End: 2023-10-28

## 2023-10-28 RX ORDER — HYDROMORPHONE HYDROCHLORIDE 2 MG/ML
0.5 INJECTION INTRAMUSCULAR; INTRAVENOUS; SUBCUTANEOUS EVERY 4 HOURS
Refills: 0 | Status: DISCONTINUED | OUTPATIENT
Start: 2023-10-28 | End: 2023-11-03

## 2023-10-28 RX ORDER — ACETAMINOPHEN 500 MG
1000 TABLET ORAL ONCE
Refills: 0 | Status: COMPLETED | OUTPATIENT
Start: 2023-10-28 | End: 2023-10-28

## 2023-10-28 RX ORDER — HEPARIN SODIUM 5000 [USP'U]/ML
2000 INJECTION INTRAVENOUS; SUBCUTANEOUS EVERY 6 HOURS
Refills: 0 | Status: DISCONTINUED | OUTPATIENT
Start: 2023-10-28 | End: 2023-10-28

## 2023-10-28 RX ORDER — ONDANSETRON 8 MG/1
4 TABLET, FILM COATED ORAL ONCE
Refills: 0 | Status: COMPLETED | OUTPATIENT
Start: 2023-10-28 | End: 2023-10-28

## 2023-10-28 RX ORDER — HYDROMORPHONE HYDROCHLORIDE 2 MG/ML
0.5 INJECTION INTRAMUSCULAR; INTRAVENOUS; SUBCUTANEOUS ONCE
Refills: 0 | Status: DISCONTINUED | OUTPATIENT
Start: 2023-10-28 | End: 2023-10-28

## 2023-10-28 RX ORDER — ONDANSETRON 8 MG/1
4 TABLET, FILM COATED ORAL EVERY 4 HOURS
Refills: 0 | Status: DISCONTINUED | OUTPATIENT
Start: 2023-10-28 | End: 2023-10-29

## 2023-10-28 RX ORDER — SODIUM CHLORIDE 9 MG/ML
1000 INJECTION INTRAMUSCULAR; INTRAVENOUS; SUBCUTANEOUS
Refills: 0 | Status: DISCONTINUED | OUTPATIENT
Start: 2023-10-28 | End: 2023-10-31

## 2023-10-28 RX ORDER — HEPARIN SODIUM 5000 [USP'U]/ML
900 INJECTION INTRAVENOUS; SUBCUTANEOUS
Qty: 25000 | Refills: 0 | Status: DISCONTINUED | OUTPATIENT
Start: 2023-10-28 | End: 2023-10-28

## 2023-10-28 RX ADMIN — HYDROMORPHONE HYDROCHLORIDE 0.5 MILLIGRAM(S): 2 INJECTION INTRAMUSCULAR; INTRAVENOUS; SUBCUTANEOUS at 09:46

## 2023-10-28 RX ADMIN — SODIUM CHLORIDE 1000 MILLILITER(S): 9 INJECTION, SOLUTION INTRAVENOUS at 09:16

## 2023-10-28 RX ADMIN — Medication 10 MILLIGRAM(S): at 11:25

## 2023-10-28 RX ADMIN — HYDROMORPHONE HYDROCHLORIDE 0.5 MILLIGRAM(S): 2 INJECTION INTRAMUSCULAR; INTRAVENOUS; SUBCUTANEOUS at 09:16

## 2023-10-28 RX ADMIN — Medication 1000 MILLIGRAM(S): at 09:08

## 2023-10-28 RX ADMIN — ONDANSETRON 4 MILLIGRAM(S): 8 TABLET, FILM COATED ORAL at 08:38

## 2023-10-28 RX ADMIN — HEPARIN SODIUM 900 UNIT(S)/HR: 5000 INJECTION INTRAVENOUS; SUBCUTANEOUS at 14:43

## 2023-10-28 RX ADMIN — Medication 400 MILLIGRAM(S): at 08:38

## 2023-10-28 NOTE — H&P ADULT - HISTORY OF PRESENT ILLNESS
63F PMHx HTN, DM, PAD,with recurrent abdominal pain, for 1 year with associated NV. Reports 40lb weight loss. Vascular surgery consulted to rule out mesenteric ischemia. 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 S/p ex lap right iliac to SMA bypass on 10/20/23; postoperative course unremarkable and was discharged home on 10/27. Patient returned to ED with abdominal pain, NV, fevers, chills,no bloody BM. 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 Occlusion of proximal SMA. Occluded bypass graft from left common iliac artery to SMA. Reconstitution of distal SMA from collaterals. No lactate, elevated WBC. patient HDS an din no acute distress

## 2023-10-28 NOTE — ED PROVIDER NOTE - ATTENDING CONTRIBUTION TO CARE
71M with a h/o Dementia, DM, HTN, HLD, glaucoma, Prostate Ca, DVT on Eliquis, presents after mechanical fall with head injury, R arm and hip pain. Pt was in kitchen when slipped and fell, hitting head and right side of body. No LOC. A&Ox3, +L eyelid droop, CN otherwise intact, no focal neuro deficits. + R shoulder/humerus ttp, +R hip ttp, +R forehead ecchymosis. R/o ICH as head injury on eliquis, r/o C-spine injury (c-spine collar in place), r/o R arm/hip fracture, glucose elevated per ems, r/o DKA although no ssxs. Will get labs, ECG, XRay, CT, give IVF, pain meds, and reassess.

## 2023-10-28 NOTE — H&P ADULT - ATTENDING COMMENTS
Patient s/p ilimesenteric bypass one week ago p/w nausea, vomiting, epigastric abdominal pain. CTA shows occluded bypass with patent distal SMA flow and patent right iliac artery. D/w family and patient. Proceed to OR for bypass thrombectomy. Anticoagulation. Antibiotics.

## 2023-10-28 NOTE — ED ADULT NURSE NOTE - OBJECTIVE STATEMENT
Patient presents to ED for abdominal pain, N/V, fevers, chills. Daughter at bedside. She is A&Ox4, c/o of discomfort, but calm, cooperative. As per daugther discharged yesterday from hospital s/p right iliac SMH bypass on 10/20. Incision sit left upper arm with staples and incision abdominal area with staples. No redness, no swelling, no discharge from the incision sites. She states she took tylenol at 3 AM for pain, but pain persists. Denies CP, dizziness, SOB, dyspnea. Respirations even, unlabored on room air. History of HTN, DM2. On cardiac monitor showing NSR in 60s BPM. 20G IV placed right AC, labs drawn and sent. Medicated as ordered. Pending CT scan.

## 2023-10-28 NOTE — H&P ADULT - ASSESSMENT
63F PMHx HTN, DM, PAD,with recurrent abdominal pain, for 1 year with associated NV.  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 S/p ex lap right iliac to SMA bypass on 10/20/23; postoperative course unremarkable and was discharged home on 10/27. Patient returned to ED with abdominal pain, NV, fevers, chills,no bloody BM. 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 Occlusion of proximal SMA. Occluded bypass graft from left common iliac artery to SMA. Reconstitution of distal SMA from collaterals.     Plan:  - Admit to vascular surgery under Dr. Morales   - stat CBC CMP coags, T&S  - book for emergent ex lap, bypass revision, possible mesenteric angio, possible thrombectomy,possible temporary abdominal closure; OR  aware  - patient consented; 4u pRBC on hold for OR  - NPO/IVF    Plan discussed with and approved by vascular fellow on behalf of attending, Dr. Carmen Lutz MD PGY-3  C Team Surgery   [id number]

## 2023-10-28 NOTE — ED ADULT NURSE NOTE - NSFALLUNIVINTERV_ED_ALL_ED
Bed/Stretcher in lowest position, wheels locked, appropriate side rails in place/Call bell, personal items and telephone in reach/Instruct patient to call for assistance before getting out of bed/chair/stretcher/Non-slip footwear applied when patient is off stretcher/[place] to call system/Physically safe environment - no spills, clutter or unnecessary equipment/Purposeful proactive rounding/Room/bathroom lighting operational, light cord in reach

## 2023-10-28 NOTE — ED PROVIDER NOTE - PROGRESS NOTE DETAILS
Jesus Mohamud PGY3: patient requiring additional pain medicine and anti-nausea medicine. AVSS. CTr pending, Vascular to see patient.

## 2023-10-28 NOTE — ED PROVIDER NOTE - CLINICAL SUMMARY MEDICAL DECISION MAKING FREE TEXT BOX
63-year-old female, history of HTN, DM, HLD, PAD, POD 8 s/p right iliac to SMA bypass (Dr. Sterling, Dr. Morales,) discharged from hospital yesterday presenting with abdominal pain, nausea/vomiting, fever/chills.  Patient discharged yesterday without issue last night around 2 AM woke up with worsening abdominal pain nausea and vomiting, feeling of chills and overall feeling unwell.  Denies any purulent discharge from incision site.  Abdominal pain is generalized.  Incision site, however feels sharp.  Denies CP, SOB, back pain, dysuria, hematuria, diarrhea.

## 2023-10-28 NOTE — ED ADULT NURSE NOTE - CAS EDN DISCHARGE ASSESSMENT
Telephone Encounter by Stewart Quinteros at 04/21/17 01:38 PM     Author:  Stewart Quinteros Service:  (none) Author Type:  Patient      Filed:  04/21/17 01:39 PM Encounter Date:  4/18/2017 Status:  Signed     :  Stewart Quinteros (Patient )            Pharmacy calling and states the[AN1.1M] mometasone (NASONEX) 50 MCG/ACT nasal spray[AN1.1C] is not covered by insurance, please advise.[AN1.1M]       Revision History        User Key Date/Time User Provider Type Action    > AN1.1 04/21/17 01:39 PM Stewart Quinteros Patient  Sign    C - Copied, M - Manual             Alert and oriented to person, place and time

## 2023-10-28 NOTE — H&P ADULT - NSHPLABSRESULTS_GEN_ALL_CORE
.  LABS:                         11.5   7.51  )-----------( 602      ( 28 Oct 2023 08:30 )             34.7     10    134<L>  |  95<L>  |  9   ----------------------------<  111<H>  4.4   |  23  |  0.69    Ca    9.2      28 Oct 2023 08:30  Phos  3.7     10  Mg     1.80     10-27    TPro  7.4  /  Alb  3.9  /  TBili  0.6  /  DBili  x   /  AST  37<H>  /  ALT  41<H>  /  AlkPhos  113  1028    PT/INR - ( 28 Oct 2023 08:30 )   PT: 11.9 sec;   INR: 1.06 ratio         PTT - ( 28 Oct 2023 08:30 )  PTT:28.1 sec  Urinalysis Basic - ( 28 Oct 2023 11:37 )    Color: Yellow / Appearance: Cloudy / S.017 / pH: x  Gluc: x / Ketone: 40 mg/dL  / Bili: Negative / Urobili: 1.0 mg/dL   Blood: x / Protein: Trace mg/dL / Nitrite: Negative   Leuk Esterase: Small / RBC: 4 /HPF / WBC 8 /HPF   Sq Epi: x / Non Sq Epi: 14 /HPF / Bacteria: Occasional /HPF            RADIOLOGY, EKG & ADDITIONAL TESTS: Reviewed.

## 2023-10-28 NOTE — ED ADULT TRIAGE NOTE - CHIEF COMPLAINT QUOTE
Pt c/o subjective fever, chills, nausea, vomiting, diffuse abd pain radiating to back since this morning. S/p right iliac to [facility] bypass 10/20. PMHx DM2, HTN, h/ pylori

## 2023-10-28 NOTE — H&P ADULT - NSHPPHYSICALEXAM_GEN_ALL_CORE
PHYSICAL EXAM  GENERAL: NAD, lying in bed comfortably  CHEST: nonlabored, no increased WOB  ABDOMEN: Abd soft, nondistended, + loreto-incisional tenderness, no rebound tenderness, no guarding, no rigidity, +laparotomy incision with staples in place c/d/i  EXTREMITIES: Well perfused. No clubbing, cyanosis, or edema, palpable DP/PT/AT pulses b/l  NERVOUS SYSTEM:  Alert & Oriented X3

## 2023-10-28 NOTE — ED PROVIDER NOTE - PHYSICAL EXAMINATION
GENERAL: well appearing in no acute distress, non-toxic appearing  CARDIAC: regular rate and rhythm, normal S1S2, no appreciable murmurs  PULM: normal breath sounds, clear to ascultation bilaterally, no rales, rhonchi, wheezing  GI: Abd soft, nondistended, + loreto-incisional tenderness, no rebound tenderness, no guarding, no rigidity, +laparotomy incision with staples in place c/d/i  :  no suprapubic tenderness  NEURO: no focal motor or sensory deficits, CN2-12 intact, normal speech, AAOx3  MSK: no peripheral edema, +LUE incision with staples c/d/i  SKIN: well-perfused, extremities warm, no visible rashes

## 2023-10-29 ENCOUNTER — TRANSCRIPTION ENCOUNTER (OUTPATIENT)
Age: 63
End: 2023-10-29

## 2023-10-29 LAB
ANION GAP SERPL CALC-SCNC: 15 MMOL/L — HIGH (ref 7–14)
ANION GAP SERPL CALC-SCNC: 15 MMOL/L — HIGH (ref 7–14)
ANION GAP SERPL CALC-SCNC: 19 MMOL/L — HIGH (ref 7–14)
ANION GAP SERPL CALC-SCNC: 19 MMOL/L — HIGH (ref 7–14)
APTT BLD: 142 SEC — CRITICAL HIGH (ref 24.5–35.6)
APTT BLD: 142 SEC — CRITICAL HIGH (ref 24.5–35.6)
APTT BLD: 46 SEC — HIGH (ref 24.5–35.6)
APTT BLD: 46 SEC — HIGH (ref 24.5–35.6)
APTT BLD: 64.8 SEC — HIGH (ref 24.5–35.6)
APTT BLD: 64.8 SEC — HIGH (ref 24.5–35.6)
APTT BLD: >200 SEC — CRITICAL HIGH (ref 24.5–35.6)
APTT BLD: >200 SEC — CRITICAL HIGH (ref 24.5–35.6)
BLD GP AB SCN SERPL QL: NEGATIVE — SIGNIFICANT CHANGE UP
BLOOD GAS ARTERIAL - LYTES,HGB,ICA,LACT RESULT: SIGNIFICANT CHANGE UP
BUN SERPL-MCNC: 19 MG/DL — SIGNIFICANT CHANGE UP (ref 7–23)
BUN SERPL-MCNC: 19 MG/DL — SIGNIFICANT CHANGE UP (ref 7–23)
BUN SERPL-MCNC: 8 MG/DL — SIGNIFICANT CHANGE UP (ref 7–23)
BUN SERPL-MCNC: 8 MG/DL — SIGNIFICANT CHANGE UP (ref 7–23)
CALCIUM SERPL-MCNC: 7.4 MG/DL — LOW (ref 8.4–10.5)
CALCIUM SERPL-MCNC: 7.4 MG/DL — LOW (ref 8.4–10.5)
CALCIUM SERPL-MCNC: 9.3 MG/DL — SIGNIFICANT CHANGE UP (ref 8.4–10.5)
CALCIUM SERPL-MCNC: 9.3 MG/DL — SIGNIFICANT CHANGE UP (ref 8.4–10.5)
CHLORIDE SERPL-SCNC: 104 MMOL/L — SIGNIFICANT CHANGE UP (ref 98–107)
CHLORIDE SERPL-SCNC: 104 MMOL/L — SIGNIFICANT CHANGE UP (ref 98–107)
CHLORIDE SERPL-SCNC: 95 MMOL/L — LOW (ref 98–107)
CHLORIDE SERPL-SCNC: 95 MMOL/L — LOW (ref 98–107)
CO2 SERPL-SCNC: 17 MMOL/L — LOW (ref 22–31)
CO2 SERPL-SCNC: 17 MMOL/L — LOW (ref 22–31)
CO2 SERPL-SCNC: 21 MMOL/L — LOW (ref 22–31)
CO2 SERPL-SCNC: 21 MMOL/L — LOW (ref 22–31)
CREAT SERPL-MCNC: 0.59 MG/DL — SIGNIFICANT CHANGE UP (ref 0.5–1.3)
CREAT SERPL-MCNC: 0.59 MG/DL — SIGNIFICANT CHANGE UP (ref 0.5–1.3)
CREAT SERPL-MCNC: 1.43 MG/DL — HIGH (ref 0.5–1.3)
CREAT SERPL-MCNC: 1.43 MG/DL — HIGH (ref 0.5–1.3)
EGFR: 100 ML/MIN/1.73M2 — SIGNIFICANT CHANGE UP
EGFR: 100 ML/MIN/1.73M2 — SIGNIFICANT CHANGE UP
EGFR: 41 ML/MIN/1.73M2 — LOW
EGFR: 41 ML/MIN/1.73M2 — LOW
GLUCOSE BLDC GLUCOMTR-MCNC: 113 MG/DL — HIGH (ref 70–99)
GLUCOSE BLDC GLUCOMTR-MCNC: 113 MG/DL — HIGH (ref 70–99)
GLUCOSE BLDC GLUCOMTR-MCNC: 123 MG/DL — HIGH (ref 70–99)
GLUCOSE BLDC GLUCOMTR-MCNC: 123 MG/DL — HIGH (ref 70–99)
GLUCOSE BLDC GLUCOMTR-MCNC: 146 MG/DL — HIGH (ref 70–99)
GLUCOSE BLDC GLUCOMTR-MCNC: 146 MG/DL — HIGH (ref 70–99)
GLUCOSE SERPL-MCNC: 157 MG/DL — HIGH (ref 70–99)
GLUCOSE SERPL-MCNC: 157 MG/DL — HIGH (ref 70–99)
GLUCOSE SERPL-MCNC: 199 MG/DL — HIGH (ref 70–99)
GLUCOSE SERPL-MCNC: 199 MG/DL — HIGH (ref 70–99)
HCT VFR BLD CALC: 22.1 % — LOW (ref 34.5–45)
HCT VFR BLD CALC: 22.1 % — LOW (ref 34.5–45)
HCT VFR BLD CALC: 24 % — LOW (ref 34.5–45)
HCT VFR BLD CALC: 24 % — LOW (ref 34.5–45)
HCT VFR BLD CALC: 34.9 % — SIGNIFICANT CHANGE UP (ref 34.5–45)
HCT VFR BLD CALC: 34.9 % — SIGNIFICANT CHANGE UP (ref 34.5–45)
HGB BLD-MCNC: 11.9 G/DL — SIGNIFICANT CHANGE UP (ref 11.5–15.5)
HGB BLD-MCNC: 11.9 G/DL — SIGNIFICANT CHANGE UP (ref 11.5–15.5)
HGB BLD-MCNC: 7.9 G/DL — LOW (ref 11.5–15.5)
HGB BLD-MCNC: 7.9 G/DL — LOW (ref 11.5–15.5)
HGB BLD-MCNC: 8.2 G/DL — LOW (ref 11.5–15.5)
HGB BLD-MCNC: 8.2 G/DL — LOW (ref 11.5–15.5)
INR BLD: 1.28 RATIO — HIGH (ref 0.85–1.18)
INR BLD: 1.28 RATIO — HIGH (ref 0.85–1.18)
INR BLD: 1.31 RATIO — HIGH (ref 0.85–1.18)
INR BLD: 1.31 RATIO — HIGH (ref 0.85–1.18)
LACTATE SERPL-SCNC: 1 MMOL/L — SIGNIFICANT CHANGE UP (ref 0.5–2)
LACTATE SERPL-SCNC: 1 MMOL/L — SIGNIFICANT CHANGE UP (ref 0.5–2)
MAGNESIUM SERPL-MCNC: 1.3 MG/DL — LOW (ref 1.6–2.6)
MAGNESIUM SERPL-MCNC: 1.3 MG/DL — LOW (ref 1.6–2.6)
MAGNESIUM SERPL-MCNC: 1.9 MG/DL — SIGNIFICANT CHANGE UP (ref 1.6–2.6)
MAGNESIUM SERPL-MCNC: 1.9 MG/DL — SIGNIFICANT CHANGE UP (ref 1.6–2.6)
MCHC RBC-ENTMCNC: 28.9 PG — SIGNIFICANT CHANGE UP (ref 27–34)
MCHC RBC-ENTMCNC: 28.9 PG — SIGNIFICANT CHANGE UP (ref 27–34)
MCHC RBC-ENTMCNC: 29 PG — SIGNIFICANT CHANGE UP (ref 27–34)
MCHC RBC-ENTMCNC: 29 PG — SIGNIFICANT CHANGE UP (ref 27–34)
MCHC RBC-ENTMCNC: 29.8 PG — SIGNIFICANT CHANGE UP (ref 27–34)
MCHC RBC-ENTMCNC: 29.8 PG — SIGNIFICANT CHANGE UP (ref 27–34)
MCHC RBC-ENTMCNC: 34.1 GM/DL — SIGNIFICANT CHANGE UP (ref 32–36)
MCHC RBC-ENTMCNC: 34.1 GM/DL — SIGNIFICANT CHANGE UP (ref 32–36)
MCHC RBC-ENTMCNC: 34.2 GM/DL — SIGNIFICANT CHANGE UP (ref 32–36)
MCHC RBC-ENTMCNC: 34.2 GM/DL — SIGNIFICANT CHANGE UP (ref 32–36)
MCHC RBC-ENTMCNC: 35.7 GM/DL — SIGNIFICANT CHANGE UP (ref 32–36)
MCHC RBC-ENTMCNC: 35.7 GM/DL — SIGNIFICANT CHANGE UP (ref 32–36)
MCV RBC AUTO: 83.4 FL — SIGNIFICANT CHANGE UP (ref 80–100)
MCV RBC AUTO: 83.4 FL — SIGNIFICANT CHANGE UP (ref 80–100)
MCV RBC AUTO: 84.5 FL — SIGNIFICANT CHANGE UP (ref 80–100)
MCV RBC AUTO: 84.5 FL — SIGNIFICANT CHANGE UP (ref 80–100)
MCV RBC AUTO: 85.1 FL — SIGNIFICANT CHANGE UP (ref 80–100)
MCV RBC AUTO: 85.1 FL — SIGNIFICANT CHANGE UP (ref 80–100)
NRBC # BLD: 0 /100 WBCS — SIGNIFICANT CHANGE UP (ref 0–0)
NRBC # FLD: 0 K/UL — SIGNIFICANT CHANGE UP (ref 0–0)
PHOSPHATE SERPL-MCNC: 4.6 MG/DL — HIGH (ref 2.5–4.5)
PHOSPHATE SERPL-MCNC: 4.6 MG/DL — HIGH (ref 2.5–4.5)
PHOSPHATE SERPL-MCNC: 6 MG/DL — HIGH (ref 2.5–4.5)
PHOSPHATE SERPL-MCNC: 6 MG/DL — HIGH (ref 2.5–4.5)
PLATELET # BLD AUTO: 244 K/UL — SIGNIFICANT CHANGE UP (ref 150–400)
PLATELET # BLD AUTO: 244 K/UL — SIGNIFICANT CHANGE UP (ref 150–400)
PLATELET # BLD AUTO: 271 K/UL — SIGNIFICANT CHANGE UP (ref 150–400)
PLATELET # BLD AUTO: 271 K/UL — SIGNIFICANT CHANGE UP (ref 150–400)
PLATELET # BLD AUTO: 311 K/UL — SIGNIFICANT CHANGE UP (ref 150–400)
PLATELET # BLD AUTO: 311 K/UL — SIGNIFICANT CHANGE UP (ref 150–400)
POTASSIUM SERPL-MCNC: 3.6 MMOL/L — SIGNIFICANT CHANGE UP (ref 3.5–5.3)
POTASSIUM SERPL-MCNC: 3.6 MMOL/L — SIGNIFICANT CHANGE UP (ref 3.5–5.3)
POTASSIUM SERPL-MCNC: 4.5 MMOL/L — SIGNIFICANT CHANGE UP (ref 3.5–5.3)
POTASSIUM SERPL-MCNC: 4.5 MMOL/L — SIGNIFICANT CHANGE UP (ref 3.5–5.3)
POTASSIUM SERPL-SCNC: 3.6 MMOL/L — SIGNIFICANT CHANGE UP (ref 3.5–5.3)
POTASSIUM SERPL-SCNC: 3.6 MMOL/L — SIGNIFICANT CHANGE UP (ref 3.5–5.3)
POTASSIUM SERPL-SCNC: 4.5 MMOL/L — SIGNIFICANT CHANGE UP (ref 3.5–5.3)
POTASSIUM SERPL-SCNC: 4.5 MMOL/L — SIGNIFICANT CHANGE UP (ref 3.5–5.3)
PROTHROM AB SERPL-ACNC: 14.4 SEC — HIGH (ref 9.5–13)
PROTHROM AB SERPL-ACNC: 14.4 SEC — HIGH (ref 9.5–13)
PROTHROM AB SERPL-ACNC: 14.6 SEC — HIGH (ref 9.5–13)
PROTHROM AB SERPL-ACNC: 14.6 SEC — HIGH (ref 9.5–13)
RBC # BLD: 2.65 M/UL — LOW (ref 3.8–5.2)
RBC # BLD: 2.65 M/UL — LOW (ref 3.8–5.2)
RBC # BLD: 2.84 M/UL — LOW (ref 3.8–5.2)
RBC # BLD: 2.84 M/UL — LOW (ref 3.8–5.2)
RBC # BLD: 4.1 M/UL — SIGNIFICANT CHANGE UP (ref 3.8–5.2)
RBC # BLD: 4.1 M/UL — SIGNIFICANT CHANGE UP (ref 3.8–5.2)
RBC # FLD: 14 % — SIGNIFICANT CHANGE UP (ref 10.3–14.5)
RBC # FLD: 14 % — SIGNIFICANT CHANGE UP (ref 10.3–14.5)
RBC # FLD: 15 % — HIGH (ref 10.3–14.5)
RBC # FLD: 15 % — HIGH (ref 10.3–14.5)
RBC # FLD: 15.1 % — HIGH (ref 10.3–14.5)
RBC # FLD: 15.1 % — HIGH (ref 10.3–14.5)
RH IG SCN BLD-IMP: POSITIVE — SIGNIFICANT CHANGE UP
SODIUM SERPL-SCNC: 135 MMOL/L — SIGNIFICANT CHANGE UP (ref 135–145)
SODIUM SERPL-SCNC: 135 MMOL/L — SIGNIFICANT CHANGE UP (ref 135–145)
SODIUM SERPL-SCNC: 136 MMOL/L — SIGNIFICANT CHANGE UP (ref 135–145)
SODIUM SERPL-SCNC: 136 MMOL/L — SIGNIFICANT CHANGE UP (ref 135–145)
WBC # BLD: 14.47 K/UL — HIGH (ref 3.8–10.5)
WBC # BLD: 14.47 K/UL — HIGH (ref 3.8–10.5)
WBC # BLD: 14.6 K/UL — HIGH (ref 3.8–10.5)
WBC # BLD: 14.6 K/UL — HIGH (ref 3.8–10.5)
WBC # BLD: 16.77 K/UL — HIGH (ref 3.8–10.5)
WBC # BLD: 16.77 K/UL — HIGH (ref 3.8–10.5)
WBC # FLD AUTO: 14.47 K/UL — HIGH (ref 3.8–10.5)
WBC # FLD AUTO: 14.47 K/UL — HIGH (ref 3.8–10.5)
WBC # FLD AUTO: 14.6 K/UL — HIGH (ref 3.8–10.5)
WBC # FLD AUTO: 14.6 K/UL — HIGH (ref 3.8–10.5)
WBC # FLD AUTO: 16.77 K/UL — HIGH (ref 3.8–10.5)
WBC # FLD AUTO: 16.77 K/UL — HIGH (ref 3.8–10.5)

## 2023-10-29 PROCEDURE — 71045 X-RAY EXAM CHEST 1 VIEW: CPT | Mod: 26

## 2023-10-29 PROCEDURE — 99291 CRITICAL CARE FIRST HOUR: CPT

## 2023-10-29 RX ORDER — CEFAZOLIN SODIUM 1 G
2000 VIAL (EA) INJECTION EVERY 8 HOURS
Refills: 0 | Status: DISCONTINUED | OUTPATIENT
Start: 2023-10-29 | End: 2023-10-30

## 2023-10-29 RX ORDER — LIDOCAINE 4 G/100G
1 CREAM TOPICAL DAILY
Refills: 0 | Status: DISCONTINUED | OUTPATIENT
Start: 2023-10-29 | End: 2023-11-09

## 2023-10-29 RX ORDER — INSULIN LISPRO 100/ML
VIAL (ML) SUBCUTANEOUS EVERY 6 HOURS
Refills: 0 | Status: DISCONTINUED | OUTPATIENT
Start: 2023-10-29 | End: 2023-11-03

## 2023-10-29 RX ORDER — SODIUM CHLORIDE 9 MG/ML
1000 INJECTION, SOLUTION INTRAVENOUS ONCE
Refills: 0 | Status: COMPLETED | OUTPATIENT
Start: 2023-10-29 | End: 2023-10-29

## 2023-10-29 RX ORDER — GLUCAGON INJECTION, SOLUTION 0.5 MG/.1ML
1 INJECTION, SOLUTION SUBCUTANEOUS ONCE
Refills: 0 | Status: DISCONTINUED | OUTPATIENT
Start: 2023-10-29 | End: 2023-10-29

## 2023-10-29 RX ORDER — DEXTROSE 50 % IN WATER 50 %
12.5 SYRINGE (ML) INTRAVENOUS ONCE
Refills: 0 | Status: DISCONTINUED | OUTPATIENT
Start: 2023-10-29 | End: 2023-10-30

## 2023-10-29 RX ORDER — HEPARIN SODIUM 5000 [USP'U]/ML
1000 INJECTION INTRAVENOUS; SUBCUTANEOUS
Qty: 25000 | Refills: 0 | Status: DISCONTINUED | OUTPATIENT
Start: 2023-10-28 | End: 2023-10-29

## 2023-10-29 RX ORDER — CALCIUM GLUCONATE 100 MG/ML
2 VIAL (ML) INTRAVENOUS ONCE
Refills: 0 | Status: COMPLETED | OUTPATIENT
Start: 2023-10-29 | End: 2023-10-29

## 2023-10-29 RX ORDER — SODIUM CHLORIDE 9 MG/ML
500 INJECTION INTRAMUSCULAR; INTRAVENOUS; SUBCUTANEOUS ONCE
Refills: 0 | Status: COMPLETED | OUTPATIENT
Start: 2023-10-29 | End: 2023-10-29

## 2023-10-29 RX ORDER — DEXTROSE 50 % IN WATER 50 %
25 SYRINGE (ML) INTRAVENOUS ONCE
Refills: 0 | Status: DISCONTINUED | OUTPATIENT
Start: 2023-10-29 | End: 2023-10-29

## 2023-10-29 RX ORDER — POTASSIUM CHLORIDE 20 MEQ
10 PACKET (EA) ORAL
Refills: 0 | Status: COMPLETED | OUTPATIENT
Start: 2023-10-29 | End: 2023-10-29

## 2023-10-29 RX ORDER — SODIUM CHLORIDE 9 MG/ML
1000 INJECTION, SOLUTION INTRAVENOUS
Refills: 0 | Status: DISCONTINUED | OUTPATIENT
Start: 2023-10-29 | End: 2023-10-29

## 2023-10-29 RX ORDER — HEPARIN SODIUM 5000 [USP'U]/ML
700 INJECTION INTRAVENOUS; SUBCUTANEOUS
Qty: 25000 | Refills: 0 | Status: DISCONTINUED | OUTPATIENT
Start: 2023-10-29 | End: 2023-10-29

## 2023-10-29 RX ORDER — MAGNESIUM SULFATE 500 MG/ML
2 VIAL (ML) INJECTION ONCE
Refills: 0 | Status: COMPLETED | OUTPATIENT
Start: 2023-10-29 | End: 2023-10-29

## 2023-10-29 RX ORDER — DEXTROSE 50 % IN WATER 50 %
25 SYRINGE (ML) INTRAVENOUS ONCE
Refills: 0 | Status: DISCONTINUED | OUTPATIENT
Start: 2023-10-29 | End: 2023-10-30

## 2023-10-29 RX ORDER — HYDRALAZINE HCL 50 MG
10 TABLET ORAL ONCE
Refills: 0 | Status: COMPLETED | OUTPATIENT
Start: 2023-10-29 | End: 2023-10-29

## 2023-10-29 RX ORDER — DEXTROSE 50 % IN WATER 50 %
15 SYRINGE (ML) INTRAVENOUS ONCE
Refills: 0 | Status: DISCONTINUED | OUTPATIENT
Start: 2023-10-29 | End: 2023-10-29

## 2023-10-29 RX ORDER — ATORVASTATIN CALCIUM 80 MG/1
80 TABLET, FILM COATED ORAL AT BEDTIME
Refills: 0 | Status: DISCONTINUED | OUTPATIENT
Start: 2023-10-29 | End: 2023-11-09

## 2023-10-29 RX ORDER — ACETAMINOPHEN 500 MG
1000 TABLET ORAL EVERY 6 HOURS
Refills: 0 | Status: COMPLETED | OUTPATIENT
Start: 2023-10-29 | End: 2023-10-30

## 2023-10-29 RX ORDER — HYDROMORPHONE HYDROCHLORIDE 2 MG/ML
0.5 INJECTION INTRAMUSCULAR; INTRAVENOUS; SUBCUTANEOUS EVERY 4 HOURS
Refills: 0 | Status: DISCONTINUED | OUTPATIENT
Start: 2023-10-29 | End: 2023-10-29

## 2023-10-29 RX ORDER — HEPARIN SODIUM 5000 [USP'U]/ML
700 INJECTION INTRAVENOUS; SUBCUTANEOUS
Qty: 25000 | Refills: 0 | Status: DISCONTINUED | OUTPATIENT
Start: 2023-10-29 | End: 2023-10-30

## 2023-10-29 RX ORDER — ALBUMIN HUMAN 25 %
250 VIAL (ML) INTRAVENOUS ONCE
Refills: 0 | Status: COMPLETED | OUTPATIENT
Start: 2023-10-29 | End: 2023-10-29

## 2023-10-29 RX ADMIN — HEPARIN SODIUM 7 UNIT(S)/HR: 5000 INJECTION INTRAVENOUS; SUBCUTANEOUS at 20:51

## 2023-10-29 RX ADMIN — HYDROMORPHONE HYDROCHLORIDE 1 MILLIGRAM(S): 2 INJECTION INTRAMUSCULAR; INTRAVENOUS; SUBCUTANEOUS at 06:18

## 2023-10-29 RX ADMIN — HYDROMORPHONE HYDROCHLORIDE 1 MILLIGRAM(S): 2 INJECTION INTRAMUSCULAR; INTRAVENOUS; SUBCUTANEOUS at 01:05

## 2023-10-29 RX ADMIN — SODIUM CHLORIDE 100 MILLILITER(S): 9 INJECTION INTRAMUSCULAR; INTRAVENOUS; SUBCUTANEOUS at 20:50

## 2023-10-29 RX ADMIN — Medication 100 MILLIGRAM(S): at 23:06

## 2023-10-29 RX ADMIN — HYDROMORPHONE HYDROCHLORIDE 0.5 MILLIGRAM(S): 2 INJECTION INTRAMUSCULAR; INTRAVENOUS; SUBCUTANEOUS at 13:33

## 2023-10-29 RX ADMIN — HYDROMORPHONE HYDROCHLORIDE 1 MILLIGRAM(S): 2 INJECTION INTRAMUSCULAR; INTRAVENOUS; SUBCUTANEOUS at 08:50

## 2023-10-29 RX ADMIN — Medication 200 GRAM(S): at 23:39

## 2023-10-29 RX ADMIN — Medication 100 MILLIEQUIVALENT(S): at 01:52

## 2023-10-29 RX ADMIN — CHLORHEXIDINE GLUCONATE 1 APPLICATION(S): 213 SOLUTION TOPICAL at 12:39

## 2023-10-29 RX ADMIN — Medication 1000 MILLIGRAM(S): at 06:18

## 2023-10-29 RX ADMIN — HYDROMORPHONE HYDROCHLORIDE 1 MILLIGRAM(S): 2 INJECTION INTRAMUSCULAR; INTRAVENOUS; SUBCUTANEOUS at 23:30

## 2023-10-29 RX ADMIN — Medication 100 MILLIGRAM(S): at 05:18

## 2023-10-29 RX ADMIN — Medication 400 MILLIGRAM(S): at 17:20

## 2023-10-29 RX ADMIN — SODIUM CHLORIDE 1000 MILLILITER(S): 9 INJECTION INTRAMUSCULAR; INTRAVENOUS; SUBCUTANEOUS at 11:59

## 2023-10-29 RX ADMIN — HEPARIN SODIUM 6 UNIT(S)/HR: 5000 INJECTION INTRAVENOUS; SUBCUTANEOUS at 11:59

## 2023-10-29 RX ADMIN — HYDROMORPHONE HYDROCHLORIDE 0.5 MILLIGRAM(S): 2 INJECTION INTRAMUSCULAR; INTRAVENOUS; SUBCUTANEOUS at 13:52

## 2023-10-29 RX ADMIN — HYDROMORPHONE HYDROCHLORIDE 1 MILLIGRAM(S): 2 INJECTION INTRAMUSCULAR; INTRAVENOUS; SUBCUTANEOUS at 18:01

## 2023-10-29 RX ADMIN — LIDOCAINE 1 PATCH: 4 CREAM TOPICAL at 11:47

## 2023-10-29 RX ADMIN — Medication 10 MILLIGRAM(S): at 01:05

## 2023-10-29 RX ADMIN — Medication 400 MILLIGRAM(S): at 05:18

## 2023-10-29 RX ADMIN — HEPARIN SODIUM 8 UNIT(S)/HR: 5000 INJECTION INTRAVENOUS; SUBCUTANEOUS at 05:19

## 2023-10-29 RX ADMIN — Medication 100 MILLIGRAM(S): at 15:41

## 2023-10-29 RX ADMIN — Medication 1000 MILLIGRAM(S): at 12:39

## 2023-10-29 RX ADMIN — LIDOCAINE 1 PATCH: 4 CREAM TOPICAL at 18:44

## 2023-10-29 RX ADMIN — SODIUM CHLORIDE 1000 MILLILITER(S): 9 INJECTION INTRAMUSCULAR; INTRAVENOUS; SUBCUTANEOUS at 15:00

## 2023-10-29 RX ADMIN — Medication 100 MILLIEQUIVALENT(S): at 04:19

## 2023-10-29 RX ADMIN — Medication 125 MILLILITER(S): at 20:50

## 2023-10-29 RX ADMIN — Medication 400 MILLIGRAM(S): at 11:47

## 2023-10-29 RX ADMIN — HYDROMORPHONE HYDROCHLORIDE 1 MILLIGRAM(S): 2 INJECTION INTRAMUSCULAR; INTRAVENOUS; SUBCUTANEOUS at 09:17

## 2023-10-29 RX ADMIN — SODIUM CHLORIDE 2000 MILLILITER(S): 9 INJECTION, SOLUTION INTRAVENOUS at 18:15

## 2023-10-29 RX ADMIN — HYDROMORPHONE HYDROCHLORIDE 1 MILLIGRAM(S): 2 INJECTION INTRAMUSCULAR; INTRAVENOUS; SUBCUTANEOUS at 02:05

## 2023-10-29 RX ADMIN — SODIUM CHLORIDE 100 MILLILITER(S): 9 INJECTION INTRAMUSCULAR; INTRAVENOUS; SUBCUTANEOUS at 11:59

## 2023-10-29 RX ADMIN — Medication 1000 MILLIGRAM(S): at 17:45

## 2023-10-29 RX ADMIN — HYDROMORPHONE HYDROCHLORIDE 1 MILLIGRAM(S): 2 INJECTION INTRAMUSCULAR; INTRAVENOUS; SUBCUTANEOUS at 23:11

## 2023-10-29 RX ADMIN — HYDROMORPHONE HYDROCHLORIDE 1 MILLIGRAM(S): 2 INJECTION INTRAMUSCULAR; INTRAVENOUS; SUBCUTANEOUS at 18:17

## 2023-10-29 RX ADMIN — Medication 25 GRAM(S): at 01:53

## 2023-10-29 RX ADMIN — HYDROMORPHONE HYDROCHLORIDE 1 MILLIGRAM(S): 2 INJECTION INTRAMUSCULAR; INTRAVENOUS; SUBCUTANEOUS at 05:18

## 2023-10-29 RX ADMIN — Medication 100 MILLIEQUIVALENT(S): at 03:09

## 2023-10-29 RX ADMIN — PANTOPRAZOLE SODIUM 40 MILLIGRAM(S): 20 TABLET, DELAYED RELEASE ORAL at 11:47

## 2023-10-29 NOTE — PROGRESS NOTE ADULT - SUBJECTIVE AND OBJECTIVE BOX
POD 0 s/p redo iliomesenteric bypass for CMI    Doing well in SICU, extubated, no vasopressors, reports minimal incisional pain, no nausea  NGT in place with bilious output, cannon in place with clear yellow urine  PTT was 200 and 142 overnight, hep gtt at 6 u /hr currently  No bowel function as of yet    ICU Vital Signs Last 24 Hrs  T(C): 36.2 (29 Oct 2023 04:00), Max: 37.1 (28 Oct 2023 15:38)  T(F): 97.2 (29 Oct 2023 04:00), Max: 98.7 (28 Oct 2023 15:38)  HR: 66 (29 Oct 2023 08:00) (65 - 78)  BP: 132/50 (29 Oct 2023 00:00) (132/50 - 175/71)  BP(mean): 74 (29 Oct 2023 00:00) (74 - 74)  ABP: 119/55 (29 Oct 2023 08:00) (110/49 - 170/72)  ABP(mean): 76 (29 Oct 2023 08:00) (69 - 110)  RR: 16 (29 Oct 2023 08:00) (12 - 20)  SpO2: 99% (29 Oct 2023 08:00) (95% - 100%)    O2 Parameters below as of 29 Oct 2023 08:00  Patient On (Oxygen Delivery Method): room air      Gen: NAD  CV: reg rhythm  Resp: non-labored  Abd: softly distended, not firm, no rebound or guarding    A/P: 63F recent R DEBI-SMA bypass for CMI, presented after discharge with worsening nausea/vomiting and abdominal pain, CTA showed occluded bypass graft  now s/p redo R DEBI-SMA bypass procedure    Clinically stable with minimal abdominal pain, supratherapeutic on hep gtt  Cont hep gtt at current rate; recheck PTT at 1030  Cont abx  Cont cannon catheter  Monitor abd exam  f/u AM labs  Keep NPO/NGT until pt has bowel function

## 2023-10-29 NOTE — CONSULT NOTE ADULT - ASSESSMENT
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 Morales) and discharged on 10/27 returned to ED with abdominal pain, NV, fevers, chills. 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 Occlusion of proximal SMA. Occluded bypass graft from left common iliac artery to SMA. Reconstitution of distal SMA from collaterals." Patient taken emergently to OR for bypass revision. Patient now s/p ex lap, removal/replacement of thrombosed graft on 10/28. SICU consulted for HD monitoring and frequent abdominal pain assessment for possible relapse of ischemia post-operatively.       Plan  NEUROLOGIC   - pain control prn tylenol and dilaudid   - A&Ox4     RESPIRATORY   - Monitor SpO2 goal >92%  - Incentive spirometry   - on NC, wean as tolerated     CARDIOVASCULAR   - no current pressor requirements  - MAP >65  - holding home statin, lisinopril, amlodipine while NPO  - s/p 2pRBC and 2 FFP intra-op    GASTROINTESTINAL   - Diet: NPO/NGT  - serial abdominal exams   - f/u CXR to confirm NGT placement   - protonix daily   - zofran prn nausea    /RENAL   - IVF @100  - Strict I/Os  - Maintain cannon catheter   - Monitor electrolytes, replete PRN    HEMATOLOGIC  - Monitor H/H   - holding home ASA   - heparin gtt, f/u PTT  - f/u post-op labs     INFECTIOUS DISEASE  - Monitor fever / WBC  - continue ancef per primary team    ENDOCRINE  - Monitor gluc  - no active issues     DISPO: SICU 63F PMHx HTN, DM, PAD, 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 Morales) and discharged on 10/27 returned to ED with abdominal pain, NV, fevers, chills. 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 Occlusion of proximal SMA. Occluded bypass graft from left common iliac artery to SMA. Reconstitution of distal SMA from collaterals." Patient taken emergently to OR for bypass revision. Patient now s/p ex lap, removal/replacement of thrombosed graft on 10/28. SICU consulted for HD monitoring and frequent abdominal pain assessment for possible relapse of ischemia post-operatively.     Interval events:  - 500cc bolus x 2  - PT eval    Plan  NEUROLOGIC   - pain control prn tylenol and dilaudid   - A&Ox4     RESPIRATORY   - Monitor SpO2 goal >92%  - Incentive spirometry   - on NC, wean as tolerated     CARDIOVASCULAR   - no current pressor requirements  - MAP >65  - lisinopril, amlodipine while NPO  - restarted home statin  - s/p 2pRBC and 2 FFP intra-op    GASTROINTESTINAL   - Diet: NPO/NGT  - serial abdominal exams   - f/u CXR to confirm NGT placement   - protonix daily   - zofran prn nausea    /RENAL   - IVF @100  - Strict I/Os  - Maintain cannon catheter   - Monitor electrolytes, replete PRN  - 500 cc bolus x 2 10/29     HEMATOLOGIC  - Monitor H/H   - holding home ASA   - heparin gtt, f/u PTT  - f/u post-op labs     INFECTIOUS DISEASE  - Monitor fever / WBC  - continue ancef per primary team    ENDOCRINE  - Monitor gluc  - ISS    DISPO: SICU

## 2023-10-29 NOTE — CONSULT NOTE ADULT - SUBJECTIVE AND OBJECTIVE BOX
SICU Consult Note  =====================================================    HPI:   63F PMHx HTN, DM, PAD, 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 Morales) and discharged on 10/27 returned to ED with abdominal pain, NV, fevers, chills. 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 Occlusion of proximal SMA. Occluded bypass graft from left common iliac artery to SMA. Reconstitution of distal SMA from collaterals." Patient taken emergently to OR for bypass revision. Patient now s/p ex lap, removal/replacement of thrombosed graft on 10/28. SICU consulted for HD monitoring and frequent abdominal pain assessment for possible relapse of ischemia post-operatively.     PAST MEDICAL & SURGICAL HISTORY:  Positive H. pylori test  Status post vascular bypass      ALLERGIES:  No Known Allergies      --------------------------------------------------------------------------------------    MEDICATIONS:    Neurologic Medications  acetaminophen   IVPB .. 1000 milliGRAM(s) IV Intermittent every 6 hours  HYDROmorphone  Injectable 1 milliGRAM(s) IV Push every 4 hours PRN Severe Pain (7 - 10)  HYDROmorphone  Injectable 0.5 milliGRAM(s) IV Push every 4 hours PRN Moderate Pain (4 - 6)  ondansetron Injectable 4 milliGRAM(s) IV Push every 4 hours PRN Nausea and/or Vomiting    Respiratory Medications    Cardiovascular Medications    Gastrointestinal Medications  pantoprazole  Injectable 40 milliGRAM(s) IV Push daily  sodium chloride 0.9%. 1000 milliLiter(s) IV Continuous <Continuous>    Genitourinary Medications    Hematologic/Oncologic Medications  heparin  Infusion 1000 Unit(s)/Hr IV Continuous <Continuous>    Antimicrobial/Immunologic Medications  ceFAZolin   IVPB 2000 milliGRAM(s) IV Intermittent every 8 hours    Endocrine/Metabolic Medications    Topical/Other Medications  chlorhexidine 2% Cloths 1 Application(s) Topical daily    --------------------------------------------------------------------------------------    VITAL SIGNS:  ICU Vital Signs Last 24 Hrs  T(C): 37.1 (28 Oct 2023 15:40), Max: 37.1 (28 Oct 2023 15:38)  T(F): 98.7 (28 Oct 2023 15:40), Max: 98.7 (28 Oct 2023 15:38)  HR: 66 (28 Oct 2023 15:40) (65 - 73)  BP: 156/61 (28 Oct 2023 15:40) (130/59 - 175/71)  RR: 17 (28 Oct 2023 15:40) (16 - 17)  SpO2: 95% (28 Oct 2023 15:40) (95% - 100%)    O2 Parameters below as of 28 Oct 2023 15:40  Patient On (Oxygen Delivery Method): room air    --------------------------------------------------------------------------------------    EXAM  NEUROLOGY  Exam: Normal, in no acute distress.  Alert and oriented x4.  No focal neurologic deficits.     RESPIRATORY  Exam: Normal expansion/effort. No signs of respiratory distress on RA.     CARDIOVASCULAR  Exam: S1, S2.  Regular rate and rhythm.      GI/NUTRITION  Exam: Abdomen soft, Non-tender, Non-distended. Midline acquacel. NGT in place.   Current Diet: NPO    VASCULAR  Exam: Extremities warm, pink, well-perfused. Palpable DP pulses b/l.     MUSCULOSKELETAL  Exam: All extremities moving spontaneously without limitations.     SKIN  Exam: Good skin turgor, no skin breakdown.     METABOLIC / FLUIDS / ELECTROLYTES  sodium chloride 0.9%. 1000 milliLiter(s) IV Continuous <Continuous>      HEMATOLOGIC  [x] VTE Prophylaxis: heparin  Infusion 1000 Unit(s)/Hr IV Continuous <Continuous>      INFECTIOUS DISEASE  Antimicrobials/Immunologic Medications:  ceFAZolin   IVPB 2000 milliGRAM(s) IV Intermittent every 8 hours      TUBES / LINES / DRAINS    [x] Peripheral IV  [] Central Venous Line     	[] R	[] L	[] IJ	[] Fem	[] SC	Date Placed:   [X] Arterial Line		[] R	[] L	[] Fem	[] Rad	[] Ax	Date Placed:   [] PICC		[] Midline		[] Mediport  [X] Urinary Catheter		Date Placed:   [x] Necessity of urinary, arterial, and venous catheters discussed      --------------------------------------------------------------------------------------

## 2023-10-29 NOTE — PATIENT PROFILE ADULT - FALL HARM RISK - HARM RISK INTERVENTIONS

## 2023-10-30 LAB
ANION GAP SERPL CALC-SCNC: 13 MMOL/L — SIGNIFICANT CHANGE UP (ref 7–14)
ANION GAP SERPL CALC-SCNC: 18 MMOL/L — HIGH (ref 7–14)
ANION GAP SERPL CALC-SCNC: 18 MMOL/L — HIGH (ref 7–14)
ANION GAP SERPL CALC-SCNC: 19 MMOL/L — HIGH (ref 7–14)
ANION GAP SERPL CALC-SCNC: 19 MMOL/L — HIGH (ref 7–14)
APPEARANCE UR: ABNORMAL
APPEARANCE UR: ABNORMAL
APTT BLD: 196.2 SEC — CRITICAL HIGH (ref 24.5–35.6)
APTT BLD: 196.2 SEC — CRITICAL HIGH (ref 24.5–35.6)
APTT BLD: 64.8 SEC — HIGH (ref 24.5–35.6)
APTT BLD: 64.8 SEC — HIGH (ref 24.5–35.6)
APTT BLD: 81.2 SEC — HIGH (ref 24.5–35.6)
APTT BLD: 81.2 SEC — HIGH (ref 24.5–35.6)
BACTERIA # UR AUTO: NEGATIVE /HPF — SIGNIFICANT CHANGE UP
BACTERIA # UR AUTO: NEGATIVE /HPF — SIGNIFICANT CHANGE UP
BILIRUB UR-MCNC: NEGATIVE — SIGNIFICANT CHANGE UP
BILIRUB UR-MCNC: NEGATIVE — SIGNIFICANT CHANGE UP
BLOOD GAS ARTERIAL - LYTES,HGB,ICA,LACT RESULT: SIGNIFICANT CHANGE UP
BLOOD GAS ARTERIAL - LYTES,HGB,ICA,LACT RESULT: SIGNIFICANT CHANGE UP
BUN SERPL-MCNC: 22 MG/DL — SIGNIFICANT CHANGE UP (ref 7–23)
BUN SERPL-MCNC: 22 MG/DL — SIGNIFICANT CHANGE UP (ref 7–23)
BUN SERPL-MCNC: 24 MG/DL — HIGH (ref 7–23)
BUN SERPL-MCNC: 26 MG/DL — HIGH (ref 7–23)
BUN SERPL-MCNC: 26 MG/DL — HIGH (ref 7–23)
CALCIUM SERPL-MCNC: 7.7 MG/DL — LOW (ref 8.4–10.5)
CALCIUM SERPL-MCNC: 7.7 MG/DL — LOW (ref 8.4–10.5)
CALCIUM SERPL-MCNC: 7.9 MG/DL — LOW (ref 8.4–10.5)
CALCIUM SERPL-MCNC: 7.9 MG/DL — LOW (ref 8.4–10.5)
CALCIUM SERPL-MCNC: 8.5 MG/DL — SIGNIFICANT CHANGE UP (ref 8.4–10.5)
CAST: 15 /LPF — HIGH (ref 0–4)
CAST: 15 /LPF — HIGH (ref 0–4)
CHLORIDE SERPL-SCNC: 104 MMOL/L — SIGNIFICANT CHANGE UP (ref 98–107)
CHLORIDE SERPL-SCNC: 105 MMOL/L — SIGNIFICANT CHANGE UP (ref 98–107)
CHLORIDE SERPL-SCNC: 105 MMOL/L — SIGNIFICANT CHANGE UP (ref 98–107)
CHLORIDE SERPL-SCNC: 107 MMOL/L — SIGNIFICANT CHANGE UP (ref 98–107)
CHLORIDE SERPL-SCNC: 107 MMOL/L — SIGNIFICANT CHANGE UP (ref 98–107)
CO2 SERPL-SCNC: 15 MMOL/L — LOW (ref 22–31)
CO2 SERPL-SCNC: 15 MMOL/L — LOW (ref 22–31)
CO2 SERPL-SCNC: 17 MMOL/L — LOW (ref 22–31)
CO2 SERPL-SCNC: 17 MMOL/L — LOW (ref 22–31)
CO2 SERPL-SCNC: 18 MMOL/L — LOW (ref 22–31)
CO2 SERPL-SCNC: 18 MMOL/L — LOW (ref 22–31)
CO2 SERPL-SCNC: 19 MMOL/L — LOW (ref 22–31)
CO2 SERPL-SCNC: 19 MMOL/L — LOW (ref 22–31)
COLOR SPEC: YELLOW — SIGNIFICANT CHANGE UP
COLOR SPEC: YELLOW — SIGNIFICANT CHANGE UP
CREAT ?TM UR-MCNC: 58 MG/DL — SIGNIFICANT CHANGE UP
CREAT ?TM UR-MCNC: 58 MG/DL — SIGNIFICANT CHANGE UP
CREAT SERPL-MCNC: 1.58 MG/DL — HIGH (ref 0.5–1.3)
CREAT SERPL-MCNC: 1.58 MG/DL — HIGH (ref 0.5–1.3)
CREAT SERPL-MCNC: 1.69 MG/DL — HIGH (ref 0.5–1.3)
CREAT SERPL-MCNC: 1.69 MG/DL — HIGH (ref 0.5–1.3)
CREAT SERPL-MCNC: 1.94 MG/DL — HIGH (ref 0.5–1.3)
CREAT SERPL-MCNC: 1.94 MG/DL — HIGH (ref 0.5–1.3)
CREAT SERPL-MCNC: 1.98 MG/DL — HIGH (ref 0.5–1.3)
CREAT SERPL-MCNC: 1.98 MG/DL — HIGH (ref 0.5–1.3)
DIFF PNL FLD: ABNORMAL
DIFF PNL FLD: ABNORMAL
EGFR: 28 ML/MIN/1.73M2 — LOW
EGFR: 33 ML/MIN/1.73M2 — LOW
EGFR: 33 ML/MIN/1.73M2 — LOW
EGFR: 36 ML/MIN/1.73M2 — LOW
EGFR: 36 ML/MIN/1.73M2 — LOW
GAS PNL BLDA: SIGNIFICANT CHANGE UP
GAS PNL BLDA: SIGNIFICANT CHANGE UP
GLUCOSE BLDC GLUCOMTR-MCNC: 108 MG/DL — HIGH (ref 70–99)
GLUCOSE BLDC GLUCOMTR-MCNC: 108 MG/DL — HIGH (ref 70–99)
GLUCOSE BLDC GLUCOMTR-MCNC: 119 MG/DL — HIGH (ref 70–99)
GLUCOSE BLDC GLUCOMTR-MCNC: 119 MG/DL — HIGH (ref 70–99)
GLUCOSE BLDC GLUCOMTR-MCNC: 126 MG/DL — HIGH (ref 70–99)
GLUCOSE BLDC GLUCOMTR-MCNC: 126 MG/DL — HIGH (ref 70–99)
GLUCOSE BLDC GLUCOMTR-MCNC: 151 MG/DL — HIGH (ref 70–99)
GLUCOSE BLDC GLUCOMTR-MCNC: 151 MG/DL — HIGH (ref 70–99)
GLUCOSE BLDC GLUCOMTR-MCNC: 156 MG/DL — HIGH (ref 70–99)
GLUCOSE BLDC GLUCOMTR-MCNC: 156 MG/DL — HIGH (ref 70–99)
GLUCOSE SERPL-MCNC: 114 MG/DL — HIGH (ref 70–99)
GLUCOSE SERPL-MCNC: 114 MG/DL — HIGH (ref 70–99)
GLUCOSE SERPL-MCNC: 140 MG/DL — HIGH (ref 70–99)
GLUCOSE SERPL-MCNC: 140 MG/DL — HIGH (ref 70–99)
GLUCOSE SERPL-MCNC: 142 MG/DL — HIGH (ref 70–99)
GLUCOSE SERPL-MCNC: 142 MG/DL — HIGH (ref 70–99)
GLUCOSE SERPL-MCNC: 156 MG/DL — HIGH (ref 70–99)
GLUCOSE SERPL-MCNC: 156 MG/DL — HIGH (ref 70–99)
GLUCOSE UR QL: NEGATIVE MG/DL — SIGNIFICANT CHANGE UP
GLUCOSE UR QL: NEGATIVE MG/DL — SIGNIFICANT CHANGE UP
HCT VFR BLD CALC: 20.2 % — CRITICAL LOW (ref 34.5–45)
HCT VFR BLD CALC: 20.2 % — CRITICAL LOW (ref 34.5–45)
HCT VFR BLD CALC: 23.9 % — LOW (ref 34.5–45)
HCT VFR BLD CALC: 23.9 % — LOW (ref 34.5–45)
HCT VFR BLD CALC: 24.7 % — LOW (ref 34.5–45)
HCT VFR BLD CALC: 24.7 % — LOW (ref 34.5–45)
HCT VFR BLD CALC: 26 % — LOW (ref 34.5–45)
HCT VFR BLD CALC: 26 % — LOW (ref 34.5–45)
HCT VFR BLD CALC: 27.4 % — LOW (ref 34.5–45)
HCT VFR BLD CALC: 27.4 % — LOW (ref 34.5–45)
HGB BLD-MCNC: 7.1 G/DL — LOW (ref 11.5–15.5)
HGB BLD-MCNC: 7.1 G/DL — LOW (ref 11.5–15.5)
HGB BLD-MCNC: 8.5 G/DL — LOW (ref 11.5–15.5)
HGB BLD-MCNC: 8.5 G/DL — LOW (ref 11.5–15.5)
HGB BLD-MCNC: 8.8 G/DL — LOW (ref 11.5–15.5)
HGB BLD-MCNC: 8.8 G/DL — LOW (ref 11.5–15.5)
HGB BLD-MCNC: 8.9 G/DL — LOW (ref 11.5–15.5)
HGB BLD-MCNC: 8.9 G/DL — LOW (ref 11.5–15.5)
HGB BLD-MCNC: 9.5 G/DL — LOW (ref 11.5–15.5)
HGB BLD-MCNC: 9.5 G/DL — LOW (ref 11.5–15.5)
INR BLD: 1.48 RATIO — HIGH (ref 0.85–1.18)
INR BLD: 1.48 RATIO — HIGH (ref 0.85–1.18)
INR BLD: 1.7 RATIO — HIGH (ref 0.85–1.18)
INR BLD: 1.7 RATIO — HIGH (ref 0.85–1.18)
KETONES UR-MCNC: ABNORMAL MG/DL
KETONES UR-MCNC: ABNORMAL MG/DL
LEUKOCYTE ESTERASE UR-ACNC: NEGATIVE — SIGNIFICANT CHANGE UP
LEUKOCYTE ESTERASE UR-ACNC: NEGATIVE — SIGNIFICANT CHANGE UP
MAGNESIUM SERPL-MCNC: 1.8 MG/DL — SIGNIFICANT CHANGE UP (ref 1.6–2.6)
MAGNESIUM SERPL-MCNC: 1.8 MG/DL — SIGNIFICANT CHANGE UP (ref 1.6–2.6)
MAGNESIUM SERPL-MCNC: 1.9 MG/DL — SIGNIFICANT CHANGE UP (ref 1.6–2.6)
MAGNESIUM SERPL-MCNC: 1.9 MG/DL — SIGNIFICANT CHANGE UP (ref 1.6–2.6)
MAGNESIUM SERPL-MCNC: 2.4 MG/DL — SIGNIFICANT CHANGE UP (ref 1.6–2.6)
MAGNESIUM SERPL-MCNC: 2.4 MG/DL — SIGNIFICANT CHANGE UP (ref 1.6–2.6)
MCHC RBC-ENTMCNC: 28.9 PG — SIGNIFICANT CHANGE UP (ref 27–34)
MCHC RBC-ENTMCNC: 28.9 PG — SIGNIFICANT CHANGE UP (ref 27–34)
MCHC RBC-ENTMCNC: 29.4 PG — SIGNIFICANT CHANGE UP (ref 27–34)
MCHC RBC-ENTMCNC: 29.4 PG — SIGNIFICANT CHANGE UP (ref 27–34)
MCHC RBC-ENTMCNC: 29.7 PG — SIGNIFICANT CHANGE UP (ref 27–34)
MCHC RBC-ENTMCNC: 29.7 PG — SIGNIFICANT CHANGE UP (ref 27–34)
MCHC RBC-ENTMCNC: 29.9 PG — SIGNIFICANT CHANGE UP (ref 27–34)
MCHC RBC-ENTMCNC: 29.9 PG — SIGNIFICANT CHANGE UP (ref 27–34)
MCHC RBC-ENTMCNC: 30.1 PG — SIGNIFICANT CHANGE UP (ref 27–34)
MCHC RBC-ENTMCNC: 30.1 PG — SIGNIFICANT CHANGE UP (ref 27–34)
MCHC RBC-ENTMCNC: 33.8 GM/DL — SIGNIFICANT CHANGE UP (ref 32–36)
MCHC RBC-ENTMCNC: 33.8 GM/DL — SIGNIFICANT CHANGE UP (ref 32–36)
MCHC RBC-ENTMCNC: 34.7 GM/DL — SIGNIFICANT CHANGE UP (ref 32–36)
MCHC RBC-ENTMCNC: 34.7 GM/DL — SIGNIFICANT CHANGE UP (ref 32–36)
MCHC RBC-ENTMCNC: 35.1 GM/DL — SIGNIFICANT CHANGE UP (ref 32–36)
MCHC RBC-ENTMCNC: 35.1 GM/DL — SIGNIFICANT CHANGE UP (ref 32–36)
MCHC RBC-ENTMCNC: 35.6 GM/DL — SIGNIFICANT CHANGE UP (ref 32–36)
MCHC RBC-ENTMCNC: 35.6 GM/DL — SIGNIFICANT CHANGE UP (ref 32–36)
MCHC RBC-ENTMCNC: 36 GM/DL — SIGNIFICANT CHANGE UP (ref 32–36)
MCHC RBC-ENTMCNC: 36 GM/DL — SIGNIFICANT CHANGE UP (ref 32–36)
MCV RBC AUTO: 82.9 FL — SIGNIFICANT CHANGE UP (ref 80–100)
MCV RBC AUTO: 82.9 FL — SIGNIFICANT CHANGE UP (ref 80–100)
MCV RBC AUTO: 83.6 FL — SIGNIFICANT CHANGE UP (ref 80–100)
MCV RBC AUTO: 83.6 FL — SIGNIFICANT CHANGE UP (ref 80–100)
MCV RBC AUTO: 84.8 FL — SIGNIFICANT CHANGE UP (ref 80–100)
MCV RBC AUTO: 84.8 FL — SIGNIFICANT CHANGE UP (ref 80–100)
MCV RBC AUTO: 85.5 FL — SIGNIFICANT CHANGE UP (ref 80–100)
MCV RBC AUTO: 85.5 FL — SIGNIFICANT CHANGE UP (ref 80–100)
MCV RBC AUTO: 85.6 FL — SIGNIFICANT CHANGE UP (ref 80–100)
MCV RBC AUTO: 85.6 FL — SIGNIFICANT CHANGE UP (ref 80–100)
NITRITE UR-MCNC: NEGATIVE — SIGNIFICANT CHANGE UP
NITRITE UR-MCNC: NEGATIVE — SIGNIFICANT CHANGE UP
NRBC # BLD: 0 /100 WBCS — SIGNIFICANT CHANGE UP (ref 0–0)
NRBC # FLD: 0 K/UL — SIGNIFICANT CHANGE UP (ref 0–0)
OSMOLALITY UR: 488 MOSM/KG — SIGNIFICANT CHANGE UP (ref 50–1200)
OSMOLALITY UR: 488 MOSM/KG — SIGNIFICANT CHANGE UP (ref 50–1200)
PH UR: 5.5 — SIGNIFICANT CHANGE UP (ref 5–8)
PH UR: 5.5 — SIGNIFICANT CHANGE UP (ref 5–8)
PHOSPHATE SERPL-MCNC: 4.7 MG/DL — HIGH (ref 2.5–4.5)
PHOSPHATE SERPL-MCNC: 4.7 MG/DL — HIGH (ref 2.5–4.5)
PHOSPHATE SERPL-MCNC: 6.3 MG/DL — HIGH (ref 2.5–4.5)
PHOSPHATE SERPL-MCNC: 6.3 MG/DL — HIGH (ref 2.5–4.5)
PHOSPHATE SERPL-MCNC: 6.5 MG/DL — HIGH (ref 2.5–4.5)
PHOSPHATE SERPL-MCNC: 6.5 MG/DL — HIGH (ref 2.5–4.5)
PLATELET # BLD AUTO: 185 K/UL — SIGNIFICANT CHANGE UP (ref 150–400)
PLATELET # BLD AUTO: 185 K/UL — SIGNIFICANT CHANGE UP (ref 150–400)
PLATELET # BLD AUTO: 195 K/UL — SIGNIFICANT CHANGE UP (ref 150–400)
PLATELET # BLD AUTO: 195 K/UL — SIGNIFICANT CHANGE UP (ref 150–400)
PLATELET # BLD AUTO: 197 K/UL — SIGNIFICANT CHANGE UP (ref 150–400)
PLATELET # BLD AUTO: 197 K/UL — SIGNIFICANT CHANGE UP (ref 150–400)
PLATELET # BLD AUTO: 221 K/UL — SIGNIFICANT CHANGE UP (ref 150–400)
PLATELET # BLD AUTO: 221 K/UL — SIGNIFICANT CHANGE UP (ref 150–400)
PLATELET # BLD AUTO: 228 K/UL — SIGNIFICANT CHANGE UP (ref 150–400)
PLATELET # BLD AUTO: 228 K/UL — SIGNIFICANT CHANGE UP (ref 150–400)
POTASSIUM SERPL-MCNC: 4.1 MMOL/L — SIGNIFICANT CHANGE UP (ref 3.5–5.3)
POTASSIUM SERPL-MCNC: 4.1 MMOL/L — SIGNIFICANT CHANGE UP (ref 3.5–5.3)
POTASSIUM SERPL-MCNC: 4.3 MMOL/L — SIGNIFICANT CHANGE UP (ref 3.5–5.3)
POTASSIUM SERPL-MCNC: 4.3 MMOL/L — SIGNIFICANT CHANGE UP (ref 3.5–5.3)
POTASSIUM SERPL-MCNC: 4.4 MMOL/L — SIGNIFICANT CHANGE UP (ref 3.5–5.3)
POTASSIUM SERPL-MCNC: 4.4 MMOL/L — SIGNIFICANT CHANGE UP (ref 3.5–5.3)
POTASSIUM SERPL-MCNC: 4.5 MMOL/L — SIGNIFICANT CHANGE UP (ref 3.5–5.3)
POTASSIUM SERPL-MCNC: 4.5 MMOL/L — SIGNIFICANT CHANGE UP (ref 3.5–5.3)
POTASSIUM SERPL-SCNC: 4.1 MMOL/L — SIGNIFICANT CHANGE UP (ref 3.5–5.3)
POTASSIUM SERPL-SCNC: 4.1 MMOL/L — SIGNIFICANT CHANGE UP (ref 3.5–5.3)
POTASSIUM SERPL-SCNC: 4.3 MMOL/L — SIGNIFICANT CHANGE UP (ref 3.5–5.3)
POTASSIUM SERPL-SCNC: 4.3 MMOL/L — SIGNIFICANT CHANGE UP (ref 3.5–5.3)
POTASSIUM SERPL-SCNC: 4.4 MMOL/L — SIGNIFICANT CHANGE UP (ref 3.5–5.3)
POTASSIUM SERPL-SCNC: 4.4 MMOL/L — SIGNIFICANT CHANGE UP (ref 3.5–5.3)
POTASSIUM SERPL-SCNC: 4.5 MMOL/L — SIGNIFICANT CHANGE UP (ref 3.5–5.3)
POTASSIUM SERPL-SCNC: 4.5 MMOL/L — SIGNIFICANT CHANGE UP (ref 3.5–5.3)
POTASSIUM UR-SCNC: 53.9 MMOL/L — SIGNIFICANT CHANGE UP
POTASSIUM UR-SCNC: 53.9 MMOL/L — SIGNIFICANT CHANGE UP
PROT ?TM UR-MCNC: 55 MG/DL — SIGNIFICANT CHANGE UP
PROT ?TM UR-MCNC: 55 MG/DL — SIGNIFICANT CHANGE UP
PROT UR-MCNC: 30 MG/DL
PROT UR-MCNC: 30 MG/DL
PROT/CREAT UR-RTO: 0.9 RATIO — HIGH (ref 0–0.2)
PROT/CREAT UR-RTO: 0.9 RATIO — HIGH (ref 0–0.2)
PROTHROM AB SERPL-ACNC: 16.5 SEC — HIGH (ref 9.5–13)
PROTHROM AB SERPL-ACNC: 16.5 SEC — HIGH (ref 9.5–13)
PROTHROM AB SERPL-ACNC: 18.7 SEC — HIGH (ref 9.5–13)
PROTHROM AB SERPL-ACNC: 18.7 SEC — HIGH (ref 9.5–13)
RBC # BLD: 2.36 M/UL — LOW (ref 3.8–5.2)
RBC # BLD: 2.36 M/UL — LOW (ref 3.8–5.2)
RBC # BLD: 2.86 M/UL — LOW (ref 3.8–5.2)
RBC # BLD: 2.86 M/UL — LOW (ref 3.8–5.2)
RBC # BLD: 2.98 M/UL — LOW (ref 3.8–5.2)
RBC # BLD: 2.98 M/UL — LOW (ref 3.8–5.2)
RBC # BLD: 3.04 M/UL — LOW (ref 3.8–5.2)
RBC # BLD: 3.04 M/UL — LOW (ref 3.8–5.2)
RBC # BLD: 3.23 M/UL — LOW (ref 3.8–5.2)
RBC # BLD: 3.23 M/UL — LOW (ref 3.8–5.2)
RBC # FLD: 13.7 % — SIGNIFICANT CHANGE UP (ref 10.3–14.5)
RBC # FLD: 13.7 % — SIGNIFICANT CHANGE UP (ref 10.3–14.5)
RBC # FLD: 14.2 % — SIGNIFICANT CHANGE UP (ref 10.3–14.5)
RBC # FLD: 14.2 % — SIGNIFICANT CHANGE UP (ref 10.3–14.5)
RBC # FLD: 14.6 % — HIGH (ref 10.3–14.5)
RBC # FLD: 14.7 % — HIGH (ref 10.3–14.5)
RBC # FLD: 14.7 % — HIGH (ref 10.3–14.5)
RBC CASTS # UR COMP ASSIST: 157 /HPF — HIGH (ref 0–4)
RBC CASTS # UR COMP ASSIST: 157 /HPF — HIGH (ref 0–4)
REVIEW: SIGNIFICANT CHANGE UP
REVIEW: SIGNIFICANT CHANGE UP
SODIUM SERPL-SCNC: 136 MMOL/L — SIGNIFICANT CHANGE UP (ref 135–145)
SODIUM SERPL-SCNC: 136 MMOL/L — SIGNIFICANT CHANGE UP (ref 135–145)
SODIUM SERPL-SCNC: 138 MMOL/L — SIGNIFICANT CHANGE UP (ref 135–145)
SODIUM SERPL-SCNC: 138 MMOL/L — SIGNIFICANT CHANGE UP (ref 135–145)
SODIUM SERPL-SCNC: 139 MMOL/L — SIGNIFICANT CHANGE UP (ref 135–145)
SODIUM UR-SCNC: 47 MMOL/L — SIGNIFICANT CHANGE UP
SODIUM UR-SCNC: 47 MMOL/L — SIGNIFICANT CHANGE UP
SP GR SPEC: 1.03 — HIGH (ref 1–1.03)
SP GR SPEC: 1.03 — HIGH (ref 1–1.03)
SQUAMOUS # UR AUTO: 2 /HPF — SIGNIFICANT CHANGE UP (ref 0–5)
SQUAMOUS # UR AUTO: 2 /HPF — SIGNIFICANT CHANGE UP (ref 0–5)
UROBILINOGEN FLD QL: 1 MG/DL — SIGNIFICANT CHANGE UP (ref 0.2–1)
UROBILINOGEN FLD QL: 1 MG/DL — SIGNIFICANT CHANGE UP (ref 0.2–1)
UUN UR-MCNC: 367.5 MG/DL — SIGNIFICANT CHANGE UP
UUN UR-MCNC: 367.5 MG/DL — SIGNIFICANT CHANGE UP
WBC # BLD: 13.24 K/UL — HIGH (ref 3.8–10.5)
WBC # BLD: 13.24 K/UL — HIGH (ref 3.8–10.5)
WBC # BLD: 17.59 K/UL — HIGH (ref 3.8–10.5)
WBC # BLD: 17.59 K/UL — HIGH (ref 3.8–10.5)
WBC # BLD: 17.66 K/UL — HIGH (ref 3.8–10.5)
WBC # BLD: 17.66 K/UL — HIGH (ref 3.8–10.5)
WBC # BLD: 17.85 K/UL — HIGH (ref 3.8–10.5)
WBC # BLD: 17.85 K/UL — HIGH (ref 3.8–10.5)
WBC # BLD: 18.12 K/UL — HIGH (ref 3.8–10.5)
WBC # BLD: 18.12 K/UL — HIGH (ref 3.8–10.5)
WBC # FLD AUTO: 13.24 K/UL — HIGH (ref 3.8–10.5)
WBC # FLD AUTO: 13.24 K/UL — HIGH (ref 3.8–10.5)
WBC # FLD AUTO: 17.59 K/UL — HIGH (ref 3.8–10.5)
WBC # FLD AUTO: 17.59 K/UL — HIGH (ref 3.8–10.5)
WBC # FLD AUTO: 17.66 K/UL — HIGH (ref 3.8–10.5)
WBC # FLD AUTO: 17.66 K/UL — HIGH (ref 3.8–10.5)
WBC # FLD AUTO: 17.85 K/UL — HIGH (ref 3.8–10.5)
WBC # FLD AUTO: 17.85 K/UL — HIGH (ref 3.8–10.5)
WBC # FLD AUTO: 18.12 K/UL — HIGH (ref 3.8–10.5)
WBC # FLD AUTO: 18.12 K/UL — HIGH (ref 3.8–10.5)
WBC UR QL: 3 /HPF — SIGNIFICANT CHANGE UP (ref 0–5)
WBC UR QL: 3 /HPF — SIGNIFICANT CHANGE UP (ref 0–5)

## 2023-10-30 PROCEDURE — 35221 RPR BLD VSL DIR INTRA-ABDL: CPT | Mod: 79

## 2023-10-30 PROCEDURE — 99292 CRITICAL CARE ADDL 30 MIN: CPT

## 2023-10-30 PROCEDURE — 99291 CRITICAL CARE FIRST HOUR: CPT

## 2023-10-30 PROCEDURE — 71045 X-RAY EXAM CHEST 1 VIEW: CPT | Mod: 26

## 2023-10-30 PROCEDURE — 74176 CT ABD & PELVIS W/O CONTRAST: CPT | Mod: 26

## 2023-10-30 PROCEDURE — 93010 ELECTROCARDIOGRAM REPORT: CPT | Mod: 76

## 2023-10-30 PROCEDURE — 35840 EXPLORE ABDOMINAL VESSELS: CPT | Mod: 79

## 2023-10-30 DEVICE — VISTASEAL FIBRIN HUMAN 10ML: Type: IMPLANTABLE DEVICE | Status: FUNCTIONAL

## 2023-10-30 DEVICE — CLIP APPLIER COVIDIEN SURGICLIP 13" LARGE: Type: IMPLANTABLE DEVICE | Status: FUNCTIONAL

## 2023-10-30 DEVICE — CLIP APPLIER COVIDIEN SURGICLIP 11.5" MEDIUM: Type: IMPLANTABLE DEVICE | Status: FUNCTIONAL

## 2023-10-30 DEVICE — SURGICEL FIBRILLAR 2 X 4": Type: IMPLANTABLE DEVICE | Status: FUNCTIONAL

## 2023-10-30 DEVICE — SURGIFOAM PAD 8CM X 12.5CM X 2MM (100C): Type: IMPLANTABLE DEVICE | Status: FUNCTIONAL

## 2023-10-30 RX ORDER — MAGNESIUM SULFATE 500 MG/ML
2 VIAL (ML) INJECTION ONCE
Refills: 0 | Status: COMPLETED | OUTPATIENT
Start: 2023-10-30 | End: 2023-10-30

## 2023-10-30 RX ORDER — HEPARIN SODIUM 5000 [USP'U]/ML
700 INJECTION INTRAVENOUS; SUBCUTANEOUS
Qty: 25000 | Refills: 0 | Status: DISCONTINUED | OUTPATIENT
Start: 2023-10-30 | End: 2023-11-02

## 2023-10-30 RX ORDER — HYDRALAZINE HCL 50 MG
10 TABLET ORAL ONCE
Refills: 0 | Status: COMPLETED | OUTPATIENT
Start: 2023-10-30 | End: 2023-10-30

## 2023-10-30 RX ORDER — ACETAMINOPHEN 500 MG
1000 TABLET ORAL ONCE
Refills: 0 | Status: COMPLETED | OUTPATIENT
Start: 2023-10-30 | End: 2023-10-31

## 2023-10-30 RX ORDER — LANOLIN ALCOHOL/MO/W.PET/CERES
3 CREAM (GRAM) TOPICAL AT BEDTIME
Refills: 0 | Status: DISCONTINUED | OUTPATIENT
Start: 2023-10-30 | End: 2023-10-31

## 2023-10-30 RX ORDER — CEFAZOLIN SODIUM 1 G
1000 VIAL (EA) INJECTION EVERY 8 HOURS
Refills: 0 | Status: DISCONTINUED | OUTPATIENT
Start: 2023-10-30 | End: 2023-11-02

## 2023-10-30 RX ORDER — LANOLIN ALCOHOL/MO/W.PET/CERES
3 CREAM (GRAM) TOPICAL AT BEDTIME
Refills: 0 | Status: DISCONTINUED | OUTPATIENT
Start: 2023-10-30 | End: 2023-10-30

## 2023-10-30 RX ORDER — ALBUMIN HUMAN 25 %
250 VIAL (ML) INTRAVENOUS ONCE
Refills: 0 | Status: COMPLETED | OUTPATIENT
Start: 2023-10-30 | End: 2023-10-30

## 2023-10-30 RX ADMIN — HYDROMORPHONE HYDROCHLORIDE 1 MILLIGRAM(S): 2 INJECTION INTRAMUSCULAR; INTRAVENOUS; SUBCUTANEOUS at 03:30

## 2023-10-30 RX ADMIN — Medication 1: at 05:50

## 2023-10-30 RX ADMIN — Medication 100 MILLIGRAM(S): at 17:18

## 2023-10-30 RX ADMIN — HYDROMORPHONE HYDROCHLORIDE 1 MILLIGRAM(S): 2 INJECTION INTRAMUSCULAR; INTRAVENOUS; SUBCUTANEOUS at 21:00

## 2023-10-30 RX ADMIN — HEPARIN SODIUM 7 UNIT(S)/HR: 5000 INJECTION INTRAVENOUS; SUBCUTANEOUS at 07:22

## 2023-10-30 RX ADMIN — PANTOPRAZOLE SODIUM 40 MILLIGRAM(S): 20 TABLET, DELAYED RELEASE ORAL at 13:16

## 2023-10-30 RX ADMIN — Medication 1: at 01:11

## 2023-10-30 RX ADMIN — Medication 25 GRAM(S): at 14:46

## 2023-10-30 RX ADMIN — Medication 1000 MILLIGRAM(S): at 01:15

## 2023-10-30 RX ADMIN — Medication 1000 MILLIGRAM(S): at 13:46

## 2023-10-30 RX ADMIN — ATORVASTATIN CALCIUM 80 MILLIGRAM(S): 80 TABLET, FILM COATED ORAL at 21:09

## 2023-10-30 RX ADMIN — HYDROMORPHONE HYDROCHLORIDE 1 MILLIGRAM(S): 2 INJECTION INTRAMUSCULAR; INTRAVENOUS; SUBCUTANEOUS at 04:00

## 2023-10-30 RX ADMIN — HEPARIN SODIUM 7 UNIT(S)/HR: 5000 INJECTION INTRAVENOUS; SUBCUTANEOUS at 13:15

## 2023-10-30 RX ADMIN — SODIUM CHLORIDE 100 MILLILITER(S): 9 INJECTION INTRAMUSCULAR; INTRAVENOUS; SUBCUTANEOUS at 13:15

## 2023-10-30 RX ADMIN — Medication 100 MILLIGRAM(S): at 21:09

## 2023-10-30 RX ADMIN — Medication 400 MILLIGRAM(S): at 17:18

## 2023-10-30 RX ADMIN — Medication 1000 MILLIGRAM(S): at 06:00

## 2023-10-30 RX ADMIN — Medication 125 MILLILITER(S): at 04:39

## 2023-10-30 RX ADMIN — HYDROMORPHONE HYDROCHLORIDE 1 MILLIGRAM(S): 2 INJECTION INTRAMUSCULAR; INTRAVENOUS; SUBCUTANEOUS at 21:15

## 2023-10-30 RX ADMIN — Medication 400 MILLIGRAM(S): at 13:16

## 2023-10-30 RX ADMIN — SODIUM CHLORIDE 100 MILLILITER(S): 9 INJECTION INTRAMUSCULAR; INTRAVENOUS; SUBCUTANEOUS at 07:22

## 2023-10-30 RX ADMIN — Medication 3 MILLIGRAM(S): at 03:47

## 2023-10-30 RX ADMIN — Medication 10 MILLIGRAM(S): at 13:15

## 2023-10-30 RX ADMIN — Medication 400 MILLIGRAM(S): at 00:56

## 2023-10-30 RX ADMIN — Medication 400 MILLIGRAM(S): at 05:30

## 2023-10-30 RX ADMIN — Medication 3 MILLIGRAM(S): at 21:09

## 2023-10-30 RX ADMIN — HEPARIN SODIUM 5 UNIT(S)/HR: 5000 INJECTION INTRAVENOUS; SUBCUTANEOUS at 14:46

## 2023-10-30 RX ADMIN — LIDOCAINE 1 PATCH: 4 CREAM TOPICAL at 19:15

## 2023-10-30 RX ADMIN — LIDOCAINE 1 PATCH: 4 CREAM TOPICAL at 13:16

## 2023-10-30 RX ADMIN — LIDOCAINE 1 PATCH: 4 CREAM TOPICAL at 00:15

## 2023-10-30 RX ADMIN — CHLORHEXIDINE GLUCONATE 1 APPLICATION(S): 213 SOLUTION TOPICAL at 13:16

## 2023-10-30 RX ADMIN — Medication 1000 MILLIGRAM(S): at 17:48

## 2023-10-30 RX ADMIN — Medication 100 MILLIGRAM(S): at 05:39

## 2023-10-30 NOTE — PROGRESS NOTE ADULT - SUBJECTIVE AND OBJECTIVE BOX
SICU Daily Progress Note  =====================================================  Interval/Overnight Events:       Found to have dropping h/h overnight along with increased wbc count. CT ap revelaed free fluid aroudn graft, graft occlusion , fluid near liver. Added on for exploratory laparotomy with vascular surgery.  1L of hematoma was found intraoperatively and this was evacuated.    ALLERGIES:  No Known Allergies      --------------------------------------------------------------------------------------    MEDICATIONS:    Neurologic Medications  acetaminophen   IVPB .. 1000 milliGRAM(s) IV Intermittent every 6 hours  HYDROmorphone  Injectable 0.5 milliGRAM(s) IV Push every 4 hours PRN Moderate Pain (4 - 6)  HYDROmorphone  Injectable 1 milliGRAM(s) IV Push every 4 hours PRN Severe Pain (7 - 10)  melatonin 3 milliGRAM(s) Oral at bedtime    Respiratory Medications    Cardiovascular Medications    Gastrointestinal Medications  magnesium sulfate  IVPB 2 Gram(s) IV Intermittent once  pantoprazole  Injectable 40 milliGRAM(s) IV Push daily  sodium chloride 0.9%. 1000 milliLiter(s) IV Continuous <Continuous>    Genitourinary Medications    Hematologic/Oncologic Medications  heparin  Infusion 700 Unit(s)/Hr IV Continuous <Continuous>    Antimicrobial/Immunologic Medications  ceFAZolin   IVPB 2000 milliGRAM(s) IV Intermittent every 8 hours    Endocrine/Metabolic Medications  atorvastatin 80 milliGRAM(s) Oral at bedtime  insulin lispro (ADMELOG) corrective regimen sliding scale   SubCutaneous every 6 hours    Topical/Other Medications  chlorhexidine 2% Cloths 1 Application(s) Topical daily  lidocaine   4% Patch 1 Patch Transdermal daily    --------------------------------------------------------------------------------------    VITAL SIGNS:  T(C): 35.2 (10-30-23 @ 12:26), Max: 36.6 (10-30-23 @ 04:00)  HR: 108 (10-30-23 @ 14:00) (72 - 122)  BP: 155/135 (10-30-23 @ 14:00) (101/53 - 186/98)  RR: 43 (10-30-23 @ 14:00) (16 - 43)  SpO2: 96% (10-30-23 @ 14:00) (91% - 100%)  --------------------------------------------------------------------------------------    INS AND OUTS:    10-29-23 @ 07:01  -  10-30-23 @ 07:00  --------------------------------------------------------  IN: 5306 mL / OUT: 665 mL / NET: 4641 mL    10-30-23 @ 07:01  -  10-30-23 @ 14:34  --------------------------------------------------------  IN: 614 mL / OUT: 172 mL / NET: 442 mL      --------------------------------------------------------------------------------------    EXAM    NEURO: NAD,   HEENT: NC/AT  RESPIRATORY: nonlabored respirations, normal CW expansion  CARDIO: RRR, S1S2  ABDOMEN: soft, nontender, nondistended, +/- NGT, ostomy, surgical dressing c/d/i, BILL drain output  EXTREMITIES: normal strength, no deformities    --------------------------------------------------------------------------------------    LABS                        9.5    17.59 )-----------( 185      ( 30 Oct 2023 12:45 )             27.4     10-30    139  |  107  |  24<H>  ----------------------------<  140<H>  4.4   |  19<L>  |  1.94<H>    Ca    8.5      30 Oct 2023 12:45  Phos  6.3     10-30  Mg     1.80     10-30      PT/INR - ( 30 Oct 2023 12:45 )   PT: 18.7 sec;   INR: 1.70 ratio         PTT - ( 30 Oct 2023 12:45 )  PTT:196.2 sec  Urinalysis Basic - ( 30 Oct 2023 12:45 )    Color: x / Appearance: x / SG: x / pH: x  Gluc: 140 mg/dL / Ketone: x  / Bili: x / Urobili: x   Blood: x / Protein: x / Nitrite: x   Leuk Esterase: x / RBC: x / WBC x   Sq Epi: x / Non Sq Epi: x / Bacteria: x      T(C): 35.2 (10-30-23 @ 12:26), Max: 36.6 (10-30-23 @ 04:00)  HR: 108 (10-30-23 @ 14:00) (72 - 122)  BP: 155/135 (10-30-23 @ 14:00) (101/53 - 186/98)  RR: 43 (10-30-23 @ 14:00) (16 - 43)  SpO2: 96% (10-30-23 @ 14:00) (91% - 100%)  -------------------------------------------------------------------------------------- SICU Daily Progress Note  =====================================================  Interval/Overnight Events:       Found to have dropping h/h overnight along with increased wbc count. CT ap revelaed free fluid aroudn graft, graft occlusion , fluid near liver. Added on for exploratory laparotomy with vascular surgery.  1L of hematoma was found intraoperatively and this was evacuated.    ALLERGIES:  No Known Allergies      --------------------------------------------------------------------------------------    MEDICATIONS:    Neurologic Medications  acetaminophen   IVPB .. 1000 milliGRAM(s) IV Intermittent every 6 hours  HYDROmorphone  Injectable 0.5 milliGRAM(s) IV Push every 4 hours PRN Moderate Pain (4 - 6)  HYDROmorphone  Injectable 1 milliGRAM(s) IV Push every 4 hours PRN Severe Pain (7 - 10)  melatonin 3 milliGRAM(s) Oral at bedtime    Respiratory Medications    Cardiovascular Medications    Gastrointestinal Medications  magnesium sulfate  IVPB 2 Gram(s) IV Intermittent once  pantoprazole  Injectable 40 milliGRAM(s) IV Push daily  sodium chloride 0.9%. 1000 milliLiter(s) IV Continuous <Continuous>    Genitourinary Medications    Hematologic/Oncologic Medications  heparin  Infusion 700 Unit(s)/Hr IV Continuous <Continuous>    Antimicrobial/Immunologic Medications  ceFAZolin   IVPB 2000 milliGRAM(s) IV Intermittent every 8 hours    Endocrine/Metabolic Medications  atorvastatin 80 milliGRAM(s) Oral at bedtime  insulin lispro (ADMELOG) corrective regimen sliding scale   SubCutaneous every 6 hours    Topical/Other Medications  chlorhexidine 2% Cloths 1 Application(s) Topical daily  lidocaine   4% Patch 1 Patch Transdermal daily    --------------------------------------------------------------------------------------    VITAL SIGNS:  T(C): 35.2 (10-30-23 @ 12:26), Max: 36.6 (10-30-23 @ 04:00)  HR: 108 (10-30-23 @ 14:00) (72 - 122)  BP: 155/135 (10-30-23 @ 14:00) (101/53 - 186/98)  RR: 43 (10-30-23 @ 14:00) (16 - 43)  SpO2: 96% (10-30-23 @ 14:00) (91% - 100%)  --------------------------------------------------------------------------------------    INS AND OUTS:    10-29-23 @ 07:01  -  10-30-23 @ 07:00  --------------------------------------------------------  IN: 5306 mL / OUT: 665 mL / NET: 4641 mL    10-30-23 @ 07:01  -  10-30-23 @ 14:34  --------------------------------------------------------  IN: 614 mL / OUT: 172 mL / NET: 442 mL      --------------------------------------------------------------------------------------    EXAM    NEURO: NAD,   HEENT: NC/AT  RESPIRATORY: nonlabored respirations, normal CW expansion  CARDIO: RRR, S1S2  ABDOMEN: soft, nontender, nondistended, surgical dressing c/d/i  EXTREMITIES: normal strength, no deformities    --------------------------------------------------------------------------------------    LABS                        9.5    17.59 )-----------( 185      ( 30 Oct 2023 12:45 )             27.4     10-30    139  |  107  |  24<H>  ----------------------------<  140<H>  4.4   |  19<L>  |  1.94<H>    Ca    8.5      30 Oct 2023 12:45  Phos  6.3     10-30  Mg     1.80     10-30      PT/INR - ( 30 Oct 2023 12:45 )   PT: 18.7 sec;   INR: 1.70 ratio         PTT - ( 30 Oct 2023 12:45 )  PTT:196.2 sec  Urinalysis Basic - ( 30 Oct 2023 12:45 )    Color: x / Appearance: x / SG: x / pH: x  Gluc: 140 mg/dL / Ketone: x  / Bili: x / Urobili: x   Blood: x / Protein: x / Nitrite: x   Leuk Esterase: x / RBC: x / WBC x   Sq Epi: x / Non Sq Epi: x / Bacteria: x      T(C): 35.2 (10-30-23 @ 12:26), Max: 36.6 (10-30-23 @ 04:00)  HR: 108 (10-30-23 @ 14:00) (72 - 122)  BP: 155/135 (10-30-23 @ 14:00) (101/53 - 186/98)  RR: 43 (10-30-23 @ 14:00) (16 - 43)  SpO2: 96% (10-30-23 @ 14:00) (91% - 100%)  --------------------------------------------------------------------------------------

## 2023-10-30 NOTE — DIETITIAN INITIAL EVALUATION ADULT - PERTINENT LABORATORY DATA
10-30    139  |  104  |  24<H>  ----------------------------<  142<H>  4.5   |  17<L>  |  1.98<H>    Ca    7.9<L>      30 Oct 2023 05:50  Phos  6.5     10-30  Mg     1.90     10-30    POCT Blood Glucose.: 151 mg/dL (10-30-23 @ 05:48)  A1C with Estimated Average Glucose Result: 5.8 % (10-19-23 @ 07:55)

## 2023-10-30 NOTE — BRIEF OPERATIVE NOTE - NSICDXBRIEFPOSTOP_GEN_ALL_CORE_FT
POST-OP DIAGNOSIS:  Chronic mesenteric ischemia 29-Oct-2023 08:18:32  Matty Conklin  
POST-OP DIAGNOSIS:  Chronic mesenteric ischemia 29-Oct-2023 08:18:32  Matty Conklin

## 2023-10-30 NOTE — PROGRESS NOTE ADULT - SUBJECTIVE AND OBJECTIVE BOX
VASCULAR SURGERY POST-OP CHECK NOTE:    Pt seen and examined at bedside in SICU. Family bedside. Reports minimal abdominal pain, denies flatus. Denies chest pain or SOB.     Vital Signs Last 24 Hrs  T(C): 35.2 (30 Oct 2023 12:26), Max: 36.6 (30 Oct 2023 04:00)  T(F): 95.3 (30 Oct 2023 12:26), Max: 97.8 (30 Oct 2023 04:00)  HR: 106 (30 Oct 2023 15:00) (72 - 122)  BP: 155/135 (30 Oct 2023 14:00) (101/53 - 186/98)  BP(mean): 143 (30 Oct 2023 14:00) (61 - 143)  RR: 30 (30 Oct 2023 15:00) (16 - 43)  SpO2: 97% (30 Oct 2023 15:00) (91% - 100%)    Parameters below as of 30 Oct 2023 15:00  Patient On (Oxygen Delivery Method): nasal cannula  O2 Flow (L/min): 2      I&O's Summary    29 Oct 2023 07:01  -  30 Oct 2023 07:00  --------------------------------------------------------  IN: 5306 mL / OUT: 665 mL / NET: 4641 mL    30 Oct 2023 07:01  -  30 Oct 2023 16:08  --------------------------------------------------------  IN: 614 mL / OUT: 222 mL / NET: 392 mL        Physical Exam  Gen: No acute distress, resting comfortably in bed  Pulm: On room air. Even and unlabored breathing. No respiratory distress, no subcostal retractions  CV: Sinus tach on monitor   Abd: Soft, non distended, appropriately tender to midline incision without rebound or guarding. Incision c/d/i  : Rankin catheter in place with lance urine   Extremities:  L femoral a-line in place. No edema

## 2023-10-30 NOTE — DIETITIAN INITIAL EVALUATION ADULT - ADD RECOMMEND
1. Advance diet as medically feasible and appropriate. 2. Monitor weights, labs, BM's, skin integrity and edema. 3. Follow pt as per protocol.

## 2023-10-30 NOTE — PROGRESS NOTE ADULT - ASSESSMENT
ASSESSMENT: 63F recent R DEBI-SMA bypass for CMI, presented after discharge with worsening nausea/vomiting and abdominal pain, CTA showed occluded bypass graft s/p redo R DEBI-SMA bypass procedure on 10/28.     PLAN:  -H/H dropping this AM   -WBC increased to 18  -CT demonstrating free fluid around the bypass and near the liver   -Patient added on emergently for exploratory laparotomy     Vascular Surgery, [id number]

## 2023-10-30 NOTE — PROGRESS NOTE ADULT - SUBJECTIVE AND OBJECTIVE BOX
Surgery Progress Note    SUBJECTIVE: Pt seen and examined at bedside. Overnight, patient with minimal UOP, requiring boluses of crystalloid and colloid. H/H dropped overnight, s/p 1u pRBC. Repeat H/H 7.1/20.2.     Vital Signs Last 24 Hrs  T(C): 36.3 (30 Oct 2023 09:00), Max: 37.1 (29 Oct 2023 12:00)  T(F): 97.3 (30 Oct 2023 09:00), Max: 98.7 (29 Oct 2023 12:00)  HR: 86 (30 Oct 2023 09:00) (66 - 122)  BP: 186/98 (30 Oct 2023 09:00) (101/53 - 186/98)  BP(mean): 127 (30 Oct 2023 09:00) (61 - 127)  RR: 36 (30 Oct 2023 09:00) (14 - 36)  SpO2: 97% (30 Oct 2023 09:00) (91% - 100%)    Parameters below as of 30 Oct 2023 09:00  Patient On (Oxygen Delivery Method): nasal cannula  O2 Flow (L/min): 2      Physical Exam:  General Appearance: NAD  Respiratory: No labored breathing  CV: Pulse regularly present  Abdomen: Soft, nontender.     LABS:                        7.1    18.12 )-----------( 221      ( 30 Oct 2023 05:50 )             20.2     10-30    139  |  104  |  24<H>  ----------------------------<  142<H>  4.5   |  17<L>  |  1.98<H>    Ca    7.9<L>      30 Oct 2023 05:50  Phos  6.5     10-30  Mg     1.90     10-30      PT/INR - ( 30 Oct 2023 01:07 )   PT: 16.5 sec;   INR: 1.48 ratio         PTT - ( 30 Oct 2023 01:07 )  PTT:81.2 sec  Urinalysis Basic - ( 30 Oct 2023 05:50 )    Color: x / Appearance: x / SG: x / pH: x  Gluc: 142 mg/dL / Ketone: x  / Bili: x / Urobili: x   Blood: x / Protein: x / Nitrite: x   Leuk Esterase: x / RBC: x / WBC x   Sq Epi: x / Non Sq Epi: x / Bacteria: x        INs and OUTs:    10-29-23 @ 07:01  -  10-30-23 @ 07:00  --------------------------------------------------------  IN: 5306 mL / OUT: 665 mL / NET: 4641 mL    10-30-23 @ 07:01  -  10-30-23 @ 10:33  --------------------------------------------------------  IN: 407 mL / OUT: 65 mL / NET: 342 mL

## 2023-10-30 NOTE — DIETITIAN INITIAL EVALUATION ADULT - SIGNS/SYMPTOMS
<50% of estimated nutrition needs met > 5 days with > 2% weight loss > 1 week PTA pt s/p removal and replacement of thrombosed graft of L common iliac artery to SMA

## 2023-10-30 NOTE — DIETITIAN INITIAL EVALUATION ADULT - PERTINENT MEDS FT
MEDICATIONS  (STANDING):  acetaminophen   IVPB .. 1000 milliGRAM(s) IV Intermittent every 6 hours  atorvastatin 80 milliGRAM(s) Oral at bedtime  ceFAZolin   IVPB 2000 milliGRAM(s) IV Intermittent every 8 hours  chlorhexidine 2% Cloths 1 Application(s) Topical daily  heparin  Infusion 700 Unit(s)/Hr (7 mL/Hr) IV Continuous <Continuous>  insulin lispro (ADMELOG) corrective regimen sliding scale   SubCutaneous every 6 hours  lidocaine   4% Patch 1 Patch Transdermal daily  melatonin 3 milliGRAM(s) Oral at bedtime  pantoprazole  Injectable 40 milliGRAM(s) IV Push daily  sodium chloride 0.9%. 1000 milliLiter(s) (100 mL/Hr) IV Continuous <Continuous>    MEDICATIONS  (PRN):  HYDROmorphone  Injectable 0.5 milliGRAM(s) IV Push every 4 hours PRN Moderate Pain (4 - 6)  HYDROmorphone  Injectable 1 milliGRAM(s) IV Push every 4 hours PRN Severe Pain (7 - 10)

## 2023-10-30 NOTE — DIETITIAN INITIAL EVALUATION ADULT - NSFNSGIIOFT_GEN_A_CORE
10-29-23 @ 07:01  -  10-30-23 @ 07:00  --------------------------------------------------------  OUT:    Nasogastric/Oral tube (mL): 350 mL  Total OUT: 350 mL    Total NET: -350 mL      10-30-23 @ 07:01  -  10-30-23 @ 12:44  --------------------------------------------------------  OUT:    Nasogastric/Oral tube (mL): 50 mL  Total OUT: 50 mL    Total NET: -50 mL

## 2023-10-30 NOTE — BRIEF OPERATIVE NOTE - NSICDXBRIEFPROCEDURE_GEN_ALL_CORE_FT
PROCEDURES:  Exploratory laparotomy 30-Oct-2023 12:48:55  Matty Conklin  
PROCEDURES:  Creation of iliomesenteric arterial bypass using graft 29-Oct-2023 08:18:06  Matty Conklin

## 2023-10-30 NOTE — PROGRESS NOTE ADULT - ASSESSMENT
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 Morales) and discharged on 10/27 returned to ED with abdominal pain, NV, fevers, chills. 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 Occlusion of proximal SMA. Occluded bypass graft from left common iliac artery to SMA. Reconstitution of distal SMA from collaterals." Patient taken emergently to OR for bypass revision. Patient s/p ex lap, removal/replacement of thrombosed graft on 10/28. SICU consulted for HD monitoring. Post-op course c/b low UOP, acute blood loss anemia requiring blood transfusion, and tachycardia concerning for bleed. Non con CT demonstrated free fluid around the bypass and near the liver. Patient taken emergently to OR and now s/p evacuation of 1L hematoma and repair of iliac anastomosis. Patient seen and examined in SICU, recovering well.   - Pain control PRN  - Maintain MAP >65  - Continue NPO/NGT, await return of bowel function   -IV fluids, monitor UOP  -s/p 3U PRBC. Transfuse PRN  - Titrate hep gtt to therapeutic, currently held for supra therapeutic aPTT   - Continue abx   -Appreciate SICU care     Vascular Surgery #[id number]  Please page with any questions or concerns

## 2023-10-30 NOTE — PROGRESS NOTE ADULT - SUBJECTIVE AND OBJECTIVE BOX
SICU Daily Progress Note  =====================================================  Interval/Overnight Events:       - back on NC for sats in high 80s  - 1pRBC overnight   - borderline MAPs, POCUS with hyerdynamic ventricles - 250cc albumin   - low UOP, s/p 500cc bolus x2 during day shift. s/p 1L bolus overnight     PAST MEDICAL & SURGICAL HISTORY:  Positive H. pylori test  Status post vascular bypass      ALLERGIES:  No Known Allergies      --------------------------------------------------------------------------------------    MEDICATIONS:    Neurologic Medications  acetaminophen   IVPB .. 1000 milliGRAM(s) IV Intermittent every 6 hours  HYDROmorphone  Injectable 1 milliGRAM(s) IV Push every 4 hours PRN Severe Pain (7 - 10)  HYDROmorphone  Injectable 0.5 milliGRAM(s) IV Push every 4 hours PRN Moderate Pain (4 - 6)    Respiratory Medications    Cardiovascular Medications    Gastrointestinal Medications  pantoprazole  Injectable 40 milliGRAM(s) IV Push daily  sodium chloride 0.9%. 1000 milliLiter(s) IV Continuous <Continuous>    Genitourinary Medications    Hematologic/Oncologic Medications  heparin  Infusion 700 Unit(s)/Hr IV Continuous <Continuous>    Antimicrobial/Immunologic Medications  ceFAZolin   IVPB 2000 milliGRAM(s) IV Intermittent every 8 hours    Endocrine/Metabolic Medications  atorvastatin 80 milliGRAM(s) Oral at bedtime  dextrose 50% Injectable 25 Gram(s) IV Push once  dextrose 50% Injectable 12.5 Gram(s) IV Push once  insulin lispro (ADMELOG) corrective regimen sliding scale   SubCutaneous every 6 hours    Topical/Other Medications  chlorhexidine 2% Cloths 1 Application(s) Topical daily  lidocaine   4% Patch 1 Patch Transdermal daily    --------------------------------------------------------------------------------------    VITAL SIGNS:  ICU Vital Signs Last 24 Hrs  T(C): 36.3 (29 Oct 2023 20:00), Max: 37.1 (29 Oct 2023 12:00)  T(F): 97.4 (29 Oct 2023 20:00), Max: 98.7 (29 Oct 2023 12:00)  HR: 88 (29 Oct 2023 23:00) (65 - 92)  BP: 110/89 (29 Oct 2023 09:00) (110/89 - 110/89)  BP(mean): 94 (29 Oct 2023 09:00) (94 - 94)  ABP: 93/41 (29 Oct 2023 23:00) (93/41 - 167/69)  ABP(mean): 59 (29 Oct 2023 23:00) (59 - 105)  RR: 35 (29 Oct 2023 23:00) (12 - 35)  SpO2: 96% (29 Oct 2023 23:00) (91% - 100%)    O2 Parameters below as of 29 Oct 2023 23:00  Patient On (Oxygen Delivery Method): nasal cannula  O2 Flow (L/min): 4      --------------------------------------------------------------------------------------    INS AND OUTS:  I&O's Detail    28 Oct 2023 07:01  -  29 Oct 2023 07:00  --------------------------------------------------------  IN:    Heparin: 32 mL    sodium chloride 0.9%: 600 mL  Total IN: 632 mL    OUT:    Indwelling Catheter - Urethral (mL): 570 mL    Nasogastric/Oral tube (mL): 100 mL  Total OUT: 670 mL    Total NET: -38 mL      29 Oct 2023 07:01  -  30 Oct 2023 00:02  --------------------------------------------------------  IN:    Heparin: 30 mL    Heparin: 77 mL    IV PiggyBack: 450 mL    Lactated Ringers Bolus: 1000 mL    PRBCs (Packed Red Blood Cells): 300 mL    sodium chloride 0.9%: 1600 mL    Sodium Chloride 0.9% Bolus: 1000 mL  Total IN: 4457 mL    OUT:    Indwelling Catheter - Urethral (mL): 250 mL    Nasogastric/Oral tube (mL): 350 mL  Total OUT: 600 mL    Total NET: 3857 mL        --------------------------------------------------------------------------------------    EXAM  NEUROLOGY  Exam: Normal, in no acute distress.  Alert and oriented x4.  No focal neurologic deficits.     RESPIRATORY  Exam: Normal expansion/effort on NC  Mechanical Ventilation:     CARDIOVASCULAR  Exam: S1, S2.  Regular rate and rhythm.       GI/NUTRITION  Exam: Abdomen soft, slightly distended, tender throughout. Midline acquacel intact with minimal strikethrough. NGT in place. cannon in place  Current Diet: NPO    VASCULAR  Exam: Extremities warm. Palpable DP pulses b/l.     MUSCULOSKELETAL  Exam: All extremities moving spontaneously without limitations.       METABOLIC / FLUIDS / ELECTROLYTES  sodium chloride 0.9%. 1000 milliLiter(s) IV Continuous <Continuous>      HEMATOLOGIC  [x] VTE Prophylaxis: heparin  Infusion 700 Unit(s)/Hr IV Continuous <Continuous>      INFECTIOUS DISEASE  Antimicrobials/Immunologic Medications:  ceFAZolin   IVPB 2000 milliGRAM(s) IV Intermittent every 8 hours      --------------------------------------------------------------------------------------    LABS                          7.9    14.60 )-----------( 244      ( 29 Oct 2023 21:41 )             22.1     10-29    136  |  104  |  19  ----------------------------<  157<H>  4.5   |  17<L>  |  1.43<H>    Ca    7.4<L>      29 Oct 2023 20:57  Phos  6.0     10-29  Mg     1.90     10-29    TPro  7.4  /  Alb  3.9  /  TBili  0.6  /  DBili  x   /  AST  37<H>  /  ALT  41<H>  /  AlkPhos  113  10-28    --------------------------------------------------------------------------------------   SICU Daily Progress Note  =====================================================  Interval/Overnight Events:       - back on NC for sats in high 80s  - 1pRBC overnight   - borderline MAPs, POCUS with hyerdynamic ventricles - 250cc albumin   - low UOP, s/p 500cc bolus x2 during day shift. s/p 1L bolus overnight     PAST MEDICAL & SURGICAL HISTORY:  Positive H. pylori test  Status post vascular bypass 10/20      ALLERGIES:  No Known Allergies      --------------------------------------------------------------------------------------    MEDICATIONS:    Neurologic Medications  acetaminophen   IVPB .. 1000 milliGRAM(s) IV Intermittent every 6 hours  HYDROmorphone  Injectable 1 milliGRAM(s) IV Push every 4 hours PRN Severe Pain (7 - 10)  HYDROmorphone  Injectable 0.5 milliGRAM(s) IV Push every 4 hours PRN Moderate Pain (4 - 6)    Respiratory Medications    Cardiovascular Medications    Gastrointestinal Medications  pantoprazole  Injectable 40 milliGRAM(s) IV Push daily  sodium chloride 0.9%. 1000 milliLiter(s) IV Continuous <Continuous>    Genitourinary Medications    Hematologic/Oncologic Medications  heparin  Infusion 700 Unit(s)/Hr IV Continuous <Continuous>    Antimicrobial/Immunologic Medications  ceFAZolin   IVPB 2000 milliGRAM(s) IV Intermittent every 8 hours    Endocrine/Metabolic Medications  atorvastatin 80 milliGRAM(s) Oral at bedtime  dextrose 50% Injectable 25 Gram(s) IV Push once  dextrose 50% Injectable 12.5 Gram(s) IV Push once  insulin lispro (ADMELOG) corrective regimen sliding scale   SubCutaneous every 6 hours    Topical/Other Medications  chlorhexidine 2% Cloths 1 Application(s) Topical daily  lidocaine   4% Patch 1 Patch Transdermal daily    --------------------------------------------------------------------------------------    VITAL SIGNS:  ICU Vital Signs Last 24 Hrs  T(C): 36.3 (29 Oct 2023 20:00), Max: 37.1 (29 Oct 2023 12:00)  T(F): 97.4 (29 Oct 2023 20:00), Max: 98.7 (29 Oct 2023 12:00)  HR: 88 (29 Oct 2023 23:00) (65 - 92)  BP: 110/89 (29 Oct 2023 09:00) (110/89 - 110/89)  BP(mean): 94 (29 Oct 2023 09:00) (94 - 94)  ABP: 93/41 (29 Oct 2023 23:00) (93/41 - 167/69)  ABP(mean): 59 (29 Oct 2023 23:00) (59 - 105)  RR: 35 (29 Oct 2023 23:00) (12 - 35)  SpO2: 96% (29 Oct 2023 23:00) (91% - 100%)    O2 Parameters below as of 29 Oct 2023 23:00  Patient On (Oxygen Delivery Method): nasal cannula  O2 Flow (L/min): 4      --------------------------------------------------------------------------------------    INS AND OUTS:  I&O's Detail    28 Oct 2023 07:01  -  29 Oct 2023 07:00  --------------------------------------------------------  IN:    Heparin: 32 mL    sodium chloride 0.9%: 600 mL  Total IN: 632 mL    OUT:    Indwelling Catheter - Urethral (mL): 570 mL    Nasogastric/Oral tube (mL): 100 mL  Total OUT: 670 mL    Total NET: -38 mL      29 Oct 2023 07:01  -  30 Oct 2023 00:02  --------------------------------------------------------  IN:    Heparin: 30 mL    Heparin: 77 mL    IV PiggyBack: 450 mL    Lactated Ringers Bolus: 1000 mL    PRBCs (Packed Red Blood Cells): 300 mL    sodium chloride 0.9%: 1600 mL    Sodium Chloride 0.9% Bolus: 1000 mL  Total IN: 4457 mL    OUT:    Indwelling Catheter - Urethral (mL): 250 mL    Nasogastric/Oral tube (mL): 350 mL  Total OUT: 600 mL    Total NET: 3857 mL        --------------------------------------------------------------------------------------    EXAM  NEUROLOGY  Exam: Normal, in no acute distress.  Alert and oriented x4.  No focal neurologic deficits.     RESPIRATORY  Exam: Normal expansion/effort on NC  Mechanical Ventilation:     CARDIOVASCULAR  Exam: S1, S2.  Regular rate and rhythm.       GI/NUTRITION  Exam: Abdomen soft, slightly distended, tender throughout. Midline acquacel intact with minimal strikethrough. NGT in place. cannon in place  Current Diet: NPO    VASCULAR  Exam: Extremities warm. Palpable DP pulses b/l. LUE cutdown incision intact, staples in place    MUSCULOSKELETAL  Exam: All extremities moving spontaneously without limitations.       METABOLIC / FLUIDS / ELECTROLYTES  sodium chloride 0.9%. 1000 milliLiter(s) IV Continuous <Continuous>      HEMATOLOGIC  [x] AC: heparin  Infusion 700 Unit(s)/Hr IV Continuous <Continuous>      INFECTIOUS DISEASE  Antimicrobials/Immunologic Medications:  ceFAZolin   IVPB 2000 milliGRAM(s) IV Intermittent every 8 hours      --------------------------------------------------------------------------------------    LABS                          7.9    14.60 )-----------( 244      ( 29 Oct 2023 21:41 )             22.1     10-29    136  |  104  |  19  ----------------------------<  157<H>  4.5   |  17<L>  |  1.43<H>    Ca    7.4<L>      29 Oct 2023 20:57  Phos  6.0     10-29  Mg     1.90     10-29    TPro  7.4  /  Alb  3.9  /  TBili  0.6  /  DBili  x   /  AST  37<H>  /  ALT  41<H>  /  AlkPhos  113  10-28    --------------------------------------------------------------------------------------

## 2023-10-30 NOTE — PROGRESS NOTE ADULT - ASSESSMENT
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 Morales) and discharged on 10/27 returned to ED with abdominal pain, NV, fevers, chills. 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 Occlusion of proximal SMA. Occluded bypass graft from left common iliac artery to SMA. Reconstitution of distal SMA from collaterals." Patient taken emergently to OR for bypass revision. Patient now s/p ex lap, removal/replacement of thrombosed graft on 10/28. SICU consulted for HD monitoring and frequent abdominal pain assessment for possible relapse of ischemia post-operatively.     Plan  NEUROLOGIC   - pain control prn tylenol and dilaudid   - A&Ox4     RESPIRATORY   - Monitor SpO2 goal >92%  - Incentive spirometry   - on NC, wean as tolerated     CARDIOVASCULAR   - no current pressor requirements  - MAP >65  - continue home statin  - holding home lisinopril, amlodipine while NPO      GASTROINTESTINAL   - Diet: NPO/NGT  - serial abdominal exams   - protonix daily   - zofran prn nausea    /RENAL   - IVF @100  - Strict I/Os  - Maintain cannon catheter   - Monitor electrolytes, replete PRN    HEMATOLOGIC  - Monitor H/H   - holding home ASA   - heparin gtt, f/u PTT    INFECTIOUS DISEASE  - Monitor fever / WBC  - continue ancef per primary team    ENDOCRINE  - Monitor gluc  - no active issues     DISPO: SICU 63F PMHx HTN, DM, PAD, 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 Morales) and discharged on 10/27 returned to ED with abdominal pain, NV, fevers, chills. 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 Occlusion of proximal SMA. Occluded bypass graft from left common iliac artery to SMA. Reconstitution of distal SMA from collaterals." Patient taken emergently to OR for bypass revision. Patient now s/p ex lap, removal/replacement of thrombosed graft on 10/28. SICU consulted for HD monitoring and frequent abdominal pain assessment for possible relapse of ischemia post-operatively. Patient taken to OR 10/30 for evacuation of 1L hematoma and repair of iliac anastomosis.    Plan  NEUROLOGIC   - pain control prn tylenol and dilaudid   - A&Ox4     RESPIRATORY   - Monitor SpO2 goal >92%  - Incentive spirometry   - on NC, wean as tolerated     CARDIOVASCULAR   - no current pressor requirements  - MAP >65  - continue home statin  - holding home lisinopril, amlodipine while NPO      GASTROINTESTINAL   - Diet: NPO/NGT  - serial abdominal exams   - protonix daily   - zofran prn nausea    /RENAL   - IVF @100  - Strict I/Os  - Maintain cannon catheter   - Monitor electrolytes, replete PRN    HEMATOLOGIC  - Monitor H/H   - s/p 2U pRBC  - holding home ASA   - heparin gtt, f/u PTT    INFECTIOUS DISEASE  - Monitor fever / WBC  - continue ancef per primary team    ENDOCRINE  - Monitor gluc  - no active issues     DISPO: SICU

## 2023-10-30 NOTE — PROGRESS NOTE ADULT - ASSESSMENT
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 Morales) and discharged on 10/27 returned to ED with abdominal pain, NV, fevers, chills. 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 Occlusion of proximal SMA. Occluded bypass graft from left common iliac artery to SMA. Reconstitution of distal SMA from collaterals." Patient taken emergently to OR for bypass revision. Patient now s/p ex lap, removal/replacement of thrombosed graft on 10/28. SICU consulted for HD monitoring and frequent abdominal pain assessment for possible relapse of ischemia post-operatively. Patient taken to OR 10/30 for evacuation of 1L hematoma and repair of iliac anastomosis.          Plan  NEUROLOGIC   - pain control prn tylenol and dilaudid   - A&Ox4     RESPIRATORY   - Monitor SpO2 goal >92%  - Incentive spirometry   - on NC, wean as tolerated     CARDIOVASCULAR   - no current pressor requirements  - MAP >65  - continue home statin  - holding home lisinopril, amlodipine while NPO      GASTROINTESTINAL   - Diet: NPO/NGT  - serial abdominal exams   - protonix daily   - zofran prn nausea    /RENAL   - IVF @100  - Strict I/Os  - Maintain cannon catheter   - Monitor electrolytes, replete PRN    HEMATOLOGIC  - Monitor H/H   - s/p 2U pRBC  - holding home ASA   - heparin gtt, f/u PTT    INFECTIOUS DISEASE  - Monitor fever / WBC  - continue ancef per primary team    ENDOCRINE  - Monitor gluc  - no active issues     DISPO: SICU

## 2023-10-30 NOTE — DIETITIAN INITIAL EVALUATION ADULT - OTHER INFO
64 y/o female with PMHx HTN, DM, PAD, complete occlusion of SMA s/p ex lap right iliac to SMA bypass on 10/20/23 and discharged on 10/27 returned to ED with abdominal pain, NV, fevers, chills. 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 Occlusion of proximal SMA. Occluded bypass graft from left common iliac artery to SMA. Reconstitution of distal SMA from collaterals." Patient taken emergently to OR for bypass revision. Patient now s/p ex lap, removal/replacement of thrombosed graft on 10/28. Visited with pt and Dtr at bedside to obtain nutrition hx. Dtr provided hx for patient. Pt presently NPO receiving IVF. NGT to LCWS with 100 mL output over the past 24 hrs. Per EMR review, pt had a recorded wt her previous admission a week ago of 55.8 kg (123 lbs) with current admit wt of 119 lbs indicating a clinically significant loss of 3.3% in 1 week. Pt's Dtr said that pt has not had anything to eat since the day of her discharge, 10/27 and ate minimally a few days leading up to this date due to having abdominal pain and feeling nauseous. Pt presents at severe risk for malnutrition based on <50% of estimated nutrition needs met > 5 days with >2% weight loss > 1 week. NKFA. Dtr denies food allergies, nausea/vomiting/diarrhea/constipation presently, or issues with chewing/swallowing of patient. Suggest advancing diet when medically feasible and appropriate. Please consult this service once nutrition plan of care has been established so that appropriate nutrition intervention can be provided in a timely manner. Education deferred at this time as patient is medically acute in the critical care setting. RDN services to remain available as needed.

## 2023-10-30 NOTE — BRIEF OPERATIVE NOTE - NSICDXBRIEFPREOP_GEN_ALL_CORE_FT
PRE-OP DIAGNOSIS:  Chronic mesenteric ischemia 29-Oct-2023 08:18:17  Matty Conklin  
PRE-OP DIAGNOSIS:  Chronic mesenteric ischemia 29-Oct-2023 08:18:17  Matty Conklin

## 2023-10-31 LAB
APTT BLD: 82.2 SEC — HIGH (ref 24.5–35.6)
APTT BLD: 82.2 SEC — HIGH (ref 24.5–35.6)
APTT BLD: 83 SEC — HIGH (ref 24.5–35.6)
APTT BLD: 83 SEC — HIGH (ref 24.5–35.6)
GLUCOSE BLDC GLUCOMTR-MCNC: 104 MG/DL — HIGH (ref 70–99)
GLUCOSE BLDC GLUCOMTR-MCNC: 104 MG/DL — HIGH (ref 70–99)
GLUCOSE BLDC GLUCOMTR-MCNC: 84 MG/DL — SIGNIFICANT CHANGE UP (ref 70–99)
GLUCOSE BLDC GLUCOMTR-MCNC: 84 MG/DL — SIGNIFICANT CHANGE UP (ref 70–99)
GLUCOSE BLDC GLUCOMTR-MCNC: 90 MG/DL — SIGNIFICANT CHANGE UP (ref 70–99)
HCT VFR BLD CALC: 22.3 % — LOW (ref 34.5–45)
HGB BLD-MCNC: 7.7 G/DL — LOW (ref 11.5–15.5)
HGB BLD-MCNC: 7.7 G/DL — LOW (ref 11.5–15.5)
HGB BLD-MCNC: 7.9 G/DL — LOW (ref 11.5–15.5)
HGB BLD-MCNC: 7.9 G/DL — LOW (ref 11.5–15.5)
INR BLD: 1.78 RATIO — HIGH (ref 0.85–1.18)
INR BLD: 1.78 RATIO — HIGH (ref 0.85–1.18)
MCHC RBC-ENTMCNC: 29.6 PG — SIGNIFICANT CHANGE UP (ref 27–34)
MCHC RBC-ENTMCNC: 29.6 PG — SIGNIFICANT CHANGE UP (ref 27–34)
MCHC RBC-ENTMCNC: 29.7 PG — SIGNIFICANT CHANGE UP (ref 27–34)
MCHC RBC-ENTMCNC: 29.7 PG — SIGNIFICANT CHANGE UP (ref 27–34)
MCHC RBC-ENTMCNC: 34.5 GM/DL — SIGNIFICANT CHANGE UP (ref 32–36)
MCHC RBC-ENTMCNC: 34.5 GM/DL — SIGNIFICANT CHANGE UP (ref 32–36)
MCHC RBC-ENTMCNC: 35.4 GM/DL — SIGNIFICANT CHANGE UP (ref 32–36)
MCHC RBC-ENTMCNC: 35.4 GM/DL — SIGNIFICANT CHANGE UP (ref 32–36)
MCV RBC AUTO: 83.8 FL — SIGNIFICANT CHANGE UP (ref 80–100)
MCV RBC AUTO: 83.8 FL — SIGNIFICANT CHANGE UP (ref 80–100)
MCV RBC AUTO: 85.8 FL — SIGNIFICANT CHANGE UP (ref 80–100)
MCV RBC AUTO: 85.8 FL — SIGNIFICANT CHANGE UP (ref 80–100)
NRBC # BLD: 0 /100 WBCS — SIGNIFICANT CHANGE UP (ref 0–0)
NRBC # FLD: 0 K/UL — SIGNIFICANT CHANGE UP (ref 0–0)
PLATELET # BLD AUTO: 194 K/UL — SIGNIFICANT CHANGE UP (ref 150–400)
PLATELET # BLD AUTO: 194 K/UL — SIGNIFICANT CHANGE UP (ref 150–400)
PLATELET # BLD AUTO: 204 K/UL — SIGNIFICANT CHANGE UP (ref 150–400)
PLATELET # BLD AUTO: 204 K/UL — SIGNIFICANT CHANGE UP (ref 150–400)
PROTHROM AB SERPL-ACNC: 19.6 SEC — HIGH (ref 9.5–13)
PROTHROM AB SERPL-ACNC: 19.6 SEC — HIGH (ref 9.5–13)
RBC # BLD: 2.6 M/UL — LOW (ref 3.8–5.2)
RBC # BLD: 2.6 M/UL — LOW (ref 3.8–5.2)
RBC # BLD: 2.66 M/UL — LOW (ref 3.8–5.2)
RBC # BLD: 2.66 M/UL — LOW (ref 3.8–5.2)
RBC # FLD: 15.2 % — HIGH (ref 10.3–14.5)
RBC # FLD: 15.2 % — HIGH (ref 10.3–14.5)
RBC # FLD: 15.7 % — HIGH (ref 10.3–14.5)
RBC # FLD: 15.7 % — HIGH (ref 10.3–14.5)
WBC # BLD: 16.36 K/UL — HIGH (ref 3.8–10.5)
WBC # BLD: 16.36 K/UL — HIGH (ref 3.8–10.5)
WBC # BLD: 17.47 K/UL — HIGH (ref 3.8–10.5)
WBC # BLD: 17.47 K/UL — HIGH (ref 3.8–10.5)
WBC # FLD AUTO: 16.36 K/UL — HIGH (ref 3.8–10.5)
WBC # FLD AUTO: 16.36 K/UL — HIGH (ref 3.8–10.5)
WBC # FLD AUTO: 17.47 K/UL — HIGH (ref 3.8–10.5)
WBC # FLD AUTO: 17.47 K/UL — HIGH (ref 3.8–10.5)

## 2023-10-31 PROCEDURE — 99233 SBSQ HOSP IP/OBS HIGH 50: CPT

## 2023-10-31 RX ORDER — ACETAMINOPHEN 500 MG
1000 TABLET ORAL EVERY 6 HOURS
Refills: 0 | Status: COMPLETED | OUTPATIENT
Start: 2023-10-31 | End: 2023-11-01

## 2023-10-31 RX ORDER — SODIUM CHLORIDE 9 MG/ML
1000 INJECTION, SOLUTION INTRAVENOUS
Refills: 0 | Status: DISCONTINUED | OUTPATIENT
Start: 2023-10-31 | End: 2023-11-06

## 2023-10-31 RX ORDER — LANOLIN ALCOHOL/MO/W.PET/CERES
3 CREAM (GRAM) TOPICAL ONCE
Refills: 0 | Status: COMPLETED | OUTPATIENT
Start: 2023-10-31 | End: 2023-10-31

## 2023-10-31 RX ORDER — ACETAMINOPHEN 500 MG
1000 TABLET ORAL ONCE
Refills: 0 | Status: COMPLETED | OUTPATIENT
Start: 2023-10-31 | End: 2023-10-31

## 2023-10-31 RX ADMIN — HYDROMORPHONE HYDROCHLORIDE 1 MILLIGRAM(S): 2 INJECTION INTRAMUSCULAR; INTRAVENOUS; SUBCUTANEOUS at 10:45

## 2023-10-31 RX ADMIN — Medication 400 MILLIGRAM(S): at 14:18

## 2023-10-31 RX ADMIN — HYDROMORPHONE HYDROCHLORIDE 1 MILLIGRAM(S): 2 INJECTION INTRAMUSCULAR; INTRAVENOUS; SUBCUTANEOUS at 05:11

## 2023-10-31 RX ADMIN — Medication 400 MILLIGRAM(S): at 21:55

## 2023-10-31 RX ADMIN — Medication 1000 MILLIGRAM(S): at 21:55

## 2023-10-31 RX ADMIN — Medication 100 MILLIGRAM(S): at 05:36

## 2023-10-31 RX ADMIN — LIDOCAINE 1 PATCH: 4 CREAM TOPICAL at 01:30

## 2023-10-31 RX ADMIN — HYDROMORPHONE HYDROCHLORIDE 1 MILLIGRAM(S): 2 INJECTION INTRAMUSCULAR; INTRAVENOUS; SUBCUTANEOUS at 04:51

## 2023-10-31 RX ADMIN — Medication 1000 MILLIGRAM(S): at 08:30

## 2023-10-31 RX ADMIN — ATORVASTATIN CALCIUM 80 MILLIGRAM(S): 80 TABLET, FILM COATED ORAL at 22:24

## 2023-10-31 RX ADMIN — Medication 3 MILLIGRAM(S): at 22:24

## 2023-10-31 RX ADMIN — Medication 100 MILLIGRAM(S): at 14:18

## 2023-10-31 RX ADMIN — LIDOCAINE 1 PATCH: 4 CREAM TOPICAL at 11:59

## 2023-10-31 RX ADMIN — Medication 400 MILLIGRAM(S): at 07:50

## 2023-10-31 RX ADMIN — HYDROMORPHONE HYDROCHLORIDE 1 MILLIGRAM(S): 2 INJECTION INTRAMUSCULAR; INTRAVENOUS; SUBCUTANEOUS at 10:29

## 2023-10-31 RX ADMIN — Medication 400 MILLIGRAM(S): at 00:20

## 2023-10-31 RX ADMIN — LIDOCAINE 1 PATCH: 4 CREAM TOPICAL at 21:24

## 2023-10-31 RX ADMIN — PANTOPRAZOLE SODIUM 40 MILLIGRAM(S): 20 TABLET, DELAYED RELEASE ORAL at 11:58

## 2023-10-31 RX ADMIN — Medication 100 MILLIGRAM(S): at 21:57

## 2023-10-31 RX ADMIN — SODIUM CHLORIDE 100 MILLILITER(S): 9 INJECTION INTRAMUSCULAR; INTRAVENOUS; SUBCUTANEOUS at 07:49

## 2023-10-31 RX ADMIN — Medication 1000 MILLIGRAM(S): at 14:54

## 2023-10-31 RX ADMIN — CHLORHEXIDINE GLUCONATE 1 APPLICATION(S): 213 SOLUTION TOPICAL at 11:59

## 2023-10-31 RX ADMIN — HYDROMORPHONE HYDROCHLORIDE 1 MILLIGRAM(S): 2 INJECTION INTRAMUSCULAR; INTRAVENOUS; SUBCUTANEOUS at 16:15

## 2023-10-31 RX ADMIN — HYDROMORPHONE HYDROCHLORIDE 1 MILLIGRAM(S): 2 INJECTION INTRAMUSCULAR; INTRAVENOUS; SUBCUTANEOUS at 16:00

## 2023-10-31 NOTE — PHYSICAL THERAPY INITIAL EVALUATION ADULT - REHAB POTENTIAL, PT EVAL
Initial Anesthesia Post-op Note    Patient: Jace Pickering  Procedure(s) Performed: LEFT TRIPLE ARTHRODESIS AND TENDO-ACHILLES LENGTHENING - LEFTLENGTHENING, TENDON, ACHILLES - LEFT  Anesthesia type: General    Vitals Value Taken Time   Temp 98.1f 05/24/21 1217   Pulse 64 05/24/21 1215   Resp 32 05/24/21 1215   SpO2 96 % 05/24/21 1215   /78 05/24/21 1212   Vitals shown include unvalidated device data.      Patient Location: PACU Phase 1  Post-op Vital Signs:stable  Level of Consciousness: sedated  Respiratory Status: spontaneous ventilation  Cardiovascular blood pressure returned to baseline  Hydration: euvolemic  Pain Management: well controlled  Handoff: Handoff to receiving clinician was performed and questions were answered  Vomiting: none  Nausea: None  Airway Patency:patent  Post-op Assessment: no complications, patient tolerated procedure well with no complications, no evidence of recall, regional anesthetic in place - able to participate, dentition within defined limits, moving all extremities and No Corneal Abrasion      No complications documented.  
good, to achieve stated therapy goals

## 2023-10-31 NOTE — PROGRESS NOTE ADULT - SUBJECTIVE AND OBJECTIVE BOX
Vascular Surgery Daily Progress Note  =====================================================    SUBJECTIVE: Patient seen and examined at bedside on AM rounds. Patient endorses no complaints, states she is doing okay. Denies flatus/BM. Denies chest pain, SOB, fever, chills.    PAST MEDICAL & SURGICAL HISTORY:  Positive H. pylori test  Status post vascular bypass      ALLERGIES:  No Known Allergies    --------------------------------------------------------------------------------------    MEDICATIONS:    Neurologic Medications  HYDROmorphone  Injectable 1 milliGRAM(s) IV Push every 4 hours PRN Severe Pain (7 - 10)  HYDROmorphone  Injectable 0.5 milliGRAM(s) IV Push every 4 hours PRN Moderate Pain (4 - 6)    Respiratory Medications    Cardiovascular Medications    Gastrointestinal Medications  pantoprazole  Injectable 40 milliGRAM(s) IV Push daily  sodium chloride 0.9%. 1000 milliLiter(s) IV Continuous <Continuous>    Genitourinary Medications    Hematologic/Oncologic Medications  heparin  Infusion 700 Unit(s)/Hr IV Continuous <Continuous>    Antimicrobial/Immunologic Medications  ceFAZolin   IVPB 1000 milliGRAM(s) IV Intermittent every 8 hours    Endocrine/Metabolic Medications  atorvastatin 80 milliGRAM(s) Oral at bedtime  insulin lispro (ADMELOG) corrective regimen sliding scale   SubCutaneous every 6 hours    Topical/Other Medications  chlorhexidine 2% Cloths 1 Application(s) Topical daily  lidocaine   4% Patch 1 Patch Transdermal daily    --------------------------------------------------------------------------------------    VITAL SIGNS:  T(C): 37.1 (10-31-23 @ 04:00), Max: 37.2 (10-30-23 @ 16:00)  HR: 70 (10-31-23 @ 07:00) (66 - 111)  BP: 143/52 (10-31-23 @ 07:00) (106/59 - 186/98)  RR: 21 (10-31-23 @ 07:00) (16 - 43)  SpO2: 100% (10-31-23 @ 07:00) (96% - 100%)  --------------------------------------------------------------------------------------    EXAM    General: NAD, resting in bed comfortably. A&Ox3.   Cardiac: regular rate  Respiratory: Nonlabored respirations, normal cw expansion.  Abdomen: soft, distended, appropriately tender. midline with dressing. NGT in place.   Extremities: moving extremities  Vascular:  Palpable DP b/l.  --------------------------------------------------------------------------------------    LABS                        7.9    17.47 )-----------( 204      ( 31 Oct 2023 05:00 )             22.3     10-30    136  |  105  |  26<H>  ----------------------------<  114<H>  4.1   |  18<L>  |  1.69<H>    Ca    7.7<L>      30 Oct 2023 22:53  Phos  4.7     10-30  Mg     2.40     10-30          --------------------------------------------------------------------------------------      I&Os:    10-30-23 @ 07:01  -  10-31-23 @ 07:00  --------------------------------------------------------  IN: 2421 mL / OUT: 947 mL / NET: 1474 mL        --------------------------------------------------------------------------------------

## 2023-10-31 NOTE — PROGRESS NOTE ADULT - ASSESSMENT
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 Morales) and discharged on 10/27 returned to ED with abdominal pain, NV, fevers, chills. 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 Occlusion of proximal SMA. Occluded bypass graft from left common iliac artery to SMA. Reconstitution of distal SMA from collaterals." Patient taken emergently to OR for bypass revision. Patient s/p ex lap, removal/replacement of thrombosed graft on 10/28. SICU consulted for HD monitoring. Post-op course c/b low UOP, acute blood loss anemia requiring blood transfusion, and tachycardia concerning for bleed. Non con CT demonstrated free fluid around the bypass and near the liver. Patient taken emergently to OR and now s/p evacuation of 1L hematoma and repair of iliac anastomosis on 10/30. Patient seen and examined in SICU, recovering well.     Plan:  - Diet: NPO/NGT  - Pain control PRN  - Maintain MAP >65  - IV fluids, monitor UOP  - continue cannon  - continue heparin gtt  - Continue abx   -Appreciate SICU care     Vascular Surgery   [phone]

## 2023-10-31 NOTE — PROGRESS NOTE ADULT - ASSESSMENT
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 Morales) and discharged on 10/27 returned to ED with abdominal pain, NV, fevers, chills. 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 Occlusion of proximal SMA. Occluded bypass graft from left common iliac artery to SMA. Reconstitution of distal SMA from collaterals." Patient taken emergently to OR for bypass revision. Patient s/p ex lap, removal/replacement of thrombosed graft on 10/28. SICU consulted for HD monitoring and frequent abdominal pain assessment for possible relapse of ischemia post-operatively. Take back to OR on 10/30 for emergent ex lap, evacuation of hematoma 1L, stitch placed around iliac anastomosis. Stable.    Plan  NEUROLOGIC   - pain control prn tylenol and dilaudid   - A&Ox4     RESPIRATORY   - Monitor SpO2 goal >92%  - Incentive spirometry   - on NC, wean as tolerated     CARDIOVASCULAR   - no current pressor requirements  - MAP >65  - continue home statin via NGT  - holding home lisinopril, amlodipine while NPO    GASTROINTESTINAL   - Diet: NPO/NGT to LCWS  - serial abdominal exams   - protonix daily   - zofran prn nausea    /RENAL   - IVF @100  - Strict I/Os  - Maintain cannon catheter   - Monitor electrolytes, replete PRN    HEMATOLOGIC  - Monitor H/H   - holding home ASA   - heparin gtt, f/u PTT    INFECTIOUS DISEASE  - Monitor fever / WBC  - continue ancef per primary team    ENDOCRINE  - Monitor gluc  - ISS    DISPO: SICU

## 2023-10-31 NOTE — PHYSICAL THERAPY INITIAL EVALUATION ADULT - GENERAL OBSERVATIONS, REHAB EVAL
Pt seen lying in bed in SICU, cannon, +NG feeding tube, +IV, monitor, Arterial line; HR 75; RN Margot present to assist

## 2023-10-31 NOTE — PHYSICAL THERAPY INITIAL EVALUATION ADULT - PERTINENT HX OF CURRENT PROBLEM, REHAB EVAL
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 Occlusion of proximal SMA. Occluded bypass graft from left common iliac artery to SMA. Reconstitution of distal SMA from collaterals." Patient taken emergently to OR for bypass revision. Patient now s/p ex lap, removal/replacement of thrombosed graft on 10/28. SICU consulted for HD monitoring and frequent abdominal pain assessment for possible relapse of ischemia post-operatively.

## 2023-10-31 NOTE — PROGRESS NOTE ADULT - SUBJECTIVE AND OBJECTIVE BOX
INTERVAL EVENTS:  - CT A/P noncontrast with hemoperitoneum --> s/p emergent ex lap, evacuation of hematoma 1L, stitch placed around iliac anastomosis   - 3 unit pRBC total (pre and intra-op)  - PTT supratherapeutic post op > continues on heparin drip  - Hydral x1  - h/h with slow down-trend postop 9.5/27.4 > 8.5/23.9 over 10 hours    SUBJECTIVE/ROS:  [ ] A ten-point review of systems was otherwise negative except as noted.  [ ] Due to altered mental status/intubation, subjective information were not able to be obtained from the patient. History was obtained, to the extent possible, from review of the chart and collateral sources of information.      NEURO  Exam: awake, alert, oriented  Meds: acetaminophen   IVPB .. 1000 milliGRAM(s) IV Intermittent once  HYDROmorphone  Injectable 1 milliGRAM(s) IV Push every 4 hours PRN Severe Pain (7 - 10)  HYDROmorphone  Injectable 0.5 milliGRAM(s) IV Push every 4 hours PRN Moderate Pain (4 - 6)  melatonin 3 milliGRAM(s) Oral at bedtime    [x] Adequacy of sedation and pain control has been assessed and adjusted      RESPIRATORY  RR: 16 (10-31-23 @ 00:00) (16 - 43)  SpO2: 99% (10-31-23 @ 00:00) (94% - 100%)  Exam: no increased work of breathing     CARDIOVASCULAR  HR: 67 (10-31-23 @ 00:00) (67 - 122)  BP: 113/58 (10-31-23 @ 00:00) (101/53 - 186/98)  BP(mean): 74 (10-31-23 @ 00:00) (61 - 143)  ABP: 103/43 (10-31-23 @ 00:00) (90/47 - 192/79)  ABP(mean): 66 (10-31-23 @ 00:00) (59 - 125)  Exam: regular rate and rhythm  Cardiac Rhythm: sinus  Perfusion     [x]Adequate   [ ]Inadequate  Mentation   [x]Normal       [ ]Reduced  Extremities  [x]Warm         [ ]Cool  Volume Status [ ]Hypervolemic [x]Euvolemic [ ]Hypovolemic  Meds:       GI/NUTRITION  Exam: soft, appropriately tender, slightly distended, incision dressing C/D/I  Diet: NPO  Meds: pantoprazole  Injectable 40 milliGRAM(s) IV Push daily      GENITOURINARY  I&O's Detail    10-29 @ 07:01  -  10-30 @ 07:00  --------------------------------------------------------  IN:    Heparin: 30 mL    Heparin: 126 mL    IV PiggyBack: 550 mL    Lactated Ringers Bolus: 1000 mL    PRBCs (Packed Red Blood Cells): 300 mL    sodium chloride 0.9%: 2300 mL    Sodium Chloride 0.9% Bolus: 1000 mL  Total IN: 5306 mL    OUT:    Indwelling Catheter - Urethral (mL): 315 mL    Nasogastric/Oral tube (mL): 350 mL  Total OUT: 665 mL    Total NET: 4641 mL      10-30 @ 07:01  -  10-31 @ 00:09  --------------------------------------------------------  IN:    Heparin: 7 mL    Heparin: 65 mL    PRBCs (Packed Red Blood Cells): 300 mL    sodium chloride 0.9%: 1300 mL  Total IN: 1672 mL    OUT:    Indwelling Catheter - Urethral (mL): 457 mL    Nasogastric/Oral tube (mL): 150 mL  Total OUT: 607 mL    Total NET: 1065 mL          10-30    136  |  105  |  26<H>  ----------------------------<  114<H>  4.1   |  18<L>  |  1.69<H>    Ca    7.7<L>      30 Oct 2023 22:53  Phos  4.7     10-30  Mg     2.40     10-30      [ ] Rankin catheter, indication: N/A  Meds: sodium chloride 0.9%. 1000 milliLiter(s) IV Continuous <Continuous>        HEMATOLOGIC  Meds: heparin  Infusion 700 Unit(s)/Hr IV Continuous <Continuous>    [x] VTE Prophylaxis                        8.5    17.66 )-----------( 197      ( 30 Oct 2023 22:53 )             23.9     PT/INR - ( 30 Oct 2023 12:45 )   PT: 18.7 sec;   INR: 1.70 ratio         PTT - ( 30 Oct 2023 17:20 )  PTT:64.8 sec  Transfusion     [ ] PRBC   [ ] Platelets   [ ] FFP   [ ] Cryoprecipitate      INFECTIOUS DISEASES  WBC Count: 17.66 K/uL (10-30 @ 22:53)  WBC Count: 17.85 K/uL (10-30 @ 17:51)  WBC Count: 17.59 K/uL (10-30 @ 12:45)  WBC Count: 18.12 K/uL (10-30 @ 05:50)  WBC Count: 13.24 K/uL (10-30 @ 01:07)    RECENT CULTURES:  Specimen Source: .Blood Blood-Peripheral  Date/Time: 10-28 @ 08:40  Culture Results:   No growth at 48 Hours  Gram Stain: --  Organism: --  Specimen Source: .Blood Blood-Peripheral  Date/Time: 10-28 @ 08:35  Culture Results:   No growth at 48 Hours  Gram Stain: --  Organism: --    Meds: ceFAZolin   IVPB 1000 milliGRAM(s) IV Intermittent every 8 hours        ENDOCRINE  CAPILLARY BLOOD GLUCOSE      POCT Blood Glucose.: 119 mg/dL (30 Oct 2023 22:55)  POCT Blood Glucose.: 126 mg/dL (30 Oct 2023 17:23)  POCT Blood Glucose.: 108 mg/dL (30 Oct 2023 13:19)  POCT Blood Glucose.: 151 mg/dL (30 Oct 2023 05:48)  POCT Blood Glucose.: 156 mg/dL (30 Oct 2023 01:06)    Meds: atorvastatin 80 milliGRAM(s) Oral at bedtime  insulin lispro (ADMELOG) corrective regimen sliding scale   SubCutaneous every 6 hours    OTHER MEDICATIONS:  chlorhexidine 2% Cloths 1 Application(s) Topical daily  lidocaine   4% Patch 1 Patch Transdermal daily      CODE STATUS:      IMAGING:  < from: CT Abdomen and Pelvis No Cont (10.30.23 @ 08:13) >    ACC: [id number] EXAM:  CT ABDOMEN AND PELVIS   ORDERED BY: LONI STEVENS     PROCEDURE DATE:  10/30/2023          INTERPRETATION:  CLINICAL INFORMATION: Status post exploratory   laparoscopy iliac to SMA bypass, stent occlusion and replacement of graft.    COMPARISON: CTA abdomen pelvis 10/28/2023    CONTRAST/COMPLICATIONS:  IV Contrast: None.  Oral Contrast: None.  Complications: None documented.    PROCEDURE:  CT of the Abdomen and Pelvis was performed.  Sagittal and coronal reformats were performed.    FINDINGS:  LOWER CHEST: Small pleural effusions with subsegmental lower lobe   atelectasis. Small pericardial effusion.    LIVER: Within normal limits.  BILE DUCTS: Normal caliber.  GALLBLADDER: Vicarious excretion of contrast.  SPLEEN: Perisplenic hyperdensity, likely representing hematoma.  PANCREAS: Within normal limits.  ADRENALS: Within normal limits.  KIDNEYS/URETERS: Within normal limits.    BLADDER: Rankin catheter.  REPRODUCTIVE ORGANS: Not well evaluated secondary to hyperdensefluid   collections in the abdomen and pelvis.    BOWEL: Enteric tube terminates in stomach. No bowel obstruction. Appendix   is not visualized.  PERITONEUM: New hyperattenuating fluid within the abdomen, representing   large hemoperitoneum. New small pneumoperitoneum, expected changes from   recent surgery.  VESSELS: Left common iliac artery to SMA bypass graft. There is a new   heterogeneous focus adjacent to the iliac anastomosis, likely   postsurgical changes Atherosclerotic changes. 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  RETROPERITONEUM/LYMPH NODES: No lymphadenopathy.  ABDOMINAL WALL: Postsurgical changes.  BONES: Chronic left 12th rib fracture. Degenerative changes.    IMPRESSION:  Large new hemoperitoneum.  Small new pneumoperitoneum, likely post operative changes.  Small pleural effusions with segmental lower lobe atelectasis.    Findings were discussed by Dr. Gil with  Rubin HESTER, on 10/30/2023   9:02 AM with read back confirmation.    --- End of Report---          ANNALISE GIL MD; Resident Radiologist  This document has been electronically signed.  KIERA PARKER MD; Attending Radiologist  This document has been electronically signed. Oct 30 2023  9:45AM    < end of copied text >

## 2023-11-01 LAB
ANION GAP SERPL CALC-SCNC: 14 MMOL/L — SIGNIFICANT CHANGE UP (ref 7–14)
ANION GAP SERPL CALC-SCNC: 14 MMOL/L — SIGNIFICANT CHANGE UP (ref 7–14)
APTT BLD: 41.7 SEC — HIGH (ref 24.5–35.6)
APTT BLD: 41.7 SEC — HIGH (ref 24.5–35.6)
APTT BLD: 76.2 SEC — HIGH (ref 24.5–35.6)
APTT BLD: 76.2 SEC — HIGH (ref 24.5–35.6)
APTT BLD: 78.4 SEC — HIGH (ref 24.5–35.6)
APTT BLD: 78.4 SEC — HIGH (ref 24.5–35.6)
BUN SERPL-MCNC: 20 MG/DL — SIGNIFICANT CHANGE UP (ref 7–23)
BUN SERPL-MCNC: 20 MG/DL — SIGNIFICANT CHANGE UP (ref 7–23)
CALCIUM SERPL-MCNC: 7.4 MG/DL — LOW (ref 8.4–10.5)
CALCIUM SERPL-MCNC: 7.4 MG/DL — LOW (ref 8.4–10.5)
CHLORIDE SERPL-SCNC: 110 MMOL/L — HIGH (ref 98–107)
CHLORIDE SERPL-SCNC: 110 MMOL/L — HIGH (ref 98–107)
CO2 SERPL-SCNC: 16 MMOL/L — LOW (ref 22–31)
CO2 SERPL-SCNC: 16 MMOL/L — LOW (ref 22–31)
CREAT SERPL-MCNC: 0.95 MG/DL — SIGNIFICANT CHANGE UP (ref 0.5–1.3)
CREAT SERPL-MCNC: 0.95 MG/DL — SIGNIFICANT CHANGE UP (ref 0.5–1.3)
EGFR: 66 ML/MIN/1.73M2 — SIGNIFICANT CHANGE UP
EGFR: 66 ML/MIN/1.73M2 — SIGNIFICANT CHANGE UP
GLUCOSE BLDC GLUCOMTR-MCNC: 105 MG/DL — HIGH (ref 70–99)
GLUCOSE BLDC GLUCOMTR-MCNC: 105 MG/DL — HIGH (ref 70–99)
GLUCOSE BLDC GLUCOMTR-MCNC: 108 MG/DL — HIGH (ref 70–99)
GLUCOSE BLDC GLUCOMTR-MCNC: 108 MG/DL — HIGH (ref 70–99)
GLUCOSE BLDC GLUCOMTR-MCNC: 91 MG/DL — SIGNIFICANT CHANGE UP (ref 70–99)
GLUCOSE BLDC GLUCOMTR-MCNC: 91 MG/DL — SIGNIFICANT CHANGE UP (ref 70–99)
GLUCOSE SERPL-MCNC: 83 MG/DL — SIGNIFICANT CHANGE UP (ref 70–99)
GLUCOSE SERPL-MCNC: 83 MG/DL — SIGNIFICANT CHANGE UP (ref 70–99)
HCT VFR BLD CALC: 22.4 % — LOW (ref 34.5–45)
HCT VFR BLD CALC: 22.4 % — LOW (ref 34.5–45)
HGB BLD-MCNC: 7.6 G/DL — LOW (ref 11.5–15.5)
HGB BLD-MCNC: 7.6 G/DL — LOW (ref 11.5–15.5)
INR BLD: 1.78 RATIO — HIGH (ref 0.85–1.18)
INR BLD: 1.78 RATIO — HIGH (ref 0.85–1.18)
MAGNESIUM SERPL-MCNC: 2.1 MG/DL — SIGNIFICANT CHANGE UP (ref 1.6–2.6)
MAGNESIUM SERPL-MCNC: 2.1 MG/DL — SIGNIFICANT CHANGE UP (ref 1.6–2.6)
MCHC RBC-ENTMCNC: 29.9 PG — SIGNIFICANT CHANGE UP (ref 27–34)
MCHC RBC-ENTMCNC: 29.9 PG — SIGNIFICANT CHANGE UP (ref 27–34)
MCHC RBC-ENTMCNC: 33.9 GM/DL — SIGNIFICANT CHANGE UP (ref 32–36)
MCHC RBC-ENTMCNC: 33.9 GM/DL — SIGNIFICANT CHANGE UP (ref 32–36)
MCV RBC AUTO: 88.2 FL — SIGNIFICANT CHANGE UP (ref 80–100)
MCV RBC AUTO: 88.2 FL — SIGNIFICANT CHANGE UP (ref 80–100)
NRBC # BLD: 0 /100 WBCS — SIGNIFICANT CHANGE UP (ref 0–0)
NRBC # BLD: 0 /100 WBCS — SIGNIFICANT CHANGE UP (ref 0–0)
NRBC # FLD: 0 K/UL — SIGNIFICANT CHANGE UP (ref 0–0)
NRBC # FLD: 0 K/UL — SIGNIFICANT CHANGE UP (ref 0–0)
PHOSPHATE SERPL-MCNC: 1.8 MG/DL — LOW (ref 2.5–4.5)
PHOSPHATE SERPL-MCNC: 1.8 MG/DL — LOW (ref 2.5–4.5)
PLATELET # BLD AUTO: 211 K/UL — SIGNIFICANT CHANGE UP (ref 150–400)
PLATELET # BLD AUTO: 211 K/UL — SIGNIFICANT CHANGE UP (ref 150–400)
POTASSIUM SERPL-MCNC: 3.5 MMOL/L — SIGNIFICANT CHANGE UP (ref 3.5–5.3)
POTASSIUM SERPL-MCNC: 3.5 MMOL/L — SIGNIFICANT CHANGE UP (ref 3.5–5.3)
POTASSIUM SERPL-SCNC: 3.5 MMOL/L — SIGNIFICANT CHANGE UP (ref 3.5–5.3)
POTASSIUM SERPL-SCNC: 3.5 MMOL/L — SIGNIFICANT CHANGE UP (ref 3.5–5.3)
PROTHROM AB SERPL-ACNC: 19.6 SEC — HIGH (ref 9.5–13)
PROTHROM AB SERPL-ACNC: 19.6 SEC — HIGH (ref 9.5–13)
RBC # BLD: 2.54 M/UL — LOW (ref 3.8–5.2)
RBC # BLD: 2.54 M/UL — LOW (ref 3.8–5.2)
RBC # FLD: 15.5 % — HIGH (ref 10.3–14.5)
RBC # FLD: 15.5 % — HIGH (ref 10.3–14.5)
SODIUM SERPL-SCNC: 140 MMOL/L — SIGNIFICANT CHANGE UP (ref 135–145)
SODIUM SERPL-SCNC: 140 MMOL/L — SIGNIFICANT CHANGE UP (ref 135–145)
WBC # BLD: 15.89 K/UL — HIGH (ref 3.8–10.5)
WBC # BLD: 15.89 K/UL — HIGH (ref 3.8–10.5)
WBC # FLD AUTO: 15.89 K/UL — HIGH (ref 3.8–10.5)
WBC # FLD AUTO: 15.89 K/UL — HIGH (ref 3.8–10.5)

## 2023-11-01 PROCEDURE — 99233 SBSQ HOSP IP/OBS HIGH 50: CPT

## 2023-11-01 RX ORDER — POTASSIUM PHOSPHATE, MONOBASIC POTASSIUM PHOSPHATE, DIBASIC 236; 224 MG/ML; MG/ML
30 INJECTION, SOLUTION INTRAVENOUS ONCE
Refills: 0 | Status: COMPLETED | OUTPATIENT
Start: 2023-11-01 | End: 2023-11-01

## 2023-11-01 RX ORDER — LANOLIN ALCOHOL/MO/W.PET/CERES
1 CREAM (GRAM) TOPICAL ONCE
Refills: 0 | Status: DISCONTINUED | OUTPATIENT
Start: 2023-11-01 | End: 2023-11-03

## 2023-11-01 RX ADMIN — Medication 100 MILLIGRAM(S): at 22:04

## 2023-11-01 RX ADMIN — LIDOCAINE 1 PATCH: 4 CREAM TOPICAL at 11:48

## 2023-11-01 RX ADMIN — ATORVASTATIN CALCIUM 80 MILLIGRAM(S): 80 TABLET, FILM COATED ORAL at 22:06

## 2023-11-01 RX ADMIN — Medication 400 MILLIGRAM(S): at 02:59

## 2023-11-01 RX ADMIN — Medication 400 MILLIGRAM(S): at 11:47

## 2023-11-01 RX ADMIN — LIDOCAINE 1 PATCH: 4 CREAM TOPICAL at 00:00

## 2023-11-01 RX ADMIN — LIDOCAINE 1 PATCH: 4 CREAM TOPICAL at 23:00

## 2023-11-01 RX ADMIN — POTASSIUM PHOSPHATE, MONOBASIC POTASSIUM PHOSPHATE, DIBASIC 83.33 MILLIMOLE(S): 236; 224 INJECTION, SOLUTION INTRAVENOUS at 05:52

## 2023-11-01 RX ADMIN — HYDROMORPHONE HYDROCHLORIDE 1 MILLIGRAM(S): 2 INJECTION INTRAMUSCULAR; INTRAVENOUS; SUBCUTANEOUS at 23:40

## 2023-11-01 RX ADMIN — PANTOPRAZOLE SODIUM 40 MILLIGRAM(S): 20 TABLET, DELAYED RELEASE ORAL at 11:47

## 2023-11-01 RX ADMIN — HYDROMORPHONE HYDROCHLORIDE 0.5 MILLIGRAM(S): 2 INJECTION INTRAMUSCULAR; INTRAVENOUS; SUBCUTANEOUS at 15:00

## 2023-11-01 RX ADMIN — HEPARIN SODIUM 8 UNIT(S)/HR: 5000 INJECTION INTRAVENOUS; SUBCUTANEOUS at 02:57

## 2023-11-01 RX ADMIN — Medication 1000 MILLIGRAM(S): at 03:15

## 2023-11-01 RX ADMIN — Medication 100 MILLIGRAM(S): at 14:13

## 2023-11-01 RX ADMIN — SODIUM CHLORIDE 100 MILLILITER(S): 9 INJECTION, SOLUTION INTRAVENOUS at 23:10

## 2023-11-01 RX ADMIN — HYDROMORPHONE HYDROCHLORIDE 1 MILLIGRAM(S): 2 INJECTION INTRAMUSCULAR; INTRAVENOUS; SUBCUTANEOUS at 23:10

## 2023-11-01 RX ADMIN — SODIUM CHLORIDE 100 MILLILITER(S): 9 INJECTION, SOLUTION INTRAVENOUS at 03:00

## 2023-11-01 RX ADMIN — HEPARIN SODIUM 8 UNIT(S)/HR: 5000 INJECTION INTRAVENOUS; SUBCUTANEOUS at 21:33

## 2023-11-01 RX ADMIN — HYDROMORPHONE HYDROCHLORIDE 0.5 MILLIGRAM(S): 2 INJECTION INTRAMUSCULAR; INTRAVENOUS; SUBCUTANEOUS at 14:26

## 2023-11-01 RX ADMIN — Medication 100 MILLIGRAM(S): at 05:52

## 2023-11-01 RX ADMIN — CHLORHEXIDINE GLUCONATE 1 APPLICATION(S): 213 SOLUTION TOPICAL at 11:49

## 2023-11-01 NOTE — PROGRESS NOTE ADULT - SUBJECTIVE AND OBJECTIVE BOX
Vascular Surgery Daily Progress Note    SUBJECTIVE: Patient seen and examined by team on morning rounds. No acute events overnight.    Vital Signs Last 24 Hrs  T(C): 37.1 (01 Nov 2023 08:00), Max: 37.2 (01 Nov 2023 00:00)  T(F): 98.8 (01 Nov 2023 08:00), Max: 98.9 (01 Nov 2023 00:00)  HR: 90 (01 Nov 2023 08:00) (66 - 102)  BP: 147/81 (01 Nov 2023 08:00) (108/89 - 153/66)  BP(mean): 97 (01 Nov 2023 08:00) (81 - 98)  RR: 30 (01 Nov 2023 08:00) (17 - 56)  SpO2: 100% (01 Nov 2023 08:00) (88% - 100%)  Parameters below as of 01 Nov 2023 08:00  Patient On (Oxygen Delivery Method): room air      General Appearance: Appears well, NAD  Neck: Supple  Chest: Equal expansion bilaterally, equal breath sounds  CV: Pulse regular presently  Abdomen: soft, distended, appropriately tender. midline with dressing. NGT in place.   Extremities: moving extremities  Vascular:  Palpable DP b/l.    I&O's Summary    31 Oct 2023 07:01  -  01 Nov 2023 07:00  --------------------------------------------------------  IN: 3763 mL / OUT: 2465 mL / NET: 1298 mL    01 Nov 2023 07:01  -  01 Nov 2023 11:37  --------------------------------------------------------  IN: 432 mL / OUT: 300 mL / NET: 132 mL      I&O's Detail    31 Oct 2023 07:01  -  01 Nov 2023 07:00  --------------------------------------------------------  IN:    dextrose 5% + sodium chloride 0.9%: 1300 mL    Enteral Tube Flush: 40 mL    Heparin: 173 mL    IV PiggyBack: 1050 mL    sodium chloride 0.9%: 1200 mL  Total IN: 3763 mL    OUT:    Indwelling Catheter - Urethral (mL): 1665 mL    Nasogastric/Oral tube (mL): 800 mL  Total OUT: 2465 mL    Total NET: 1298 mL      01 Nov 2023 07:01  -  01 Nov 2023 11:37  --------------------------------------------------------  IN:    dextrose 5% + sodium chloride 0.9%: 400 mL    Heparin: 32 mL  Total IN: 432 mL    OUT:    Indwelling Catheter - Urethral (mL): 300 mL  Total OUT: 300 mL    Total NET: 132 mL          MEDICATIONS  (STANDING):  acetaminophen   IVPB .. 1000 milliGRAM(s) IV Intermittent every 6 hours  atorvastatin 80 milliGRAM(s) Oral at bedtime  ceFAZolin   IVPB 1000 milliGRAM(s) IV Intermittent every 8 hours  chlorhexidine 2% Cloths 1 Application(s) Topical daily  dextrose 5% + sodium chloride 0.9%. 1000 milliLiter(s) (100 mL/Hr) IV Continuous <Continuous>  heparin  Infusion 700 Unit(s)/Hr (8 mL/Hr) IV Continuous <Continuous>  insulin lispro (ADMELOG) corrective regimen sliding scale   SubCutaneous every 6 hours  lidocaine   4% Patch 1 Patch Transdermal daily  pantoprazole  Injectable 40 milliGRAM(s) IV Push daily    MEDICATIONS  (PRN):  HYDROmorphone  Injectable 0.5 milliGRAM(s) IV Push every 4 hours PRN Moderate Pain (4 - 6)  HYDROmorphone  Injectable 1 milliGRAM(s) IV Push every 4 hours PRN Severe Pain (7 - 10)      LABS:                        7.6    15.89 )-----------( 211      ( 01 Nov 2023 01:52 )             22.4     11-01    140  |  110<H>  |  20  ----------------------------<  83  3.5   |  16<L>  |  0.95    Ca    7.4<L>      01 Nov 2023 01:55  Phos  1.8     11-01  Mg     2.10     11-01      PT/INR - ( 01 Nov 2023 01:00 )   PT: 19.6 sec;   INR: 1.78 ratio         PTT - ( 01 Nov 2023 09:20 )  PTT:78.4 sec  Urinalysis Basic - ( 01 Nov 2023 01:55 )    Color: x / Appearance: x / SG: x / pH: x  Gluc: 83 mg/dL / Ketone: x  / Bili: x / Urobili: x   Blood: x / Protein: x / Nitrite: x   Leuk Esterase: x / RBC: x / WBC x   Sq Epi: x / Non Sq Epi: x / Bacteria: x        RADIOLOGY & ADDITIONAL STUDIES:

## 2023-11-01 NOTE — PROGRESS NOTE ADULT - SUBJECTIVE AND OBJECTIVE BOX
SICU Daily Progress Note  =====================================================  Interval/Overnight Events:   - left groin a line removed  - heparin gtt therapeutic      Allergies: No Known Allergies      MEDICATIONS:   --------------------------------------------------------------------------------------  Neurologic Medications  acetaminophen   IVPB .. 1000 milliGRAM(s) IV Intermittent every 6 hours  HYDROmorphone  Injectable 0.5 milliGRAM(s) IV Push every 4 hours PRN Moderate Pain (4 - 6)  HYDROmorphone  Injectable 1 milliGRAM(s) IV Push every 4 hours PRN Severe Pain (7 - 10)    Respiratory Medications    Cardiovascular Medications    Gastrointestinal Medications  dextrose 5% + sodium chloride 0.9%. 1000 milliLiter(s) IV Continuous <Continuous>  pantoprazole  Injectable 40 milliGRAM(s) IV Push daily    Genitourinary Medications    Hematologic/Oncologic Medications  heparin  Infusion 700 Unit(s)/Hr IV Continuous <Continuous>    Antimicrobial/Immunologic Medications  ceFAZolin   IVPB 1000 milliGRAM(s) IV Intermittent every 8 hours    Endocrine/Metabolic Medications  atorvastatin 80 milliGRAM(s) Oral at bedtime  insulin lispro (ADMELOG) corrective regimen sliding scale   SubCutaneous every 6 hours    Topical/Other Medications  chlorhexidine 2% Cloths 1 Application(s) Topical daily  lidocaine   4% Patch 1 Patch Transdermal daily    --------------------------------------------------------------------------------------    VITAL SIGNS, INS/OUTS (last 24 hours):  --------------------------------------------------------------------------------------  I&O's Detail    30 Oct 2023 07:01  -  31 Oct 2023 07:00  --------------------------------------------------------  IN:    Heparin: 7 mL    Heparin: 114 mL    PRBCs (Packed Red Blood Cells): 300 mL    sodium chloride 0.9%: 2000 mL  Total IN: 2421 mL    OUT:    Indwelling Catheter - Urethral (mL): 647 mL    Nasogastric/Oral tube (mL): 300 mL  Total OUT: 947 mL    Total NET: 1474 mL      31 Oct 2023 07:01  -  2023 00:32  --------------------------------------------------------  IN:    dextrose 5% + sodium chloride 0.9%: 600 mL    Enteral Tube Flush: 40 mL    Heparin: 119 mL    IV PiggyBack: 250 mL    sodium chloride 0.9%: 1200 mL  Total IN: 2209 mL    OUT:    Indwelling Catheter - Urethral (mL): 1070 mL    Nasogastric/Oral tube (mL): 600 mL  Total OUT: 1670 mL    Total NET: 539 mL        --------------------------------------------------------------------------------------    EXAM  NEUROLOGY  RASS:   	GCS:    Exam: Normal, NAD, alert, oriented x3, no focal deficits.      HEENT  Exam: Normocephalic, atraumatic, EOMI.     RESPIRATORY  Exam: Unlabored respiratory effort    CARDIOVASCULAR  Exam: S1, S2.  Regular rate and rhythm.      GI/NUTRITION  Exam: Abdomen soft, NTND. NGT to LCWS    VASCULAR  Exam: Extremities warm, pink, well-perfused. Left groin dressing c/d/i without strikethrough. Soft, no hematoma.    MUSCULOSKELETAL  Exam: All extremities moving spontaneously without limitations     SKIN  Exam: Good skin turgor, no skin breakdown.      METABOLIC/FLUIDS/ELECTROLYTES  dextrose 5% + sodium chloride 0.9%. 1000 milliLiter(s) IV Continuous <Continuous>      HEMATOLOGIC  [x] VTE Prophylaxis: heparin  Infusion 700 Unit(s)/Hr IV Continuous <Continuous>    Transfusions:	[] PRBC	[] Platelets		[] FFP	[] Cryoprecipitate    INFECTIOUS DISEASE  Antimicrobials/Immunologic Medications:  ceFAZolin   IVPB 1000 milliGRAM(s) IV Intermittent every 8 hours      Tubes/Lines/Drains   [x] Peripheral IV  [] Central Venous Line     	[] R	[] L	[] IJ	[] Fem	[] SC	Date Placed:   [] Arterial Line		[] R	[] L	[] Fem	[] Rad	[] Ax	Date Placed:   [] PICC		[] Midline		[] Mediport  [x] Urinary Catheter		Date Placed:   [x] Necessity of urinary, arterial, and venous catheters discussed    LABS  --------------------------------------------------------------------------------------  CBC (10-31 @ 18:27)                          7.7<L>                   16.36<H>  )--------------(  194        --    % Neuts, --    % Lymphs, ANC: --                              22.3<L>  CBC (10-31 @ 05:00)                          7.9<L>                   17.47<H>  )--------------(  204        --    % Neuts, --    % Lymphs, ANC: --                              22.3<L>    BMP (10-30 @ 22:53)       136     |  105     |  26<H> 			Ca++ --      Ca 7.7<L>       ---------------------------------( 114<H>		Mg 2.40         4.1     |  18<L>   |  1.69<H>			Ph 4.7<H>  BMP (10-30 @ 12:45)       139     |  107     |  24<H> 			Ca++ --      Ca 8.5          ---------------------------------( 140<H>		Mg 1.80         4.4     |  19<L>   |  1.94<H>			Ph 6.3<H>      Coags (10-31 @ 05:00)  aPTT 82.2<H> / INR -- / PT --  Coags (10-31 @ 00:30)  aPTT 83.0<H> / INR 1.78<H> / PT 19.6<H>          Urinalysis (10-30 @ 22:53):     Color:  / Appearance:  / SG:  / pH:  / Gluc: 114<H> / Ketones:  / Bili:  / Urobili:  / Protein : / Nitrites:  / Leuk.Est:  / RBC:  / WBC:  / Sq Epi:  / Non Sq Epi:  / Bacteria      Urinalysis (10-30 @ 16:00):     Color: Yellow / Appearance: Cloudy<!> / S.034<H> / pH: 5.5 / Gluc: Negative / Ketones: Trace<!> / Bili: Negative / Urobili: 1.0 / Protein :30<!> / Nitrites: Negative / Leuk.Est: Negative / RBC: 157<H> / WBC: 3 / Sq Epi:  / Non Sq Epi: 2 / Bacteria Negative       -> .Blood Blood-Peripheral Culture (10-28 @ 08:40)     NG    NG    No growth at 72 Hours    -> .Blood Blood-Peripheral Culture (10-28 @ 08:35)     NG    NG    No growth at 72 Hours

## 2023-11-01 NOTE — PROGRESS NOTE ADULT - ASSESSMENT
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 Morales) and discharged on 10/27 returned to ED with abdominal pain, NV, fevers, chills. 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 Occlusion of proximal SMA. Occluded bypass graft from left common iliac artery to SMA. Reconstitution of distal SMA from collaterals." Patient taken emergently to OR for bypass revision. Patient s/p ex lap, removal/replacement of thrombosed graft on 10/28. SICU consulted for HD monitoring. Post-op course c/b low UOP, acute blood loss anemia requiring blood transfusion, and tachycardia concerning for bleed. Non con CT demonstrated free fluid around the bypass and near the liver. Patient taken emergently to OR and now s/p evacuation of 1L hematoma and repair of iliac anastomosis on 10/30. Patient seen and examined in SICU, recovering well.     Plan:  - Diet: NPO/NGT  - Pain control PRN  - Maintain MAP >65  - IV fluids, monitor UOP  - continue cannon  - continue heparin gtt  - Continue abx   -Appreciate SICU care     Vascular Surgery   [id number]

## 2023-11-01 NOTE — PROGRESS NOTE ADULT - ASSESSMENT
· Assessment	  63F PMHx HTN, DM, PAD, 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 Morales) and discharged on 10/27 returned to ED with abdominal pain, NV, fevers, chills. 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 Occlusion of proximal SMA. Occluded bypass graft from left common iliac artery to SMA. Reconstitution of distal SMA from collaterals." Patient taken emergently to OR for bypass revision. Patient s/p ex lap, removal/replacement of thrombosed graft on 10/28. SICU consulted for HD monitoring and frequent abdominal pain assessment for possible relapse of ischemia post-operatively. Take back to OR on 10/30 for emergent ex lap, evacuation of hematoma 1L, stitch placed around iliac anastomosis. Stable.    Plan  NEUROLOGIC   - pain control prn tylenol and dilaudid   - A&Ox4     RESPIRATORY   - Monitor SpO2 goal >92%  - Incentive spirometry   - on NC, wean as tolerated     CARDIOVASCULAR   - no current pressor requirements  - MAP >65  - continue home statin via NGT  - holding home lisinopril, amlodipine while NPO    GASTROINTESTINAL   - Diet: NPO/NGT to LCWS  - serial abdominal exams   - protonix daily   - zofran prn nausea    /RENAL   - IVF @100  - Strict I/Os  - Maintain cannon catheter   - Monitor electrolytes, replete PRN    HEMATOLOGIC  - Monitor H/H   - holding home ASA   - heparin gtt, f/u daily PTT    INFECTIOUS DISEASE  - Monitor fever / WBC  - continue ancef per primary team    ENDOCRINE  - Monitor gluc  - ISS    DISPO: SICU   · Assessment	  63F PMHx HTN, DM, PAD, 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 Morales) and discharged on 10/27 returned to ED with abdominal pain, NV, fevers, chills. 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 Occlusion of proximal SMA. Occluded bypass graft from left common iliac artery to SMA. Reconstitution of distal SMA from collaterals." Patient taken emergently to OR for bypass revision. Patient s/p ex lap, removal/replacement of thrombosed graft on 10/28. SICU consulted for HD monitoring and frequent abdominal pain assessment for possible relapse of ischemia post-operatively. Take back to OR on 10/30 for emergent ex lap, evacuation of hematoma 1L, stitch placed around iliac anastomosis. Stable.    Plan  -Discharge to floor today     NEUROLOGIC   - pain control prn tylenol and dilaudid   - A&Ox4     RESPIRATORY   - Monitor SpO2 goal >92%  - Incentive spirometry   - on NC, wean as tolerated     CARDIOVASCULAR   - no current pressor requirements  - MAP >65  - continue home statin via NGT  - holding home lisinopril, amlodipine while NPO    GASTROINTESTINAL   - Diet: NPO/NGT to LCWS  - serial abdominal exams   - protonix daily   - zofran prn nausea    /RENAL   - IVF @100  - Strict I/Os  - Maintain cannon catheter   - Monitor electrolytes, replete PRN    HEMATOLOGIC  - Monitor H/H   - holding home ASA   - heparin gtt, f/u daily PTT    INFECTIOUS DISEASE  - Monitor fever / WBC  - continue ancef per primary team    ENDOCRINE  - Monitor gluc  - ISS    DISPO: SICU

## 2023-11-02 LAB
-  AMPICILLIN: SIGNIFICANT CHANGE UP
-  AMPICILLIN: SIGNIFICANT CHANGE UP
-  CIPROFLOXACIN: SIGNIFICANT CHANGE UP
-  CIPROFLOXACIN: SIGNIFICANT CHANGE UP
-  LEVOFLOXACIN: SIGNIFICANT CHANGE UP
-  LEVOFLOXACIN: SIGNIFICANT CHANGE UP
-  NITROFURANTOIN: SIGNIFICANT CHANGE UP
-  NITROFURANTOIN: SIGNIFICANT CHANGE UP
-  TETRACYCLINE: SIGNIFICANT CHANGE UP
-  TETRACYCLINE: SIGNIFICANT CHANGE UP
-  VANCOMYCIN: SIGNIFICANT CHANGE UP
-  VANCOMYCIN: SIGNIFICANT CHANGE UP
ANION GAP SERPL CALC-SCNC: 10 MMOL/L — SIGNIFICANT CHANGE UP (ref 7–14)
ANION GAP SERPL CALC-SCNC: 10 MMOL/L — SIGNIFICANT CHANGE UP (ref 7–14)
APTT BLD: 73.1 SEC — HIGH (ref 24.5–35.6)
APTT BLD: 73.1 SEC — HIGH (ref 24.5–35.6)
BUN SERPL-MCNC: 5 MG/DL — LOW (ref 7–23)
BUN SERPL-MCNC: 5 MG/DL — LOW (ref 7–23)
C DIFF BY PCR RESULT: SIGNIFICANT CHANGE UP
C DIFF BY PCR RESULT: SIGNIFICANT CHANGE UP
CALCIUM SERPL-MCNC: 7.5 MG/DL — LOW (ref 8.4–10.5)
CALCIUM SERPL-MCNC: 7.5 MG/DL — LOW (ref 8.4–10.5)
CHLORIDE SERPL-SCNC: 108 MMOL/L — HIGH (ref 98–107)
CHLORIDE SERPL-SCNC: 108 MMOL/L — HIGH (ref 98–107)
CO2 SERPL-SCNC: 21 MMOL/L — LOW (ref 22–31)
CO2 SERPL-SCNC: 21 MMOL/L — LOW (ref 22–31)
CREAT SERPL-MCNC: 0.57 MG/DL — SIGNIFICANT CHANGE UP (ref 0.5–1.3)
CREAT SERPL-MCNC: 0.57 MG/DL — SIGNIFICANT CHANGE UP (ref 0.5–1.3)
CULTURE RESULTS: ABNORMAL
CULTURE RESULTS: ABNORMAL
CULTURE RESULTS: SIGNIFICANT CHANGE UP
EGFR: 101 ML/MIN/1.73M2 — SIGNIFICANT CHANGE UP
EGFR: 101 ML/MIN/1.73M2 — SIGNIFICANT CHANGE UP
GLUCOSE BLDC GLUCOMTR-MCNC: 111 MG/DL — HIGH (ref 70–99)
GLUCOSE BLDC GLUCOMTR-MCNC: 111 MG/DL — HIGH (ref 70–99)
GLUCOSE BLDC GLUCOMTR-MCNC: 116 MG/DL — HIGH (ref 70–99)
GLUCOSE BLDC GLUCOMTR-MCNC: 116 MG/DL — HIGH (ref 70–99)
GLUCOSE BLDC GLUCOMTR-MCNC: 117 MG/DL — HIGH (ref 70–99)
GLUCOSE BLDC GLUCOMTR-MCNC: 117 MG/DL — HIGH (ref 70–99)
GLUCOSE BLDC GLUCOMTR-MCNC: 118 MG/DL — HIGH (ref 70–99)
GLUCOSE SERPL-MCNC: 109 MG/DL — HIGH (ref 70–99)
GLUCOSE SERPL-MCNC: 109 MG/DL — HIGH (ref 70–99)
HCT VFR BLD CALC: 22.3 % — LOW (ref 34.5–45)
HCT VFR BLD CALC: 22.3 % — LOW (ref 34.5–45)
HGB BLD-MCNC: 7.6 G/DL — LOW (ref 11.5–15.5)
HGB BLD-MCNC: 7.6 G/DL — LOW (ref 11.5–15.5)
INR BLD: 1.63 RATIO — HIGH (ref 0.85–1.18)
INR BLD: 1.63 RATIO — HIGH (ref 0.85–1.18)
MAGNESIUM SERPL-MCNC: 1.8 MG/DL — SIGNIFICANT CHANGE UP (ref 1.6–2.6)
MAGNESIUM SERPL-MCNC: 1.8 MG/DL — SIGNIFICANT CHANGE UP (ref 1.6–2.6)
MCHC RBC-ENTMCNC: 29.5 PG — SIGNIFICANT CHANGE UP (ref 27–34)
MCHC RBC-ENTMCNC: 29.5 PG — SIGNIFICANT CHANGE UP (ref 27–34)
MCHC RBC-ENTMCNC: 34.1 GM/DL — SIGNIFICANT CHANGE UP (ref 32–36)
MCHC RBC-ENTMCNC: 34.1 GM/DL — SIGNIFICANT CHANGE UP (ref 32–36)
MCV RBC AUTO: 86.4 FL — SIGNIFICANT CHANGE UP (ref 80–100)
MCV RBC AUTO: 86.4 FL — SIGNIFICANT CHANGE UP (ref 80–100)
METHOD TYPE: SIGNIFICANT CHANGE UP
METHOD TYPE: SIGNIFICANT CHANGE UP
NRBC # BLD: 0 /100 WBCS — SIGNIFICANT CHANGE UP (ref 0–0)
NRBC # BLD: 0 /100 WBCS — SIGNIFICANT CHANGE UP (ref 0–0)
NRBC # FLD: 0.06 K/UL — HIGH (ref 0–0)
NRBC # FLD: 0.06 K/UL — HIGH (ref 0–0)
ORGANISM # SPEC MICROSCOPIC CNT: ABNORMAL
PHOSPHATE SERPL-MCNC: 1.3 MG/DL — LOW (ref 2.5–4.5)
PHOSPHATE SERPL-MCNC: 1.3 MG/DL — LOW (ref 2.5–4.5)
PLATELET # BLD AUTO: 189 K/UL — SIGNIFICANT CHANGE UP (ref 150–400)
PLATELET # BLD AUTO: 189 K/UL — SIGNIFICANT CHANGE UP (ref 150–400)
POTASSIUM SERPL-MCNC: 2.8 MMOL/L — CRITICAL LOW (ref 3.5–5.3)
POTASSIUM SERPL-MCNC: 2.8 MMOL/L — CRITICAL LOW (ref 3.5–5.3)
POTASSIUM SERPL-SCNC: 2.8 MMOL/L — CRITICAL LOW (ref 3.5–5.3)
POTASSIUM SERPL-SCNC: 2.8 MMOL/L — CRITICAL LOW (ref 3.5–5.3)
PROTHROM AB SERPL-ACNC: 18.2 SEC — HIGH (ref 9.5–13)
PROTHROM AB SERPL-ACNC: 18.2 SEC — HIGH (ref 9.5–13)
RBC # BLD: 2.58 M/UL — LOW (ref 3.8–5.2)
RBC # BLD: 2.58 M/UL — LOW (ref 3.8–5.2)
RBC # FLD: 15.4 % — HIGH (ref 10.3–14.5)
RBC # FLD: 15.4 % — HIGH (ref 10.3–14.5)
SODIUM SERPL-SCNC: 139 MMOL/L — SIGNIFICANT CHANGE UP (ref 135–145)
SODIUM SERPL-SCNC: 139 MMOL/L — SIGNIFICANT CHANGE UP (ref 135–145)
SPECIMEN SOURCE: SIGNIFICANT CHANGE UP
WBC # BLD: 12.46 K/UL — HIGH (ref 3.8–10.5)
WBC # BLD: 12.46 K/UL — HIGH (ref 3.8–10.5)
WBC # FLD AUTO: 12.46 K/UL — HIGH (ref 3.8–10.5)
WBC # FLD AUTO: 12.46 K/UL — HIGH (ref 3.8–10.5)

## 2023-11-02 PROCEDURE — 99222 1ST HOSP IP/OBS MODERATE 55: CPT | Mod: GC

## 2023-11-02 RX ORDER — ENOXAPARIN SODIUM 100 MG/ML
55 INJECTION SUBCUTANEOUS EVERY 12 HOURS
Refills: 0 | Status: DISCONTINUED | OUTPATIENT
Start: 2023-11-02 | End: 2023-11-08

## 2023-11-02 RX ORDER — MAGNESIUM SULFATE 500 MG/ML
2 VIAL (ML) INJECTION ONCE
Refills: 0 | Status: COMPLETED | OUTPATIENT
Start: 2023-11-02 | End: 2023-11-02

## 2023-11-02 RX ORDER — POTASSIUM CHLORIDE 20 MEQ
10 PACKET (EA) ORAL
Refills: 0 | Status: COMPLETED | OUTPATIENT
Start: 2023-11-02 | End: 2023-11-02

## 2023-11-02 RX ORDER — POTASSIUM PHOSPHATE, MONOBASIC POTASSIUM PHOSPHATE, DIBASIC 236; 224 MG/ML; MG/ML
30 INJECTION, SOLUTION INTRAVENOUS ONCE
Refills: 0 | Status: COMPLETED | OUTPATIENT
Start: 2023-11-02 | End: 2023-11-02

## 2023-11-02 RX ADMIN — LIDOCAINE 1 PATCH: 4 CREAM TOPICAL at 12:18

## 2023-11-02 RX ADMIN — Medication 0: at 00:04

## 2023-11-02 RX ADMIN — Medication 0: at 06:31

## 2023-11-02 RX ADMIN — Medication 25 GRAM(S): at 15:06

## 2023-11-02 RX ADMIN — Medication 100 MILLIEQUIVALENT(S): at 15:00

## 2023-11-02 RX ADMIN — HYDROMORPHONE HYDROCHLORIDE 1 MILLIGRAM(S): 2 INJECTION INTRAMUSCULAR; INTRAVENOUS; SUBCUTANEOUS at 23:46

## 2023-11-02 RX ADMIN — Medication 100 MILLIGRAM(S): at 06:32

## 2023-11-02 RX ADMIN — HYDROMORPHONE HYDROCHLORIDE 1 MILLIGRAM(S): 2 INJECTION INTRAMUSCULAR; INTRAVENOUS; SUBCUTANEOUS at 09:44

## 2023-11-02 RX ADMIN — SODIUM CHLORIDE 100 MILLILITER(S): 9 INJECTION, SOLUTION INTRAVENOUS at 09:18

## 2023-11-02 RX ADMIN — Medication 100 MILLIEQUIVALENT(S): at 16:03

## 2023-11-02 RX ADMIN — PANTOPRAZOLE SODIUM 40 MILLIGRAM(S): 20 TABLET, DELAYED RELEASE ORAL at 12:18

## 2023-11-02 RX ADMIN — Medication 100 MILLIEQUIVALENT(S): at 17:00

## 2023-11-02 RX ADMIN — SODIUM CHLORIDE 100 MILLILITER(S): 9 INJECTION, SOLUTION INTRAVENOUS at 19:41

## 2023-11-02 RX ADMIN — ENOXAPARIN SODIUM 55 MILLIGRAM(S): 100 INJECTION SUBCUTANEOUS at 22:15

## 2023-11-02 RX ADMIN — HYDROMORPHONE HYDROCHLORIDE 1 MILLIGRAM(S): 2 INJECTION INTRAMUSCULAR; INTRAVENOUS; SUBCUTANEOUS at 23:16

## 2023-11-02 RX ADMIN — ATORVASTATIN CALCIUM 80 MILLIGRAM(S): 80 TABLET, FILM COATED ORAL at 22:16

## 2023-11-02 RX ADMIN — LIDOCAINE 1 PATCH: 4 CREAM TOPICAL at 18:03

## 2023-11-02 RX ADMIN — HYDROMORPHONE HYDROCHLORIDE 1 MILLIGRAM(S): 2 INJECTION INTRAMUSCULAR; INTRAVENOUS; SUBCUTANEOUS at 09:14

## 2023-11-02 RX ADMIN — ENOXAPARIN SODIUM 55 MILLIGRAM(S): 100 INJECTION SUBCUTANEOUS at 09:19

## 2023-11-02 RX ADMIN — POTASSIUM PHOSPHATE, MONOBASIC POTASSIUM PHOSPHATE, DIBASIC 83.33 MILLIMOLE(S): 236; 224 INJECTION, SOLUTION INTRAVENOUS at 17:01

## 2023-11-02 NOTE — PROGRESS NOTE ADULT - ASSESSMENT
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 Morales) and discharged on 10/27 returned to ED with abdominal pain, NV, fevers, chills. 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 Occlusion of proximal SMA. Occluded bypass graft from left common iliac artery to SMA. Reconstitution of distal SMA from collaterals." Patient taken emergently to OR for bypass revision. Patient s/p ex lap, removal/replacement of thrombosed graft on 10/28. SICU consulted for HD monitoring. Post-op course c/b low UOP, acute blood loss anemia requiring blood transfusion, and tachycardia concerning for bleed. Non con CT demonstrated free fluid around the bypass and near the liver. Patient taken emergently to OR and now s/p evacuation of 1L hematoma and repair of iliac anastomosis on 10/30. Recovering well now on surgical floor.    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 Morales) and discharged on 10/27 returned to ED with abdominal pain, NV, fevers, chills. 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 Occlusion of proximal SMA. Occluded bypass graft from left common iliac artery to SMA. Reconstitution of distal SMA from collaterals." Patient taken emergently to OR for bypass revision. Patient s/p ex lap, removal/replacement of thrombosed graft on 10/28. SICU consulted for HD monitoring. Post-op course c/b low UOP, acute blood loss anemia requiring blood transfusion, and tachycardia concerning for bleed. Non con CT demonstrated free fluid around the bypass and near the liver. Patient taken emergently to OR and now s/p evacuation of 1L hematoma and repair of iliac anastomosis on 10/30. Recovering well now on surgical floor.    Plan:  - Diet: NPO  - Pain control PRN  - IV fluids, monitor UOP  - continue cannon  - D/c hep gtt, start T lovenox  - d/c abx  - f/u c dif  -OOB/ambulate  - f/u PT recs    Vascular Surgery   [id number]      INCOMPLETE*** awaiting attending discussion  [id number]

## 2023-11-02 NOTE — CONSULT NOTE ADULT - ASSESSMENT
63F PMHx HTN, DM, PAD,with recurrent abdominal pain, for 1 year with associated nausea/vomiting. Hematology consulted for SMA thrombosis and hypercoagulable workup.    # SMA Thrombosis  # Hypercoagulable Workup  Initially presented with one year of abdominal pain. Vascular surgery was consulted due to concerns for mesenteric ischemia. 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 S/p ex lap right iliac to SMA bypass on 10/20/23; postoperative course unremarkable and she was discharged home on 10/27/23. Patient returned to ED with abdominal pain, NV, fevers, chills but no bloody BMs. CTA A/P in ED showed occluded origin of celiac artery with reconstitution from collaterals. Occlusion of proximal SMA. Occluded bypass graft from left common iliac artery to SMA. Reconstitution of distal SMA from collaterals.  - Ddx includes APLS, PNH, and JAK2 mediated hypercoagulable state  - Currently on therapeutic Lovenox 1 mg/kg subQ BID  - Please check DRVVT, silica clotting time, anti-cardiolipin antibody, anti-beta-2 glycoprotein antibody, PI-linked antigen, and JAK2 mutation    ***Note is incomplete. Will discuss plan with attending.  Note is not finalized until signed by attending.     Sachin Gaitan MD  Hematology/Oncology Fellow PGY-5  Pager: [facility] [phone] / [place] [phone]  After 5pm and on weekends please page on-call fellow  63F PMHx HTN, DM, PAD,with recurrent abdominal pain, for 1 year with associated nausea/vomiting. Hematology consulted for SMA thrombosis and hypercoagulable workup.    # SMA Thrombosis  # Hypercoagulable Workup  Initially presented with one year of abdominal pain. Vascular surgery was consulted due to concerns for mesenteric ischemia. 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 S/p ex lap right iliac to SMA bypass on 10/20/23; postoperative course unremarkable and she was discharged home on 10/27/23. Patient returned to ED with abdominal pain, NV, fevers, chills but no bloody BMs. CTA A/P in ED showed occluded origin of celiac artery with reconstitution from collaterals. Occlusion of proximal SMA. Occluded bypass graft from left common iliac artery to SMA. Reconstitution of distal SMA from collaterals.  - Ddx includes APLS, PNH, and JAK2 mediated hypercoagulable state  - Currently on therapeutic Lovenox 1 mg/kg subQ BID  - Please check DRVVT, silica clotting time (SCT), anti-cardiolipin antibody, anti-beta-2 glycoprotein antibody, PI-linked antigen, and JAK2 mutation  - If patient is triple positive for APLS labs, then she will need warfarin. If she is not triple positive, then she can be transitioned to a DOAC.    Note is not finalized until signed by attending.     Sachin Gaitan MD  Hematology/Oncology Fellow PGY-5  Pager: [facility] [phone] / [place] [phone]  After 5pm and on weekends please page on-call fellow

## 2023-11-02 NOTE — CONSULT NOTE ADULT - ATTENDING COMMENTS
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 Morales) and discharged on 10/27 returned to ED with abdominal pain. CTA A/P showing occluded bypass graft from left common iliac artery to SMA. Reconstitution of distal SMA from collaterals. Patient taken emergently to OR for bypass revision for acute on chronic mesenteric ischemia. Patient now s/p ex lap, removal/replacement of thrombosed graft on 10/28. SICU consulted for HD monitoring and frequent abdominal pain assessment for possible relapse of ischemia post-operatively.       Plan  NEUROLOGIC: postoperative pain   - pain control prn tylenol and dilaudid     RESPIRATORY   - on NC, wean as tolerated     CARDIOVASCULAR: relative hypotension, PVD    - hold lisinopril, amlodipine while NPO  - restarted home statin    GASTROINTESTINAL chronic mesenteric ischemia  - Diet: NPO/NGT  - protonix daily     /RENAL: hypovolemic, oliguric    - NS @100  - Maintain cannon catheter   - 500 cc bolus x 2 10/29     HEMATOLOGIC  - holding home ASA   - heparin gtt, f/u PTT    INFECTIOUS DISEASE  - continue ancef per primary team    ENDOCRINE  - ISS
-------------------------------------------------------------------------------------------------------------------  Patient seen and examined on rounds on 11/2/23 with Heme/onc fellows Dr. Dinorah Pineda and Dr. Cale Gaitan  Briefly patient is admitted with abdominal pain and found to have thrombosis of SMA; started on anticoagulation and currently on enoxaparin 1mg/kg q12 hour dosing; hypercoaguable work-up including APLS labs, JAK2 mutation and PNH ordered and pending. Discussed with patient and family that she will ultimately transition to oral anticoagulation pending results of APLS labs (i.e. warfarin would be preferred if she has + Lupus anticoagulant and antiphosopholipid antibodies). She would be stable to go home on enoxaparin and follow up in office as outpatient.

## 2023-11-02 NOTE — PROVIDER CONTACT NOTE (OTHER) - BACKGROUND
63F PMHx HTN, DM, PAD,with recurrent abdominal pain, for 1 year with associated NV. Reports 40lb weight loss. Vascular surgery consulted to rule out mesenteric ischemia.

## 2023-11-02 NOTE — PROGRESS NOTE ADULT - SUBJECTIVE AND OBJECTIVE BOX
Vascular Surgery Daily Progress Note    SUBJECTIVE:     Vital Signs Last 24 Hrs  T(C): 36.8 (02 Nov 2023 01:42), Max: 37.3 (01 Nov 2023 16:00)  T(F): 98.3 (02 Nov 2023 01:42), Max: 99.1 (01 Nov 2023 16:00)  HR: 70 (02 Nov 2023 01:42) (70 - 90)  BP: 169/64 (02 Nov 2023 01:42) (147/81 - 169/64)  BP(mean): 81 (01 Nov 2023 16:00) (81 - 98)  RR: 17 (02 Nov 2023 01:42) (17 - 35)  SpO2: 96% (02 Nov 2023 01:42) (96% - 100%)  Parameters below as of 02 Nov 2023 01:42  Patient On (Oxygen Delivery Method): room air      General Appearance: Appears well, NAD  Neck: Supple  Chest: Equal expansion bilaterally, equal breath sounds  CV: Pulse regular presently  Abdomen: soft, distended, appropriately tender. midline with dressing. NGT in place.   Extremities: moving extremities  Vascular:  Palpable DP b/l.    I&O's Summary    01 Nov 2023 07:01  -  02 Nov 2023 07:00  --------------------------------------------------------  IN: 1232 mL / OUT: 1200 mL / NET: 32 mL      I&O's Detail    01 Nov 2023 07:01  -  02 Nov 2023 07:00  --------------------------------------------------------  IN:    dextrose 5% + sodium chloride 0.9%: 1000 mL    Enteral Tube Flush: 60 mL    Heparin: 72 mL    IV PiggyBack: 100 mL  Total IN: 1232 mL    OUT:    Indwelling Catheter - Urethral (mL): 1000 mL    Voided (mL): 200 mL  Total OUT: 1200 mL    Total NET: 32 mL          MEDICATIONS  (STANDING):  atorvastatin 80 milliGRAM(s) Oral at bedtime  ceFAZolin   IVPB 1000 milliGRAM(s) IV Intermittent every 8 hours  chlorhexidine 2% Cloths 1 Application(s) Topical daily  dextrose 5% + sodium chloride 0.9%. 1000 milliLiter(s) (100 mL/Hr) IV Continuous <Continuous>  heparin  Infusion 700 Unit(s)/Hr (8 mL/Hr) IV Continuous <Continuous>  insulin lispro (ADMELOG) corrective regimen sliding scale   SubCutaneous every 6 hours  lidocaine   4% Patch 1 Patch Transdermal daily  melatonin 1 milliGRAM(s) Oral once  pantoprazole  Injectable 40 milliGRAM(s) IV Push daily    MEDICATIONS  (PRN):  HYDROmorphone  Injectable 1 milliGRAM(s) IV Push every 4 hours PRN Severe Pain (7 - 10)  HYDROmorphone  Injectable 0.5 milliGRAM(s) IV Push every 4 hours PRN Moderate Pain (4 - 6)      LABS:                        7.6    15.89 )-----------( 211      ( 01 Nov 2023 01:52 )             22.4     11-01    140  |  110<H>  |  20  ----------------------------<  83  3.5   |  16<L>  |  0.95    Ca    7.4<L>      01 Nov 2023 01:55  Phos  1.8     11-01  Mg     2.10     11-01      PT/INR - ( 01 Nov 2023 01:00 )   PT: 19.6 sec;   INR: 1.78 ratio         PTT - ( 01 Nov 2023 15:30 )  PTT:76.2 sec  Urinalysis Basic - ( 01 Nov 2023 01:55 )    Color: x / Appearance: x / SG: x / pH: x  Gluc: 83 mg/dL / Ketone: x  / Bili: x / Urobili: x   Blood: x / Protein: x / Nitrite: x   Leuk Esterase: x / RBC: x / WBC x   Sq Epi: x / Non Sq Epi: x / Bacteria: x        RADIOLOGY & ADDITIONAL STUDIES: Vascular Surgery Daily Progress Note    SUBJECTIVE: Patient seen and examined by team on morning rounds. No acute events overnight. Denies chest pain, palpitations, SOB, dizziness, fever, chills, N/V/D.    Vital Signs Last 24 Hrs  T(C): 36.8 (02 Nov 2023 01:42), Max: 37.3 (01 Nov 2023 16:00)  T(F): 98.3 (02 Nov 2023 01:42), Max: 99.1 (01 Nov 2023 16:00)  HR: 70 (02 Nov 2023 01:42) (70 - 90)  BP: 169/64 (02 Nov 2023 01:42) (147/81 - 169/64)  BP(mean): 81 (01 Nov 2023 16:00) (81 - 98)  RR: 17 (02 Nov 2023 01:42) (17 - 35)  SpO2: 96% (02 Nov 2023 01:42) (96% - 100%)  Parameters below as of 02 Nov 2023 01:42  Patient On (Oxygen Delivery Method): room air      General Appearance: Appears well, NAD  Neck: Supple  Chest: Equal expansion bilaterally, equal breath sounds  CV: Pulse regular presently  Abdomen: soft, distended, appropriately tender. midline with dressing. NGT in place.   Extremities: moving extremities  Vascular:  Palpable DP b/l.    I&O's Summary    01 Nov 2023 07:01  -  02 Nov 2023 07:00  --------------------------------------------------------  IN: 1232 mL / OUT: 1200 mL / NET: 32 mL      I&O's Detail    01 Nov 2023 07:01  -  02 Nov 2023 07:00  --------------------------------------------------------  IN:    dextrose 5% + sodium chloride 0.9%: 1000 mL    Enteral Tube Flush: 60 mL    Heparin: 72 mL    IV PiggyBack: 100 mL  Total IN: 1232 mL    OUT:    Indwelling Catheter - Urethral (mL): 1000 mL    Voided (mL): 200 mL  Total OUT: 1200 mL    Total NET: 32 mL          MEDICATIONS  (STANDING):  atorvastatin 80 milliGRAM(s) Oral at bedtime  ceFAZolin   IVPB 1000 milliGRAM(s) IV Intermittent every 8 hours  chlorhexidine 2% Cloths 1 Application(s) Topical daily  dextrose 5% + sodium chloride 0.9%. 1000 milliLiter(s) (100 mL/Hr) IV Continuous <Continuous>  heparin  Infusion 700 Unit(s)/Hr (8 mL/Hr) IV Continuous <Continuous>  insulin lispro (ADMELOG) corrective regimen sliding scale   SubCutaneous every 6 hours  lidocaine   4% Patch 1 Patch Transdermal daily  melatonin 1 milliGRAM(s) Oral once  pantoprazole  Injectable 40 milliGRAM(s) IV Push daily    MEDICATIONS  (PRN):  HYDROmorphone  Injectable 1 milliGRAM(s) IV Push every 4 hours PRN Severe Pain (7 - 10)  HYDROmorphone  Injectable 0.5 milliGRAM(s) IV Push every 4 hours PRN Moderate Pain (4 - 6)      LABS:                        7.6    15.89 )-----------( 211      ( 01 Nov 2023 01:52 )             22.4     11-01    140  |  110<H>  |  20  ----------------------------<  83  3.5   |  16<L>  |  0.95    Ca    7.4<L>      01 Nov 2023 01:55  Phos  1.8     11-01  Mg     2.10     11-01      PT/INR - ( 01 Nov 2023 01:00 )   PT: 19.6 sec;   INR: 1.78 ratio         PTT - ( 01 Nov 2023 15:30 )  PTT:76.2 sec  Urinalysis Basic - ( 01 Nov 2023 01:55 )    Color: x / Appearance: x / SG: x / pH: x  Gluc: 83 mg/dL / Ketone: x  / Bili: x / Urobili: x   Blood: x / Protein: x / Nitrite: x   Leuk Esterase: x / RBC: x / WBC x   Sq Epi: x / Non Sq Epi: x / Bacteria: x        RADIOLOGY & ADDITIONAL STUDIES:

## 2023-11-02 NOTE — CONSULT NOTE ADULT - SUBJECTIVE AND OBJECTIVE BOX
CC: Patient is a 63y old  Female who presents with a chief complaint of Occluded PTFE Graft (02 Nov 2023 07:16)    HPI:  63F PMHx HTN, DM, PAD,with recurrent abdominal pain, for 1 year with associated NV. Reports 40lb weight loss. Vascular surgery consulted to rule out mesenteric ischemia. 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 S/p ex lap right iliac to SMA bypass on 10/20/23; postoperative course unremarkable and was discharged home on 10/27. Patient returned to ED with abdominal pain, NV, fevers, chills,no bloody BM. 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 Occlusion of proximal SMA. Occluded bypass graft from left common iliac artery to SMA. Reconstitution of distal SMA from collaterals. No lactate, elevated WBC.    Hematology consulted for hypercoagulable work up.    PAST MEDICAL & SURGICAL HISTORY:  Positive H. pylori test      Status post vascular bypass        SOCIAL HISTORY:  Tobacco Usage:  (   ) never smoked   (   ) former smoker   (   ) current smoker  (     ) pack years    Tobacco Quit Date:  Substance Use (Street drugs): (  ) never used  (  ) other:  Alcohol Usage:    Family history reviewed and otherwise non-contributory  ALLERGIES:  No Known Allergies    MEDICATIONS:  atorvastatin 80 milliGRAM(s) Oral at bedtime  dextrose 5% + sodium chloride 0.9%. 1000 milliLiter(s) IV Continuous <Continuous>  enoxaparin Injectable 55 milliGRAM(s) SubCutaneous every 12 hours  HYDROmorphone  Injectable 0.5 milliGRAM(s) IV Push every 4 hours PRN  HYDROmorphone  Injectable 1 milliGRAM(s) IV Push every 4 hours PRN  insulin lispro (ADMELOG) corrective regimen sliding scale   SubCutaneous every 6 hours  lidocaine   4% Patch 1 Patch Transdermal daily  melatonin 1 milliGRAM(s) Oral once  pantoprazole  Injectable 40 milliGRAM(s) IV Push daily    REVIEW OF SYSTEMS:  CONSTITUTIONAL: No fever, weight loss, or fatigue  EYES: No eye pain, visual disturbances, or discharge  ENMT:  No difficulty hearing, tinnitus, vertigo; No sinus or throat pain  NECK: No neck pain or neck stiffness  RESPIRATORY: No cough, wheezing, chills or hemoptysis; No shortness of breath  CARDIOVASCULAR: No chest pain, palpitations, dizziness, or leg swelling  GASTROINTESTINAL: No abdominal pain, No nausea, vomiting, or hematemesis; No diarrhea or constipation  GENITOURINARY: No dysuria, frequency, hematuria, or incontinence  NEUROLOGICAL: No headaches, memory loss, loss of strength, numbness, or tremors  SKIN: No itching, burning, rashes, or lesions   ENDOCRINE: No heat or cold intolerance; No hair loss  MUSCULOSKELETAL: No joint pain or swelling; No muscle, back, or extremity pain  PSYCHIATRIC: No depression, anxiety, mood swings, or difficulty sleeping  HEME/LYMPH: No easy bruising or bleeding  ALLERY AND IMMUNOLOGIC: No hives or eczema    All other ROS reviewed and negative except as otherwise stated    Vital Signs Last 24 Hrs  T(F): 98 (02 Nov 2023 10:00), Max: 99.1 (01 Nov 2023 16:00)  HR: 75 (02 Nov 2023 10:00) (70 - 75)  BP: 149/59 (02 Nov 2023 10:00) (149/59 - 169/64)  RR: 18 (02 Nov 2023 10:00) (17 - 24)  SpO2: 99% (02 Nov 2023 10:00) (96% - 99%)  I&O's Summary    01 Nov 2023 07:01  -  02 Nov 2023 07:00  --------------------------------------------------------  IN: 1232 mL / OUT: 1200 mL / NET: 32 mL      PHYSICAL EXAM:  GENERAL: NAD, well-groomed  HEAD:  Atraumatic, Normocephalic  EYES: PERRLA, normal conjunctiva, anicteric  ENMT: Moist mucous membranes, no mucositis  NECK: Supple, No JVD  CHEST/LUNG: Clear to auscultation bilaterally; No rales, rhonchi, wheezing, or rubs  HEART: Regular rate and rhythm; S1/S2, No murmurs, rubs, or gallops  ABDOMEN: Soft, Nontender, Nondistended; Bowel sounds present  VASCULAR: Normal pulses, Normal capillary refill  EXTREMITIES:  2+ Peripheral Pulses, No cyanosis, No edema  LYMPH: No lymphadenopathy noted  SKIN: Warm, Intact  PSYCH: Normal mood and affect  NERVOUS SYSTEM:  A/O x3, CN 2-12 intact, No focal deficits    LABS:                        7.6    12.46 )-----------( 189      ( 02 Nov 2023 07:01 )             22.3     11-02    139  |  108  |  5   ----------------------------<  109  2.8   |  21  |  0.57    Ca    7.5      02 Nov 2023 07:01  Phos  1.3     11-02  Mg     1.80     11-02        PT/INR - ( 02 Nov 2023 07:01 )   PT: 18.2 sec;   INR: 1.63 ratio         PTT - ( 02 Nov 2023 07:01 )  PTT:73.1 sec                    POCT Blood Glucose.: 117 mg/dL (02 Nov 2023 06:26)  POCT Blood Glucose.: 118 mg/dL (02 Nov 2023 00:00)  POCT Blood Glucose.: 105 mg/dL (01 Nov 2023 17:19)      Urinalysis Basic - ( 02 Nov 2023 07:01 )    Color: x / Appearance: x / SG: x / pH: x  Gluc: 109 mg/dL / Ketone: x  / Bili: x / Urobili: x   Blood: x / Protein: x / Nitrite: x   Leuk Esterase: x / RBC: x / WBC x   Sq Epi: x / Non Sq Epi: x / Bacteria: x        Culture - Urine (collected 28 Oct 2023 11:30)  Source: Clean Catch Clean Catch (Midstream)  Preliminary Report (01 Nov 2023 16:53):    10,000 - 49,000 CFU/mL Enterococcus faecalis    10,000 - 49,000 CFU/mL Streptococcus agalactiae (Group B) isolated    Group B streptococci are susceptible to ampicillin,    penicillin and cefazolin, but may be resistant to    erythromycin and clindamycin.    Culture - Blood (collected 28 Oct 2023 08:40)  Source: .Blood Blood-Peripheral  Preliminary Report (01 Nov 2023 13:01):    No growth at 4 days    Culture - Blood (collected 28 Oct 2023 08:35)  Source: .Blood Blood-Peripheral  Final Report (02 Nov 2023 12:01):    No growth at 5 days          RADIOLOGY & ADDITIONAL TESTS:    Care Discussed with Consultants/Other Providers: CC: Patient is a 63y old  Female who presents with a chief complaint of Occluded PTFE Graft (02 Nov 2023 07:16)    HPI:  [employer] ([place]) Maribell ID# [id number]  63F PMHx HTN, DM, PAD,with recurrent abdominal pain, for 1 year with associated NV. Reports 40lb weight loss. Vascular surgery consulted to rule out mesenteric ischemia. 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 S/p ex lap right iliac to SMA bypass on 10/20/23; postoperative course unremarkable and was discharged home on 10/27. Patient returned to ED with abdominal pain, NV, fevers, chills,no bloody BM. 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 Occlusion of proximal SMA. Occluded bypass graft from left common iliac artery to SMA. Reconstitution of distal SMA from collaterals. No lactate, elevated WBC.    Hematology consulted for hypercoagulable work up.    PAST MEDICAL & SURGICAL HISTORY:  Positive H. pylori test  Status post vascular bypass    SOCIAL HISTORY:  Tobacco Usage:  ( x ) never smoked   (   ) former smoker   (   ) current smoker  (     ) pack years    Tobacco Quit Date:   Substance Use (Street drugs): ( x ) never used  (  ) other:  Alcohol Usage: None    Family history reviewed and otherwise non-contributory  ALLERGIES:  No Known Allergies    MEDICATIONS:  atorvastatin 80 milliGRAM(s) Oral at bedtime  dextrose 5% + sodium chloride 0.9%. 1000 milliLiter(s) IV Continuous <Continuous>  enoxaparin Injectable 55 milliGRAM(s) SubCutaneous every 12 hours  HYDROmorphone  Injectable 0.5 milliGRAM(s) IV Push every 4 hours PRN  HYDROmorphone  Injectable 1 milliGRAM(s) IV Push every 4 hours PRN  insulin lispro (ADMELOG) corrective regimen sliding scale   SubCutaneous every 6 hours  lidocaine   4% Patch 1 Patch Transdermal daily  melatonin 1 milliGRAM(s) Oral once  pantoprazole  Injectable 40 milliGRAM(s) IV Push daily    REVIEW OF SYSTEMS:  CONSTITUTIONAL: No fever, weight loss, or fatigue  EYES: No eye pain, visual disturbances, or discharge  ENMT:  No difficulty hearing, tinnitus, vertigo; No sinus or throat pain  NECK: No neck pain or neck stiffness  RESPIRATORY: No cough, wheezing, chills or hemoptysis; No shortness of breath  CARDIOVASCULAR: No chest pain, palpitations, dizziness, or leg swelling  GASTROINTESTINAL: No abdominal pain, No nausea, vomiting, or hematemesis; No diarrhea or constipation  GENITOURINARY: No dysuria, frequency, hematuria, or incontinence  NEUROLOGICAL: No headaches, memory loss, loss of strength, numbness, or tremors  SKIN: No itching, burning, rashes, or lesions   ENDOCRINE: No heat or cold intolerance; No hair loss  MUSCULOSKELETAL: No joint pain or swelling; No muscle, back, or extremity pain  PSYCHIATRIC: No depression, anxiety, mood swings, or difficulty sleeping  HEME/LYMPH: No easy bruising or bleeding  ALLERY AND IMMUNOLOGIC: No hives or eczema    All other ROS reviewed and negative except as otherwise stated    Vital Signs Last 24 Hrs  T(F): 98 (02 Nov 2023 10:00), Max: 99.1 (01 Nov 2023 16:00)  HR: 75 (02 Nov 2023 10:00) (70 - 75)  BP: 149/59 (02 Nov 2023 10:00) (149/59 - 169/64)  RR: 18 (02 Nov 2023 10:00) (17 - 24)  SpO2: 99% (02 Nov 2023 10:00) (96% - 99%)  I&O's Summary    01 Nov 2023 07:01  -  02 Nov 2023 07:00  --------------------------------------------------------  IN: 1232 mL / OUT: 1200 mL / NET: 32 mL      PHYSICAL EXAM:  GENERAL: NAD, well-groomed  HEAD:  Atraumatic, Normocephalic  EYES: PERRLA, normal conjunctiva, anicteric  ENMT: Moist mucous membranes, no mucositis  CHEST/LUNG: Clear to auscultation bilaterally; No rales, rhonchi, wheezing, or rubs  HEART: Regular rate and rhythm; S1/S2, No murmurs, rubs, or gallops  ABDOMEN: Soft, Nontender, Nondistended; Bowel sounds present  VASCULAR: Normal pulses, Normal capillary refill  EXTREMITIES:  2+ Peripheral Pulses, No cyanosis, No edema  LYMPH: No lymphadenopathy noted  SKIN: Warm, Intact  PSYCH: Normal mood and affect  NERVOUS SYSTEM:  A/O x3, CN 2-12 intact, No focal deficits    LABS:                        7.6    12.46 )-----------( 189      ( 02 Nov 2023 07:01 )             22.3     11-02    139  |  108  |  5   ----------------------------<  109  2.8   |  21  |  0.57    Ca    7.5      02 Nov 2023 07:01  Phos  1.3     11-02  Mg     1.80     11-02    PT/INR - ( 02 Nov 2023 07:01 )   PT: 18.2 sec;   INR: 1.63 ratio    PTT - ( 02 Nov 2023 07:01 )  PTT:73.1 sec    POCT Blood Glucose.: 117 mg/dL (02 Nov 2023 06:26)  POCT Blood Glucose.: 118 mg/dL (02 Nov 2023 00:00)  POCT Blood Glucose.: 105 mg/dL (01 Nov 2023 17:19)    Urinalysis Basic - ( 02 Nov 2023 07:01 )  Color: x / Appearance: x / SG: x / pH: x  Gluc: 109 mg/dL / Ketone: x  / Bili: x / Urobili: x   Blood: x / Protein: x / Nitrite: x   Leuk Esterase: x / RBC: x / WBC x   Sq Epi: x / Non Sq Epi: x / Bacteria: x    Culture - Urine (collected 28 Oct 2023 11:30)  Source: Clean Catch Clean Catch (Midstream)  Preliminary Report (01 Nov 2023 16:53):    10,000 - 49,000 CFU/mL Enterococcus faecalis    10,000 - 49,000 CFU/mL Streptococcus agalactiae (Group B) isolated    Group B streptococci are susceptible to ampicillin,    penicillin and cefazolin, but may be resistant to    erythromycin and clindamycin.    Culture - Blood (collected 28 Oct 2023 08:40)  Source: .Blood Blood-Peripheral  Preliminary Report (01 Nov 2023 13:01):    No growth at 4 days    Culture - Blood (collected 28 Oct 2023 08:35)  Source: .Blood Blood-Peripheral  Final Report (02 Nov 2023 12:01):    No growth at 5 days      RADIOLOGY & ADDITIONAL TESTS:    Care Discussed with Consultants/Other Providers:

## 2023-11-03 LAB
ANION GAP SERPL CALC-SCNC: 10 MMOL/L — SIGNIFICANT CHANGE UP (ref 7–14)
ANION GAP SERPL CALC-SCNC: 10 MMOL/L — SIGNIFICANT CHANGE UP (ref 7–14)
ANION GAP SERPL CALC-SCNC: 11 MMOL/L — SIGNIFICANT CHANGE UP (ref 7–14)
ANION GAP SERPL CALC-SCNC: 11 MMOL/L — SIGNIFICANT CHANGE UP (ref 7–14)
BUN SERPL-MCNC: 2 MG/DL — LOW (ref 7–23)
BUN SERPL-MCNC: 2 MG/DL — LOW (ref 7–23)
BUN SERPL-MCNC: 3 MG/DL — LOW (ref 7–23)
BUN SERPL-MCNC: 3 MG/DL — LOW (ref 7–23)
CALCIUM SERPL-MCNC: 7.3 MG/DL — LOW (ref 8.4–10.5)
CALCIUM SERPL-MCNC: 7.3 MG/DL — LOW (ref 8.4–10.5)
CALCIUM SERPL-MCNC: 7.6 MG/DL — LOW (ref 8.4–10.5)
CALCIUM SERPL-MCNC: 7.6 MG/DL — LOW (ref 8.4–10.5)
CHLORIDE SERPL-SCNC: 111 MMOL/L — HIGH (ref 98–107)
CHLORIDE SERPL-SCNC: 111 MMOL/L — HIGH (ref 98–107)
CHLORIDE SERPL-SCNC: 112 MMOL/L — HIGH (ref 98–107)
CHLORIDE SERPL-SCNC: 112 MMOL/L — HIGH (ref 98–107)
CO2 SERPL-SCNC: 20 MMOL/L — LOW (ref 22–31)
CO2 SERPL-SCNC: 20 MMOL/L — LOW (ref 22–31)
CO2 SERPL-SCNC: 21 MMOL/L — LOW (ref 22–31)
CO2 SERPL-SCNC: 21 MMOL/L — LOW (ref 22–31)
CREAT SERPL-MCNC: 0.55 MG/DL — SIGNIFICANT CHANGE UP (ref 0.5–1.3)
CREAT SERPL-MCNC: 0.55 MG/DL — SIGNIFICANT CHANGE UP (ref 0.5–1.3)
CREAT SERPL-MCNC: 0.63 MG/DL — SIGNIFICANT CHANGE UP (ref 0.5–1.3)
CREAT SERPL-MCNC: 0.63 MG/DL — SIGNIFICANT CHANGE UP (ref 0.5–1.3)
DRVVT RATIO: 1.05 RATIO — SIGNIFICANT CHANGE UP (ref 0–1.21)
DRVVT RATIO: 1.05 RATIO — SIGNIFICANT CHANGE UP (ref 0–1.21)
DRVVT SCREEN TO CONFIRM RATIO: NEGATIVE — SIGNIFICANT CHANGE UP
DRVVT SCREEN TO CONFIRM RATIO: NEGATIVE — SIGNIFICANT CHANGE UP
EGFR: 102 ML/MIN/1.73M2 — SIGNIFICANT CHANGE UP
EGFR: 102 ML/MIN/1.73M2 — SIGNIFICANT CHANGE UP
EGFR: 98 ML/MIN/1.73M2 — SIGNIFICANT CHANGE UP
EGFR: 98 ML/MIN/1.73M2 — SIGNIFICANT CHANGE UP
GLUCOSE BLDC GLUCOMTR-MCNC: 100 MG/DL — HIGH (ref 70–99)
GLUCOSE BLDC GLUCOMTR-MCNC: 100 MG/DL — HIGH (ref 70–99)
GLUCOSE BLDC GLUCOMTR-MCNC: 101 MG/DL — HIGH (ref 70–99)
GLUCOSE BLDC GLUCOMTR-MCNC: 101 MG/DL — HIGH (ref 70–99)
GLUCOSE BLDC GLUCOMTR-MCNC: 102 MG/DL — HIGH (ref 70–99)
GLUCOSE BLDC GLUCOMTR-MCNC: 102 MG/DL — HIGH (ref 70–99)
GLUCOSE BLDC GLUCOMTR-MCNC: 98 MG/DL — SIGNIFICANT CHANGE UP (ref 70–99)
GLUCOSE BLDC GLUCOMTR-MCNC: 98 MG/DL — SIGNIFICANT CHANGE UP (ref 70–99)
GLUCOSE SERPL-MCNC: 117 MG/DL — HIGH (ref 70–99)
GLUCOSE SERPL-MCNC: 117 MG/DL — HIGH (ref 70–99)
GLUCOSE SERPL-MCNC: 99 MG/DL — SIGNIFICANT CHANGE UP (ref 70–99)
GLUCOSE SERPL-MCNC: 99 MG/DL — SIGNIFICANT CHANGE UP (ref 70–99)
HCT VFR BLD CALC: 24.3 % — LOW (ref 34.5–45)
HCT VFR BLD CALC: 24.3 % — LOW (ref 34.5–45)
HGB BLD-MCNC: 8.1 G/DL — LOW (ref 11.5–15.5)
HGB BLD-MCNC: 8.1 G/DL — LOW (ref 11.5–15.5)
MAGNESIUM SERPL-MCNC: 1.9 MG/DL — SIGNIFICANT CHANGE UP (ref 1.6–2.6)
MAGNESIUM SERPL-MCNC: 1.9 MG/DL — SIGNIFICANT CHANGE UP (ref 1.6–2.6)
MAGNESIUM SERPL-MCNC: 2 MG/DL — SIGNIFICANT CHANGE UP (ref 1.6–2.6)
MAGNESIUM SERPL-MCNC: 2 MG/DL — SIGNIFICANT CHANGE UP (ref 1.6–2.6)
MCHC RBC-ENTMCNC: 29.6 PG — SIGNIFICANT CHANGE UP (ref 27–34)
MCHC RBC-ENTMCNC: 29.6 PG — SIGNIFICANT CHANGE UP (ref 27–34)
MCHC RBC-ENTMCNC: 33.3 GM/DL — SIGNIFICANT CHANGE UP (ref 32–36)
MCHC RBC-ENTMCNC: 33.3 GM/DL — SIGNIFICANT CHANGE UP (ref 32–36)
MCV RBC AUTO: 88.7 FL — SIGNIFICANT CHANGE UP (ref 80–100)
MCV RBC AUTO: 88.7 FL — SIGNIFICANT CHANGE UP (ref 80–100)
NORMALIZED SCT PPP-RTO: 1.11 RATIO — SIGNIFICANT CHANGE UP
NORMALIZED SCT PPP-RTO: 1.11 RATIO — SIGNIFICANT CHANGE UP
NORMALIZED SCT PPP-RTO: NEGATIVE — SIGNIFICANT CHANGE UP
NORMALIZED SCT PPP-RTO: NEGATIVE — SIGNIFICANT CHANGE UP
NRBC # BLD: 0 /100 WBCS — SIGNIFICANT CHANGE UP (ref 0–0)
NRBC # BLD: 0 /100 WBCS — SIGNIFICANT CHANGE UP (ref 0–0)
NRBC # FLD: 0.05 K/UL — HIGH (ref 0–0)
NRBC # FLD: 0.05 K/UL — HIGH (ref 0–0)
PHOSPHATE SERPL-MCNC: 2.2 MG/DL — LOW (ref 2.5–4.5)
PHOSPHATE SERPL-MCNC: 2.2 MG/DL — LOW (ref 2.5–4.5)
PHOSPHATE SERPL-MCNC: 2.8 MG/DL — SIGNIFICANT CHANGE UP (ref 2.5–4.5)
PHOSPHATE SERPL-MCNC: 2.8 MG/DL — SIGNIFICANT CHANGE UP (ref 2.5–4.5)
PLATELET # BLD AUTO: 232 K/UL — SIGNIFICANT CHANGE UP (ref 150–400)
PLATELET # BLD AUTO: 232 K/UL — SIGNIFICANT CHANGE UP (ref 150–400)
POTASSIUM SERPL-MCNC: 3.4 MMOL/L — LOW (ref 3.5–5.3)
POTASSIUM SERPL-MCNC: 3.4 MMOL/L — LOW (ref 3.5–5.3)
POTASSIUM SERPL-MCNC: 3.5 MMOL/L — SIGNIFICANT CHANGE UP (ref 3.5–5.3)
POTASSIUM SERPL-MCNC: 3.5 MMOL/L — SIGNIFICANT CHANGE UP (ref 3.5–5.3)
POTASSIUM SERPL-SCNC: 3.4 MMOL/L — LOW (ref 3.5–5.3)
POTASSIUM SERPL-SCNC: 3.4 MMOL/L — LOW (ref 3.5–5.3)
POTASSIUM SERPL-SCNC: 3.5 MMOL/L — SIGNIFICANT CHANGE UP (ref 3.5–5.3)
POTASSIUM SERPL-SCNC: 3.5 MMOL/L — SIGNIFICANT CHANGE UP (ref 3.5–5.3)
RBC # BLD: 2.74 M/UL — LOW (ref 3.8–5.2)
RBC # BLD: 2.74 M/UL — LOW (ref 3.8–5.2)
RBC # FLD: 15.7 % — HIGH (ref 10.3–14.5)
RBC # FLD: 15.7 % — HIGH (ref 10.3–14.5)
SODIUM SERPL-SCNC: 142 MMOL/L — SIGNIFICANT CHANGE UP (ref 135–145)
SODIUM SERPL-SCNC: 142 MMOL/L — SIGNIFICANT CHANGE UP (ref 135–145)
SODIUM SERPL-SCNC: 143 MMOL/L — SIGNIFICANT CHANGE UP (ref 135–145)
SODIUM SERPL-SCNC: 143 MMOL/L — SIGNIFICANT CHANGE UP (ref 135–145)
WBC # BLD: 10.07 K/UL — SIGNIFICANT CHANGE UP (ref 3.8–10.5)
WBC # BLD: 10.07 K/UL — SIGNIFICANT CHANGE UP (ref 3.8–10.5)
WBC # FLD AUTO: 10.07 K/UL — SIGNIFICANT CHANGE UP (ref 3.8–10.5)
WBC # FLD AUTO: 10.07 K/UL — SIGNIFICANT CHANGE UP (ref 3.8–10.5)

## 2023-11-03 RX ORDER — OXYCODONE HYDROCHLORIDE 5 MG/1
10 TABLET ORAL EVERY 4 HOURS
Refills: 0 | Status: DISCONTINUED | OUTPATIENT
Start: 2023-11-03 | End: 2023-11-09

## 2023-11-03 RX ORDER — INSULIN LISPRO 100/ML
VIAL (ML) SUBCUTANEOUS
Refills: 0 | Status: DISCONTINUED | OUTPATIENT
Start: 2023-11-03 | End: 2023-11-09

## 2023-11-03 RX ORDER — OXYCODONE HYDROCHLORIDE 5 MG/1
5 TABLET ORAL EVERY 4 HOURS
Refills: 0 | Status: DISCONTINUED | OUTPATIENT
Start: 2023-11-03 | End: 2023-11-09

## 2023-11-03 RX ORDER — POTASSIUM CHLORIDE 20 MEQ
40 PACKET (EA) ORAL ONCE
Refills: 0 | Status: COMPLETED | OUTPATIENT
Start: 2023-11-03 | End: 2023-11-03

## 2023-11-03 RX ORDER — POTASSIUM PHOSPHATE, MONOBASIC POTASSIUM PHOSPHATE, DIBASIC 236; 224 MG/ML; MG/ML
15 INJECTION, SOLUTION INTRAVENOUS ONCE
Refills: 0 | Status: COMPLETED | OUTPATIENT
Start: 2023-11-03 | End: 2023-11-03

## 2023-11-03 RX ORDER — ACETAMINOPHEN 500 MG
1000 TABLET ORAL EVERY 6 HOURS
Refills: 0 | Status: DISCONTINUED | OUTPATIENT
Start: 2023-11-03 | End: 2023-11-08

## 2023-11-03 RX ORDER — LANOLIN ALCOHOL/MO/W.PET/CERES
3 CREAM (GRAM) TOPICAL AT BEDTIME
Refills: 0 | Status: DISCONTINUED | OUTPATIENT
Start: 2023-11-03 | End: 2023-11-09

## 2023-11-03 RX ORDER — CALCIUM GLUCONATE 100 MG/ML
2 VIAL (ML) INTRAVENOUS ONCE
Refills: 0 | Status: COMPLETED | OUTPATIENT
Start: 2023-11-03 | End: 2023-11-03

## 2023-11-03 RX ORDER — SODIUM,POTASSIUM PHOSPHATES 278-250MG
1 POWDER IN PACKET (EA) ORAL ONCE
Refills: 0 | Status: COMPLETED | OUTPATIENT
Start: 2023-11-03 | End: 2023-11-03

## 2023-11-03 RX ORDER — LOPERAMIDE HCL 2 MG
2 TABLET ORAL EVERY 12 HOURS
Refills: 0 | Status: DISCONTINUED | OUTPATIENT
Start: 2023-11-03 | End: 2023-11-09

## 2023-11-03 RX ORDER — POTASSIUM CHLORIDE 20 MEQ
10 PACKET (EA) ORAL
Refills: 0 | Status: COMPLETED | OUTPATIENT
Start: 2023-11-03 | End: 2023-11-03

## 2023-11-03 RX ADMIN — Medication 100 MILLIEQUIVALENT(S): at 04:00

## 2023-11-03 RX ADMIN — Medication 100 MILLIEQUIVALENT(S): at 05:25

## 2023-11-03 RX ADMIN — Medication 1000 MILLIGRAM(S): at 20:58

## 2023-11-03 RX ADMIN — Medication 1 TABLET(S): at 09:03

## 2023-11-03 RX ADMIN — POTASSIUM PHOSPHATE, MONOBASIC POTASSIUM PHOSPHATE, DIBASIC 62.5 MILLIMOLE(S): 236; 224 INJECTION, SOLUTION INTRAVENOUS at 03:00

## 2023-11-03 RX ADMIN — ENOXAPARIN SODIUM 55 MILLIGRAM(S): 100 INJECTION SUBCUTANEOUS at 19:52

## 2023-11-03 RX ADMIN — ENOXAPARIN SODIUM 55 MILLIGRAM(S): 100 INJECTION SUBCUTANEOUS at 12:11

## 2023-11-03 RX ADMIN — Medication 2 MILLIGRAM(S): at 16:43

## 2023-11-03 RX ADMIN — OXYCODONE HYDROCHLORIDE 10 MILLIGRAM(S): 5 TABLET ORAL at 22:26

## 2023-11-03 RX ADMIN — Medication 200 GRAM(S): at 09:47

## 2023-11-03 RX ADMIN — Medication 0: at 06:26

## 2023-11-03 RX ADMIN — Medication 40 MILLIEQUIVALENT(S): at 09:03

## 2023-11-03 RX ADMIN — ATORVASTATIN CALCIUM 80 MILLIGRAM(S): 80 TABLET, FILM COATED ORAL at 22:26

## 2023-11-03 RX ADMIN — LIDOCAINE 1 PATCH: 4 CREAM TOPICAL at 12:12

## 2023-11-03 RX ADMIN — Medication 0: at 00:20

## 2023-11-03 RX ADMIN — LIDOCAINE 1 PATCH: 4 CREAM TOPICAL at 21:09

## 2023-11-03 RX ADMIN — Medication 1000 MILLIGRAM(S): at 19:58

## 2023-11-03 RX ADMIN — PANTOPRAZOLE SODIUM 40 MILLIGRAM(S): 20 TABLET, DELAYED RELEASE ORAL at 12:11

## 2023-11-03 RX ADMIN — Medication 100 MILLIEQUIVALENT(S): at 06:25

## 2023-11-03 NOTE — PROGRESS NOTE ADULT - SUBJECTIVE AND OBJECTIVE BOX
Vascular Surgery Daily Progress Note  =====================================================    SUBJECTIVE: Patient seen and examined at bedside on AM rounds. Patient endorses no complaints. Denies chest pain, SOB, fever, chills.    PAST MEDICAL & SURGICAL HISTORY:  Positive H. pylori test  Status post vascular bypass    ALLERGIES:  No Known Allergies    --------------------------------------------------------------------------------------    MEDICATIONS:    Neurologic Medications  acetaminophen     Tablet .. 1000 milliGRAM(s) Oral every 6 hours PRN Mild Pain (1 - 3)  oxyCODONE    IR 5 milliGRAM(s) Oral every 4 hours PRN Moderate Pain (4 - 6)  oxyCODONE    IR 10 milliGRAM(s) Oral every 4 hours PRN Severe Pain (7 - 10)    Respiratory Medications    Cardiovascular Medications    Gastrointestinal Medications  dextrose 5% + sodium chloride 0.9%. 1000 milliLiter(s) IV Continuous <Continuous>  pantoprazole  Injectable 40 milliGRAM(s) IV Push daily    Genitourinary Medications    Hematologic/Oncologic Medications  enoxaparin Injectable 55 milliGRAM(s) SubCutaneous every 12 hours    Antimicrobial/Immunologic Medications    Endocrine/Metabolic Medications  atorvastatin 80 milliGRAM(s) Oral at bedtime  insulin lispro (ADMELOG) corrective regimen sliding scale   SubCutaneous Before meals and at bedtime    Topical/Other Medications  lidocaine   4% Patch 1 Patch Transdermal daily    --------------------------------------------------------------------------------------    VITAL SIGNS:  T(C): 36.5 (11-03-23 @ 04:42), Max: 36.7 (11-02-23 @ 10:00)  HR: 77 (11-03-23 @ 04:42) (71 - 77)  BP: 188/78 (11-03-23 @ 04:42) (149/59 - 188/78)  RR: 18 (11-03-23 @ 04:42) (17 - 18)  SpO2: 98% (11-03-23 @ 04:42) (96% - 99%)  --------------------------------------------------------------------------------------    EXAM    General: NAD, resting in bed comfortably. A&Ox3.   Cardiac: regular rate  Respiratory: Nonlabored respirations, normal cw expansion.  Abdomen: soft, softly distended, nontender. midline dressing with minimal strikethrough  Extremities: moving extremities  Vascular: palpable DP pulses     --------------------------------------------------------------------------------------    LABS                          7.6    12.46 )-----------( 189      ( 02 Nov 2023 07:01 )             22.3     11-02    143  |  112<H>  |  3<L>  ----------------------------<  117<H>  3.5   |  21<L>  |  0.63    Ca    7.3<L>      02 Nov 2023 23:48  Phos  2.8     11-02  Mg     2.00     11-02        --------------------------------------------------------------------------------------      I&Os:    11-02-23 @ 07:01  -  11-03-23 @ 07:00  --------------------------------------------------------  IN: 0 mL / OUT: 1150 mL / NET: -1150 mL        --------------------------------------------------------------------------------------

## 2023-11-03 NOTE — PROGRESS NOTE ADULT - ASSESSMENT
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 Morales) and discharged on 10/27 returned to ED with abdominal pain, NV, fevers, chills. 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 Occlusion of proximal SMA. Occluded bypass graft from left common iliac artery to SMA. Reconstitution of distal SMA from collaterals." Patient taken emergently to OR for bypass revision. Patient s/p ex lap, removal/replacement of thrombosed graft on 10/28. SICU consulted for HD monitoring. Post-op course c/b low UOP, acute blood loss anemia requiring blood transfusion, and tachycardia concerning for bleed. Non con CT demonstrated free fluid around the bypass and near the liver. Patient taken emergently to OR and now s/p evacuation of 1L hematoma and repair of iliac anastomosis on 10/30. Recovering well now on surgical floor.    Plan:  - Diet: CLD, ADAT  - Pain control PRN tylenol and oxycodone\  - C. diff negative   - monitor labs, replete prn  - Therapeutic Lovenox- teaching prior to discharge   - OOB/ambulate  - dispo: home PT    Vascular Surgery   [id number]

## 2023-11-03 NOTE — PROGRESS NOTE ADULT - ATTENDING COMMENTS
I agree with the detailed interval history, physical, and plan, which I have reviewed and edited where appropriate'; also agree with notes/assessment with my team on service.  I have personally examined the patient.  I was physically present for the key portions of the evaluation and management (E/M) service provided.  I reviewed all the pertinent data.  The patient is a critical care patient with life threatening hemodynamic and metabolic instability in SICU.  The SICU team has a constant risk benefit analyzes discussion and coordinating care with the primary team and all consultants.   The patient is in SICU with the chief complaint and diagnosis mentioned in the note.   The plan will be specified in the note.  63F with occluded bypass graft from left common iliac artery to SMA. Reconstitution of distal SMA from collaterals; s/p ex lap, removal/replacement of thrombosed graft in SICU for HD monitoring with acute blood loss anemia.  EXAM  NEUROLOGY  Exam: No focal neurologic deficits.   RESPIRATORY  Exam: clear  CARDIOVASCULAR  Exam;  Regular rate   GI/NUTRITION  Exam: Abdomen soft, slightly distended, tender throughout  VASCULAR  Exam: Extremities warm. Palpable DP pulses blt   Plan  NEUROLOGIC   - tylenol and dilaudid   RESPIRATORY   -  SpO2 goal >92%  CARDIOVASCULAR   - MAP >65  GASTROINTESTINAL   - Diet: NPO/NGT  - protonix   /RENAL   - IVF @100  HEMATOLOGIC  - holding home ASA   - heparin gtt, f/u PTT  INFECTIOUS DISEASE  - ancef   ENDOCRINE  - Monitor glucose    DISPO: SICU
POD1 s/p re-do right ilio-mesenteric bypass. Intra-op, not responding to heparin appropriately. FFP given. Now on heparin gtt (initially PTT elevated with intra-op heparin boluses, now subtherapeutic). Low urine output at times.  NGT, a-line, cannon in place. Abdomen appropriately TTP.  -Titrate hep gtt to therapeutic   -NPO, NGT  -Monitor UO and give additional fluid boluses if needed  -Antibiotics  -Hematology consult  -Protect LUE (asymptomatic brachial artery occlusion)
Patient seen/examined with family at bedside. H/H stable. BP stable. Urine output normalized. Abdomen soft, appropriately tender.  -Continue heparin gtt  -Hematology  -Rankin removed; femoral arterial line removed; NGT to remain to suction for now  -Antibiotics   -Patient reportedly with several loose BMs, continuining to monitor
I agree with the detailed interval history, physical, and plan, which I have reviewed and edited where appropriate'; also agree with notes/assessment with my team on service.  I have personally examined the patient.  I was physically present for the key portions of the evaluation and management (E/M) service provided.  I reviewed all the pertinent data.  The patient is a critical care patient with life threatening hemodynamic and metabolic instability in SICU.  The SICU team has a constant risk benefit analyzes discussion and coordinating care with the primary team and all consultants.   The patient is in SICU with the chief complaint and diagnosis mentioned in the note.   The plan will be specified in the note.  63F with occluded bypass graft from left common iliac artery to SMA. Reconstitution of distal SMA from collaterals; s/p ex lap, removal/replacement of thrombosed graft in SICU for HD monitoring with acute blood loss anemia.  EXAM  NEUROLOGY  Exam: No focal deficits.   RESPIRATORY  Exam: clear  CARDIOVASCULAR  Exam;  Regular rate   GI/NUTRITION  Exam: Abdomen soft, slightly distended, tender throughout  VASCULAR  Exam: Extremities warm. Palpable DP pulses blt   Plan  NEUROLOGIC   - tylenol   -dilaudid   RESPIRATORY   -  SpO2 goal >92%  CARDIOVASCULAR   - MAP >65  GASTROINTESTINAL   - Diet: NPO/NGT  - protonix   /RENAL   - IVF @50cc/hr  HEMATOLOGIC  - heparin gtt  -f/u PTT  INFECTIOUS DISEASE  - ancef   ENDOCRINE  - Monitor glucose    DISPO: SICU
I agree with the detailed interval history, physical, and plan, which I have reviewed and edited where appropriate'; also agree with notes/assessment with my team on service.  I have personally examined the patient.  I was physically present for the key portions of the evaluation and management (E/M) service provided.  I reviewed all the pertinent data.  The patient is a critical care patient with life threatening hemodynamic and metabolic instability in SICU.  The SICU team has a constant risk benefit analyzes discussion and coordinating care with the primary team and all consultants.   The patient is in SICU with the chief complaint and diagnosis mentioned in the note.   The plan will be specified in the note.  63F with occluded bypass graft from left common iliac artery to SMA. Reconstitution of distal SMA from collaterals; s/p ex lap, removal/replacement of thrombosed graft in SICU for HD monitoring with acute blood loss anemia.  EXAM  NEUROLOGY  Exam: No focal deficits.   RESPIRATORY  Exam: clear  CARDIOVASCULAR  Exam;  Regular rate   GI/NUTRITION  Exam: Abdomen soft, slightly distended, tender throughout  VASCULAR  Exam: Extremities warm. Palpable DP pulses blt   Plan  NEUROLOGIC   - tylenol   -dilaudid   RESPIRATORY   -  SpO2 goal >92%  CARDIOVASCULAR   - MAP >65  GASTROINTESTINAL   - Diet: NPO/NGT  - protonix   /RENAL   - IVF @50cc/hr  HEMATOLOGIC  - heparin gtt, f/u PTT  INFECTIOUS DISEASE  - ancef   ENDOCRINE  - Monitor glucose    DISPO: SICU
Patient seen/examined with family at bedside. H/H stable. BP stable. Urine output normalized. Abdomen soft, appropriately tender.  -Continue heparin gtt  -Hematology  -Rankin removed; femoral arterial line removed; NGT to remain to suction for now  -Antibiotics
S/p right ilio-mesenteric bypass, revision, return to OR for bleeding. Doing well. C. diff negative and loose BMs have resolved. Antibiotics stopped. Transitioned to  Lovenox. Will start clears today. Continue to monitor.
I agree with the detailed interval history, physical, and plan, which I have reviewed and edited where appropriate'; also agree with notes/assessment with my team on service.  I have personally examined the patient.  I was physically present for the key portions of the evaluation and management (E/M) service provided.  I reviewed all the pertinent data.  The patient is a critical care patient with life threatening hemodynamic and metabolic instability in SICU.  The SICU team has a constant risk benefit analyzes discussion and coordinating care with the primary team and all consultants.   The patient is in SICU with the chief complaint and diagnosis mentioned in the note.   The plan will be specified in the note.  63F with occluded bypass graft from left common iliac artery to SMA. Reconstitution of distal SMA from collaterals; s/p ex lap, removal/replacement of thrombosed graft in SICU for HD monitoring.  EXAM  NEUROLOGY  Exam: No focal deficits.   RESPIRATORY  Exam: clear  CARDIOVASCULAR  Exam;  RR  GI/NUTRITION  Exam: Abdomen soft, slightly   VASCULAR  Exam: Extremities warm. Palpable DP pulses blt   Plan  NEUROLOGIC   - tylenol   -dilaudid   RESPIRATORY   -  SpO2 goal >92%  CARDIOVASCULAR   - MAP >65  GASTROINTESTINAL   - Diet: clears  - protonix   /RENAL   - IVF @50cc/hr  HEMATOLOGIC  - heparin gtt  -f/u PTT  INFECTIOUS DISEASE  - ancef   ENDOCRINE  - Monitor glucose    DISPO: DC floor

## 2023-11-04 LAB
ANION GAP SERPL CALC-SCNC: 12 MMOL/L — SIGNIFICANT CHANGE UP (ref 7–14)
ANION GAP SERPL CALC-SCNC: 12 MMOL/L — SIGNIFICANT CHANGE UP (ref 7–14)
ANION GAP SERPL CALC-SCNC: 9 MMOL/L — SIGNIFICANT CHANGE UP (ref 7–14)
ANION GAP SERPL CALC-SCNC: 9 MMOL/L — SIGNIFICANT CHANGE UP (ref 7–14)
B2 GLYCOPROT1 AB SER QL: NEGATIVE — SIGNIFICANT CHANGE UP
B2 GLYCOPROT1 AB SER QL: NEGATIVE — SIGNIFICANT CHANGE UP
BUN SERPL-MCNC: 3 MG/DL — LOW (ref 7–23)
CALCIUM SERPL-MCNC: 7.8 MG/DL — LOW (ref 8.4–10.5)
CALCIUM SERPL-MCNC: 7.8 MG/DL — LOW (ref 8.4–10.5)
CALCIUM SERPL-MCNC: 7.9 MG/DL — LOW (ref 8.4–10.5)
CALCIUM SERPL-MCNC: 7.9 MG/DL — LOW (ref 8.4–10.5)
CARDIOLIPIN AB SER-ACNC: POSITIVE
CARDIOLIPIN AB SER-ACNC: POSITIVE
CHLORIDE SERPL-SCNC: 109 MMOL/L — HIGH (ref 98–107)
CO2 SERPL-SCNC: 19 MMOL/L — LOW (ref 22–31)
CREAT SERPL-MCNC: 0.59 MG/DL — SIGNIFICANT CHANGE UP (ref 0.5–1.3)
CREAT SERPL-MCNC: 0.59 MG/DL — SIGNIFICANT CHANGE UP (ref 0.5–1.3)
CREAT SERPL-MCNC: 0.63 MG/DL — SIGNIFICANT CHANGE UP (ref 0.5–1.3)
CREAT SERPL-MCNC: 0.63 MG/DL — SIGNIFICANT CHANGE UP (ref 0.5–1.3)
EGFR: 100 ML/MIN/1.73M2 — SIGNIFICANT CHANGE UP
EGFR: 100 ML/MIN/1.73M2 — SIGNIFICANT CHANGE UP
EGFR: 98 ML/MIN/1.73M2 — SIGNIFICANT CHANGE UP
EGFR: 98 ML/MIN/1.73M2 — SIGNIFICANT CHANGE UP
GLUCOSE BLDC GLUCOMTR-MCNC: 101 MG/DL — HIGH (ref 70–99)
GLUCOSE BLDC GLUCOMTR-MCNC: 101 MG/DL — HIGH (ref 70–99)
GLUCOSE BLDC GLUCOMTR-MCNC: 114 MG/DL — HIGH (ref 70–99)
GLUCOSE BLDC GLUCOMTR-MCNC: 114 MG/DL — HIGH (ref 70–99)
GLUCOSE BLDC GLUCOMTR-MCNC: 84 MG/DL — SIGNIFICANT CHANGE UP (ref 70–99)
GLUCOSE BLDC GLUCOMTR-MCNC: 84 MG/DL — SIGNIFICANT CHANGE UP (ref 70–99)
GLUCOSE BLDC GLUCOMTR-MCNC: 87 MG/DL — SIGNIFICANT CHANGE UP (ref 70–99)
GLUCOSE BLDC GLUCOMTR-MCNC: 87 MG/DL — SIGNIFICANT CHANGE UP (ref 70–99)
GLUCOSE SERPL-MCNC: 100 MG/DL — HIGH (ref 70–99)
GLUCOSE SERPL-MCNC: 100 MG/DL — HIGH (ref 70–99)
GLUCOSE SERPL-MCNC: 86 MG/DL — SIGNIFICANT CHANGE UP (ref 70–99)
GLUCOSE SERPL-MCNC: 86 MG/DL — SIGNIFICANT CHANGE UP (ref 70–99)
HCT VFR BLD CALC: 24.7 % — LOW (ref 34.5–45)
HCT VFR BLD CALC: 24.7 % — LOW (ref 34.5–45)
HGB BLD-MCNC: 8.1 G/DL — LOW (ref 11.5–15.5)
HGB BLD-MCNC: 8.1 G/DL — LOW (ref 11.5–15.5)
MAGNESIUM SERPL-MCNC: 1.7 MG/DL — SIGNIFICANT CHANGE UP (ref 1.6–2.6)
MAGNESIUM SERPL-MCNC: 1.7 MG/DL — SIGNIFICANT CHANGE UP (ref 1.6–2.6)
MCHC RBC-ENTMCNC: 29.3 PG — SIGNIFICANT CHANGE UP (ref 27–34)
MCHC RBC-ENTMCNC: 29.3 PG — SIGNIFICANT CHANGE UP (ref 27–34)
MCHC RBC-ENTMCNC: 32.8 GM/DL — SIGNIFICANT CHANGE UP (ref 32–36)
MCHC RBC-ENTMCNC: 32.8 GM/DL — SIGNIFICANT CHANGE UP (ref 32–36)
MCV RBC AUTO: 89.5 FL — SIGNIFICANT CHANGE UP (ref 80–100)
MCV RBC AUTO: 89.5 FL — SIGNIFICANT CHANGE UP (ref 80–100)
NRBC # BLD: 0 /100 WBCS — SIGNIFICANT CHANGE UP (ref 0–0)
NRBC # BLD: 0 /100 WBCS — SIGNIFICANT CHANGE UP (ref 0–0)
NRBC # FLD: 0.04 K/UL — HIGH (ref 0–0)
NRBC # FLD: 0.04 K/UL — HIGH (ref 0–0)
PHOSPHATE SERPL-MCNC: 2.7 MG/DL — SIGNIFICANT CHANGE UP (ref 2.5–4.5)
PHOSPHATE SERPL-MCNC: 2.7 MG/DL — SIGNIFICANT CHANGE UP (ref 2.5–4.5)
PLATELET # BLD AUTO: 272 K/UL — SIGNIFICANT CHANGE UP (ref 150–400)
PLATELET # BLD AUTO: 272 K/UL — SIGNIFICANT CHANGE UP (ref 150–400)
POTASSIUM SERPL-MCNC: 3.6 MMOL/L — SIGNIFICANT CHANGE UP (ref 3.5–5.3)
POTASSIUM SERPL-MCNC: 3.6 MMOL/L — SIGNIFICANT CHANGE UP (ref 3.5–5.3)
POTASSIUM SERPL-MCNC: 4 MMOL/L — SIGNIFICANT CHANGE UP (ref 3.5–5.3)
POTASSIUM SERPL-MCNC: 4 MMOL/L — SIGNIFICANT CHANGE UP (ref 3.5–5.3)
POTASSIUM SERPL-SCNC: 3.6 MMOL/L — SIGNIFICANT CHANGE UP (ref 3.5–5.3)
POTASSIUM SERPL-SCNC: 3.6 MMOL/L — SIGNIFICANT CHANGE UP (ref 3.5–5.3)
POTASSIUM SERPL-SCNC: 4 MMOL/L — SIGNIFICANT CHANGE UP (ref 3.5–5.3)
POTASSIUM SERPL-SCNC: 4 MMOL/L — SIGNIFICANT CHANGE UP (ref 3.5–5.3)
RBC # BLD: 2.76 M/UL — LOW (ref 3.8–5.2)
RBC # BLD: 2.76 M/UL — LOW (ref 3.8–5.2)
RBC # FLD: 15.9 % — HIGH (ref 10.3–14.5)
RBC # FLD: 15.9 % — HIGH (ref 10.3–14.5)
SODIUM SERPL-SCNC: 137 MMOL/L — SIGNIFICANT CHANGE UP (ref 135–145)
SODIUM SERPL-SCNC: 137 MMOL/L — SIGNIFICANT CHANGE UP (ref 135–145)
SODIUM SERPL-SCNC: 140 MMOL/L — SIGNIFICANT CHANGE UP (ref 135–145)
SODIUM SERPL-SCNC: 140 MMOL/L — SIGNIFICANT CHANGE UP (ref 135–145)
WBC # BLD: 10.4 K/UL — SIGNIFICANT CHANGE UP (ref 3.8–10.5)
WBC # BLD: 10.4 K/UL — SIGNIFICANT CHANGE UP (ref 3.8–10.5)
WBC # FLD AUTO: 10.4 K/UL — SIGNIFICANT CHANGE UP (ref 3.8–10.5)
WBC # FLD AUTO: 10.4 K/UL — SIGNIFICANT CHANGE UP (ref 3.8–10.5)

## 2023-11-04 RX ORDER — POTASSIUM CHLORIDE 20 MEQ
10 PACKET (EA) ORAL
Refills: 0 | Status: COMPLETED | OUTPATIENT
Start: 2023-11-04 | End: 2023-11-04

## 2023-11-04 RX ORDER — SODIUM,POTASSIUM PHOSPHATES 278-250MG
1 POWDER IN PACKET (EA) ORAL ONCE
Refills: 0 | Status: COMPLETED | OUTPATIENT
Start: 2023-11-04 | End: 2023-11-04

## 2023-11-04 RX ORDER — MAGNESIUM SULFATE 500 MG/ML
1 VIAL (ML) INJECTION ONCE
Refills: 0 | Status: COMPLETED | OUTPATIENT
Start: 2023-11-04 | End: 2023-11-04

## 2023-11-04 RX ADMIN — LIDOCAINE 1 PATCH: 4 CREAM TOPICAL at 23:22

## 2023-11-04 RX ADMIN — Medication 1 TABLET(S): at 21:03

## 2023-11-04 RX ADMIN — OXYCODONE HYDROCHLORIDE 5 MILLIGRAM(S): 5 TABLET ORAL at 10:18

## 2023-11-04 RX ADMIN — LIDOCAINE 1 PATCH: 4 CREAM TOPICAL at 10:18

## 2023-11-04 RX ADMIN — OXYCODONE HYDROCHLORIDE 5 MILLIGRAM(S): 5 TABLET ORAL at 10:48

## 2023-11-04 RX ADMIN — Medication 100 MILLIEQUIVALENT(S): at 05:01

## 2023-11-04 RX ADMIN — OXYCODONE HYDROCHLORIDE 10 MILLIGRAM(S): 5 TABLET ORAL at 22:10

## 2023-11-04 RX ADMIN — ENOXAPARIN SODIUM 55 MILLIGRAM(S): 100 INJECTION SUBCUTANEOUS at 19:37

## 2023-11-04 RX ADMIN — Medication 100 MILLIEQUIVALENT(S): at 03:00

## 2023-11-04 RX ADMIN — Medication 100 MILLIEQUIVALENT(S): at 04:00

## 2023-11-04 RX ADMIN — Medication 100 GRAM(S): at 21:05

## 2023-11-04 RX ADMIN — ATORVASTATIN CALCIUM 80 MILLIGRAM(S): 80 TABLET, FILM COATED ORAL at 21:12

## 2023-11-04 RX ADMIN — OXYCODONE HYDROCHLORIDE 10 MILLIGRAM(S): 5 TABLET ORAL at 21:11

## 2023-11-04 RX ADMIN — PANTOPRAZOLE SODIUM 40 MILLIGRAM(S): 20 TABLET, DELAYED RELEASE ORAL at 10:18

## 2023-11-04 RX ADMIN — SODIUM CHLORIDE 50 MILLILITER(S): 9 INJECTION, SOLUTION INTRAVENOUS at 21:16

## 2023-11-04 RX ADMIN — ENOXAPARIN SODIUM 55 MILLIGRAM(S): 100 INJECTION SUBCUTANEOUS at 05:13

## 2023-11-04 RX ADMIN — LIDOCAINE 1 PATCH: 4 CREAM TOPICAL at 00:00

## 2023-11-04 RX ADMIN — Medication 3 MILLIGRAM(S): at 21:12

## 2023-11-04 NOTE — PROGRESS NOTE ADULT - SUBJECTIVE AND OBJECTIVE BOX
SURGERY DAILY PROGRESS NOTE    SUBJECTIVE: Patient seen and examined on AM rounds. Family at bedside who assisted with translation as needed. Patient is overall doing well, reports no diarrhea overnight. Tolerating clears and ambulating. Voiding without issue. Denies chest pain, SOB, palpitations, HA, fever, chills, N/V.      OBJECTIVE:  Vital Signs Last 24 Hrs  T(C): 37.1 (04 Nov 2023 09:25), Max: 37.1 (03 Nov 2023 21:24)  T(F): 98.8 (04 Nov 2023 09:25), Max: 98.8 (03 Nov 2023 21:24)  HR: 66 (04 Nov 2023 09:25) (66 - 70)  BP: 162/73 (04 Nov 2023 09:25) (152/70 - 174/76)  BP(mean): --  RR: 17 (04 Nov 2023 09:25) (17 - 18)  SpO2: 100% (04 Nov 2023 09:25) (99% - 100%)    Parameters below as of 04 Nov 2023 09:25  Patient On (Oxygen Delivery Method): room air        I&O's Summary      Physical Exam:  General Appearance: Appears well, NAD   Chest: Nonlabored breathing on RA  CV: Hemodynamically stable  Abdomen: Soft, appropriate incisional tenderness, nondistended. Dressing C/D/I with some strikethrough  Extremities: Grossly symmetric. LUE cutdown incision C/D/I with staples  Vascular: WWP x4, palpable DP bilaterally    LABS:                        8.1    10.40 )-----------( 272      ( 04 Nov 2023 05:13 )             24.7     11-04    137  |  109<H>  |  3<L>  ----------------------------<  86  4.0   |  19<L>  |  0.59    Ca    7.9<L>      04 Nov 2023 05:13  Phos  2.7     11-04  Mg     1.70     11-04        Urinalysis Basic - ( 04 Nov 2023 05:13 )    Color: x / Appearance: x / SG: x / pH: x  Gluc: 86 mg/dL / Ketone: x  / Bili: x / Urobili: x   Blood: x / Protein: x / Nitrite: x   Leuk Esterase: x / RBC: x / WBC x   Sq Epi: x / Non Sq Epi: x / Bacteria: x

## 2023-11-04 NOTE — PROGRESS NOTE ADULT - ASSESSMENT
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 Morales) and discharged on 10/27 returned to ED with abdominal pain, NV, fevers, chills. 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 Occlusion of proximal SMA. Occluded bypass graft from left common iliac artery to SMA. Reconstitution of distal SMA from collaterals." Patient taken emergently to OR for bypass revision. Patient s/p ex lap, removal/replacement of thrombosed graft on 10/28. SICU consulted for HD monitoring. Post-op course c/b low UOP, acute blood loss anemia requiring blood transfusion, and tachycardia concerning for bleed. Non con CT demonstrated free fluid around the bypass and near the liver. Patient taken emergently to OR and now s/p evacuation of 1L hematoma and repair of iliac anastomosis on 10/30. Recovering well on the floor with return of GI function and resolving diarrhea.    Plan:  - Diet: CLD, advance as tolerated  - Pain control PRN  - C. diff negative   - monitor labs, replete prn  - Therapeutic Lovenox- teaching prior to discharge   - OOB/ambulate  - Will consider repeat abdominal imaging prior to discharge  - dispo: home PT    Vascular Surgery   [id number]

## 2023-11-05 LAB
ANION GAP SERPL CALC-SCNC: 11 MMOL/L — SIGNIFICANT CHANGE UP (ref 7–14)
ANION GAP SERPL CALC-SCNC: 11 MMOL/L — SIGNIFICANT CHANGE UP (ref 7–14)
BUN SERPL-MCNC: 4 MG/DL — LOW (ref 7–23)
BUN SERPL-MCNC: 4 MG/DL — LOW (ref 7–23)
CALCIUM SERPL-MCNC: 8 MG/DL — LOW (ref 8.4–10.5)
CALCIUM SERPL-MCNC: 8 MG/DL — LOW (ref 8.4–10.5)
CHLORIDE SERPL-SCNC: 107 MMOL/L — SIGNIFICANT CHANGE UP (ref 98–107)
CHLORIDE SERPL-SCNC: 107 MMOL/L — SIGNIFICANT CHANGE UP (ref 98–107)
CO2 SERPL-SCNC: 22 MMOL/L — SIGNIFICANT CHANGE UP (ref 22–31)
CO2 SERPL-SCNC: 22 MMOL/L — SIGNIFICANT CHANGE UP (ref 22–31)
CREAT SERPL-MCNC: 0.59 MG/DL — SIGNIFICANT CHANGE UP (ref 0.5–1.3)
CREAT SERPL-MCNC: 0.59 MG/DL — SIGNIFICANT CHANGE UP (ref 0.5–1.3)
EGFR: 100 ML/MIN/1.73M2 — SIGNIFICANT CHANGE UP
EGFR: 100 ML/MIN/1.73M2 — SIGNIFICANT CHANGE UP
GLUCOSE BLDC GLUCOMTR-MCNC: 102 MG/DL — HIGH (ref 70–99)
GLUCOSE BLDC GLUCOMTR-MCNC: 102 MG/DL — HIGH (ref 70–99)
GLUCOSE BLDC GLUCOMTR-MCNC: 118 MG/DL — HIGH (ref 70–99)
GLUCOSE BLDC GLUCOMTR-MCNC: 118 MG/DL — HIGH (ref 70–99)
GLUCOSE BLDC GLUCOMTR-MCNC: 131 MG/DL — HIGH (ref 70–99)
GLUCOSE BLDC GLUCOMTR-MCNC: 131 MG/DL — HIGH (ref 70–99)
GLUCOSE BLDC GLUCOMTR-MCNC: 68 MG/DL — LOW (ref 70–99)
GLUCOSE BLDC GLUCOMTR-MCNC: 68 MG/DL — LOW (ref 70–99)
GLUCOSE BLDC GLUCOMTR-MCNC: 91 MG/DL — SIGNIFICANT CHANGE UP (ref 70–99)
GLUCOSE BLDC GLUCOMTR-MCNC: 91 MG/DL — SIGNIFICANT CHANGE UP (ref 70–99)
GLUCOSE BLDC GLUCOMTR-MCNC: 92 MG/DL — SIGNIFICANT CHANGE UP (ref 70–99)
GLUCOSE BLDC GLUCOMTR-MCNC: 92 MG/DL — SIGNIFICANT CHANGE UP (ref 70–99)
GLUCOSE SERPL-MCNC: 91 MG/DL — SIGNIFICANT CHANGE UP (ref 70–99)
GLUCOSE SERPL-MCNC: 91 MG/DL — SIGNIFICANT CHANGE UP (ref 70–99)
HCT VFR BLD CALC: 26.1 % — LOW (ref 34.5–45)
HCT VFR BLD CALC: 26.1 % — LOW (ref 34.5–45)
HGB BLD-MCNC: 8.6 G/DL — LOW (ref 11.5–15.5)
HGB BLD-MCNC: 8.6 G/DL — LOW (ref 11.5–15.5)
MAGNESIUM SERPL-MCNC: 1.8 MG/DL — SIGNIFICANT CHANGE UP (ref 1.6–2.6)
MAGNESIUM SERPL-MCNC: 1.8 MG/DL — SIGNIFICANT CHANGE UP (ref 1.6–2.6)
MCHC RBC-ENTMCNC: 29.7 PG — SIGNIFICANT CHANGE UP (ref 27–34)
MCHC RBC-ENTMCNC: 29.7 PG — SIGNIFICANT CHANGE UP (ref 27–34)
MCHC RBC-ENTMCNC: 33 GM/DL — SIGNIFICANT CHANGE UP (ref 32–36)
MCHC RBC-ENTMCNC: 33 GM/DL — SIGNIFICANT CHANGE UP (ref 32–36)
MCV RBC AUTO: 90 FL — SIGNIFICANT CHANGE UP (ref 80–100)
MCV RBC AUTO: 90 FL — SIGNIFICANT CHANGE UP (ref 80–100)
NRBC # BLD: 0 /100 WBCS — SIGNIFICANT CHANGE UP (ref 0–0)
NRBC # BLD: 0 /100 WBCS — SIGNIFICANT CHANGE UP (ref 0–0)
NRBC # FLD: 0.03 K/UL — HIGH (ref 0–0)
NRBC # FLD: 0.03 K/UL — HIGH (ref 0–0)
PHOSPHATE SERPL-MCNC: 3 MG/DL — SIGNIFICANT CHANGE UP (ref 2.5–4.5)
PHOSPHATE SERPL-MCNC: 3 MG/DL — SIGNIFICANT CHANGE UP (ref 2.5–4.5)
PLATELET # BLD AUTO: 405 K/UL — HIGH (ref 150–400)
PLATELET # BLD AUTO: 405 K/UL — HIGH (ref 150–400)
POTASSIUM SERPL-MCNC: 3.7 MMOL/L — SIGNIFICANT CHANGE UP (ref 3.5–5.3)
POTASSIUM SERPL-MCNC: 3.7 MMOL/L — SIGNIFICANT CHANGE UP (ref 3.5–5.3)
POTASSIUM SERPL-SCNC: 3.7 MMOL/L — SIGNIFICANT CHANGE UP (ref 3.5–5.3)
POTASSIUM SERPL-SCNC: 3.7 MMOL/L — SIGNIFICANT CHANGE UP (ref 3.5–5.3)
RBC # BLD: 2.9 M/UL — LOW (ref 3.8–5.2)
RBC # BLD: 2.9 M/UL — LOW (ref 3.8–5.2)
RBC # FLD: 16.2 % — HIGH (ref 10.3–14.5)
RBC # FLD: 16.2 % — HIGH (ref 10.3–14.5)
SODIUM SERPL-SCNC: 140 MMOL/L — SIGNIFICANT CHANGE UP (ref 135–145)
SODIUM SERPL-SCNC: 140 MMOL/L — SIGNIFICANT CHANGE UP (ref 135–145)
WBC # BLD: 11.55 K/UL — HIGH (ref 3.8–10.5)
WBC # BLD: 11.55 K/UL — HIGH (ref 3.8–10.5)
WBC # FLD AUTO: 11.55 K/UL — HIGH (ref 3.8–10.5)
WBC # FLD AUTO: 11.55 K/UL — HIGH (ref 3.8–10.5)

## 2023-11-05 RX ORDER — POTASSIUM CHLORIDE 20 MEQ
40 PACKET (EA) ORAL ONCE
Refills: 0 | Status: COMPLETED | OUTPATIENT
Start: 2023-11-05 | End: 2023-11-05

## 2023-11-05 RX ORDER — MAGNESIUM SULFATE 500 MG/ML
1 VIAL (ML) INJECTION ONCE
Refills: 0 | Status: COMPLETED | OUTPATIENT
Start: 2023-11-05 | End: 2023-11-05

## 2023-11-05 RX ADMIN — Medication 100 GRAM(S): at 11:49

## 2023-11-05 RX ADMIN — Medication 0: at 22:14

## 2023-11-05 RX ADMIN — OXYCODONE HYDROCHLORIDE 5 MILLIGRAM(S): 5 TABLET ORAL at 22:40

## 2023-11-05 RX ADMIN — PANTOPRAZOLE SODIUM 40 MILLIGRAM(S): 20 TABLET, DELAYED RELEASE ORAL at 11:51

## 2023-11-05 RX ADMIN — OXYCODONE HYDROCHLORIDE 5 MILLIGRAM(S): 5 TABLET ORAL at 22:10

## 2023-11-05 RX ADMIN — ATORVASTATIN CALCIUM 80 MILLIGRAM(S): 80 TABLET, FILM COATED ORAL at 22:10

## 2023-11-05 RX ADMIN — LIDOCAINE 1 PATCH: 4 CREAM TOPICAL at 18:25

## 2023-11-05 RX ADMIN — Medication 40 MILLIEQUIVALENT(S): at 11:50

## 2023-11-05 RX ADMIN — ENOXAPARIN SODIUM 55 MILLIGRAM(S): 100 INJECTION SUBCUTANEOUS at 07:10

## 2023-11-05 RX ADMIN — ENOXAPARIN SODIUM 55 MILLIGRAM(S): 100 INJECTION SUBCUTANEOUS at 18:25

## 2023-11-05 RX ADMIN — Medication 3 MILLIGRAM(S): at 22:10

## 2023-11-05 NOTE — PROGRESS NOTE ADULT - SUBJECTIVE AND OBJECTIVE BOX
SURGERY DAILY PROGRESS NOTE    STATUS POST:  Creation of iliomesenteric arterial bypass using graft    Exploratory laparotomy        SUBJECTIVE: Patient seen and examined on AM rounds. Family at bedside who assisted with translation as needed. Patient is overall doing well, reports no diarrhea overnight. Notes some abdominal discomfort from gas but states it makes her nervous. Tolerating clears and ambulating. Voiding without issue. Denies chest pain, SOB, palpitations, HA, fever, chills, N/V.      OBJECTIVE:  Vital Signs Last 24 Hrs  T(C): 36.9 (05 Nov 2023 06:48), Max: 37.1 (04 Nov 2023 09:25)  T(F): 98.5 (05 Nov 2023 06:48), Max: 98.8 (04 Nov 2023 09:25)  HR: 75 (05 Nov 2023 06:48) (66 - 75)  BP: 145/75 (05 Nov 2023 06:48) (145/75 - 162/73)  BP(mean): --  RR: 18 (05 Nov 2023 06:48) (17 - 18)  SpO2: 100% (05 Nov 2023 06:48) (95% - 100%)    Parameters below as of 05 Nov 2023 06:48  Patient On (Oxygen Delivery Method): room air        I&O's Summary      Physical Exam:  General Appearance: Appears well, NAD  Chest: Nonlabored breathing on RA  CV: RRR  Abdomen: Soft, nontender, nondistended. Midline incision C/D/I with staples, no bleeding erythema or induration. Fresh gauze dressing applied  Extremities: Grossly symmetric. LUE cutdown C/D/I with staples  Vascular: WWPx4    LABS:                        8.6    11.55 )-----------( 405      ( 05 Nov 2023 07:45 )             26.1     11-04    137  |  109<H>  |  3<L>  ----------------------------<  86  4.0   |  19<L>  |  0.59    Ca    7.9<L>      04 Nov 2023 05:13  Phos  2.7     11-04  Mg     1.70     11-04        Urinalysis Basic - ( 04 Nov 2023 05:13 )    Color: x / Appearance: x / SG: x / pH: x  Gluc: 86 mg/dL / Ketone: x  / Bili: x / Urobili: x   Blood: x / Protein: x / Nitrite: x   Leuk Esterase: x / RBC: x / WBC x   Sq Epi: x / Non Sq Epi: x / Bacteria: x

## 2023-11-05 NOTE — PROGRESS NOTE ADULT - ASSESSMENT
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 Morales) and discharged on 10/27 returned to ED with abdominal pain, NV, fevers, chills. 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 Occlusion of proximal SMA. Occluded bypass graft from left common iliac artery to SMA. Reconstitution of distal SMA from collaterals." Patient taken emergently to OR for bypass revision. Patient s/p ex lap, removal/replacement of thrombosed graft on 10/28. SICU consulted for HD monitoring. Post-op course c/b low UOP, acute blood loss anemia requiring blood transfusion, and tachycardia concerning for bleed. Non con CT demonstrated free fluid around the bypass and near the liver. Patient taken emergently to OR and now s/p evacuation of 1L hematoma and repair of iliac anastomosis on 10/30. Recovering well on the floor with return of GI function and resolving diarrhea. Midline incision healing well.    Plan:  - Diet: CLD with Ensure, advance to regular for lunch  - Pain control PRN  - C. diff negative   - Monitor labs, replete prn  - Heme consulted, will discuss PO anticoagulation recs  - OOB/ambulate  - Will consider repeat abdominal imaging prior to discharge  - dispo: home PT    Vascular Surgery   [id number]

## 2023-11-05 NOTE — PROVIDER CONTACT NOTE (HYPOGLYCEMIA EVENT) - NS PROVIDER CONTACT BACKGROUND-HYPO
Age: 63y    Gender: Female    POCT Blood Glucose:  102 mg/dL (11-05-23 @ 18:50)  91 mg/dL (11-05-23 @ 18:17)  68 mg/dL (11-05-23 @ 18:09)  118 mg/dL (11-05-23 @ 12:47)  92 mg/dL (11-05-23 @ 08:48)  114 mg/dL (11-04-23 @ 21:43)      eMAR:atorvastatin   80 milliGRAM(s) Oral (11-04-23 @ 21:12)

## 2023-11-06 ENCOUNTER — APPOINTMENT (OUTPATIENT)
Dept: VASCULAR SURGERY | Facility: CLINIC | Age: 63
End: 2023-11-06

## 2023-11-06 ENCOUNTER — TRANSCRIPTION ENCOUNTER (OUTPATIENT)
Age: 63
End: 2023-11-06

## 2023-11-06 LAB
ANION GAP SERPL CALC-SCNC: 10 MMOL/L — SIGNIFICANT CHANGE UP (ref 7–14)
ANION GAP SERPL CALC-SCNC: 10 MMOL/L — SIGNIFICANT CHANGE UP (ref 7–14)
BUN SERPL-MCNC: 4 MG/DL — LOW (ref 7–23)
BUN SERPL-MCNC: 4 MG/DL — LOW (ref 7–23)
CALCIUM SERPL-MCNC: 7.8 MG/DL — LOW (ref 8.4–10.5)
CALCIUM SERPL-MCNC: 7.8 MG/DL — LOW (ref 8.4–10.5)
CHLORIDE SERPL-SCNC: 109 MMOL/L — HIGH (ref 98–107)
CHLORIDE SERPL-SCNC: 109 MMOL/L — HIGH (ref 98–107)
CO2 SERPL-SCNC: 20 MMOL/L — LOW (ref 22–31)
CO2 SERPL-SCNC: 20 MMOL/L — LOW (ref 22–31)
CREAT SERPL-MCNC: 0.59 MG/DL — SIGNIFICANT CHANGE UP (ref 0.5–1.3)
CREAT SERPL-MCNC: 0.59 MG/DL — SIGNIFICANT CHANGE UP (ref 0.5–1.3)
EGFR: 100 ML/MIN/1.73M2 — SIGNIFICANT CHANGE UP
EGFR: 100 ML/MIN/1.73M2 — SIGNIFICANT CHANGE UP
GLUCOSE BLDC GLUCOMTR-MCNC: 111 MG/DL — HIGH (ref 70–99)
GLUCOSE BLDC GLUCOMTR-MCNC: 111 MG/DL — HIGH (ref 70–99)
GLUCOSE BLDC GLUCOMTR-MCNC: 112 MG/DL — HIGH (ref 70–99)
GLUCOSE BLDC GLUCOMTR-MCNC: 112 MG/DL — HIGH (ref 70–99)
GLUCOSE BLDC GLUCOMTR-MCNC: 119 MG/DL — HIGH (ref 70–99)
GLUCOSE BLDC GLUCOMTR-MCNC: 119 MG/DL — HIGH (ref 70–99)
GLUCOSE BLDC GLUCOMTR-MCNC: 126 MG/DL — HIGH (ref 70–99)
GLUCOSE BLDC GLUCOMTR-MCNC: 126 MG/DL — HIGH (ref 70–99)
GLUCOSE SERPL-MCNC: 109 MG/DL — HIGH (ref 70–99)
GLUCOSE SERPL-MCNC: 109 MG/DL — HIGH (ref 70–99)
HCT VFR BLD CALC: 25.7 % — LOW (ref 34.5–45)
HCT VFR BLD CALC: 25.7 % — LOW (ref 34.5–45)
HGB BLD-MCNC: 8.5 G/DL — LOW (ref 11.5–15.5)
HGB BLD-MCNC: 8.5 G/DL — LOW (ref 11.5–15.5)
MAGNESIUM SERPL-MCNC: 1.9 MG/DL — SIGNIFICANT CHANGE UP (ref 1.6–2.6)
MAGNESIUM SERPL-MCNC: 1.9 MG/DL — SIGNIFICANT CHANGE UP (ref 1.6–2.6)
MCHC RBC-ENTMCNC: 29.8 PG — SIGNIFICANT CHANGE UP (ref 27–34)
MCHC RBC-ENTMCNC: 29.8 PG — SIGNIFICANT CHANGE UP (ref 27–34)
MCHC RBC-ENTMCNC: 33.1 GM/DL — SIGNIFICANT CHANGE UP (ref 32–36)
MCHC RBC-ENTMCNC: 33.1 GM/DL — SIGNIFICANT CHANGE UP (ref 32–36)
MCV RBC AUTO: 90.2 FL — SIGNIFICANT CHANGE UP (ref 80–100)
MCV RBC AUTO: 90.2 FL — SIGNIFICANT CHANGE UP (ref 80–100)
NRBC # BLD: 0 /100 WBCS — SIGNIFICANT CHANGE UP (ref 0–0)
NRBC # BLD: 0 /100 WBCS — SIGNIFICANT CHANGE UP (ref 0–0)
NRBC # FLD: 0.02 K/UL — HIGH (ref 0–0)
NRBC # FLD: 0.02 K/UL — HIGH (ref 0–0)
PHOSPHATE SERPL-MCNC: 2.5 MG/DL — SIGNIFICANT CHANGE UP (ref 2.5–4.5)
PHOSPHATE SERPL-MCNC: 2.5 MG/DL — SIGNIFICANT CHANGE UP (ref 2.5–4.5)
PLATELET # BLD AUTO: 401 K/UL — HIGH (ref 150–400)
PLATELET # BLD AUTO: 401 K/UL — HIGH (ref 150–400)
POTASSIUM SERPL-MCNC: 3.9 MMOL/L — SIGNIFICANT CHANGE UP (ref 3.5–5.3)
POTASSIUM SERPL-MCNC: 3.9 MMOL/L — SIGNIFICANT CHANGE UP (ref 3.5–5.3)
POTASSIUM SERPL-SCNC: 3.9 MMOL/L — SIGNIFICANT CHANGE UP (ref 3.5–5.3)
POTASSIUM SERPL-SCNC: 3.9 MMOL/L — SIGNIFICANT CHANGE UP (ref 3.5–5.3)
RBC # BLD: 2.85 M/UL — LOW (ref 3.8–5.2)
RBC # BLD: 2.85 M/UL — LOW (ref 3.8–5.2)
RBC # FLD: 16.5 % — HIGH (ref 10.3–14.5)
RBC # FLD: 16.5 % — HIGH (ref 10.3–14.5)
SODIUM SERPL-SCNC: 139 MMOL/L — SIGNIFICANT CHANGE UP (ref 135–145)
SODIUM SERPL-SCNC: 139 MMOL/L — SIGNIFICANT CHANGE UP (ref 135–145)
WBC # BLD: 10.4 K/UL — SIGNIFICANT CHANGE UP (ref 3.8–10.5)
WBC # BLD: 10.4 K/UL — SIGNIFICANT CHANGE UP (ref 3.8–10.5)
WBC # FLD AUTO: 10.4 K/UL — SIGNIFICANT CHANGE UP (ref 3.8–10.5)
WBC # FLD AUTO: 10.4 K/UL — SIGNIFICANT CHANGE UP (ref 3.8–10.5)

## 2023-11-06 RX ORDER — SODIUM,POTASSIUM PHOSPHATES 278-250MG
1 POWDER IN PACKET (EA) ORAL ONCE
Refills: 0 | Status: COMPLETED | OUTPATIENT
Start: 2023-11-06 | End: 2023-11-06

## 2023-11-06 RX ADMIN — ENOXAPARIN SODIUM 55 MILLIGRAM(S): 100 INJECTION SUBCUTANEOUS at 06:10

## 2023-11-06 RX ADMIN — OXYCODONE HYDROCHLORIDE 10 MILLIGRAM(S): 5 TABLET ORAL at 22:45

## 2023-11-06 RX ADMIN — PANTOPRAZOLE SODIUM 40 MILLIGRAM(S): 20 TABLET, DELAYED RELEASE ORAL at 14:44

## 2023-11-06 RX ADMIN — LIDOCAINE 1 PATCH: 4 CREAM TOPICAL at 06:25

## 2023-11-06 RX ADMIN — LIDOCAINE 1 PATCH: 4 CREAM TOPICAL at 19:59

## 2023-11-06 RX ADMIN — Medication 0: at 21:46

## 2023-11-06 RX ADMIN — Medication 1 TABLET(S): at 14:04

## 2023-11-06 RX ADMIN — ENOXAPARIN SODIUM 55 MILLIGRAM(S): 100 INJECTION SUBCUTANEOUS at 19:59

## 2023-11-06 RX ADMIN — OXYCODONE HYDROCHLORIDE 10 MILLIGRAM(S): 5 TABLET ORAL at 22:15

## 2023-11-06 RX ADMIN — ATORVASTATIN CALCIUM 80 MILLIGRAM(S): 80 TABLET, FILM COATED ORAL at 21:56

## 2023-11-06 NOTE — DISCHARGE NOTE PROVIDER - CARE PROVIDER_API CALL
Laura Morales  Vascular Surgery  [address] [place] [address]  Phone: [phone]  Fax: [phone]  Follow Up Time: 2 weeks

## 2023-11-06 NOTE — DISCHARGE NOTE PROVIDER - NSDCFUADDINST_GEN_ALL_CORE_FT
WOUND CARE:  Midline abdominal incision dressing with 4x4 gauze and tape to be changed daily. LUE wound with 4x4 gauze and tape to be changed daily.   BATHING: Please do not submerge wound underwater. You may shower and/or sponge bathe.  ACTIVITY: No heavy lifting or straining. Otherwise, you may return to your usual level of physical activity. If you are taking narcotic pain medication (such as Percocet) DO NOT drive a car, operate machinery or make important decisions.  DIET: Return to your usual diet.  NOTIFY YOUR SURGEON IF: You have any bleeding that does not stop, any pus draining from your wound(s), any fever (over 100.4 F) or chills, persistent nausea/vomiting, persistent diarrhea, or if your pain is not controlled on your discharge pain medications.  FOLLOW-UP: Please follow up with your primary care physician in one week regarding your hospitalization

## 2023-11-06 NOTE — PROGRESS NOTE ADULT - SUBJECTIVE AND OBJECTIVE BOX
C Team Surgery Progress Note     S: Patient resting comfortably on morning rounds in the chair. Pain well-controlled currently. No complaints at this time. Wound vac in place and holding suction. Tolerating diet. Denies n/v.      MEDICATIONS  (STANDING):  atorvastatin 80 milliGRAM(s) Oral at bedtime  dextrose 5% + sodium chloride 0.9%. 1000 milliLiter(s) (50 mL/Hr) IV Continuous <Continuous>  enoxaparin Injectable 55 milliGRAM(s) SubCutaneous every 12 hours  insulin lispro (ADMELOG) corrective regimen sliding scale   SubCutaneous Before meals and at bedtime  lidocaine   4% Patch 1 Patch Transdermal daily  melatonin 3 milliGRAM(s) Oral at bedtime  pantoprazole  Injectable 40 milliGRAM(s) IV Push daily    MEDICATIONS  (PRN):  acetaminophen     Tablet .. 1000 milliGRAM(s) Oral every 6 hours PRN Mild Pain (1 - 3)  loperamide 2 milliGRAM(s) Oral every 12 hours PRN Diarrhea  oxyCODONE    IR 5 milliGRAM(s) Oral every 4 hours PRN Moderate Pain (4 - 6)  oxyCODONE    IR 10 milliGRAM(s) Oral every 4 hours PRN Severe Pain (7 - 10)      T(C): 37 (11-06-23 @ 06:08), Max: 37.4 (11-05-23 @ 10:48)  HR: 68 (11-06-23 @ 06:08) (65 - 73)  BP: 153/61 (11-06-23 @ 06:08) (150/65 - 156/64)  RR: 18 (11-06-23 @ 06:08) (17 - 18)  SpO2: 100% (11-06-23 @ 06:08) (97% - 100%)        11-05-23 @ 07:01  -  11-06-23 @ 07:00  --------------------------------------------------------  IN: 0 mL / OUT: 300 mL / NET: -300 mL        Physical Exam:  General: NAD, AOx3  Respiratory: No labored breathing  CV: pulse regularly present  Abd: Soft, nontender, nondistended. Midline incision C/D/I with staples, no bleeding erythema or induration. Fresh gauze dressing applied  MSK: Grossly symmetric. LUE cutdown C/D/I with staples  Pulse: WWPx4    LABS:                        8.6    11.55 )-----------( 405      ( 05 Nov 2023 07:45 )             26.1     11-05    140  |  107  |  4<L>  ----------------------------<  91  3.7   |  22  |  0.59    Ca    8.0<L>      05 Nov 2023 07:45  Phos  3.0     11-05  Mg     1.80     11-05

## 2023-11-06 NOTE — DISCHARGE NOTE PROVIDER - NSDCCPCAREPLAN_GEN_ALL_CORE_FT
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 Occlusion of proximal SMA. Occluded bypass graft from left common iliac artery to SMA. Reconstitution of distal SMA from collaterals."   s/p ex lap, removal/replacement of thrombosed graft on 10/28  s/p ex lap, evacuation of hematoma 10/30

## 2023-11-06 NOTE — DISCHARGE NOTE PROVIDER - NSDCMRMEDTOKEN_GEN_ALL_CORE_FT
amLODIPine 10 mg oral tablet: 1 tab(s) orally  aspirin 81 mg oral delayed release tablet: 1 tab(s) orally once a day  atorvastatin 80 mg oral tablet: 1 tab(s) orally once a day (at bedtime)  famotidine 40 mg oral tablet: 1 tab(s) orally  lisinopril 40 mg oral tablet: 1 tab(s) orally  oxyCODONE 5 mg oral tablet: 1 tab(s) orally every 6 hours as needed for  severe pain MDD: 4 tabs  Rolling Walker: 1 Unit DME   acetaminophen 500 mg oral tablet: 2 tab(s) orally every 6 hours  amLODIPine 10 mg oral tablet: 1 tab(s) orally  aspirin 81 mg oral delayed release tablet: 1 tab(s) orally once a day  atorvastatin 80 mg oral tablet: 1 tab(s) orally once a day (at bedtime)  famotidine 40 mg oral tablet: 1 tab(s) orally  lisinopril 40 mg oral tablet: 1 tab(s) orally  oxyCODONE 5 mg oral tablet: 1 tab(s) orally every 6 hours as needed for  severe pain MDD: 4 tabs  Rolling Walker: 1 Unit DME   acetaminophen 500 mg oral tablet: 2 tab(s) orally every 6 hours  amLODIPine 10 mg oral tablet: 1 tab(s) orally  aspirin 81 mg oral delayed release tablet: 1 tab(s) orally once a day  atorvastatin 80 mg oral tablet: 1 tab(s) orally once a day (at bedtime)  famotidine 40 mg oral tablet: 1 tab(s) orally  lisinopril 40 mg oral tablet: 1 tab(s) orally  oxyCODONE 5 mg oral tablet: 1 tab(s) orally every 6 hours as needed for  severe pain MDD: 4 tabs  rivaroxaban 20 mg oral tablet: 1 tab(s) orally once a day (before a meal)  Rolling Walker: 1 Unit DME

## 2023-11-06 NOTE — DISCHARGE NOTE PROVIDER - DETAILS OF MALNUTRITION DIAGNOSIS/DIAGNOSES
This patient has been assessed with a concern for Malnutrition and was treated during this hospitalization for the following Nutrition diagnosis/diagnoses:     -  10/30/2023: Severe protein-calorie malnutrition

## 2023-11-06 NOTE — DISCHARGE NOTE PROVIDER - HOSPITAL COURSE
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 Morales) and discharged on 10/27 returned to ED with abdominal pain, NV, fevers, chills. 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 Occlusion of proximal SMA. Occluded bypass graft from left common iliac artery to SMA. Reconstitution of distal SMA from collaterals."   Patient admitted to vascular.  Patient taken emergently to OR for bypass revision. Patient s/p ex lap, removal/replacement of thrombosed graft on 10/28. SICU consulted for HD monitoring and frequent abdominal pain assessment for possible relapse of ischemia post-operatively. Post op patient with drop in h/h she was taken back to OR on 10/30 for emergent ex lap, evacuation of hematoma 1L, stitch placed around iliac anastomosis. Post op returned to SICU  10/31 Pt saw PT anticipate home w/ home PT   11/2 Pt transferred to floor and Therapeutic Lovenox started Hematology consulted for SMA thrombosis and hypercoagulable workup.  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 Morales) and discharged on 10/27 returned to ED with abdominal pain, NV, fevers, chills. 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 Occlusion of proximal SMA. Occluded bypass graft from left common iliac artery to SMA. Reconstitution of distal SMA from collaterals."   Patient admitted to vascular.  Patient taken emergently to OR for bypass revision. Patient s/p ex lap, removal/replacement of thrombosed graft on 10/28. SICU consulted for HD monitoring and frequent abdominal pain assessment for possible relapse of ischemia post-operatively. Post op patient with drop in h/h she was taken back to OR on 10/30 for emergent ex lap, evacuation of hematoma 1L, stitch placed around iliac anastomosis. Post op returned to SICU  10/31 Pt saw PT anticipate home w/ home PT   11/2 Pt transferred to floor and Therapeutic Lovenox started Hematology consulted for SMA thrombosis and hypercoagulable workup.  11/5 Diet advanced to regular    Patient currently tolerating regular diet, ambulating, voiding and pain is well controlled.  Per team and attending patient hemodynamically stable for discharge home and follow up with Dr. Morales in one week.

## 2023-11-06 NOTE — DISCHARGE NOTE PROVIDER - NSDCCPTREATMENT_GEN_ALL_CORE_FT
PRINCIPAL PROCEDURE  Procedure: Creation of iliomesenteric arterial bypass using graft  Findings and Treatment:

## 2023-11-06 NOTE — PROGRESS NOTE ADULT - ASSESSMENT
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 Morales) and discharged on 10/27 returned to ED with abdominal pain, NV, fevers, chills. 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 Occlusion of proximal SMA. Occluded bypass graft from left common iliac artery to SMA. Reconstitution of distal SMA from collaterals." Patient taken emergently to OR for bypass revision. Patient s/p ex lap, removal/replacement of thrombosed graft on 10/28. SICU consulted for HD monitoring. Post-op course c/b low UOP, acute blood loss anemia requiring blood transfusion, and tachycardia concerning for bleed. Non con CT demonstrated free fluid around the bypass and near the liver. Patient taken emergently to OR and now s/p evacuation of 1L hematoma and repair of iliac anastomosis on 10/30. Recovering well on the floor with return of GI function and resolving diarrhea. Midline incision healing well.    Plan:  - Remove LUE staples today  - Diet: consistent carb  - Pain control PRN  - C. diff negative   - Monitor labs, replete prn  - Heme consulted, will discuss PO anticoagulation recs  - Lovenox  - OOB/ambulate  - Will consider repeat abdominal imaging prior to discharge  - Dspo: home PT    Vascular Surgery   [id number]

## 2023-11-07 LAB
ANION GAP SERPL CALC-SCNC: 10 MMOL/L — SIGNIFICANT CHANGE UP (ref 7–14)
ANION GAP SERPL CALC-SCNC: 10 MMOL/L — SIGNIFICANT CHANGE UP (ref 7–14)
BUN SERPL-MCNC: 6 MG/DL — LOW (ref 7–23)
BUN SERPL-MCNC: 6 MG/DL — LOW (ref 7–23)
CALCIUM SERPL-MCNC: 8.1 MG/DL — LOW (ref 8.4–10.5)
CALCIUM SERPL-MCNC: 8.1 MG/DL — LOW (ref 8.4–10.5)
CARDIOLIPIN IGM SER-MCNC: 5.6 MPL — SIGNIFICANT CHANGE UP (ref 0–12.5)
CARDIOLIPIN IGM SER-MCNC: 5.6 MPL — SIGNIFICANT CHANGE UP (ref 0–12.5)
CARDIOLIPIN IGM SER-MCNC: <5 GPL — SIGNIFICANT CHANGE UP (ref 0–12.5)
CARDIOLIPIN IGM SER-MCNC: <5 GPL — SIGNIFICANT CHANGE UP (ref 0–12.5)
CHLORIDE SERPL-SCNC: 107 MMOL/L — SIGNIFICANT CHANGE UP (ref 98–107)
CHLORIDE SERPL-SCNC: 107 MMOL/L — SIGNIFICANT CHANGE UP (ref 98–107)
CO2 SERPL-SCNC: 23 MMOL/L — SIGNIFICANT CHANGE UP (ref 22–31)
CO2 SERPL-SCNC: 23 MMOL/L — SIGNIFICANT CHANGE UP (ref 22–31)
CREAT SERPL-MCNC: 0.59 MG/DL — SIGNIFICANT CHANGE UP (ref 0.5–1.3)
CREAT SERPL-MCNC: 0.59 MG/DL — SIGNIFICANT CHANGE UP (ref 0.5–1.3)
DEPRECATED CARDIOLIPIN IGA SER: <5 APL — SIGNIFICANT CHANGE UP (ref 0–12.5)
DEPRECATED CARDIOLIPIN IGA SER: <5 APL — SIGNIFICANT CHANGE UP (ref 0–12.5)
EGFR: 100 ML/MIN/1.73M2 — SIGNIFICANT CHANGE UP
EGFR: 100 ML/MIN/1.73M2 — SIGNIFICANT CHANGE UP
GLUCOSE BLDC GLUCOMTR-MCNC: 100 MG/DL — HIGH (ref 70–99)
GLUCOSE BLDC GLUCOMTR-MCNC: 100 MG/DL — HIGH (ref 70–99)
GLUCOSE BLDC GLUCOMTR-MCNC: 108 MG/DL — HIGH (ref 70–99)
GLUCOSE BLDC GLUCOMTR-MCNC: 108 MG/DL — HIGH (ref 70–99)
GLUCOSE BLDC GLUCOMTR-MCNC: 131 MG/DL — HIGH (ref 70–99)
GLUCOSE BLDC GLUCOMTR-MCNC: 131 MG/DL — HIGH (ref 70–99)
GLUCOSE SERPL-MCNC: 100 MG/DL — HIGH (ref 70–99)
GLUCOSE SERPL-MCNC: 100 MG/DL — HIGH (ref 70–99)
HCT VFR BLD CALC: 26.8 % — LOW (ref 34.5–45)
HCT VFR BLD CALC: 26.8 % — LOW (ref 34.5–45)
HGB BLD-MCNC: 8.8 G/DL — LOW (ref 11.5–15.5)
HGB BLD-MCNC: 8.8 G/DL — LOW (ref 11.5–15.5)
MAGNESIUM SERPL-MCNC: 2.1 MG/DL — SIGNIFICANT CHANGE UP (ref 1.6–2.6)
MAGNESIUM SERPL-MCNC: 2.1 MG/DL — SIGNIFICANT CHANGE UP (ref 1.6–2.6)
MCHC RBC-ENTMCNC: 29.4 PG — SIGNIFICANT CHANGE UP (ref 27–34)
MCHC RBC-ENTMCNC: 29.4 PG — SIGNIFICANT CHANGE UP (ref 27–34)
MCHC RBC-ENTMCNC: 32.8 GM/DL — SIGNIFICANT CHANGE UP (ref 32–36)
MCHC RBC-ENTMCNC: 32.8 GM/DL — SIGNIFICANT CHANGE UP (ref 32–36)
MCV RBC AUTO: 89.6 FL — SIGNIFICANT CHANGE UP (ref 80–100)
MCV RBC AUTO: 89.6 FL — SIGNIFICANT CHANGE UP (ref 80–100)
NRBC # BLD: 0 /100 WBCS — SIGNIFICANT CHANGE UP (ref 0–0)
NRBC # BLD: 0 /100 WBCS — SIGNIFICANT CHANGE UP (ref 0–0)
NRBC # FLD: 0.03 K/UL — HIGH (ref 0–0)
NRBC # FLD: 0.03 K/UL — HIGH (ref 0–0)
PHOSPHATE SERPL-MCNC: 2.6 MG/DL — SIGNIFICANT CHANGE UP (ref 2.5–4.5)
PHOSPHATE SERPL-MCNC: 2.6 MG/DL — SIGNIFICANT CHANGE UP (ref 2.5–4.5)
PLATELET # BLD AUTO: 432 K/UL — HIGH (ref 150–400)
PLATELET # BLD AUTO: 432 K/UL — HIGH (ref 150–400)
POTASSIUM SERPL-MCNC: 3.5 MMOL/L — SIGNIFICANT CHANGE UP (ref 3.5–5.3)
POTASSIUM SERPL-MCNC: 3.5 MMOL/L — SIGNIFICANT CHANGE UP (ref 3.5–5.3)
POTASSIUM SERPL-SCNC: 3.5 MMOL/L — SIGNIFICANT CHANGE UP (ref 3.5–5.3)
POTASSIUM SERPL-SCNC: 3.5 MMOL/L — SIGNIFICANT CHANGE UP (ref 3.5–5.3)
RBC # BLD: 2.99 M/UL — LOW (ref 3.8–5.2)
RBC # BLD: 2.99 M/UL — LOW (ref 3.8–5.2)
RBC # FLD: 16.7 % — HIGH (ref 10.3–14.5)
RBC # FLD: 16.7 % — HIGH (ref 10.3–14.5)
SODIUM SERPL-SCNC: 140 MMOL/L — SIGNIFICANT CHANGE UP (ref 135–145)
SODIUM SERPL-SCNC: 140 MMOL/L — SIGNIFICANT CHANGE UP (ref 135–145)
WBC # BLD: 11.26 K/UL — HIGH (ref 3.8–10.5)
WBC # BLD: 11.26 K/UL — HIGH (ref 3.8–10.5)
WBC # FLD AUTO: 11.26 K/UL — HIGH (ref 3.8–10.5)
WBC # FLD AUTO: 11.26 K/UL — HIGH (ref 3.8–10.5)

## 2023-11-07 RX ORDER — SIMETHICONE 80 MG/1
80 TABLET, CHEWABLE ORAL THREE TIMES A DAY
Refills: 0 | Status: DISCONTINUED | OUTPATIENT
Start: 2023-11-07 | End: 2023-11-09

## 2023-11-07 RX ORDER — SODIUM,POTASSIUM PHOSPHATES 278-250MG
1 POWDER IN PACKET (EA) ORAL ONCE
Refills: 0 | Status: COMPLETED | OUTPATIENT
Start: 2023-11-07 | End: 2023-11-07

## 2023-11-07 RX ORDER — POTASSIUM CHLORIDE 20 MEQ
40 PACKET (EA) ORAL ONCE
Refills: 0 | Status: COMPLETED | OUTPATIENT
Start: 2023-11-07 | End: 2023-11-07

## 2023-11-07 RX ADMIN — OXYCODONE HYDROCHLORIDE 10 MILLIGRAM(S): 5 TABLET ORAL at 21:32

## 2023-11-07 RX ADMIN — ATORVASTATIN CALCIUM 80 MILLIGRAM(S): 80 TABLET, FILM COATED ORAL at 21:02

## 2023-11-07 RX ADMIN — Medication 0: at 18:22

## 2023-11-07 RX ADMIN — Medication 40 MILLIEQUIVALENT(S): at 11:44

## 2023-11-07 RX ADMIN — LIDOCAINE 1 PATCH: 4 CREAM TOPICAL at 11:45

## 2023-11-07 RX ADMIN — OXYCODONE HYDROCHLORIDE 10 MILLIGRAM(S): 5 TABLET ORAL at 21:02

## 2023-11-07 RX ADMIN — LIDOCAINE 1 PATCH: 4 CREAM TOPICAL at 18:21

## 2023-11-07 RX ADMIN — ENOXAPARIN SODIUM 55 MILLIGRAM(S): 100 INJECTION SUBCUTANEOUS at 05:52

## 2023-11-07 RX ADMIN — LIDOCAINE 1 PATCH: 4 CREAM TOPICAL at 23:45

## 2023-11-07 RX ADMIN — LIDOCAINE 1 PATCH: 4 CREAM TOPICAL at 08:39

## 2023-11-07 RX ADMIN — Medication 0: at 21:22

## 2023-11-07 RX ADMIN — Medication 1 PACKET(S): at 11:43

## 2023-11-07 RX ADMIN — ENOXAPARIN SODIUM 55 MILLIGRAM(S): 100 INJECTION SUBCUTANEOUS at 18:18

## 2023-11-07 RX ADMIN — PANTOPRAZOLE SODIUM 40 MILLIGRAM(S): 20 TABLET, DELAYED RELEASE ORAL at 11:45

## 2023-11-07 NOTE — PROGRESS NOTE ADULT - SUBJECTIVE AND OBJECTIVE BOX
Vascular Surgery Daily Progress Note  =====================================================    SUBJECTIVE: Patient seen and examined at bedside on AM rounds. Patient complains of gas pain with food.     ALLERGIES:  No Known Allergies      --------------------------------------------------------------------------------------    MEDICATIONS:    Neurologic Medications  acetaminophen     Tablet .. 1000 milliGRAM(s) Oral every 6 hours PRN Mild Pain (1 - 3)  melatonin 3 milliGRAM(s) Oral at bedtime  oxyCODONE    IR 5 milliGRAM(s) Oral every 4 hours PRN Moderate Pain (4 - 6)  oxyCODONE    IR 10 milliGRAM(s) Oral every 4 hours PRN Severe Pain (7 - 10)    Respiratory Medications    Cardiovascular Medications    Gastrointestinal Medications  loperamide 2 milliGRAM(s) Oral every 12 hours PRN Diarrhea  pantoprazole  Injectable 40 milliGRAM(s) IV Push daily  simethicone 80 milliGRAM(s) Chew three times a day PRN Gas    Genitourinary Medications    Hematologic/Oncologic Medications  enoxaparin Injectable 55 milliGRAM(s) SubCutaneous every 12 hours    Antimicrobial/Immunologic Medications    Endocrine/Metabolic Medications  atorvastatin 80 milliGRAM(s) Oral at bedtime  insulin lispro (ADMELOG) corrective regimen sliding scale   SubCutaneous Before meals and at bedtime    Topical/Other Medications  lidocaine   4% Patch 1 Patch Transdermal daily    --------------------------------------------------------------------------------------    VITAL SIGNS:  T(C): 36.8 (11-07-23 @ 05:50), Max: 37 (11-06-23 @ 21:31)  HR: 73 (11-07-23 @ 05:50) (68 - 73)  BP: 155/71 (11-07-23 @ 05:50) (139/59 - 155/71)  RR: 18 (11-07-23 @ 05:50) (18 - 18)  SpO2: 100% (11-07-23 @ 05:50) (100% - 100%)  --------------------------------------------------------------------------------------    INS AND OUTS:    11-06-23 @ 07:01  -  11-07-23 @ 07:00  --------------------------------------------------------  IN: 0 mL / OUT: 650 mL / NET: -650 mL      --------------------------------------------------------------------------------------      EXAM  Physical Exam:  General: NAD, AOx3  Respiratory: No labored breathing  CV: pulse regularly present  Abd: Soft, nontender, nondistended. Midline incision C/D/I with staples, no bleeding erythema or induration. Fresh gauze dressing applied  MSK: Grossly symmetric. LUE cutdown C/D/I  Pulse: WWPx4    --------------------------------------------------------------------------------------    LABS

## 2023-11-07 NOTE — CHART NOTE - NSCHARTNOTEFT_GEN_A_CORE
General Surgery Post op Check    Pt seen and examined without complaints. Reports mild incisional pain but feels well otherwise. Denies SOB/CP/N/V.     Vital Signs Last 24 Hrs  T(C): 36.9 (29 Oct 2023 00:00), Max: 37.1 (28 Oct 2023 15:38)  T(F): 98.5 (29 Oct 2023 00:00), Max: 98.7 (28 Oct 2023 15:38)  HR: 74 (29 Oct 2023 04:00) (65 - 78)  BP: 132/50 (29 Oct 2023 00:00) (130/59 - 175/71)  BP(mean): 74 (29 Oct 2023 00:00) (74 - 74)  RR: 16 (29 Oct 2023 04:00) (16 - 20)  SpO2: 99% (29 Oct 2023 04:00) (95% - 100%)    Parameters below as of 29 Oct 2023 04:00  Patient On (Oxygen Delivery Method): room air        I&O's Summary      Physical Exam  Gen: NAD, A&Ox3  Pulm: No respiratory distress, no subcostal retractions  CV: RRR, no JVD  Abd: midline dressings c/d, softly distended, nontympanic, nontender  Extremities:  FROM, warm and well perfused, equal bilateral muscle strength      A/P: 63y Female s/p R iliac to SMA bypass replacement  -Appreciate SICU care  -DVT prophylaxis w/ SCD. Keep therapeutic on heparin  -Strict I&O's  -Analgesia and antiemetics as needed  -NGT  -Massiel in    Vascular Surgery  [id number]
HEMATOLOGY FELLOW NOTE     APLS labs are all negative. The anti-cardiolipin screen was positive but the IgG and IgM are both < 40; so anti-cardiolipin is negative. Thus, patient can be transitioned to a DOAC. Still pending PI-linked Ag and JAK2 mutation, but would not change the recommendation that she can be transitioned to a DOAC.    Sachin Gaitan MD  Hematology/Oncology Fellow PGY-5  Pager: [facility] [phone] / [place] [phone]  After 5pm and on weekends please page on-call fellow
SICU Transfer Note    Transfer from: SICU  Transfer to: 3N [id number]  Accepting physican: Dr. 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 Morales) and discharged on 10/27 returned to ED with abdominal pain, NV, fevers, chills. 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 Occlusion of proximal SMA. Occluded bypass graft from left common iliac artery to SMA. Reconstitution of distal SMA from collaterals." Patient taken emergently to OR for bypass revision. Patient s/p ex lap, removal/replacement of thrombosed graft on 10/28. SICU consulted for HD monitoring. Post-op course c/b low UOP, acute blood loss anemia requiring blood transfusion, and tachycardia concerning for bleed. Non con CT demonstrated free fluid around the bypass and near the liver. Patient taken emergently to OR and now s/p evacuation of 1L hematoma and repair of iliac anastomosis on 10/30. Patient seen and examined in SICU, recovering well.         ASSESSMENT & PLAN:   - Massiel DC'd 11/1 by SICU  - Diet: NPO/NGT  - Pain control PRN  - Maintain MAP >65  - IV fluids @100 , monitor UOP  - continue heparin gtt  - Continue abx      Vascular Surgery   [id number]        For Follow-Up:      Vital Signs Last 24 Hrs  T(C): 36.8 (02 Nov 2023 01:42), Max: 37.3 (01 Nov 2023 16:00)  T(F): 98.3 (02 Nov 2023 01:42), Max: 99.1 (01 Nov 2023 16:00)  HR: 70 (02 Nov 2023 01:42) (69 - 99)  BP: 169/64 (02 Nov 2023 01:42) (108/89 - 169/64)  BP(mean): 81 (01 Nov 2023 16:00) (81 - 98)  RR: 17 (02 Nov 2023 01:42) (17 - 56)  SpO2: 96% (02 Nov 2023 01:42) (96% - 100%)    Parameters below as of 02 Nov 2023 01:42  Patient On (Oxygen Delivery Method): room air      I&O's Summary    31 Oct 2023 07:01 - 01 Nov 2023 07:00  --------------------------------------------------------  IN: 3763 mL / OUT: 2465 mL / NET: 1298 mL    01 Nov 2023 07:01  -  02 Nov 2023 02:04  --------------------------------------------------------  IN: 1232 mL / OUT: 1200 mL / NET: 32 mL          MEDICATIONS  (STANDING):  atorvastatin 80 milliGRAM(s) Oral at bedtime  ceFAZolin   IVPB 1000 milliGRAM(s) IV Intermittent every 8 hours  chlorhexidine 2% Cloths 1 Application(s) Topical daily  dextrose 5% + sodium chloride 0.9%. 1000 milliLiter(s) (100 mL/Hr) IV Continuous <Continuous>  heparin  Infusion 700 Unit(s)/Hr (8 mL/Hr) IV Continuous <Continuous>  insulin lispro (ADMELOG) corrective regimen sliding scale   SubCutaneous every 6 hours  lidocaine   4% Patch 1 Patch Transdermal daily  melatonin 1 milliGRAM(s) Oral once  pantoprazole  Injectable 40 milliGRAM(s) IV Push daily    MEDICATIONS  (PRN):  HYDROmorphone  Injectable 1 milliGRAM(s) IV Push every 4 hours PRN Severe Pain (7 - 10)  HYDROmorphone  Injectable 0.5 milliGRAM(s) IV Push every 4 hours PRN Moderate Pain (4 - 6)        LABS        ( 11-01 @ 15:30 )   PT: x    ;   INR: x      aPTT: 76.2 sec
Patient will be downgraded from SICU to 3N, signed out to vascular team. Rankin taken out at bedside, follow up TOV tonight.     SICU PA [id number]

## 2023-11-07 NOTE — PROGRESS NOTE ADULT - ASSESSMENT
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 Morales) and discharged on 10/27 returned to ED with abdominal pain, NV, fevers, chills. 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 Occlusion of proximal SMA. Occluded bypass graft from left common iliac artery to SMA. Reconstitution of distal SMA from collaterals." Patient taken emergently to OR for bypass revision. Patient s/p ex lap, removal/replacement of thrombosed graft on 10/28. SICU consulted for HD monitoring. Post-op course c/b low UOP, acute blood loss anemia requiring blood transfusion, and tachycardia concerning for bleed. Non con CT demonstrated free fluid around the bypass and near the liver. Patient taken emergently to OR and now s/p evacuation of 1L hematoma and repair of iliac anastomosis on 10/30. Recovering well on the floor with return of GI function and resolving diarrhea. Midline incision healing well.    Plan:  - Diet: consistent carb  - Pain control PRN  - C. diff negative   - Monitor labs, replete prn  - Heme consulted, will discuss PO anticoagulation recs  - Lovenox  - OOB/ambulate  - CTA tomorrow  - Dspo: home PT    Vascular Surgery   [id number]

## 2023-11-08 LAB
ANION GAP SERPL CALC-SCNC: 14 MMOL/L — SIGNIFICANT CHANGE UP (ref 7–14)
ANION GAP SERPL CALC-SCNC: 14 MMOL/L — SIGNIFICANT CHANGE UP (ref 7–14)
BUN SERPL-MCNC: 10 MG/DL — SIGNIFICANT CHANGE UP (ref 7–23)
BUN SERPL-MCNC: 10 MG/DL — SIGNIFICANT CHANGE UP (ref 7–23)
CALCIUM SERPL-MCNC: 8.7 MG/DL — SIGNIFICANT CHANGE UP (ref 8.4–10.5)
CALCIUM SERPL-MCNC: 8.7 MG/DL — SIGNIFICANT CHANGE UP (ref 8.4–10.5)
CHLORIDE SERPL-SCNC: 107 MMOL/L — SIGNIFICANT CHANGE UP (ref 98–107)
CHLORIDE SERPL-SCNC: 107 MMOL/L — SIGNIFICANT CHANGE UP (ref 98–107)
CO2 SERPL-SCNC: 22 MMOL/L — SIGNIFICANT CHANGE UP (ref 22–31)
CO2 SERPL-SCNC: 22 MMOL/L — SIGNIFICANT CHANGE UP (ref 22–31)
CREAT SERPL-MCNC: 0.65 MG/DL — SIGNIFICANT CHANGE UP (ref 0.5–1.3)
CREAT SERPL-MCNC: 0.65 MG/DL — SIGNIFICANT CHANGE UP (ref 0.5–1.3)
EGFR: 98 ML/MIN/1.73M2 — SIGNIFICANT CHANGE UP
EGFR: 98 ML/MIN/1.73M2 — SIGNIFICANT CHANGE UP
GLUCOSE BLDC GLUCOMTR-MCNC: 108 MG/DL — HIGH (ref 70–99)
GLUCOSE BLDC GLUCOMTR-MCNC: 108 MG/DL — HIGH (ref 70–99)
GLUCOSE BLDC GLUCOMTR-MCNC: 113 MG/DL — HIGH (ref 70–99)
GLUCOSE BLDC GLUCOMTR-MCNC: 113 MG/DL — HIGH (ref 70–99)
GLUCOSE BLDC GLUCOMTR-MCNC: 114 MG/DL — HIGH (ref 70–99)
GLUCOSE BLDC GLUCOMTR-MCNC: 114 MG/DL — HIGH (ref 70–99)
GLUCOSE BLDC GLUCOMTR-MCNC: 122 MG/DL — HIGH (ref 70–99)
GLUCOSE BLDC GLUCOMTR-MCNC: 122 MG/DL — HIGH (ref 70–99)
GLUCOSE SERPL-MCNC: 99 MG/DL — SIGNIFICANT CHANGE UP (ref 70–99)
GLUCOSE SERPL-MCNC: 99 MG/DL — SIGNIFICANT CHANGE UP (ref 70–99)
HCT VFR BLD CALC: 27.3 % — LOW (ref 34.5–45)
HCT VFR BLD CALC: 27.3 % — LOW (ref 34.5–45)
HGB BLD-MCNC: 8.9 G/DL — LOW (ref 11.5–15.5)
HGB BLD-MCNC: 8.9 G/DL — LOW (ref 11.5–15.5)
MAGNESIUM SERPL-MCNC: 1.7 MG/DL — SIGNIFICANT CHANGE UP (ref 1.6–2.6)
MAGNESIUM SERPL-MCNC: 1.7 MG/DL — SIGNIFICANT CHANGE UP (ref 1.6–2.6)
MCHC RBC-ENTMCNC: 29.7 PG — SIGNIFICANT CHANGE UP (ref 27–34)
MCHC RBC-ENTMCNC: 29.7 PG — SIGNIFICANT CHANGE UP (ref 27–34)
MCHC RBC-ENTMCNC: 32.6 GM/DL — SIGNIFICANT CHANGE UP (ref 32–36)
MCHC RBC-ENTMCNC: 32.6 GM/DL — SIGNIFICANT CHANGE UP (ref 32–36)
MCV RBC AUTO: 91 FL — SIGNIFICANT CHANGE UP (ref 80–100)
MCV RBC AUTO: 91 FL — SIGNIFICANT CHANGE UP (ref 80–100)
NRBC # BLD: 0 /100 WBCS — SIGNIFICANT CHANGE UP (ref 0–0)
NRBC # BLD: 0 /100 WBCS — SIGNIFICANT CHANGE UP (ref 0–0)
NRBC # FLD: 0 K/UL — SIGNIFICANT CHANGE UP (ref 0–0)
NRBC # FLD: 0 K/UL — SIGNIFICANT CHANGE UP (ref 0–0)
PHOSPHATE SERPL-MCNC: 3 MG/DL — SIGNIFICANT CHANGE UP (ref 2.5–4.5)
PHOSPHATE SERPL-MCNC: 3 MG/DL — SIGNIFICANT CHANGE UP (ref 2.5–4.5)
PLATELET # BLD AUTO: 382 K/UL — SIGNIFICANT CHANGE UP (ref 150–400)
PLATELET # BLD AUTO: 382 K/UL — SIGNIFICANT CHANGE UP (ref 150–400)
POTASSIUM SERPL-MCNC: 3.9 MMOL/L — SIGNIFICANT CHANGE UP (ref 3.5–5.3)
POTASSIUM SERPL-MCNC: 3.9 MMOL/L — SIGNIFICANT CHANGE UP (ref 3.5–5.3)
POTASSIUM SERPL-SCNC: 3.9 MMOL/L — SIGNIFICANT CHANGE UP (ref 3.5–5.3)
POTASSIUM SERPL-SCNC: 3.9 MMOL/L — SIGNIFICANT CHANGE UP (ref 3.5–5.3)
RBC # BLD: 3 M/UL — LOW (ref 3.8–5.2)
RBC # BLD: 3 M/UL — LOW (ref 3.8–5.2)
RBC # FLD: 17 % — HIGH (ref 10.3–14.5)
RBC # FLD: 17 % — HIGH (ref 10.3–14.5)
SODIUM SERPL-SCNC: 143 MMOL/L — SIGNIFICANT CHANGE UP (ref 135–145)
SODIUM SERPL-SCNC: 143 MMOL/L — SIGNIFICANT CHANGE UP (ref 135–145)
WBC # BLD: 11.26 K/UL — HIGH (ref 3.8–10.5)
WBC # BLD: 11.26 K/UL — HIGH (ref 3.8–10.5)
WBC # FLD AUTO: 11.26 K/UL — HIGH (ref 3.8–10.5)
WBC # FLD AUTO: 11.26 K/UL — HIGH (ref 3.8–10.5)

## 2023-11-08 PROCEDURE — 74174 CTA ABD&PLVS W/CONTRAST: CPT | Mod: 26

## 2023-11-08 RX ORDER — MAGNESIUM SULFATE 500 MG/ML
2 VIAL (ML) INJECTION ONCE
Refills: 0 | Status: COMPLETED | OUTPATIENT
Start: 2023-11-08 | End: 2023-11-08

## 2023-11-08 RX ORDER — ACETAMINOPHEN 500 MG
2 TABLET ORAL
Qty: 0 | Refills: 0 | DISCHARGE
Start: 2023-11-08

## 2023-11-08 RX ORDER — RIVAROXABAN 15 MG-20MG
20 KIT ORAL
Refills: 0 | Status: DISCONTINUED | OUTPATIENT
Start: 2023-11-08 | End: 2023-11-09

## 2023-11-08 RX ORDER — OXYCODONE HYDROCHLORIDE 5 MG/1
1 TABLET ORAL
Qty: 12 | Refills: 0
Start: 2023-11-08 | End: 2023-11-10

## 2023-11-08 RX ORDER — ACETAMINOPHEN 500 MG
1000 TABLET ORAL EVERY 6 HOURS
Refills: 0 | Status: DISCONTINUED | OUTPATIENT
Start: 2023-11-08 | End: 2023-11-09

## 2023-11-08 RX ORDER — POTASSIUM CHLORIDE 20 MEQ
20 PACKET (EA) ORAL ONCE
Refills: 0 | Status: COMPLETED | OUTPATIENT
Start: 2023-11-08 | End: 2023-11-08

## 2023-11-08 RX ADMIN — Medication 1000 MILLIGRAM(S): at 11:29

## 2023-11-08 RX ADMIN — ATORVASTATIN CALCIUM 80 MILLIGRAM(S): 80 TABLET, FILM COATED ORAL at 23:23

## 2023-11-08 RX ADMIN — OXYCODONE HYDROCHLORIDE 10 MILLIGRAM(S): 5 TABLET ORAL at 23:23

## 2023-11-08 RX ADMIN — PANTOPRAZOLE SODIUM 40 MILLIGRAM(S): 20 TABLET, DELAYED RELEASE ORAL at 11:32

## 2023-11-08 RX ADMIN — Medication 1000 MILLIGRAM(S): at 18:30

## 2023-11-08 RX ADMIN — RIVAROXABAN 20 MILLIGRAM(S): KIT at 18:30

## 2023-11-08 RX ADMIN — SIMETHICONE 80 MILLIGRAM(S): 80 TABLET, CHEWABLE ORAL at 23:23

## 2023-11-08 RX ADMIN — LIDOCAINE 1 PATCH: 4 CREAM TOPICAL at 11:29

## 2023-11-08 RX ADMIN — Medication 20 MILLIEQUIVALENT(S): at 11:29

## 2023-11-08 RX ADMIN — Medication 25 GRAM(S): at 12:13

## 2023-11-08 RX ADMIN — ENOXAPARIN SODIUM 55 MILLIGRAM(S): 100 INJECTION SUBCUTANEOUS at 05:46

## 2023-11-08 RX ADMIN — LIDOCAINE 1 PATCH: 4 CREAM TOPICAL at 17:56

## 2023-11-08 NOTE — PROGRESS NOTE ADULT - ASSESSMENT
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 Morales) and discharged on 10/27 returned to ED with abdominal pain, NV, fevers, chills. 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 Occlusion of proximal SMA. Occluded bypass graft from left common iliac artery to SMA. Reconstitution of distal SMA from collaterals." Patient taken emergently to OR for bypass revision. Patient s/p ex lap, removal/replacement of thrombosed graft on 10/28. SICU consulted for HD monitoring. Post-op course c/b low UOP, acute blood loss anemia requiring blood transfusion, and tachycardia concerning for bleed. Non con CT demonstrated free fluid around the bypass and near the liver. Patient taken emergently to OR and now s/p evacuation of 1L hematoma and repair of iliac anastomosis on 10/30. Recovering well on the floor with return of GI function and resolving diarrhea. Midline incision healing well.    Plan:  - Pain control PRN  - Diet: consistent carb  - C. diff negative   - Monitor labs, replete prn  - Heme consulted, will discuss PO anticoagulation recs  - Lovenox  - OOB/ambulate  - CTA TODAY   - Dispo: home with PT, today vs. tomorrow       Vascular Surgery   [id number]

## 2023-11-08 NOTE — PROGRESS NOTE ADULT - SUBJECTIVE AND OBJECTIVE BOX
Vascular Surgery Daily Progress Note  =====================================================    SUBJECTIVE: Patient seen and examined at bedside on AM rounds. Patient complains of gas pain with food, mostly at night after dinner. Has no tried ordered simethicone, patient informed she may use it for gas pain. No nausea / vomiting. No new symptoms.       ALLERGIES:  No Known Allergies    --------------------------------------------------------------------------------------    MEDICATIONS:    MEDICATIONS  (STANDING):  acetaminophen     Tablet .. 1000 milliGRAM(s) Oral every 6 hours  atorvastatin 80 milliGRAM(s) Oral at bedtime  enoxaparin Injectable 55 milliGRAM(s) SubCutaneous every 12 hours  insulin lispro (ADMELOG) corrective regimen sliding scale   SubCutaneous Before meals and at bedtime  lidocaine   4% Patch 1 Patch Transdermal daily  melatonin 3 milliGRAM(s) Oral at bedtime  pantoprazole  Injectable 40 milliGRAM(s) IV Push daily    MEDICATIONS  (PRN):  loperamide 2 milliGRAM(s) Oral every 12 hours PRN Diarrhea  oxyCODONE    IR 5 milliGRAM(s) Oral every 4 hours PRN Moderate Pain (4 - 6)  oxyCODONE    IR 10 milliGRAM(s) Oral every 4 hours PRN Severe Pain (7 - 10)  simethicone 80 milliGRAM(s) Chew three times a day PRN Gas    --------------------------------------------------------------------------------------    VITAL SIGNS:  Vital Signs Last 24 Hrs  T(C): 36.7 (08 Nov 2023 05:44), Max: 37.4 (07 Nov 2023 18:11)  T(F): 98 (08 Nov 2023 05:44), Max: 99.3 (07 Nov 2023 18:11)  HR: 98 (08 Nov 2023 05:44) (70 - 98)  BP: 163/76 (08 Nov 2023 05:44) (140/52 - 163/76)  BP(mean): --  RR: 18 (08 Nov 2023 05:44) (17 - 18)  SpO2: 100% (08 Nov 2023 05:44) (99% - 100%)    Parameters below as of 08 Nov 2023 05:44  Patient On (Oxygen Delivery Method): room air    --------------------------------------------------------------------------------------    INS AND OUTS:    I&O's Summary    07 Nov 2023 07:01  -  08 Nov 2023 07:00  --------------------------------------------------------  IN: 0 mL / OUT: 850 mL / NET: -850 mL      --------------------------------------------------------------------------------------      EXAM  Physical Exam:  General: NAD, AOx3  Respiratory: No labored breathing  CV: pulse regularly present  Abd: Soft, nontender, nondistended. Midline incision C/D/I with staples, no bleeding erythema or induration. Fresh gauze dressing applied  MSK: Grossly symmetric. LUE cutdown C/D/I  Pulse: WWPx4    --------------------------------------------------------------------------------------    LABS                          8.9    11.26 )-----------( 382      ( 08 Nov 2023 06:42 )             27.3     11-07    140  |  107  |  6<L>  ----------------------------<  100<H>  3.5   |  23  |  0.59    Ca    8.1<L>      07 Nov 2023 06:20  Phos  2.6     11-07  Mg     2.10     11-07

## 2023-11-09 ENCOUNTER — TRANSCRIPTION ENCOUNTER (OUTPATIENT)
Age: 63
End: 2023-11-09

## 2023-11-09 VITALS
TEMPERATURE: 98 F | OXYGEN SATURATION: 98 % | HEART RATE: 68 BPM | DIASTOLIC BLOOD PRESSURE: 50 MMHG | RESPIRATION RATE: 18 BRPM | SYSTOLIC BLOOD PRESSURE: 128 MMHG

## 2023-11-09 LAB
COMMENT - PATHOLOGY: SIGNIFICANT CHANGE UP
COMMENT - PATHOLOGY: SIGNIFICANT CHANGE UP
GLUCOSE BLDC GLUCOMTR-MCNC: 114 MG/DL — HIGH (ref 70–99)
GLUCOSE BLDC GLUCOMTR-MCNC: 114 MG/DL — HIGH (ref 70–99)
GLUCOSE BLDC GLUCOMTR-MCNC: 116 MG/DL — HIGH (ref 70–99)
GLUCOSE BLDC GLUCOMTR-MCNC: 116 MG/DL — HIGH (ref 70–99)
JAK2 EXON 13 MUT ANL BLD/T: SIGNIFICANT CHANGE UP
JAK2 EXON 13 MUT ANL BLD/T: SIGNIFICANT CHANGE UP
PNH GRANULOCYTES: 0 % — SIGNIFICANT CHANGE UP (ref 0–0.01)
PNH GRANULOCYTES: 0 % — SIGNIFICANT CHANGE UP (ref 0–0.01)
PNH MONOCYTES: 0 % — SIGNIFICANT CHANGE UP (ref 0–0.05)
PNH MONOCYTES: 0 % — SIGNIFICANT CHANGE UP (ref 0–0.05)
PNH RBC-COMPLETE AG LOSS: 0 % — SIGNIFICANT CHANGE UP (ref 0–0.01)
PNH RBC-COMPLETE AG LOSS: 0 % — SIGNIFICANT CHANGE UP (ref 0–0.01)
PNH RBC-PARTIAL AG LOSS: 0 % — SIGNIFICANT CHANGE UP (ref 0–0.99)
PNH RBC-PARTIAL AG LOSS: 0 % — SIGNIFICANT CHANGE UP (ref 0–0.99)
REFLEX:: SIGNIFICANT CHANGE UP
REFLEX:: SIGNIFICANT CHANGE UP
SURGICAL PATHOLOGY STUDY: SIGNIFICANT CHANGE UP
SURGICAL PATHOLOGY STUDY: SIGNIFICANT CHANGE UP

## 2023-11-09 RX ORDER — ATORVASTATIN CALCIUM 80 MG/1
1 TABLET, FILM COATED ORAL
Qty: 30 | Refills: 2
Start: 2023-11-09 | End: 2024-02-06

## 2023-11-09 RX ORDER — RIVAROXABAN 15 MG-20MG
1 KIT ORAL
Qty: 30 | Refills: 0
Start: 2023-11-09 | End: 2023-12-08

## 2023-11-09 RX ADMIN — Medication 1000 MILLIGRAM(S): at 06:31

## 2023-11-09 RX ADMIN — OXYCODONE HYDROCHLORIDE 10 MILLIGRAM(S): 5 TABLET ORAL at 00:23

## 2023-11-09 RX ADMIN — Medication 1000 MILLIGRAM(S): at 07:31

## 2023-11-09 NOTE — PROGRESS NOTE ADULT - NUTRITIONAL ASSESSMENT
This patient has been assessed with a concern for Malnutrition and has been determined to have a diagnosis/diagnoses of Severe protein-calorie malnutrition.    This patient is being managed with:   Diet Consistent Carbohydrate w/Evening Snack-  Supplement Feeding Modality:  Oral  Ensure Surgery Cans or Servings Per Day:  1       Frequency:  Three Times a day  Entered: Nov 5 2023  2:44PM  
This patient has been assessed with a concern for Malnutrition and has been determined to have a diagnosis/diagnoses of Severe protein-calorie malnutrition.    This patient is being managed with:   Diet NPO-  Except Medications  Entered: Oct 31 2023 10:10PM  
This patient has been assessed with a concern for Malnutrition and has been determined to have a diagnosis/diagnoses of Severe protein-calorie malnutrition.    This patient is being managed with:   Diet Clear Liquid-  Consistent Carbohydrate {Evening Snack} (CSTCHOSN)  Entered: Nov  3 2023  7:40AM  
This patient has been assessed with a concern for Malnutrition and has been determined to have a diagnosis/diagnoses of Severe protein-calorie malnutrition.    This patient is being managed with:   Diet Consistent Carbohydrate w/Evening Snack-  Supplement Feeding Modality:  Oral  Ensure Surgery Cans or Servings Per Day:  1       Frequency:  Three Times a day  Entered: Nov 5 2023  2:44PM  
This patient has been assessed with a concern for Malnutrition and has been determined to have a diagnosis/diagnoses of Severe protein-calorie malnutrition.    This patient is being managed with:   Diet Consistent Carbohydrate w/Evening Snack-  Supplement Feeding Modality:  Oral  Ensure Surgery Cans or Servings Per Day:  1       Frequency:  Three Times a day  Entered: Nov 5 2023  2:44PM  
This patient has been assessed with a concern for Malnutrition and has been determined to have a diagnosis/diagnoses of Severe protein-calorie malnutrition.    This patient is being managed with:   Diet NPO-  Entered: Oct 28 2023 10:38PM  
This patient has been assessed with a concern for Malnutrition and has been determined to have a diagnosis/diagnoses of Severe protein-calorie malnutrition.    This patient is being managed with:   Diet NPO-  Entered: Oct 28 2023 10:38PM  
This patient has been assessed with a concern for Malnutrition and has been determined to have a diagnosis/diagnoses of Severe protein-calorie malnutrition.    This patient is being managed with:   Diet NPO-  Except Medications  Entered: Oct 31 2023 10:10PM  
This patient has been assessed with a concern for Malnutrition and has been determined to have a diagnosis/diagnoses of Severe protein-calorie malnutrition.    This patient is being managed with:   Diet Clear Liquid-  Consistent Carbohydrate {Evening Snack} (CSTCHOSN)  Supplement Feeding Modality:  Oral  Ensure Clear Cans or Servings Per Day:  1       Frequency:  Three Times a day  Entered: Nov 5 2023  8:16AM  
This patient has been assessed with a concern for Malnutrition and has been determined to have a diagnosis/diagnoses of Severe protein-calorie malnutrition.    This patient is being managed with:   Diet Consistent Carbohydrate w/Evening Snack-  Supplement Feeding Modality:  Oral  Ensure Surgery Cans or Servings Per Day:  1       Frequency:  Three Times a day  Entered: Nov 5 2023  2:44PM  
This patient has been assessed with a concern for Malnutrition and has been determined to have a diagnosis/diagnoses of Severe protein-calorie malnutrition.    This patient is being managed with:   Diet NPO-  Entered: Oct 28 2023 10:38PM  
This patient has been assessed with a concern for Malnutrition and has been determined to have a diagnosis/diagnoses of Severe protein-calorie malnutrition.    This patient is being managed with:   Diet NPO-  Entered: Oct 28 2023 10:38PM  
This patient has been assessed with a concern for Malnutrition and has been determined to have a diagnosis/diagnoses of Severe protein-calorie malnutrition.    This patient is being managed with:   Diet NPO-  Except Medications  Entered: Oct 31 2023 10:10PM

## 2023-11-09 NOTE — PROGRESS NOTE ADULT - PROVIDER SPECIALTY LIST ADULT
Anesthesia
Vascular Surgery
SICU
Vascular Surgery
SICU
Vascular Surgery

## 2023-11-09 NOTE — PROGRESS NOTE ADULT - REASON FOR ADMISSION
Occluded PTFE Graft
Occluded ilio-mesenteric bypass graft
Occluded PTFE Graft

## 2023-11-09 NOTE — DISCHARGE NOTE NURSING/CASE MANAGEMENT/SOCIAL WORK - PATIENT PORTAL LINK FT
You can access the FollowMyHealth Patient Portal offered by [facility] by registering at the following website: [email or web address]. By joining [employer]’s FollowMyHealth portal, you will also be able to view your health information using other applications (apps) compatible with our system.

## 2023-11-09 NOTE — PROGRESS NOTE ADULT - ASSESSMENT
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 Morales) and discharged on 10/27 returned to ED with abdominal pain, NV, fevers, chills. 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 Occlusion of proximal SMA. Occluded bypass graft from left common iliac artery to SMA. Reconstitution of distal SMA from collaterals." Patient taken emergently to OR for bypass revision. Patient s/p ex lap, removal/replacement of thrombosed graft on 10/28. SICU consulted for HD monitoring. Post-op course c/b low UOP, acute blood loss anemia requiring blood transfusion, and tachycardia concerning for bleed. Non con CT demonstrated free fluid around the bypass and near the liver. Patient taken emergently to OR and now s/p evacuation of 1L hematoma and repair of iliac anastomosis on 10/30. Recovering well on the floor with return of GI function and resolving diarrhea. Midline incision healing well.    Plan:  - On xarelto  - Pain control PRN  - Diet: consistent carb  - C. diff negative   - Monitor labs, replete prn  - Heme consulted, will discuss PO anticoagulation recs  - Lovenox  - OOB/ambulate  - CTA TODAY   - Dispo: home with PT, today       Vascular Surgery   [id number]

## 2023-11-09 NOTE — PROGRESS NOTE ADULT - SUBJECTIVE AND OBJECTIVE BOX
TEAM [ C ] Surgery Daily Progress Note  =====================================================    SUBJECTIVE: Patient seen and examined at bedside on AM rounds. Denies any acute complaints. Tolerating diet, denies nausea, vomiting.        ALLERGIES:  No Known Allergies      --------------------------------------------------------------------------------------    MEDICATIONS:    Neurologic Medications  acetaminophen     Tablet .. 1000 milliGRAM(s) Oral every 6 hours  melatonin 3 milliGRAM(s) Oral at bedtime  oxyCODONE    IR 10 milliGRAM(s) Oral every 4 hours PRN Severe Pain (7 - 10)  oxyCODONE    IR 5 milliGRAM(s) Oral every 4 hours PRN Moderate Pain (4 - 6)    Respiratory Medications    Cardiovascular Medications    Gastrointestinal Medications  loperamide 2 milliGRAM(s) Oral every 12 hours PRN Diarrhea  pantoprazole  Injectable 40 milliGRAM(s) IV Push daily  simethicone 80 milliGRAM(s) Chew three times a day PRN Gas    Genitourinary Medications    Hematologic/Oncologic Medications  rivaroxaban 20 milliGRAM(s) Oral with dinner    Antimicrobial/Immunologic Medications    Endocrine/Metabolic Medications  atorvastatin 80 milliGRAM(s) Oral at bedtime  insulin lispro (ADMELOG) corrective regimen sliding scale   SubCutaneous Before meals and at bedtime    Topical/Other Medications  lidocaine   4% Patch 1 Patch Transdermal daily    --------------------------------------------------------------------------------------    VITAL SIGNS:  T(C): 36.8 (11-09-23 @ 06:30), Max: 36.8 (11-08-23 @ 10:25)  HR: 65 (11-09-23 @ 06:30) (65 - 73)  BP: 154/60 (11-09-23 @ 06:30) (130/62 - 168/82)  RR: 18 (11-09-23 @ 06:30) (18 - 19)  SpO2: 99% (11-09-23 @ 06:30) (99% - 100%)  --------------------------------------------------------------------------------------    EXAM    General: NAD, resting in bed comfortably.  Cardiac: regular rate, warm and well perfused  Respiratory: Nonlabored respirations, normal cw expansion.  Abdomen: Abd: Soft, nontender, nondistended. Midline incision C/D/I with staples, no bleeding erythema or induration. Fresh gauze dressing applied  MSK: Grossly symmetric. LUE cutdown C/D/I    --------------------------------------------------------------------------------------    LABS                        8.9    11.26 )-----------( 382      ( 08 Nov 2023 06:42 )             27.3   11-08    143  |  107  |  10  ----------------------------<  99  3.9   |  22  |  0.65    Ca    8.7      08 Nov 2023 06:42  Phos  3.0     11-08  Mg     1.70     11-08      --------------------------------------------------------------------------------------    INS AND OUTS:    11-08-23 @ 07:01  -  11-09-23 @ 07:00  --------------------------------------------------------  IN: 350 mL / OUT: 200 mL / NET: 150 mL      --------------------------------------------------------------------------------------

## 2023-11-17 ENCOUNTER — APPOINTMENT (OUTPATIENT)
Dept: VASCULAR SURGERY | Facility: CLINIC | Age: 63
End: 2023-11-17
Payer: COMMERCIAL

## 2023-11-17 VITALS
HEART RATE: 64 BPM | DIASTOLIC BLOOD PRESSURE: 74 MMHG | SYSTOLIC BLOOD PRESSURE: 124 MMHG | RESPIRATION RATE: 15 BRPM | TEMPERATURE: 97 F | WEIGHT: 113 LBS | OXYGEN SATURATION: 99 %

## 2023-11-17 PROCEDURE — 99024 POSTOP FOLLOW-UP VISIT: CPT

## 2023-11-17 RX ORDER — OXYCODONE 10 MG/1
10 TABLET ORAL
Refills: 0 | Status: ACTIVE | COMMUNITY

## 2023-11-17 RX ORDER — LISINOPRIL 40 MG/1
40 TABLET ORAL
Refills: 0 | Status: ACTIVE | COMMUNITY

## 2023-11-17 RX ORDER — RIVAROXABAN 20 MG/1
20 TABLET, FILM COATED ORAL
Refills: 0 | Status: ACTIVE | COMMUNITY

## 2023-11-17 RX ORDER — ATORVASTATIN CALCIUM 80 MG/1
80 TABLET, FILM COATED ORAL
Refills: 0 | Status: ACTIVE | COMMUNITY

## 2023-11-17 RX ORDER — HYDROCHLOROTHIAZIDE 25 MG/1
25 TABLET ORAL
Refills: 0 | Status: ACTIVE | COMMUNITY

## 2023-12-01 ENCOUNTER — OUTPATIENT (OUTPATIENT)
Dept: OUTPATIENT SERVICES | Facility: HOSPITAL | Age: 63
LOS: 1 days | Discharge: HOME | End: 2023-12-01

## 2023-12-01 ENCOUNTER — APPOINTMENT (OUTPATIENT)
Dept: VASCULAR SURGERY | Facility: CLINIC | Age: 63
End: 2023-12-01
Payer: COMMERCIAL

## 2023-12-01 VITALS
HEART RATE: 76 BPM | DIASTOLIC BLOOD PRESSURE: 80 MMHG | HEIGHT: 59 IN | WEIGHT: 112 LBS | RESPIRATION RATE: 15 BRPM | BODY MASS INDEX: 22.58 KG/M2 | OXYGEN SATURATION: 98 % | SYSTOLIC BLOOD PRESSURE: 134 MMHG

## 2023-12-01 DIAGNOSIS — Z95.828 PRESENCE OF OTHER VASCULAR IMPLANTS AND GRAFTS: Chronic | ICD-10-CM

## 2023-12-01 DIAGNOSIS — I82.890 ACUTE EMBOLISM AND THROMBOSIS OF OTHER SPECIFIED VEINS: ICD-10-CM

## 2023-12-01 PROCEDURE — 93978 VASCULAR STUDY: CPT

## 2023-12-01 PROCEDURE — 99024 POSTOP FOLLOW-UP VISIT: CPT

## 2023-12-01 RX ORDER — RIVAROXABAN 20 MG/1
20 TABLET, FILM COATED ORAL
Qty: 90 | Refills: 3 | Status: ACTIVE | COMMUNITY
Start: 2023-12-01 | End: 1900-01-01

## 2023-12-15 ENCOUNTER — APPOINTMENT (OUTPATIENT)
Dept: HEMATOLOGY ONCOLOGY | Facility: CLINIC | Age: 63
End: 2023-12-15
Payer: COMMERCIAL

## 2023-12-15 ENCOUNTER — NON-APPOINTMENT (OUTPATIENT)
Age: 63
End: 2023-12-15

## 2023-12-15 ENCOUNTER — RESULT REVIEW (OUTPATIENT)
Age: 63
End: 2023-12-15

## 2023-12-15 VITALS
BODY MASS INDEX: 22.58 KG/M2 | TEMPERATURE: 96.8 F | SYSTOLIC BLOOD PRESSURE: 130 MMHG | WEIGHT: 111.99 LBS | HEIGHT: 58.98 IN | HEART RATE: 64 BPM | OXYGEN SATURATION: 98 % | DIASTOLIC BLOOD PRESSURE: 82 MMHG | RESPIRATION RATE: 16 BRPM

## 2023-12-15 DIAGNOSIS — D68.9 COAGULATION DEFECT, UNSPECIFIED: ICD-10-CM

## 2023-12-15 DIAGNOSIS — I74.9 EMBOLISM AND THROMBOSIS OF UNSPECIFIED ARTERY: ICD-10-CM

## 2023-12-15 DIAGNOSIS — K55.9 VASCULAR DISORDER OF INTESTINE, UNSPECIFIED: ICD-10-CM

## 2023-12-15 LAB
BASOPHILS # BLD AUTO: 0.05 K/UL — SIGNIFICANT CHANGE UP (ref 0–0.2)
BASOPHILS # BLD AUTO: 0.05 K/UL — SIGNIFICANT CHANGE UP (ref 0–0.2)
BASOPHILS NFR BLD AUTO: 0.8 % — SIGNIFICANT CHANGE UP (ref 0–2)
BASOPHILS NFR BLD AUTO: 0.8 % — SIGNIFICANT CHANGE UP (ref 0–2)
EOSINOPHIL # BLD AUTO: 0.18 K/UL — SIGNIFICANT CHANGE UP (ref 0–0.5)
EOSINOPHIL # BLD AUTO: 0.18 K/UL — SIGNIFICANT CHANGE UP (ref 0–0.5)
EOSINOPHIL NFR BLD AUTO: 3 % — SIGNIFICANT CHANGE UP (ref 0–6)
EOSINOPHIL NFR BLD AUTO: 3 % — SIGNIFICANT CHANGE UP (ref 0–6)
HCT VFR BLD CALC: 39.5 % — SIGNIFICANT CHANGE UP (ref 34.5–45)
HCT VFR BLD CALC: 39.5 % — SIGNIFICANT CHANGE UP (ref 34.5–45)
HGB BLD-MCNC: 12.9 G/DL — SIGNIFICANT CHANGE UP (ref 11.5–15.5)
HGB BLD-MCNC: 12.9 G/DL — SIGNIFICANT CHANGE UP (ref 11.5–15.5)
IMM GRANULOCYTES NFR BLD AUTO: 0.2 % — SIGNIFICANT CHANGE UP (ref 0–0.9)
IMM GRANULOCYTES NFR BLD AUTO: 0.2 % — SIGNIFICANT CHANGE UP (ref 0–0.9)
LYMPHOCYTES # BLD AUTO: 2.53 K/UL — SIGNIFICANT CHANGE UP (ref 1–3.3)
LYMPHOCYTES # BLD AUTO: 2.53 K/UL — SIGNIFICANT CHANGE UP (ref 1–3.3)
LYMPHOCYTES # BLD AUTO: 42.7 % — SIGNIFICANT CHANGE UP (ref 13–44)
LYMPHOCYTES # BLD AUTO: 42.7 % — SIGNIFICANT CHANGE UP (ref 13–44)
MCHC RBC-ENTMCNC: 28.4 PG — SIGNIFICANT CHANGE UP (ref 27–34)
MCHC RBC-ENTMCNC: 28.4 PG — SIGNIFICANT CHANGE UP (ref 27–34)
MCHC RBC-ENTMCNC: 31.7 G/DL — LOW (ref 32–36)
MCHC RBC-ENTMCNC: 31.7 G/DL — LOW (ref 32–36)
MCV RBC AUTO: 89.6 FL — SIGNIFICANT CHANGE UP (ref 80–100)
MCV RBC AUTO: 89.6 FL — SIGNIFICANT CHANGE UP (ref 80–100)
MONOCYTES # BLD AUTO: 0.45 K/UL — SIGNIFICANT CHANGE UP (ref 0–0.9)
MONOCYTES # BLD AUTO: 0.45 K/UL — SIGNIFICANT CHANGE UP (ref 0–0.9)
MONOCYTES NFR BLD AUTO: 7.6 % — SIGNIFICANT CHANGE UP (ref 2–14)
MONOCYTES NFR BLD AUTO: 7.6 % — SIGNIFICANT CHANGE UP (ref 2–14)
NEUTROPHILS # BLD AUTO: 2.7 K/UL — SIGNIFICANT CHANGE UP (ref 1.8–7.4)
NEUTROPHILS # BLD AUTO: 2.7 K/UL — SIGNIFICANT CHANGE UP (ref 1.8–7.4)
NEUTROPHILS NFR BLD AUTO: 45.7 % — SIGNIFICANT CHANGE UP (ref 43–77)
NEUTROPHILS NFR BLD AUTO: 45.7 % — SIGNIFICANT CHANGE UP (ref 43–77)
NRBC # BLD: 0 /100 WBCS — SIGNIFICANT CHANGE UP (ref 0–0)
NRBC # BLD: 0 /100 WBCS — SIGNIFICANT CHANGE UP (ref 0–0)
PLATELET # BLD AUTO: 234 K/UL — SIGNIFICANT CHANGE UP (ref 150–400)
PLATELET # BLD AUTO: 234 K/UL — SIGNIFICANT CHANGE UP (ref 150–400)
RBC # BLD: 4.61 M/UL — SIGNIFICANT CHANGE UP (ref 3.8–5.2)
RBC # BLD: 4.61 M/UL — SIGNIFICANT CHANGE UP (ref 3.8–5.2)
RBC # FLD: 14 % — SIGNIFICANT CHANGE UP (ref 10.3–14.5)
RBC # FLD: 14 % — SIGNIFICANT CHANGE UP (ref 10.3–14.5)
WBC # BLD: 5.92 K/UL — SIGNIFICANT CHANGE UP (ref 3.8–10.5)
WBC # BLD: 5.92 K/UL — SIGNIFICANT CHANGE UP (ref 3.8–10.5)
WBC # FLD AUTO: 5.92 K/UL — SIGNIFICANT CHANGE UP (ref 3.8–10.5)
WBC # FLD AUTO: 5.92 K/UL — SIGNIFICANT CHANGE UP (ref 3.8–10.5)

## 2023-12-15 PROCEDURE — 99205 OFFICE O/P NEW HI 60 MIN: CPT

## 2023-12-16 LAB
ALBUMIN SERPL ELPH-MCNC: 4.3 G/DL
ALP BLD-CCNC: 87 U/L
ALT SERPL-CCNC: 65 U/L
ANION GAP SERPL CALC-SCNC: 12 MMOL/L
AST SERPL-CCNC: 41 U/L
BILIRUB SERPL-MCNC: 0.3 MG/DL
BUN SERPL-MCNC: 11 MG/DL
CALCIUM SERPL-MCNC: 9.5 MG/DL
CHLORIDE SERPL-SCNC: 103 MMOL/L
CO2 SERPL-SCNC: 25 MMOL/L
CREAT SERPL-MCNC: 0.65 MG/DL
DEPRECATED D DIMER PPP IA-ACNC: 547 NG/ML DDU
EGFR: 98 ML/MIN/1.73M2
GLUCOSE SERPL-MCNC: 106 MG/DL
LDH SERPL-CCNC: 198 U/L
POTASSIUM SERPL-SCNC: 4.5 MMOL/L
PROT SERPL-MCNC: 7.3 G/DL
SODIUM SERPL-SCNC: 140 MMOL/L

## 2023-12-16 NOTE — HISTORY OF PRESENT ILLNESS
[de-identified] : (12/15/2023) Patient is accompanied by her daughter RENAY who provided the history.  63-year-old Ms. GREENWOOD is seen in consult for recent history of SMA thrombosis. Patient's daughter states that the patient had been suffering from abdominal pain and voimiting for about 8 months. She lost about 50 lbs during this time. She underwent several imaging studies but there was no suspicious finding except the one in 10/2023 which showed SMA thrombosis. She underwent arterial bypass surgery and was discharged. But she returned to the hospital the following day and was found to have graft thrombosis. She underwent regrafting. She was on heparin and was discharged on Xarelto. Complete thrombophilia w/u was done in the hospital which were all negative or normal. She has h/o multiple C-sections but has no personal or family h/o VTE.

## 2023-12-16 NOTE — RESULTS/DATA
[FreeTextEntry1] : 12/14/2023 Hospital labs reviewed in HIE and on patient's daughter's phone. APLA negative JAK2 negative PGI-linked antigen- Negative  PGM - not found FVL- not found

## 2023-12-16 NOTE — ASSESSMENT
[FreeTextEntry1] : 63-year-old Ms. 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 Patient has been having abdominal pain and vomiting for about eight months; currently stable. She has no past personal or family h/o thromboembolism. All thrombophilia w/u that were done in the hospital were normal or negative. The episodes were likely provoked. However, she may need AC for indefinite period. She is on Xarelto.    [] SMA thrombosis s/p bypass, recurrence  - On Xarelto 20 mg daily now; continue for 3-6 months - Thrombophilia w/u: all negative/normal   - PGM, FVL not found (sent today)  - May need lifelong AC - Discussed the risk of recurrence and risk of bleeding  - Advised vigilance - RTC in 3 months

## 2023-12-20 LAB
DNA PLOIDY SPEC FC-IMP: NORMAL
PTR INTERP: NORMAL

## 2023-12-22 ENCOUNTER — APPOINTMENT (OUTPATIENT)
Dept: VASCULAR SURGERY | Facility: CLINIC | Age: 63
End: 2023-12-22
Payer: COMMERCIAL

## 2023-12-22 VITALS
DIASTOLIC BLOOD PRESSURE: 77 MMHG | SYSTOLIC BLOOD PRESSURE: 152 MMHG | OXYGEN SATURATION: 100 % | HEIGHT: 58.98 IN | HEART RATE: 57 BPM | RESPIRATION RATE: 14 BRPM | WEIGHT: 113 LBS | BODY MASS INDEX: 22.78 KG/M2 | TEMPERATURE: 97.2 F

## 2023-12-22 PROCEDURE — 93978 VASCULAR STUDY: CPT

## 2023-12-22 PROCEDURE — 99024 POSTOP FOLLOW-UP VISIT: CPT

## 2023-12-22 NOTE — HISTORY OF PRESENT ILLNESS
[FreeTextEntry1] : Mrs. Espinosa presents s/p right DEBI-SMA bypass (10/20/23), re-do bypass for occlusion (10/23/23) and hematoma evacuation (10/30/23). She has returned home. Her incision is healing well and she denies erythema, induration, fluctuance or drainage. She denies post-prandial abdominal pain or pain that was similar to the pain she experienced before bypass or when the bypass occluded. She is moving her bowels and passing flatus. She denies any nausea, vomiting or belching. She reports "gas pain." She is taking Xarelto as prescribed, as well as Lipitor.  Her daughter reports a sacral decubitus ulcer, first noted in the hospital. She is sitting on a pillow to prevent further pressure on the wound.   She has a minor fungal infection under her breasts.  [de-identified] : 12/1/23: Mrs. Espinosa returns for mesenteric surveillance duplex. She is taking Xarelto as instructed and requesting refill on the medication. She is tolerating a regular diet but complains of "gas pain." On further questioning this appears consistent with constipation and I advised her to obtain OTC stool softeners (Colace). She was evaluated by GI and prescribed Protonix as well.   12/22/23: Mrs. Espinosa presents for surveillance duplex of her right EIA-SMA bypass. She is accompanied by her daughter, who is translating. She is doing well and her abdominal pain that she has described as "gas pain" has resolved. She does at times experience some incisional pain, particularly with movement. Her constipation has resolved. She continues to take Xarelto as instructed. She is otherwise without complaints.

## 2023-12-22 NOTE — ASSESSMENT
[FreeTextEntry1] : In summary, Mrs. Espinosa presents s/p right DEBI-SMA bypass. A mesenteric duplex was performed which showed patent bypass. I instructed her to continue Xarelto and statin therapy. I instructed her to attempt to increase her protein intake. She will follow up with repeat fasting duplex in three months.

## 2023-12-22 NOTE — PHYSICAL EXAM
[Normal Breath Sounds] : Normal breath sounds [Normal Heart Sounds] : normal heart sounds [de-identified] : NAD [de-identified] : WNL [de-identified] : Well healed midline incision. Soft, ND, NT.

## 2024-01-12 ENCOUNTER — APPOINTMENT (OUTPATIENT)
Dept: VASCULAR SURGERY | Facility: CLINIC | Age: 64
End: 2024-01-12

## 2024-03-05 DIAGNOSIS — K55.069 ACUTE INFARCTION OF INTESTINE, PART AND EXTENT UNSPECIFIED: ICD-10-CM

## 2024-03-15 ENCOUNTER — APPOINTMENT (OUTPATIENT)
Dept: VASCULAR SURGERY | Facility: CLINIC | Age: 64
End: 2024-03-15

## 2024-03-15 ENCOUNTER — APPOINTMENT (OUTPATIENT)
Dept: HEMATOLOGY ONCOLOGY | Facility: CLINIC | Age: 64
End: 2024-03-15

## 2024-05-10 ENCOUNTER — APPOINTMENT (OUTPATIENT)
Dept: VASCULAR SURGERY | Facility: CLINIC | Age: 64
End: 2024-05-10
Payer: MEDICARE

## 2024-05-10 VITALS
TEMPERATURE: 98 F | HEIGHT: 58 IN | BODY MASS INDEX: 23.72 KG/M2 | HEART RATE: 62 BPM | SYSTOLIC BLOOD PRESSURE: 134 MMHG | DIASTOLIC BLOOD PRESSURE: 81 MMHG | OXYGEN SATURATION: 99 % | WEIGHT: 113 LBS

## 2024-05-10 PROCEDURE — 93976 VASCULAR STUDY: CPT

## 2024-05-10 PROCEDURE — 99213 OFFICE O/P EST LOW 20 MIN: CPT

## 2024-05-10 NOTE — PHYSICAL EXAM
[Normal Breath Sounds] : Normal breath sounds [Normal Heart Sounds] : normal heart sounds [de-identified] : NAD [de-identified] : WNL [de-identified] : Well healed midline incision. Soft, ND, NT.

## 2024-05-10 NOTE — ASSESSMENT
[FreeTextEntry1] : In summary, Mrs. Espinosa presents s/p right DEBI-SMA bypass. A mesenteric duplex was performed which showed patent bypass without flow limiting lesions. I instructed her to continue Xarelto and statin therapy. She will follow up with repeat fasting duplex in six months.

## 2024-05-10 NOTE — HISTORY OF PRESENT ILLNESS
[FreeTextEntry1] : Mrs. Espinosa presents s/p right DEBI-SMA bypass (10/20/23), re-do bypass for occlusion (10/23/23) and hematoma evacuation (10/30/23). She has returned home. Her incision is healing well and she denies erythema, induration, fluctuance or drainage. She denies post-prandial abdominal pain or pain that was similar to the pain she experienced before bypass or when the bypass occluded. She is moving her bowels and passing flatus. She denies any nausea, vomiting or belching. She reports "gas pain." She is taking Xarelto as prescribed, as well as Lipitor.  Her daughter reports a sacral decubitus ulcer, first noted in the hospital. She is sitting on a pillow to prevent further pressure on the wound.   She has a minor fungal infection under her breasts.  [de-identified] : 12/1/23: Mrs. Espinosa returns for mesenteric surveillance duplex. She is taking Xarelto as instructed and requesting refill on the medication. She is tolerating a regular diet but complains of "gas pain." On further questioning this appears consistent with constipation and I advised her to obtain OTC stool softeners (Colace). She was evaluated by GI and prescribed Protonix as well.   12/22/23: Mrs. Espinosa presents for surveillance duplex of her right EIA-SMA bypass. She is accompanied by her daughter, who is translating. She is doing well and her abdominal pain that she has described as "gas pain" has resolved. She does at times experience some incisional pain, particularly with movement. Her constipation has resolved. She continues to take Xarelto as instructed. She is otherwise without complaints.   5/10/24:  Mrs. Espinosa presents for surveillance duplex of her right EIA-SMA bypass. She is accompanied by her daughter, who is translating. She is doing quite well and denies any post-prandial abdominal pain. Her GERD symptoms have resolved as well. She has a good appetite and is tolerating a regular diet. She denies any incisional pain. She is now retired.

## 2024-07-18 NOTE — PROVIDER CONTACT NOTE (CRITICAL VALUE NOTIFICATION) - TEST AND RESULT REPORTED:
[facility]ensive [facility]  General Surgery  History and Physical     Patient Name: Calixto Navarro Jr.  MRN: [id number]  Code Status: Full Code  Admission Date: 7/18/2024  Hospital Length of Stay: 0 days  Attending Physician: Reggie Guidry MD  Primary Care Provider: Radha Primary Doctor    Patient information was obtained from patient, past medical records, and ER records.     Consults  Subjective:     Principal Problem: SBO (small bowel obstruction)    History of Present Illness: Patient is a 65 year old male with h/o ESLD s/p liver transplant in 2001 (with 3 takebacks, currently on tacro 1mg MWF), A fib s/p Watchman procedure (not currently on anticoagulation), GERD, HTN who was transferred here from OSH with 2 days of abdominal pain. He reports diffuse abdominal pain that worsened since onset with associated nausea and vomiting. Had diarrhea earlier in the week after miralax then developed constipation, bloating, and obstipation last night. Denies fever, chills, CP, SOB, and all other symptoms.         He presented to OSH ED and was found to be hypotensive. Given 2L NS and started on abx.   WBC 10.3 > 16  Lactic acid at OSH 2.3 > 2.5. Repeat here pending  CT a/p with concerning for SBO with evidence of pneumatosis and pneumobilia.     Upon eval in the ED, he is afebrile. Levo 0.2 via peripheral line on cuff pressures with MAPs in 80s. Denies abdominal pain and nausea.   NGT with ~150cc in bedside cannister    No current facility-administered medications on file prior to encounter.     Current Outpatient Medications on File Prior to Encounter   Medication Sig    amiodarone (PACERONE) 200 MG Tab 400 mg.    amitriptyline (ELAVIL) 75 MG tablet Take by mouth.    amLODIPine (NORVASC) 5 MG tablet Take by mouth.    ELIQUIS 5 mg Tab Take by mouth.    fluticasone propionate (FLONASE) 50 mcg/actuation nasal spray by Each Nostril route.    HYDROcodone-acetaminophen (NORCO)  mg per tablet Take by mouth.     meloxicam (MOBIC) 15 MG tablet Take by mouth.    metoprolol succinate (TOPROL-XL) 25 MG 24 hr tablet Take by mouth.    pantoprazole (PROTONIX) 40 MG tablet Take by mouth.    pravastatin (PRAVACHOL) 20 MG tablet Take by mouth.    tacrolimus (PROGRAF) 1 MG Cap Take 1 mg by mouth.       Review of patient's allergies indicates:   Allergen Reactions    Gabapentin        Past Medical History:   Diagnosis Date    Afib     GERD (gastroesophageal reflux disease)     Hypertension      Past Surgical History:   Procedure Laterality Date    LIVER TRANSPLANT       Family History    None       Tobacco Use    Smoking status: Never    Smokeless tobacco: Never   Substance and Sexual Activity    Alcohol use: Not on file    Drug use: Not on file    Sexual activity: Not on file     Review of Systems   Constitutional:  Negative for chills and fever.   Respiratory:  Negative for cough and shortness of breath.    Cardiovascular:  Negative for chest pain.   Gastrointestinal:  Positive for abdominal pain, constipation, nausea and vomiting. Negative for diarrhea.   Genitourinary:  Negative for dysuria and hematuria.   Musculoskeletal:  Negative for back pain and myalgias.   Skin:  Negative for rash.   Neurological:  Negative for dizziness and headaches.   Psychiatric/Behavioral:  Negative for confusion. The patient is not nervous/anxious.    All other systems reviewed and are negative.    Objective:     Vital Signs (Most Recent):  Temp: 98.2 °F (36.8 °C) (07/18/24 1330)  Pulse: 103 (07/18/24 1330)  Resp: 20 (07/18/24 1330)  BP: 109/62 (07/18/24 1330)  SpO2: (!) 92 % (07/18/24 1330) Vital Signs (24h Range):  Temp:  [97.9 °F (36.6 °C)-98.2 °F (36.8 °C)] 98.2 °F (36.8 °C)  Pulse:  [103-111] 103  Resp:  [18-22] 20  SpO2:  [89 %-93 %] 92 %  BP: (102-127)/(62-73) 109/62     Weight: 99.3 kg (218 lb 14.7 oz)  Body mass index is 29.69 kg/m².     Physical Exam  Vitals and nursing note reviewed.   Constitutional:       General: He is not in acute  Potassium 2.8 distress.     Appearance: He is not ill-appearing or diaphoretic.      Comments: 1L O2 via NC  NGT to LIWS    HENT:      Head: Normocephalic and atraumatic.      Mouth/Throat:      Mouth: Mucous membranes are dry.      Pharynx: Oropharynx is clear.   Eyes:      Extraocular Movements: Extraocular movements intact.      Conjunctiva/sclera: Conjunctivae normal.   Cardiovascular:      Rate and Rhythm: Tachycardia present.   Pulmonary:      Effort: Pulmonary effort is normal. No respiratory distress.   Abdominal:      Palpations: Abdomen is soft.      Tenderness: There is no guarding or rebound.      Comments: Well healed surgical scar noted  Distended with some firmness but no TTP to light or deep palpation. No peritonitic signs    Musculoskeletal:         General: No deformity.   Skin:     General: Skin is warm and dry. Skin tenting      Coloration: Skin is not jaundiced.   Neurological:      Mental Status: He is alert and oriented to person, place, and time.            I have reviewed all pertinent lab results within the past 24 hours.    Significant Diagnostics:  I have reviewed all pertinent imaging results/findings within the past 24 hours.    Assessment/Plan:     * SBO (small bowel obstruction)  Patient is a 65 year old male with h/o ESLD s/p liver transplant in 2001, A fib s/p Watchman procedure (not currently on anticoagulation), GERD, HTN who was transferred here from OSH with 2 days of abdominal pain, concern for SBO. Hypotensive on arrival with concerning CT a/p, however, abdominal exam is reassuring and patient volume down on exam. Will admit to SICU for close monitoring, resuscitate with IVF and repeat labs, low threshold to take to OR if clinically worsens    - Patient seen and examined. Labs and imaging reviewed. Case discussed with staff on call, Dr. Guidry  - Admit to general surgery, SICU  - Wean levo as able. Art line   - Broad spectrum abx (zosyn)  - Trend lactate, CBC, CMP, INR  - Volume down on  exam Needs IVF resuscitation. Bolus followed by mIVF. Long discussion at bedside with patient, wife, and daughter. Discussed risks and benefits of operative management. While CT a/p does have some concerning findings, were able to review CT a/p from 2017 and pneumobilia is from from that time. His physical exam is reassuring. He is distended but no TTP throughout and volume depleted. Discussed his increased surgical risk given his immunosuppression. They are agreeable to the plan of aggressive IVF resuscitation, serial abdominal exams, and trending labs with the plan to go to the OR if he clinically worsens. They verbalized understanding   - NGT to LIWS   - KTM transplant for immunosuppression medication management in setting of liver transplant  - Home meds reviewed and reconciled. Not taking amio or eliquis. Metoprolol converted to IV   - Dvt ppx (SCDs and heparin)  - Gi ppx (PPI)  - Remainder of care per SICU  - Please contact general surgery with any questions, concerns, or clinical status changes        VTE Risk Mitigation (From admission, onward)           Ordered     heparin (porcine) injection 5,000 Units  Every 8 hours         07/18/24 1325     Place sequential compression device  Until discontinued         07/18/24 1135     IP VTE LOW RISK PATIENT  Once         07/18/24 1134                  I will follow-up with patient. Please contact us if you have any additional questions.    David Barry PA-C  General Surgery  [facility]

## 2024-11-15 ENCOUNTER — APPOINTMENT (OUTPATIENT)
Dept: VASCULAR SURGERY | Facility: CLINIC | Age: 64
End: 2024-11-15
Payer: MEDICARE

## 2024-11-15 VITALS
SYSTOLIC BLOOD PRESSURE: 131 MMHG | TEMPERATURE: 97.9 F | OXYGEN SATURATION: 99 % | HEART RATE: 60 BPM | DIASTOLIC BLOOD PRESSURE: 77 MMHG

## 2024-11-15 VITALS — BODY MASS INDEX: 29.6 KG/M2 | HEIGHT: 58 IN | WEIGHT: 141 LBS

## 2024-11-15 PROCEDURE — 93976 VASCULAR STUDY: CPT

## 2024-11-15 PROCEDURE — 99213 OFFICE O/P EST LOW 20 MIN: CPT

## 2024-11-15 RX ORDER — ATORVASTATIN CALCIUM 40 MG/1
40 TABLET, FILM COATED ORAL
Refills: 0 | Status: ACTIVE | COMMUNITY

## 2024-11-27 RX ORDER — RIVAROXABAN 20 MG/1
20 TABLET, FILM COATED ORAL
Qty: 90 | Refills: 3 | Status: ACTIVE | COMMUNITY
Start: 2024-11-27 | End: 1900-01-01

## 2025-01-17 NOTE — PROGRESS NOTE ADULT - REASON FOR ADMISSION
Patient 02 decreased to 2 LPM at this time       01/17/25 1011   Oxygen Therapy/Pulse Ox   O2 Device Nasal cannula   Helmet in Use? No   O2 Patient Assessment Complete   O2 Flow Rate (L/min) 2 L/min   SpO2 94 %   Resp 20       
r/o mesenteric ischemia

## 2025-03-10 NOTE — PHYSICAL THERAPY INITIAL EVALUATION ADULT - PLANNED THERAPY INTERVENTIONS, PT EVAL
Comments:     Last Office Visit (last PCP visit):   3/6/2025    Next Visit Date:  Future Appointments   Date Time Provider Department Center   4/9/2025 10:00 AM Hung Stewart MD [facility] DEP   9/8/2025  8:30 AM Hung Stewart MD [facility] DEP       **If hasn't been seen in over a year OR hasn't followed up according to last diabetes/ADHD visit, make appointment for patient before sending refill to provider.    Rx requested:  Requested Prescriptions     Pending Prescriptions Disp Refills    atorvastatin (LIPITOR) 20 MG tablet [Pharmacy Med Name: ATORVASTATIN 20 MG TABLET] 90 tablet 0     Sig: TAKE 1 TABLET BY MOUTH EVERY DAY    olopatadine (PATANASE) 0.6 % SOLN nassl soln [Pharmacy Med Name: OLOPATADINE 665 MCG NASAL SPRY]  1     Sig: INSTILL 2 SPRAYS INTO EACH NOSTRIL TWICE DAILY AS NEEDED FOR SEASONAL ALLERGIES                    balance training/bed mobility training/gait training/strengthening/transfer training

## 2025-05-01 ENCOUNTER — RESULT REVIEW (OUTPATIENT)
Age: 65
End: 2025-05-01

## 2025-05-01 ENCOUNTER — INPATIENT (INPATIENT)
Facility: HOSPITAL | Age: 65
LOS: 4 days | Discharge: HOME | End: 2025-05-06
Attending: INTERNAL MEDICINE | Admitting: INTERNAL MEDICINE
Payer: MEDICARE

## 2025-05-01 VITALS
OXYGEN SATURATION: 98 % | SYSTOLIC BLOOD PRESSURE: 121 MMHG | HEART RATE: 47 BPM | RESPIRATION RATE: 18 BRPM | TEMPERATURE: 98 F | DIASTOLIC BLOOD PRESSURE: 53 MMHG

## 2025-05-01 DIAGNOSIS — R00.1 BRADYCARDIA, UNSPECIFIED: ICD-10-CM

## 2025-05-01 DIAGNOSIS — Z29.9 ENCOUNTER FOR PROPHYLACTIC MEASURES, UNSPECIFIED: ICD-10-CM

## 2025-05-01 DIAGNOSIS — K55.1 CHRONIC VASCULAR DISORDERS OF INTESTINE: ICD-10-CM

## 2025-05-01 DIAGNOSIS — I10 ESSENTIAL (PRIMARY) HYPERTENSION: ICD-10-CM

## 2025-05-01 DIAGNOSIS — Z95.828 PRESENCE OF OTHER VASCULAR IMPLANTS AND GRAFTS: Chronic | ICD-10-CM

## 2025-05-01 DIAGNOSIS — E11.9 TYPE 2 DIABETES MELLITUS WITHOUT COMPLICATIONS: ICD-10-CM

## 2025-05-01 DIAGNOSIS — N18.2 CHRONIC KIDNEY DISEASE, STAGE 2 (MILD): ICD-10-CM

## 2025-05-01 LAB
A1C WITH ESTIMATED AVERAGE GLUCOSE RESULT: 7.5 % — HIGH (ref 4–5.6)
ADD ON TEST-SPECIMEN IN LAB: SIGNIFICANT CHANGE UP
ADD ON TEST-SPECIMEN IN LAB: SIGNIFICANT CHANGE UP
ALBUMIN SERPL ELPH-MCNC: 3.6 G/DL — SIGNIFICANT CHANGE UP (ref 3.3–5)
ALP SERPL-CCNC: 120 U/L — SIGNIFICANT CHANGE UP (ref 40–120)
ALT FLD-CCNC: 27 U/L — SIGNIFICANT CHANGE UP (ref 4–33)
ANION GAP SERPL CALC-SCNC: 11 MMOL/L — SIGNIFICANT CHANGE UP (ref 7–14)
ANION GAP SERPL CALC-SCNC: 14 MMOL/L — SIGNIFICANT CHANGE UP (ref 7–14)
APPEARANCE UR: CLEAR — SIGNIFICANT CHANGE UP
AST SERPL-CCNC: 39 U/L — HIGH (ref 4–32)
B BURGDOR C6 AB SER-ACNC: NEGATIVE — SIGNIFICANT CHANGE UP
B BURGDOR IGG+IGM SER-ACNC: 0.1 INDEX — SIGNIFICANT CHANGE UP (ref 0.01–0.9)
BACTERIA # UR AUTO: NEGATIVE /HPF — SIGNIFICANT CHANGE UP
BASOPHILS # BLD AUTO: 0.06 K/UL — SIGNIFICANT CHANGE UP (ref 0–0.2)
BASOPHILS NFR BLD AUTO: 1.2 % — SIGNIFICANT CHANGE UP (ref 0–2)
BILIRUB SERPL-MCNC: 0.5 MG/DL — SIGNIFICANT CHANGE UP (ref 0.2–1.2)
BILIRUB UR-MCNC: NEGATIVE — SIGNIFICANT CHANGE UP
BLOOD GAS VENOUS COMPREHENSIVE RESULT: SIGNIFICANT CHANGE UP
BUN SERPL-MCNC: 15 MG/DL — SIGNIFICANT CHANGE UP (ref 7–23)
BUN SERPL-MCNC: 17 MG/DL — SIGNIFICANT CHANGE UP (ref 7–23)
CALCIUM SERPL-MCNC: 8.2 MG/DL — LOW (ref 8.4–10.5)
CALCIUM SERPL-MCNC: 8.7 MG/DL — SIGNIFICANT CHANGE UP (ref 8.4–10.5)
CAST: 1 /LPF — SIGNIFICANT CHANGE UP (ref 0–4)
CHLORIDE SERPL-SCNC: 102 MMOL/L — SIGNIFICANT CHANGE UP (ref 98–107)
CHLORIDE SERPL-SCNC: 107 MMOL/L — SIGNIFICANT CHANGE UP (ref 98–107)
CO2 SERPL-SCNC: 17 MMOL/L — LOW (ref 22–31)
CO2 SERPL-SCNC: 19 MMOL/L — LOW (ref 22–31)
COLOR SPEC: YELLOW — SIGNIFICANT CHANGE UP
CREAT SERPL-MCNC: 1.09 MG/DL — SIGNIFICANT CHANGE UP (ref 0.5–1.3)
CREAT SERPL-MCNC: 1.15 MG/DL — SIGNIFICANT CHANGE UP (ref 0.5–1.3)
DIFF PNL FLD: NEGATIVE — SIGNIFICANT CHANGE UP
EGFR: 53 ML/MIN/1.73M2 — LOW
EGFR: 53 ML/MIN/1.73M2 — LOW
EGFR: 56 ML/MIN/1.73M2 — LOW
EGFR: 56 ML/MIN/1.73M2 — LOW
EOSINOPHIL # BLD AUTO: 0.06 K/UL — SIGNIFICANT CHANGE UP (ref 0–0.5)
EOSINOPHIL NFR BLD AUTO: 1.2 % — SIGNIFICANT CHANGE UP (ref 0–6)
ESTIMATED AVERAGE GLUCOSE: 169 — SIGNIFICANT CHANGE UP
FERRITIN SERPL-MCNC: 22 NG/ML — SIGNIFICANT CHANGE UP (ref 13–330)
FLUAV AG NPH QL: SIGNIFICANT CHANGE UP
FLUBV AG NPH QL: SIGNIFICANT CHANGE UP
GLUCOSE BLDC GLUCOMTR-MCNC: 119 MG/DL — HIGH (ref 70–99)
GLUCOSE BLDC GLUCOMTR-MCNC: 222 MG/DL — HIGH (ref 70–99)
GLUCOSE SERPL-MCNC: 131 MG/DL — HIGH (ref 70–99)
GLUCOSE SERPL-MCNC: 178 MG/DL — HIGH (ref 70–99)
GLUCOSE UR QL: NEGATIVE MG/DL — SIGNIFICANT CHANGE UP
HCT VFR BLD CALC: 33.1 % — LOW (ref 34.5–45)
HGB BLD-MCNC: 10.4 G/DL — LOW (ref 11.5–15.5)
IANC: 3.15 K/UL — SIGNIFICANT CHANGE UP (ref 1.8–7.4)
IMM GRANULOCYTES NFR BLD AUTO: 0.2 % — SIGNIFICANT CHANGE UP (ref 0–0.9)
IRON SATN MFR SERPL: 19 UG/DL — LOW (ref 30–160)
IRON SATN MFR SERPL: 6 % — LOW (ref 14–50)
KETONES UR-MCNC: NEGATIVE MG/DL — SIGNIFICANT CHANGE UP
LACTATE SERPL-SCNC: 1.4 MMOL/L — SIGNIFICANT CHANGE UP (ref 0.5–2)
LEUKOCYTE ESTERASE UR-ACNC: ABNORMAL
LIDOCAIN IGE QN: 39 U/L — SIGNIFICANT CHANGE UP (ref 7–60)
LYMPHOCYTES # BLD AUTO: 1.23 K/UL — SIGNIFICANT CHANGE UP (ref 1–3.3)
LYMPHOCYTES # BLD AUTO: 24.8 % — SIGNIFICANT CHANGE UP (ref 13–44)
MAGNESIUM SERPL-MCNC: 2.1 MG/DL — SIGNIFICANT CHANGE UP (ref 1.6–2.6)
MCHC RBC-ENTMCNC: 24.8 PG — LOW (ref 27–34)
MCHC RBC-ENTMCNC: 31.4 G/DL — LOW (ref 32–36)
MCV RBC AUTO: 78.8 FL — LOW (ref 80–100)
MONOCYTES # BLD AUTO: 0.45 K/UL — SIGNIFICANT CHANGE UP (ref 0–0.9)
MONOCYTES NFR BLD AUTO: 9.1 % — SIGNIFICANT CHANGE UP (ref 2–14)
NEUTROPHILS # BLD AUTO: 3.15 K/UL — SIGNIFICANT CHANGE UP (ref 1.8–7.4)
NEUTROPHILS NFR BLD AUTO: 63.5 % — SIGNIFICANT CHANGE UP (ref 43–77)
NITRITE UR-MCNC: NEGATIVE — SIGNIFICANT CHANGE UP
NRBC # BLD AUTO: 0 K/UL — SIGNIFICANT CHANGE UP (ref 0–0)
NRBC # FLD: 0 K/UL — SIGNIFICANT CHANGE UP (ref 0–0)
NRBC BLD AUTO-RTO: 0 /100 WBCS — SIGNIFICANT CHANGE UP (ref 0–0)
NT-PROBNP SERPL-SCNC: 982 PG/ML — HIGH
PH UR: 7 — SIGNIFICANT CHANGE UP (ref 5–8)
PLATELET # BLD AUTO: 252 K/UL — SIGNIFICANT CHANGE UP (ref 150–400)
POTASSIUM SERPL-MCNC: 4.7 MMOL/L — SIGNIFICANT CHANGE UP (ref 3.5–5.3)
POTASSIUM SERPL-MCNC: 5.7 MMOL/L — HIGH (ref 3.5–5.3)
POTASSIUM SERPL-SCNC: 4.7 MMOL/L — SIGNIFICANT CHANGE UP (ref 3.5–5.3)
POTASSIUM SERPL-SCNC: 5.7 MMOL/L — HIGH (ref 3.5–5.3)
PROT SERPL-MCNC: 7.4 G/DL — SIGNIFICANT CHANGE UP (ref 6–8.3)
PROT UR-MCNC: SIGNIFICANT CHANGE UP MG/DL
RBC # BLD: 4.2 M/UL — SIGNIFICANT CHANGE UP (ref 3.8–5.2)
RBC # FLD: 14.9 % — HIGH (ref 10.3–14.5)
RBC CASTS # UR COMP ASSIST: 0 /HPF — SIGNIFICANT CHANGE UP (ref 0–4)
RSV RNA NPH QL NAA+NON-PROBE: SIGNIFICANT CHANGE UP
SARS-COV-2 RNA SPEC QL NAA+PROBE: SIGNIFICANT CHANGE UP
SODIUM SERPL-SCNC: 133 MMOL/L — LOW (ref 135–145)
SODIUM SERPL-SCNC: 137 MMOL/L — SIGNIFICANT CHANGE UP (ref 135–145)
SOURCE RESPIRATORY: SIGNIFICANT CHANGE UP
SP GR SPEC: 1.01 — SIGNIFICANT CHANGE UP (ref 1–1.03)
SQUAMOUS # UR AUTO: 1 /HPF — SIGNIFICANT CHANGE UP (ref 0–5)
TIBC SERPL-MCNC: 341 UG/DL — SIGNIFICANT CHANGE UP (ref 220–430)
TROPONIN T, HIGH SENSITIVITY RESULT: 13 NG/L — SIGNIFICANT CHANGE UP
TSH SERPL-MCNC: 3.58 UIU/ML — SIGNIFICANT CHANGE UP (ref 0.27–4.2)
UIBC SERPL-MCNC: 322 UG/DL — SIGNIFICANT CHANGE UP (ref 110–370)
UROBILINOGEN FLD QL: 0.2 MG/DL — SIGNIFICANT CHANGE UP (ref 0.2–1)
WBC # BLD: 4.96 K/UL — SIGNIFICANT CHANGE UP (ref 3.8–10.5)
WBC # FLD AUTO: 4.96 K/UL — SIGNIFICANT CHANGE UP (ref 3.8–10.5)
WBC UR QL: 1 /HPF — SIGNIFICANT CHANGE UP (ref 0–5)

## 2025-05-01 PROCEDURE — 93454 CORONARY ARTERY ANGIO S&I: CPT | Mod: 26

## 2025-05-01 PROCEDURE — 99152 MOD SED SAME PHYS/QHP 5/>YRS: CPT

## 2025-05-01 PROCEDURE — 99223 1ST HOSP IP/OBS HIGH 75: CPT | Mod: GC

## 2025-05-01 PROCEDURE — 99233 SBSQ HOSP IP/OBS HIGH 50: CPT

## 2025-05-01 PROCEDURE — 71045 X-RAY EXAM CHEST 1 VIEW: CPT | Mod: 26

## 2025-05-01 PROCEDURE — 99285 EMERGENCY DEPT VISIT HI MDM: CPT | Mod: 25

## 2025-05-01 RX ORDER — SODIUM CHLORIDE 9 G/1000ML
1000 INJECTION, SOLUTION INTRAVENOUS
Refills: 0 | Status: DISCONTINUED | OUTPATIENT
Start: 2025-05-01 | End: 2025-05-06

## 2025-05-01 RX ORDER — LISINOPRIL 5 MG/1
1 TABLET ORAL
Refills: 0 | DISCHARGE

## 2025-05-01 RX ORDER — ATORVASTATIN CALCIUM 80 MG/1
40 TABLET, FILM COATED ORAL AT BEDTIME
Refills: 0 | Status: DISCONTINUED | OUTPATIENT
Start: 2025-05-01 | End: 2025-05-06

## 2025-05-01 RX ORDER — AMLODIPINE BESYLATE 10 MG/1
10 TABLET ORAL DAILY
Refills: 0 | Status: DISCONTINUED | OUTPATIENT
Start: 2025-05-01 | End: 2025-05-06

## 2025-05-01 RX ORDER — DEXTROSE 50 % IN WATER 50 %
25 SYRINGE (ML) INTRAVENOUS ONCE
Refills: 0 | Status: DISCONTINUED | OUTPATIENT
Start: 2025-05-01 | End: 2025-05-06

## 2025-05-01 RX ORDER — ONDANSETRON HCL/PF 4 MG/2 ML
4 VIAL (ML) INJECTION EVERY 8 HOURS
Refills: 0 | Status: DISCONTINUED | OUTPATIENT
Start: 2025-05-01 | End: 2025-05-06

## 2025-05-01 RX ORDER — DEXTROSE 50 % IN WATER 50 %
12.5 SYRINGE (ML) INTRAVENOUS ONCE
Refills: 0 | Status: DISCONTINUED | OUTPATIENT
Start: 2025-05-01 | End: 2025-05-06

## 2025-05-01 RX ORDER — ATORVASTATIN CALCIUM 80 MG/1
1 TABLET, FILM COATED ORAL
Refills: 0 | DISCHARGE

## 2025-05-01 RX ORDER — RIVAROXABAN 10 MG/1
1 TABLET, FILM COATED ORAL
Refills: 0 | DISCHARGE

## 2025-05-01 RX ORDER — LISINOPRIL 5 MG/1
40 TABLET ORAL DAILY
Refills: 0 | Status: DISCONTINUED | OUTPATIENT
Start: 2025-05-01 | End: 2025-05-06

## 2025-05-01 RX ORDER — GLUCAGON 3 MG/1
1 POWDER NASAL ONCE
Refills: 0 | Status: DISCONTINUED | OUTPATIENT
Start: 2025-05-01 | End: 2025-05-06

## 2025-05-01 RX ORDER — ACETAMINOPHEN 500 MG/5ML
650 LIQUID (ML) ORAL EVERY 6 HOURS
Refills: 0 | Status: DISCONTINUED | OUTPATIENT
Start: 2025-05-01 | End: 2025-05-06

## 2025-05-01 RX ORDER — MELATONIN 5 MG
3 TABLET ORAL AT BEDTIME
Refills: 0 | Status: DISCONTINUED | OUTPATIENT
Start: 2025-05-01 | End: 2025-05-06

## 2025-05-01 RX ORDER — RESMETIROM 80 MG/1
1 TABLET, COATED ORAL
Refills: 0 | DISCHARGE

## 2025-05-01 RX ORDER — INSULIN LISPRO 100 U/ML
INJECTION, SOLUTION INTRAVENOUS; SUBCUTANEOUS AT BEDTIME
Refills: 0 | Status: DISCONTINUED | OUTPATIENT
Start: 2025-05-01 | End: 2025-05-06

## 2025-05-01 RX ORDER — DEXTROSE 50 % IN WATER 50 %
15 SYRINGE (ML) INTRAVENOUS ONCE
Refills: 0 | Status: DISCONTINUED | OUTPATIENT
Start: 2025-05-01 | End: 2025-05-06

## 2025-05-01 RX ORDER — INSULIN LISPRO 100 U/ML
INJECTION, SOLUTION INTRAVENOUS; SUBCUTANEOUS
Refills: 0 | Status: DISCONTINUED | OUTPATIENT
Start: 2025-05-01 | End: 2025-05-06

## 2025-05-01 RX ADMIN — ATORVASTATIN CALCIUM 40 MILLIGRAM(S): 80 TABLET, FILM COATED ORAL at 21:26

## 2025-05-01 RX ADMIN — Medication 666.67 MILLILITER(S): at 07:35

## 2025-05-01 NOTE — ED ADULT TRIAGE NOTE - GLASGOW COMA SCALE: EYE OPENING, MLM
(E4) spontaneous Show Applicator Variable?: Yes Duration Of Freeze Thaw-Cycle (Seconds): 0 Detail Level: Simple Render Post-Care Instructions In Note?: no Number Of Freeze-Thaw Cycles: 2 freeze-thaw cycles Post-Care Instructions: I reviewed with the patient in detail post-care instructions. Patient is to wear sunprotection, and avoid picking at any of the treated lesions. Pt may apply Vaseline to crusted or scabbing areas. Consent: The patient's consent was obtained including but not limited to risks of crusting, scabbing, blistering, scarring, darker or lighter pigmentary change, recurrence, incomplete removal and infection.

## 2025-05-01 NOTE — CONSULT NOTE ADULT - SUBJECTIVE AND OBJECTIVE BOX
Patient is a 64y old  Female who presents with a chief complaint of         HPI:  Patient request her son to translate at bedside, declined      64 year old Estonian speaking female with a PMH of R DEBI/SMA stenosis s/p vascular bypass with stent on 10/20/2023-maintained on Xarelto, HTN, HLD, fatty liver who presented to ED with c/o intermittent "dizziness" started last night.  She was found to be in sinus marci with arrythmia with HR in low 40's mostly.  Occasionally marci down to 35-39 bpm transiently.  Patient endorses "felt nauseous " associated with dizziness last night.  Presently denies CP/SOB or syncope or dizziness.  She has been on bowel prep for colonoscopy scheduled this Saturday.  Recently underwent a stress test for pre- colonoscopy clearance with "normal result" per son.  Patient appears hemodynamically stable.    Labs reviewed: TSH 3.5, K 5.8 mod hemolyzed, Top T 13; Pro-  TTE done 10/2023 showed normal LVEF and moderately LVH.                  PAST MEDICAL & SURGICAL HISTORY:  Positive H. pylori test  Fatty liver  hx DEBI/SMA stenosis   HTN  HLD      Status post R DEBI/SMA vascular bypass with stent on 10/20/2023          MEDICATIONS  (STANDING): Rezdiffra 60mg daily; Norvasc 10mg daily; Lipitor 40mg daily; Lisinopril 40mg daily; Xarelto 20 mg daily    MEDICATIONS  (PRN):    Allergies    No Known Allergies    Intolerances      FAMILY HISTORY:      SOCIAL HISTORY:  Denies smoking; no   Alcohol  or  Drug abuse               REVIEW OF SYSTEMS:    CONSTITUTIONAL: No fever, weight loss, chills, shakes, or fatigue  EYES: No eye pain, visual disturbances, or discharge  ENMT:  No difficulty hearing, tinnitus, vertigo; No sinus or throat pain  NECK: No pain or stiffness  RESPIRATORY: No cough, wheezing, hemoptysis, or shortness of breath  CARDIOVASCULAR: No chest pain, dyspnea, palpitations,  syncope, paroxysmal nocturnal dyspnea, orthopnea, or arm or leg swelling +dizziness  GASTROINTESTINAL: No abdominal  or epigastric pain, vomiting, hematemesis, diarrhea, constipation, melena or bright red blood. +nausea  GENITOURINARY: No dysuria, nocturia, hematuria, or urinary incontinence  NEUROLOGICAL: No headaches, memory loss, slurred speech, limb weakness, loss of strength, numbness, or tremors  SKIN: No itching, burning, rashes, or lesions   LYMPH NODES: No enlarged glands  ENDOCRINE: No heat or cold intolerance, or hair loss  MUSCULOSKELETAL: No joint pain or swelling, muscle, back, or extremity pain  PSYCHIATRIC: No depression, anxiety, or difficulty sleeping  HEME/LYMPH: No easy bruising or bleeding gums  ALLERY AND IMMUNOLOGIC: No hives or rash.      Vital Signs Last 24 Hrs  T(C): 36.7 (01 May 2025 08:24), Max: 36.7 (01 May 2025 06:12)  T(F): 98.1 (01 May 2025 08:24), Max: 98.1 (01 May 2025 08:24)  HR: 44 (01 May 2025 08:24) (44 - 49)  BP: 96/54 (01 May 2025 08:24) (96/54 - 128/43)  BP(mean): --  RR: 18 (01 May 2025 08:24) (16 - 21)  SpO2: 99% (01 May 2025 08:24) (98% - 100%)    Parameters below as of 01 May 2025 08:24  Patient On (Oxygen Delivery Method): room air        PHYSICAL EXAM:    GENERAL: In no apparent distress  HEAD:  Atraumatic, Normocephalic  NECK: Supple . No JVD or carotid bruit or thyroidmegaly.  Carotid pulse is 2+ bilaterally.  PULMONARY: Clear to auscultation and perfusion.  No rales, wheezing, or rhonchi bilaterally.  HEART: S1S2 marci; No murmurs, rubs, or gallops.  ABDOMEN: Soft, Nontender, Nondistended; Bowel sounds present  EXTREMITIES: No clubbing, cyanosis, or edema  NEUROLOGICAL: Alert oriented to person, place and time.  Speech clear.  Skin: Dry intact, no rashes or lesions.          INTERPRETATION OF TELEMETRY:  Sinus marci with arrhythmia  in low 40's to 50's with occasional marci to 35-39 bpm transiently ?junctional beats     ECG: no available EKG seen    EKG 10/30/2023 Sinus tachy with PVC's at 102 bpm, QRS 82ms,ST/T abnormality        LABS:                        10.4   4.96  )-----------( 252      ( 01 May 2025 06:45 )             33.1     05-01    133[L]  |  102  |  17  ----------------------------<  178[H]  5.7[H]   |  17[L]  |  1.15    Ca    8.7      01 May 2025 06:45  Mg     2.10     05-01    TPro  7.4  /  Alb  3.6  /  TBili  0.5  /  DBili  x   /  AST  39[H]  /  ALT  27  /  AlkPhos  120  05-01  Thyroid Stimulating Hormone, Serum: 3.58 uIU/mL (05.01.25 @ 07:30)              Urinalysis Basic - ( 01 May 2025 06:45 )    Color: x / Appearance: x / SG: x / pH: x  Gluc: 178 mg/dL / Ketone: x  / Bili: x / Urobili: x   Blood: x / Protein: x / Nitrite: x   Leuk Esterase: x / RBC: x / WBC x   Sq Epi: x / Non Sq Epi: x / Bacteria: x        BNP  RADIOLOGY & ADDITIONAL STUDIES:  PREVIOUS DIAGNOSTIC TESTING:      ECHO FINDINGS:CONCLUSIONS:      1. Left ventricular cavity is normal. Left ventricular systolic function is normal with an ejection fraction of 71 % by Peñaloza's method of disks.   2. Normal left ventricular diastolic function.   3. Normal right ventricular cavity size and systolic function.   4. Moderate left ventricular hypertrophy.   5. Trileaflet aortic valve with normal systolic excursion. calcification of the aortic valve leaflets.    ________________________________________________________________________________________        STRESS FINDINGS:      CATHETERIZATION FINDINGS:         Patient is a 64y old  Female who presents with a chief complaint of         HPI:  Patient request her son to translate at bedside, declined      64 year old Croatian speaking female with a PMH of R DEBI/SMA stenosis s/p vascular bypass with stent on 10/20/2023-maintained on Xarelto, HTN, HLD, fatty liver who presented to ED with c/o intermittent "dizziness" started last night.  She was found to be in sinus marci with arrythmia with HR in low 40's mostly.  Occasionally marci down to 35-39 bpm transiently.  Patient endorses "felt nauseous " associated with dizziness last night.  Presently denies CP/SOB or syncope or dizziness.  She has been on bowel prep for colonoscopy scheduled this Saturday.  Recently underwent a stress test for pre- colonoscopy clearance with "normal result" per son.  Patient appears hemodynamically stable.    Labs reviewed: TSH 3.5, K 5.8 mod hemolyzed, Top T 13; Pro-  TTE done 10/2023 showed normal LVEF and moderately LVH.                  PAST MEDICAL & SURGICAL HISTORY:  Positive H. pylori test  Fatty liver  hx DEBI/SMA stenosis   HTN  HLD      Status post R DEBI/SMA vascular bypass with stent on 10/20/2023          MEDICATIONS  (STANDING): Rezdiffra 60mg daily; Norvasc 10mg daily; Lipitor 40mg daily; Lisinopril 40mg daily; Xarelto 20 mg daily    MEDICATIONS  (PRN):    Allergies    No Known Allergies    Intolerances      FAMILY HISTORY:      SOCIAL HISTORY:  Denies smoking; no   Alcohol  or  Drug abuse               REVIEW OF SYSTEMS:    CONSTITUTIONAL: No fever, weight loss, chills, shakes, or fatigue  EYES: No eye pain, visual disturbances, or discharge  ENMT:  No difficulty hearing, tinnitus, vertigo; No sinus or throat pain  NECK: No pain or stiffness  RESPIRATORY: No cough, wheezing, hemoptysis, or shortness of breath  CARDIOVASCULAR: No chest pain, dyspnea, palpitations,  syncope, paroxysmal nocturnal dyspnea, orthopnea, or arm or leg swelling +dizziness  GASTROINTESTINAL: No abdominal  or epigastric pain, vomiting, hematemesis, diarrhea, constipation, melena or bright red blood. +nausea  GENITOURINARY: No dysuria, nocturia, hematuria, or urinary incontinence  NEUROLOGICAL: No headaches, memory loss, slurred speech, limb weakness, loss of strength, numbness, or tremors  SKIN: No itching, burning, rashes, or lesions   LYMPH NODES: No enlarged glands  ENDOCRINE: No heat or cold intolerance, or hair loss  MUSCULOSKELETAL: No joint pain or swelling, muscle, back, or extremity pain  PSYCHIATRIC: No depression, anxiety, or difficulty sleeping  HEME/LYMPH: No easy bruising or bleeding gums  ALLERY AND IMMUNOLOGIC: No hives or rash.      Vital Signs Last 24 Hrs  T(C): 36.7 (01 May 2025 08:24), Max: 36.7 (01 May 2025 06:12)  T(F): 98.1 (01 May 2025 08:24), Max: 98.1 (01 May 2025 08:24)  HR: 44 (01 May 2025 08:24) (44 - 49)  BP: 96/54 (01 May 2025 08:24) (96/54 - 128/43)  BP(mean): --  RR: 18 (01 May 2025 08:24) (16 - 21)  SpO2: 99% (01 May 2025 08:24) (98% - 100%)    Parameters below as of 01 May 2025 08:24  Patient On (Oxygen Delivery Method): room air        PHYSICAL EXAM:    GENERAL: In no apparent distress  HEAD:  Atraumatic, Normocephalic  NECK: Supple . No JVD or carotid bruit or thyroidmegaly.  Carotid pulse is 2+ bilaterally.  PULMONARY: Clear to auscultation and perfusion.  No rales, wheezing, or rhonchi bilaterally.  HEART: S1S2 marci; No murmurs, rubs, or gallops.  ABDOMEN: Soft, Nontender, Nondistended; Bowel sounds present  EXTREMITIES: No clubbing, cyanosis, or edema  NEUROLOGICAL: Alert oriented to person, place and time.  Speech clear.  Skin: Dry intact, no rashes or lesions.          INTERPRETATION OF TELEMETRY:  Sinus marci with arrhythmia  in low 40's to 50's with occasional marci to 35-39 bpm transiently ?junctional beats     ECG: no available EKG seen    EKG 10/30/2023 Sinus tachy with PVC's at 102 bpm, QRS 82ms,ST/T abnormality        LABS:                        10.4   4.96  )-----------( 252      ( 01 May 2025 06:45 )             33.1     05-01    133[L]  |  102  |  17  ----------------------------<  178[H]  5.7[H]   |  17[L]  |  1.15    Ca    8.7      01 May 2025 06:45  Mg     2.10     05-01    TPro  7.4  /  Alb  3.6  /  TBili  0.5  /  DBili  x   /  AST  39[H]  /  ALT  27  /  AlkPhos  120  05-01  Thyroid Stimulating Hormone, Serum: 3.58 uIU/mL (05.01.25 @ 07:30)              Urinalysis Basic - ( 01 May 2025 06:45 )    Color: x / Appearance: x / SG: x / pH: x  Gluc: 178 mg/dL / Ketone: x  / Bili: x / Urobili: x   Blood: x / Protein: x / Nitrite: x   Leuk Esterase: x / RBC: x / WBC x   Sq Epi: x / Non Sq Epi: x / Bacteria: x        BNP  RADIOLOGY & ADDITIONAL STUDIES:  CONCLUSIONS:      1. Left ventricular cavity is normal in size. Left ventricular wall thickness is normal. Left ventricular systolic function is normal with an ejection fraction of 69 % by 3D. There are no regional wall motion abnormalities seen.   2. Normal right ventricular cavity size and probably normal right ventricular systolic function. Tricuspid annular plane systolic excursion (TAPSE) is 1.8 cm (normal >=1.7 cm).   3. Structurally normal mitral valve with normal leaflet excursion. There is calcification of the mitral valve annulus. There is trace mitral regurgitation.   4. The left atrium is moderately dilated with an indexed volume of 43.88 ml/m².   5. Structurally normal tricuspid valve with normal leaflet excursion. There is mild tricuspid regurgitation. Estimated pulmonary artery systolic pressure is 42 mmHg, consistent with mild pulmonary hypertension.    PREVIOUS DIAGNOSTIC TESTING:      ECHO FINDINGS:CONCLUSIONS:      1. Left ventricular cavity is normal. Left ventricular systolic function is normal with an ejection fraction of 71 % by Peñaloza's method of disks.   2. Normal left ventricular diastolic function.   3. Normal right ventricular cavity size and systolic function.   4. Moderate left ventricular hypertrophy.   5. Trileaflet aortic valve with normal systolic excursion. calcification of the aortic valve leaflets.    ________________________________________________________________________________________        STRESS FINDINGS:      CATHETERIZATION FINDINGS:

## 2025-05-01 NOTE — H&P ADULT - ATTENDING COMMENTS
PMH of R DEBI/SMA stenosis s/p vascular bypass with stent on 10/20/2023-maintained on Xarelto, HTN, HLD, fatty liver presented with dizziness found to have symptomatic bradycardia      #Symptomatic Bradycardia  patient w/ hx of ischemic disease also being worked up for gammopathy outpatient  - f/u EP recs  - f/u Cardiac MR to assess for infiltrative cardiac disease   - f/u Left Heart Cath -> Loop recorder afterwards

## 2025-05-01 NOTE — ED ADULT NURSE NOTE - NSFALLUNIVINTERV_ED_ALL_ED
Bed/Stretcher in lowest position, wheels locked, appropriate side rails in place/Call bell, personal items and telephone in reach/Instruct patient to call for assistance before getting out of bed/chair/stretcher/Non-slip footwear applied when patient is off stretcher/[place] to call system/Physically safe environment - no spills, clutter or unnecessary equipment/Purposeful proactive rounding/Room/bathroom lighting operational, light cord in reach

## 2025-05-01 NOTE — H&P ADULT - HISTORY OF PRESENT ILLNESS
63 y/o Tamazight-speaking female CKD, R. SMA stenosis (on xarelto) presenting with intermittent dizziness, palpitations, SOB for 1 day. Patient first noticed dizziness last evening. Denies any prior similar episodes. Endorses numbness/tingling of both hands. Denies fevers, chest pain, syncope, pre-syncope, recent travel. Patient denies hx. cardiac disease. Recently saw cardiologist for pre-op clearance for colonoscopy. TTE and EKG in March 2025 showed EF 60-65% and normal sinus rhythm, respectively. Cardiac perfusion test 10/2024 was also normal on outpatient workup. Patient 's daughter at [facility] pt. currently getting worked up for gammopathy.  63 y/o Nepali-speaking female CKD, R. SMA stenosis (on xarelto) presenting with intermittent dizziness, palpitations, SOB for 1 day. Patient first noticed dizziness last evening. Denies any prior similar episodes. Endorses numbness/tingling of both hands. Denies fevers, chest pain, syncope, pre-syncope, recent travel. Patient denies hx. cardiac disease. Recently saw cardiologist for pre-op clearance for colonoscopy. TTE and EKG in March 2025 showed EF 60-65% and normal sinus rhythm, respectively. Cardiac perfusion test 10/2024 was also normal on outpatient workup. Patient 's daughter at [facility] pt. currently getting worked up for gammopathy.     In ED, patient with HR fluctuating between 30s and 50s. Otherwise hemodynamically   63 y/o Greek-speaking female CKD, R. SMA stenosis s/p bypass 2023 (on xarelto) presenting with intermittent dizziness, palpitations, SOB for 1 day. Patient first noticed dizziness last evening. Denies any prior similar episodes. Endorses numbness/tingling of both hands. Denies fevers, chest pain, syncope, pre-syncope, recent travel. Patient denies hx. cardiac disease. Recently saw cardiologist for pre-op clearance for colonoscopy. TTE and EKG in March 2025 showed EF 60-65% and normal sinus rhythm, respectively. Cardiac perfusion test 10/2024 was also normal on outpatient workup, EF 74%, no ischemic perfusion defects. Patient 's daughter at [facility] states pt. currently getting worked up for gammopathy.     In ED, patient with HR fluctuating between 30s and 50s. Otherwise hemodynamically stable. Troponin wnl, EKG showed junctional bradycardia. EP consulted. 63 y/o Yoruba-speaking female CKD, R. SMA stenosis s/p bypass 2023 (on xarelto) presenting with intermittent dizziness, palpitations, SOB for 1 day. Patient first noticed dizziness last evening. Denies any prior similar episodes. Endorses numbness/tingling of both hands. Denies fevers, chest pain, syncope, pre-syncope, recent travel. Patient denies hx. cardiac disease. Recently saw cardiologist at [place] for pre-op clearance for colonoscopy. TTE and EKG in March 2025 showed EF 60-65% and normal sinus rhythm, respectively. Cardiac perfusion test 10/2024 was also normal on outpatient workup, EF 74%, no ischemic perfusion defects. Patient 's daughter at [facility] states pt. currently getting worked up for gammopathy.     In ED, patient with HR fluctuating between 30s and 50s. Otherwise hemodynamically stable. Troponin wnl, EKG showed junctional bradycardia. EP consulted.

## 2025-05-01 NOTE — H&P ADULT - ASSESSMENT
63 y/o Omani-speaking female CKD, R. SMA stenosis s/p bypass 2023 (on xarelto) presenting with intermittent dizziness, palpitations, SOB for 1 day. Found to have bradycardia with HR ranging from 30s to 50s. C/f sick sinus vs, ischemic cardiomyopathy vs amyloidopathy.

## 2025-05-01 NOTE — H&P ADULT - NSHPLABSRESULTS_GEN_ALL_CORE
LABS:                         10.4   4.96  )-----------( 252      ( 01 May 2025 06:45 )             33.1     05-01    137  |  107  |  15  ----------------------------<  131[H]  4.7   |  19[L]  |  1.09    Ca    8.2[L]      01 May 2025 09:30  Mg     2.10     05-01    TPro  7.4  /  Alb  3.6  /  TBili  0.5  /  DBili  x   /  AST  39[H]  /  ALT  27  /  AlkPhos  120  05-01      Urinalysis Basic - ( 01 May 2025 09:30 )    Color: x / Appearance: x / SG: x / pH: x  Gluc: 131 mg/dL / Ketone: x  / Bili: x / Urobili: x   Blood: x / Protein: x / Nitrite: x   Leuk Esterase: x / RBC: x / WBC x   Sq Epi: x / Non Sq Epi: x / Bacteria: x            Lactate, Blood: 1.4 mmol/L (05-01 @ 11:18)      RADIOLOGY, EKG & ADDITIONAL TESTS:

## 2025-05-01 NOTE — CONSULT NOTE ADULT - ASSESSMENT
64 year old Azeri speaking female with a PMH of R DEBI/SMA stenosis s/p vascular bypass with stent on 10/20/2023-maintained on Xarelto, HTN, HLD, fatty liver who presented to ED with c/o intermittent "dizziness" started last night.  She was found to be in sinus marci with arrythmia with HR in low 40's mostly.  Occasionally marci down to 35-39 bpm transiently.  Patient endorses "felt nauseous " associated with dizziness last night.  Presently denies CP/SOB or syncope or dizziness.  She has been on bowel prep for colonoscopy scheduled this Saturday.  Recently underwent a stress test for pre- colonoscopy clearance with "normal result" per son.  Patient appears hemodynamically stable.    Labs reviewed: TSH 3.5, K 5.8 mod hemolyzed, Top T 13; Pro-  TTE done 10/2023 showed normal LVEF and moderately LVH.      symptomatic sinus bradycardia with arrhythmia -etiology unclear suspect high vagal tone + nausea   -Repeat K, keep K >4 and Mg>1.8  -Obtain EKG if not done, closely monitor for symptomatic marci <40, consider administer Atropine 1mg    -check lyme titer, serum titer for chagas to rule out exposure to T. Cruzi infection   -Avoid AV gabino agents  -Will ambulate patient to monitor for chronotropic response   -TTE   -?benefit from a left heart catheterization to rule out obstructive CAD given her significant atherosclerotic artery diseases   -Will follow up for pacing indication after rule out possible reversible causes of marci   64 year old Hungarian speaking female with a PMH of R DEBI/SMA stenosis s/p vascular bypass with stent on 10/20/2023-maintained on Xarelto, HTN, HLD, fatty liver who presented to ED with c/o intermittent "dizziness" started last night.  She was found to be in sinus marci with arrythmia with HR in low 40's mostly.  Occasionally marci down to 35-39 bpm transiently.  Patient endorses "felt nauseous " associated with dizziness last night.  Presently denies CP/SOB or syncope or dizziness.  She has been on bowel prep for colonoscopy scheduled this Saturday.  Recently underwent a stress test for pre- colonoscopy clearance with "normal result" per son.  Patient appears hemodynamically stable.    Labs reviewed: TSH 3.5, K 5.8 mod hemolyzed, Top T 13; Pro-  TTE done 10/2023 showed normal LVEF and moderately LVH.      symptomatic sinus bradycardia with arrhythmia -etiology unclear suspect high vagal tone + nausea   -Repeat K, keep K >4 and Mg>1.8  -Obtain EKG if not done, closely monitor for symptomatic marci <40, consider administer Atropine 1mg    -check lyme titer, serum titer for chagas to rule out exposure to T. Cruzi infection   -Avoid AV gabino agents  -HR went up to  bpm with bedside ambulation demonstrates chronotropic competence   -TTE done today showed normal LVEF, mod dilated LA  -?benefit from a left heart catheterization to rule out obstructive CAD given her significant atherosclerotic artery diseases   -No acute pacing at present time, will follow up

## 2025-05-01 NOTE — PATIENT PROFILE ADULT - FALL HARM RISK - HARM RISK INTERVENTIONS
Assistance with ambulation/Assistance OOB with selected safe patient handling equipment/Communicate Risk of Fall with Harm to all staff/Discuss with provider need for PT consult/Monitor gait and stability/Provide patient with walking aids - walker, cane, crutches/Reinforce activity limits and safety measures with patient and family/Sit up slowly, dangle for a short time, stand at bedside before walking/Tailored Fall Risk Interventions/Visual Cue: Yellow wristband and red socks/Bed in lowest position, wheels locked, appropriate side rails in place/Call bell, personal items and telephone in reach/Instruct patient to call for assistance before getting out of bed or chair/Non-slip footwear when patient is out of bed/[place] to call system/Physically safe environment - no spills, clutter or unnecessary equipment/Purposeful Proactive Rounding/Room/bathroom lighting operational, light cord in reach

## 2025-05-01 NOTE — H&P ADULT - PROBLEM SELECTOR PLAN 3
- c/w home amlodipine and lisinopril #Hx. R. CI/SMA stenosis, s/p bypass in 2023. Patient on home Xarelto    Plan:   - Will hold xarelto for now pending possible cath #Hx. R. CI/SMA stenosis, s/p bypass in 2023. Patient on home Xarelto    Plan:   - Will hold xarelto for now pending possible cath  - start hep. gtt

## 2025-05-01 NOTE — ED PROVIDER NOTE - PROGRESS NOTE DETAILS
EM PGY-2/Tyrell DO: Patient endorsed to me at shift change.  History of HTN on amlodipine, mesenteric ischemia on Xarelto, presenting for lightheadedness, SOB, nausea, palpitations X 1 day.  On arrival HR 30s to 50s however VSS otherwise.  EKG shows junctional bradycardia with no ischemic changes.  Last echo on file reviewed by me, 10/2023 shows 71% EF with moderate LVH.  Previous team consulted EP, awaiting recommendations.  Upon my arrival TSH, fluvid, IVF. Awaiting results and recommendations from consultants. Reeval to dispo although anticipate admission. EM PGY-2/Tyrell DO: CMP w/ hemolyzed K. No evidence of peaked t-waves on EKG. Other findings c/w dehydration including lactate 2.1 which should improve w/ IVF as mentioned above. Trop 13. pro-bnp 982 (no prior value for comparison). Labs and imaging thus far nonspecific in nature regarding presentation. Awaiting repeat BMP for potassium will defer hyperK tx at this time. Awaiting EP recs for admission. EM PGY-2/Tyrell DO: Pt endorsed to Dr. Brice hospitalist who accepted tele admission. Chagas and lyme labs w/ TTE ordered per EP recs, to be followed up by primary admitting team. EM PGY-2/Tyrell DO: Pt endorsed to Dr. Brice hospitalist who accepted tele admission. Chagas and lyme labs w/ TTE ordered per EP recs, to be followed up by primary admitting team

## 2025-05-01 NOTE — H&P ADULT - PROBLEM SELECTOR PLAN 1
Patient with symptomatic bradycardia, dizziness, SOB. Patient w/o prior hx cardiac disease, recent negative cardiac workup, including TTE and EKG in March 2025, cardiac perfusion test October 2024. Given patient hx. of atherosclerotic dx. initial concern for ischemic myopathy, however EKG w/o ST changes, T-wave changes, BBB and troponins wnl. Can also consider sick sinus syndrome vs. amyloid. Patient getting worked up for gammopathy outpatient, hx. CKD.     -TTE 5/1  -possible  left heart catheterization to rule out obstructive CAD given her significant atherosclerotic artery diseases   -Will follow up for pacing indication after rule out possible reversible causes of marci  f/u serum electrophoresis, immunofixation Patient with symptomatic bradycardia, dizziness, SOB. Patient w/o prior hx cardiac disease, recent negative cardiac workup, including TTE and EKG in March 2025, cardiac perfusion test October 2024. Given patient hx. of atherosclerotic dx. initial concern for ischemic myopathy, however EKG w/o ST changes, T-wave changes, BBB and troponins wnl. Can also consider sick sinus syndrome vs. amyloid. Patient getting worked up for gammopathy outpatient, hx. CKD.   - Troponin 13 (wnl), BNP elevated 982  -TTE 5/1 shows EF 69%, NWMA. However does not comment on diastolic function    Plan  - ?possible left heart catheterization to rule out obstructive CAD given her significant atherosclerotic artery diseases   -Will follow up for pacing indication after rule out possible reversible causes of marci  - f/u serum electrophoresis, immunofixation to evaluate for gammopathy Patient with symptomatic bradycardia, dizziness, SOB. Patient w/o prior hx cardiac disease, recent negative cardiac workup, including TTE and EKG in March 2025, cardiac perfusion test October 2024. Given patient hx. of atherosclerotic dx. initial concern for ischemic myopathy, however EKG w/o ST changes, T-wave changes, BBB and troponins wnl. Can also consider sick sinus syndrome vs. amyloid. Patient getting worked up for gammopathy outpatient, hx. CKD.   - Troponin 13 (wnl), BNP elevated 982  - TSH 3.58    Plan  -TTE 5/1 --> EF 69%, NWMA. However does not comment on diastolic function  - EP c/s, appreciate recs  - ?possible left heart catheterization to rule out obstructive CAD given her significant atherosclerotic artery diseases   - EP to follow up for pacing indication after rule out possible reversible causes of marci  - f/u lyme serology, t.cruzi  - f/u serum electrophoresis, immunofixation to evaluate for gammopathy

## 2025-05-01 NOTE — CONSULT NOTE ADULT - NS ATTEND AMEND GEN_ALL_CORE FT
sinus bradycardia with occasional junctional marci, good chronotropic competence, consider long term monitoring wih ILR to assess symptoms of sinus node dysfunction, will follow.

## 2025-05-01 NOTE — H&P ADULT - TIME BILLING
Time-based billing (NON-critical care).     more than 50% of the visit was spent counseling and / or coordinating care by the attending physician.  The necessity of the time spent during the encounter on this date of service was due to:     review of laboratory data, radiology results, consultants' recommendations, documentation in [place], discussion with patient/ACP and interdisciplinary staff (such as , social workers, etc). Interventions were performed as documented above.

## 2025-05-01 NOTE — H&P ADULT - PROBLEM SELECTOR PLAN 5
A1c 7.5. Patient was previously on metformin but was dc'ed because DM well controlled as per patient    Plan  - TESS  - CC diet

## 2025-05-01 NOTE — H&P ADULT - PROBLEM SELECTOR PLAN 6
DVT PPx: hep. gtt  E: replete K<4, Mg<2  Diet: DASH/CC   PT/OT: eval  Code Status: Full  Dispo: pending medical improvement

## 2025-05-01 NOTE — H&P ADULT - PROBLEM SELECTOR PLAN 2
- f/u UA #Hx. CKD, stage 2. Not on hemodialysis. Etiology unknown. Patient's know risk factors are HTN, T2DM. As mentioned patient also being worked up for gammopathy    Plan  - f/u UA  - f/u serum electrophoresis, immunofixation

## 2025-05-01 NOTE — ED PROVIDER NOTE - CLINICAL SUMMARY MEDICAL DECISION MAKING FREE TEXT BOX
Adult female with hx of on Xarelto, amlodipine.  Patient presenting to the ED reporting dizziness lightheadedness shortness of breath nausea palpitations heart racing since yesterday.  Patient arrives with a heart rate of 38 on the twelve-lead.  Fluctuating between 30s and 50s.  The patient did not overdose on amlodipine.    Bradycardic.  Blood pressures pending.  Afebrile.  Appears to be in mild distress.  Airway intact.  Mild respiratory distress.  Nonfocal neuroexam.  No peripheral edema.  No JVD.  Skin is normal.    Plan to obtain:  -EP consultation.  Patient may need pacemaker placement.  Will get troponin.  Electrolytes.  Admission.    ED Course: Pending

## 2025-05-01 NOTE — ED ADULT NURSE REASSESSMENT NOTE - NS ED NURSE REASSESS COMMENT FT1
Attending Dr Josue aware of low Heart Rate no interventions ordered. pt awaiting EP. pt  mentating well.
Per MD Gunn, no pacer pads to be placed at this time. Safety maintained throughout.
Per MD Josue, Pt. placed on zoll pads. Safety maintained throughout.
Pt received with pacer pads bradycardic HR fluctuates between 30-40s. Pt with no co nausea at this time co mild dizziness no chest pain , co intermittent sob. 02 sat 100% pt does not look tachypneic, pt  does not look diaphoretic.  pt son at bedside, pt is mostly Khmer speaking son translating. Pt and pt family instructed to report any worsening symptoms. pt also co cardiac monitor. Pt awaiting EP evaluation.
Pt to ECHO with ED  nursing staff . pt mentating well no co chest pain.
Pt using bed pan as needed, yellow urine noted. pt remains on cardiac monitor and pacer pads no co chest pain now . pts son at bedside.
pt in spot 27. A&Ox4. pt was consulted by individuals per daughter translating from cardiology team, unsure of their name. per patient, team did not use a  and did not understand what team was talking about. contacted MD Sullivan from medicine team and aware, will attempt to page cardiology team for follow up. I attempted to contact and page Dr Green, Dr Fisher, Dr. Finkelstein and Dr Giles as those are the names from cardiology team who have accessed her chart with no response. Pending further orders.
received report from day shift RN calixto. pt A&Ox4, vitally stable. denies chest pain, SOB, headache, dizziness, nausea, vomiting. pt placed on zoll pads, noted to be sinus marci rhythym to HR of 57 on cardiac monitor. pending bed assignment. pt aware needs catheterization procedure to r/o heart disease/blockages. family at bedside updated on patient care. safety maintained

## 2025-05-01 NOTE — ED ADULT NURSE NOTE - NS ED NURSE REPORT GIVEN DT
BRONCHOSPASM/BRONCHOCONSTRICTION     [x]         IMPROVE AERATION/BREATH SOUNDS  [x]   ADMINISTER BRONCHODILATOR THERAPY AS APPROPRIATE  [x]   ASSESS BREATH SOUNDS  [x]   IMPLEMENT AEROSOL/MDI PROTOCOL  [x]   PATIENT EDUCATION AS NEEDED        Problem: Respiratory - Adult  Goal: Achieves optimal ventilation and oxygenation  10/13/2023 [id number] by Marion Lois Romano RCP  Outcome: Progressing 01-May-2025 19:47

## 2025-05-01 NOTE — H&P ADULT - NSHPPHYSICALEXAM_GEN_ALL_CORE
PHYSICAL EXAM:  GENERAL: NAD, well-groomed, well-developed  HEAD:  Atraumatic, Normocephalic  EYES: EOMI, PERRLA, conjunctiva and sclera clear  ENMT: No tonsillar erythema, exudates, or enlargement; Moist mucous membranes  NECK: Supple, No JVD, Normal thyroid  HEART: Regular rate and rhythm; No murmurs, rubs, or gallops  RESPIRATORY: CTA B/L, No W/R/R  ABDOMEN: Soft, Nontender, Nondistended; Bowel sounds present  NEUROLOGY: A&Ox3, nonfocal, moving all extremities  EXTREMITIES:  2+ Peripheral Pulses, No clubbing, cyanosis, or edema  SKIN: warm, dry, normal color, no rash or abnormal lesions PHYSICAL EXAM:  GENERAL: NAD, well-groomed, well-developed  HEAD:  Atraumatic, Normocephalic  EYES: EOMI, PERRLA, conjunctiva and sclera clear  ENMT: No tonsillar erythema, exudates, or enlargement; Moist mucous membranes  NECK: Supple, No JVD.  HEART: Regular rate and rhythm; No murmurs, rubs, or gallops  RESPIRATORY: CTA B/L, No W/R/R  ABDOMEN: Soft, Nontender, Nondistended; Bowel sounds present  NEUROLOGY: A&Ox3, nonfocal, moving all extremities  EXTREMITIES:  2+ Peripheral Pulses, No clubbing, cyanosis, or edema  SKIN: warm, dry, normal color, no rash or abnormal lesions

## 2025-05-01 NOTE — ED ADULT TRIAGE NOTE - CHIEF COMPLAINT QUOTE
C/o palpitations and dizziness x 1 day. reports mild SOB and nausea. hx: vascular bypass, fatty liver

## 2025-05-01 NOTE — ED ADULT NURSE NOTE - OBJECTIVE STATEMENT
Pt. received to spot 27. Pt. A&OX4 and ambulatory, German speaking, son at the bedside translating c/o palpitations and SOB since 11pm last night. Pt. 12 lead EKG shows junctional bradycardia (38bpm) om triage. Pt. medical hx HTN, DM, oscular bypass (on Xarelto). Pt. states that she began experiencing palpitations 11pm last night with SOB starting this morning. Pt. endorses HA. pt. denies dizziness, blurry vision, chest pain, n/v, abdominal pain, fever, chills, urinary symptoms. Upon assessment, airway patent, respirations even and unlabored on RA. Pt. on the cardiac monitor, junctional bradycardia maintaining [id number], lowest recorded during evaluation 38bpm. Pt. abdomen soft and nontender. No swelling noted to the lower extremities. 20G IV's placed to right and left AC's, labs collected and sent. Comfort measures provided, safety maintained throughout.

## 2025-05-02 LAB
ALBUMIN SERPL ELPH-MCNC: 3.3 G/DL — SIGNIFICANT CHANGE UP (ref 3.3–5)
ALP SERPL-CCNC: 110 U/L — SIGNIFICANT CHANGE UP (ref 40–120)
ALT FLD-CCNC: 21 U/L — SIGNIFICANT CHANGE UP (ref 4–33)
ANION GAP SERPL CALC-SCNC: 11 MMOL/L — SIGNIFICANT CHANGE UP (ref 7–14)
APTT BLD: 30.8 SEC — SIGNIFICANT CHANGE UP (ref 26.1–36.8)
AST SERPL-CCNC: 28 U/L — SIGNIFICANT CHANGE UP (ref 4–32)
BASOPHILS # BLD AUTO: 0.05 K/UL — SIGNIFICANT CHANGE UP (ref 0–0.2)
BASOPHILS NFR BLD AUTO: 0.7 % — SIGNIFICANT CHANGE UP (ref 0–2)
BILIRUB SERPL-MCNC: 0.3 MG/DL — SIGNIFICANT CHANGE UP (ref 0.2–1.2)
BUN SERPL-MCNC: 20 MG/DL — SIGNIFICANT CHANGE UP (ref 7–23)
CALCIUM SERPL-MCNC: 8.6 MG/DL — SIGNIFICANT CHANGE UP (ref 8.4–10.5)
CHLORIDE SERPL-SCNC: 111 MMOL/L — HIGH (ref 98–107)
CO2 SERPL-SCNC: 18 MMOL/L — LOW (ref 22–31)
CREAT SERPL-MCNC: 0.98 MG/DL — SIGNIFICANT CHANGE UP (ref 0.5–1.3)
CULTURE RESULTS: SIGNIFICANT CHANGE UP
EGFR: 64 ML/MIN/1.73M2 — SIGNIFICANT CHANGE UP
EGFR: 64 ML/MIN/1.73M2 — SIGNIFICANT CHANGE UP
EOSINOPHIL # BLD AUTO: 0.3 K/UL — SIGNIFICANT CHANGE UP (ref 0–0.5)
EOSINOPHIL NFR BLD AUTO: 4.2 % — SIGNIFICANT CHANGE UP (ref 0–6)
GLUCOSE BLDC GLUCOMTR-MCNC: 120 MG/DL — HIGH (ref 70–99)
GLUCOSE BLDC GLUCOMTR-MCNC: 120 MG/DL — HIGH (ref 70–99)
GLUCOSE BLDC GLUCOMTR-MCNC: 160 MG/DL — HIGH (ref 70–99)
GLUCOSE BLDC GLUCOMTR-MCNC: 172 MG/DL — HIGH (ref 70–99)
GLUCOSE SERPL-MCNC: 135 MG/DL — HIGH (ref 70–99)
HCT VFR BLD CALC: 31.4 % — LOW (ref 34.5–45)
HGB BLD-MCNC: 9.6 G/DL — LOW (ref 11.5–15.5)
IANC: 4.25 K/UL — SIGNIFICANT CHANGE UP (ref 1.8–7.4)
IMM GRANULOCYTES NFR BLD AUTO: 0.3 % — SIGNIFICANT CHANGE UP (ref 0–0.9)
INR BLD: 1.09 RATIO — SIGNIFICANT CHANGE UP (ref 0.85–1.16)
KAPPA LC SER QL IFE: 4.15 MG/DL — HIGH (ref 0.33–1.94)
KAPPA/LAMBDA FREE LIGHT CHAIN RATIO, SERUM: 1.33 RATIO — SIGNIFICANT CHANGE UP (ref 0.26–1.65)
LAMBDA LC SER QL IFE: 3.12 MG/DL — HIGH (ref 0.57–2.63)
LYMPHOCYTES # BLD AUTO: 1.88 K/UL — SIGNIFICANT CHANGE UP (ref 1–3.3)
LYMPHOCYTES # BLD AUTO: 26.1 % — SIGNIFICANT CHANGE UP (ref 13–44)
MAGNESIUM SERPL-MCNC: 1.9 MG/DL — SIGNIFICANT CHANGE UP (ref 1.6–2.6)
MCHC RBC-ENTMCNC: 24.5 PG — LOW (ref 27–34)
MCHC RBC-ENTMCNC: 30.6 G/DL — LOW (ref 32–36)
MCV RBC AUTO: 80.1 FL — SIGNIFICANT CHANGE UP (ref 80–100)
MONOCYTES # BLD AUTO: 0.69 K/UL — SIGNIFICANT CHANGE UP (ref 0–0.9)
MONOCYTES NFR BLD AUTO: 9.6 % — SIGNIFICANT CHANGE UP (ref 2–14)
NEUTROPHILS # BLD AUTO: 4.25 K/UL — SIGNIFICANT CHANGE UP (ref 1.8–7.4)
NEUTROPHILS NFR BLD AUTO: 59.1 % — SIGNIFICANT CHANGE UP (ref 43–77)
NRBC # BLD AUTO: 0 K/UL — SIGNIFICANT CHANGE UP (ref 0–0)
NRBC # FLD: 0 K/UL — SIGNIFICANT CHANGE UP (ref 0–0)
NRBC BLD AUTO-RTO: 0 /100 WBCS — SIGNIFICANT CHANGE UP (ref 0–0)
PHOSPHATE SERPL-MCNC: 3.8 MG/DL — SIGNIFICANT CHANGE UP (ref 2.5–4.5)
PLATELET # BLD AUTO: 254 K/UL — SIGNIFICANT CHANGE UP (ref 150–400)
POTASSIUM SERPL-MCNC: 4.2 MMOL/L — SIGNIFICANT CHANGE UP (ref 3.5–5.3)
POTASSIUM SERPL-SCNC: 4.2 MMOL/L — SIGNIFICANT CHANGE UP (ref 3.5–5.3)
PROT SERPL-MCNC: 6.8 G/DL — SIGNIFICANT CHANGE UP (ref 6–8.3)
PROT SERPL-MCNC: 6.8 G/DL — SIGNIFICANT CHANGE UP (ref 6–8.3)
PROTHROM AB SERPL-ACNC: 12.6 SEC — SIGNIFICANT CHANGE UP (ref 9.9–13.4)
RBC # BLD: 3.92 M/UL — SIGNIFICANT CHANGE UP (ref 3.8–5.2)
RBC # FLD: 15.1 % — HIGH (ref 10.3–14.5)
SODIUM SERPL-SCNC: 140 MMOL/L — SIGNIFICANT CHANGE UP (ref 135–145)
SPECIMEN SOURCE: SIGNIFICANT CHANGE UP
WBC # BLD: 7.19 K/UL — SIGNIFICANT CHANGE UP (ref 3.8–10.5)
WBC # FLD AUTO: 7.19 K/UL — SIGNIFICANT CHANGE UP (ref 3.8–10.5)

## 2025-05-02 PROCEDURE — 84165 PROTEIN E-PHORESIS SERUM: CPT | Mod: 26

## 2025-05-02 PROCEDURE — 99233 SBSQ HOSP IP/OBS HIGH 50: CPT | Mod: GC

## 2025-05-02 PROCEDURE — 99231 SBSQ HOSP IP/OBS SF/LOW 25: CPT

## 2025-05-02 PROCEDURE — 86334 IMMUNOFIX E-PHORESIS SERUM: CPT | Mod: 26

## 2025-05-02 PROCEDURE — 93010 ELECTROCARDIOGRAM REPORT: CPT

## 2025-05-02 RX ORDER — HEPARIN SODIUM 1000 [USP'U]/ML
INJECTION INTRAVENOUS; SUBCUTANEOUS
Qty: 25000 | Refills: 0 | Status: DISCONTINUED | OUTPATIENT
Start: 2025-05-02 | End: 2025-05-02

## 2025-05-02 RX ORDER — HEPARIN SODIUM 1000 [USP'U]/ML
5500 INJECTION INTRAVENOUS; SUBCUTANEOUS EVERY 6 HOURS
Refills: 0 | Status: DISCONTINUED | OUTPATIENT
Start: 2025-05-02 | End: 2025-05-02

## 2025-05-02 RX ORDER — HEPARIN SODIUM 1000 [USP'U]/ML
5500 INJECTION INTRAVENOUS; SUBCUTANEOUS ONCE
Refills: 0 | Status: COMPLETED | OUTPATIENT
Start: 2025-05-02 | End: 2025-05-02

## 2025-05-02 RX ORDER — RIVAROXABAN 10 MG/1
20 TABLET, FILM COATED ORAL
Refills: 0 | Status: DISCONTINUED | OUTPATIENT
Start: 2025-05-02 | End: 2025-05-06

## 2025-05-02 RX ORDER — HEPARIN SODIUM 1000 [USP'U]/ML
2500 INJECTION INTRAVENOUS; SUBCUTANEOUS EVERY 6 HOURS
Refills: 0 | Status: DISCONTINUED | OUTPATIENT
Start: 2025-05-02 | End: 2025-05-02

## 2025-05-02 RX ADMIN — HEPARIN SODIUM 5500 UNIT(S): 1000 INJECTION INTRAVENOUS; SUBCUTANEOUS at 11:46

## 2025-05-02 RX ADMIN — AMLODIPINE BESYLATE 10 MILLIGRAM(S): 10 TABLET ORAL at 11:53

## 2025-05-02 RX ADMIN — RIVAROXABAN 20 MILLIGRAM(S): 10 TABLET, FILM COATED ORAL at 21:11

## 2025-05-02 RX ADMIN — Medication 1 APPLICATION(S): at 21:11

## 2025-05-02 RX ADMIN — HEPARIN SODIUM 1200 UNIT(S)/HR: 1000 INJECTION INTRAVENOUS; SUBCUTANEOUS at 11:41

## 2025-05-02 RX ADMIN — ATORVASTATIN CALCIUM 40 MILLIGRAM(S): 80 TABLET, FILM COATED ORAL at 21:11

## 2025-05-02 RX ADMIN — LISINOPRIL 40 MILLIGRAM(S): 5 TABLET ORAL at 11:53

## 2025-05-02 NOTE — PROGRESS NOTE ADULT - PROBLEM SELECTOR PLAN 2
#Hx. CKD, stage 2. Not on hemodialysis. Etiology unknown. Patient's know risk factors are HTN, T2DM. As mentioned patient also being worked up for gammopathy    Plan  - f/u UA  - f/u serum electrophoresis, immunofixation #Hx. CKD, stage 2. Not on hemodialysis. Etiology unknown. Patient's know risk factors are HTN, T2DM. As mentioned patient also being worked up for gammopathy    Plan  - U/A trace protein  - f/u serum electrophoresis, immunofixation

## 2025-05-02 NOTE — CONSULT NOTE ADULT - ASSESSMENT
EKG - not in chart   Echo - EF normal   [facility] - LM normal LAD prox 40%, LCX normal RCA luminal     a/p     1) SOB - cath non onstructive, not sec to CAD f/u cMRI , echo shows normal LV     2) Bradycardia - pt has chronotropic competence, for loop recorder     3) SMA stenosis - s/p bypass on xarelto

## 2025-05-02 NOTE — PRE PROCEDURE NOTE - PRE PROCEDURE EVALUATION
Pre Procedural Sedation Evaluation   offered,   ID # [id number] . However the pt prefers her daughter Bethany Espinosa to translate for her     Proceduralist: Dr Pulido     History and physical reviewed  Medications reviewed  Labs reviewed  EKG reviewed    Anticoagulation: heparin drip  Urine pregnancy: N/A  Dentures: None  Last PO intake: 5/2/25 at 2pm    Pre-Procedure Physical Assessment  Mental status: Alert and oriented x 3  Level of consciousness: 1  Cardiac assessment: Stable  Pulmonary assessment: Stable  Obstructive sleep apnea: No  Aspiration risk: No  Mallampati score: 2  ASA Classification: 2  Prior Sedative or Anesthesia Experience: No complications  Informed consent by responsible adult: Yes  Based on today's assessment, anesthesia consult requested: No

## 2025-05-02 NOTE — PROGRESS NOTE ADULT - ASSESSMENT
63 y/o Bulgarian-speaking female CKD, R. SMA stenosis s/p bypass 2023 (on xarelto) presenting with intermittent dizziness, palpitations, SOB for 1 day. Found to have bradycardia with HR ranging from 30s to 50s. C/f sick sinus vs, ischemic cardiomyopathy vs amyloidopathy.

## 2025-05-02 NOTE — PROGRESS NOTE ADULT - PROBLEM SELECTOR PLAN 6
DVT PPx:   E: replete K<4, Mg<2  Diet: DASH/CC   PT/OT: eval  Code Status: Full  Dispo: pending medical improvement DVT PPx: hep gtt while inpatient (patient on Xarelto at home)  E: replete K<4, Mg<2  Diet: DASH/CC   PT/OT: eval  Code Status: Full  Dispo: pending medical improvement

## 2025-05-02 NOTE — PROGRESS NOTE ADULT - PROBLEM SELECTOR PLAN 1
Patient with symptomatic bradycardia, dizziness, SOB. Patient w/o prior hx cardiac disease, recent negative cardiac workup, including TTE and EKG in March 2025, cardiac perfusion test October 2024. Given patient hx. of atherosclerotic dx. initial concern for ischemic myopathy, however EKG w/o ST changes, T-wave changes, BBB and troponins wnl. Can also consider sick sinus syndrome vs. amyloid. Patient getting worked up for gammopathy outpatient, hx. CKD.   - Troponin 13 (wnl), BNP elevated 982  - TSH 3.58    Plan  -TTE 5/1 --> EF 69%, NWMA. However does not comment on diastolic function  - EP c/s, appreciate recs  - possible left heart catheterization to rule out obstructive CAD given her significant atherosclerotic artery diseases   - EP plan for an implantable loop recorder after CMR/LHC  - lyme serology neg  - f/u t.cruzi  - f/u serum electrophoresis, immunofixation to evaluate for gammopathy Patient with symptomatic bradycardia, dizziness, SOB. Patient w/o prior hx cardiac disease, recent negative cardiac workup, including TTE and EKG in March 2025, cardiac perfusion test October 2024. Given patient hx. of atherosclerotic dx. initial concern for ischemic myopathy, however EKG w/o ST changes, T-wave changes, BBB and troponins wnl. Can also consider sick sinus syndrome vs. amyloid. Patient getting worked up for gammopathy outpatient, hx. CKD.   - Troponin 13 (wnl), BNP elevated 982  - TSH 3.58    Plan  -TTE 5/1 --> EF 69%, NWMA. However does not comment on diastolic function  - EP c/s, appreciate recs  - cards c/s for left heart catheterization to rule out obstructive CAD given her significant atherosclerotic artery diseases   - EP plan for an implantable loop recorder after CMR/LHC  - lyme serology, t.cruzi neg  - f/u serum electrophoresis, immunofixation to evaluate for gammopathy

## 2025-05-02 NOTE — CONSULT NOTE ADULT - SUBJECTIVE AND OBJECTIVE BOX
Winston House MD  Interventional Cardiology / Advance Heart Failure and Cardiac Transplant Specialist  [place] Office : [phone] [place] [address] [place] [address]  Tel:   [place] Office : [phone] [facility] [place] [address]  Tel: [phone]         HISTORY OF PRESENTING ILLNESS:  HPI:  63 y/o Japanese-speaking female CKD, R. SMA stenosis s/p bypass 2023 (on xarelto) presenting with intermittent dizziness, palpitations, SOB for 1 day. Patient first noticed dizziness last evening. Denies any prior similar episodes. Endorses numbness/tingling of both hands. Denies fevers, chest pain, syncope, pre-syncope, recent travel. Patient denies hx. cardiac disease. Recently saw cardiologist at [place] for pre-op clearance for colonoscopy. TTE and EKG in March 2025 showed EF 60-65% and normal sinus rhythm, respectively. Cardiac perfusion test 10/2024 was also normal on outpatient workup, EF 74%, no ischemic perfusion defects. Patient 's daughter at [facility] pt. currently getting worked up for gammopathy.     Cardio called to r/o CAD as per daughter pt gets some SOB on exertion not much chest pains , EP recommends C to r/o CAD prior to loop recorder     PAST MEDICAL & SURGICAL HISTORY:  Positive H. pylori test      Status post vascular bypass          SOCIAL HISTORY: Substance Use (street drugs): ( x ) never used  (  ) other:    FAMILY HISTORY:      MEDICATIONS:  amLODIPine   Tablet 10 milliGRAM(s) Oral daily  lisinopril 40 milliGRAM(s) Oral daily  rivaroxaban 20 milliGRAM(s) Oral with dinner  acetaminophen     Tablet .. 650 milliGRAM(s) Oral every 6 hours PRN  melatonin 3 milliGRAM(s) Oral at bedtime PRN  ondansetron Injectable 4 milliGRAM(s) IV Push every 8 hours PRN  atorvastatin 40 milliGRAM(s) Oral at bedtime  dextrose 50% Injectable 25 Gram(s) IV Push once  dextrose 50% Injectable 12.5 Gram(s) IV Push once  dextrose 50% Injectable 25 Gram(s) IV Push once  dextrose Oral Gel 15 Gram(s) Oral once PRN  glucagon  Injectable 1 milliGRAM(s) IntraMuscular once  insulin lispro (ADMELOG) corrective regimen sliding scale   SubCutaneous three times a day before meals  insulin lispro (ADMELOG) corrective regimen sliding scale   SubCutaneous at bedtime  chlorhexidine 2% Cloths 1 Application(s) Topical daily  dextrose 5%. 1000 milliLiter(s) IV Continuous <Continuous>  dextrose 5%. 1000 milliLiter(s) IV Continuous <Continuous>      FAMILY HISTORY:        Allergies    No Known Allergies    Intolerances    	      PHYSICAL EXAM:  T(C): 36.6 (05-02-25 @ 19:56), Max: 37.1 (05-02-25 @ 00:36)  HR: 73 (05-02-25 @ 19:56) (52 - 76)  BP: 108/52 (05-02-25 @ 19:56) (108/52 - 141/62)  RR: 18 (05-02-25 @ 19:56) (17 - 22)  SpO2: 100% (05-02-25 @ 19:56) (97% - 100%)  Wt(kg): --  I&O's Summary      GENERAL: NAD   EYES: EOMI, PERRLA, conjunctiva and sclera clear  ENMT: No tonsillar erythema, exudates, or enlargement; Moist mucous membranes, Good dentition, No lesions  Cardiovascular: Normal S1 S2, No JVD, No murmurs, No edema  Respiratory: Lungs clear to auscultation	  Gastrointestinal:  Soft, Non-tender, + BS	  Extremities: no edema       LABS:	 	    CARDIAC MARKERS:                                  9.6    7.19  )-----------( 254      ( 02 May 2025 05:35 )             31.4     05-02    140  |  111[H]  |  20  ----------------------------<  135[H]  4.2   |  18[L]  |  0.98    Ca    8.6      02 May 2025 05:35  Phos  3.8     05-02  Mg     1.90     05-02    TPro  6.8  /  Alb  3.3  /  TBili  0.3  /  DBili  x   /  AST  28  /  ALT  21  /  AlkPhos  110  05-02    proBNP:   Lipid Profile:   HgA1c:   TSH:     Consultant(s) Notes Reviewed:  [x ] YES  [ ] NO    Care Discussed with Consultants/Other Providers [ x] YES  [ ] NO    Imaging Personally Reviewed independently:  [x] YES  [ ] NO    All labs, radiologic studies, vitals, orders and medications list reviewed. Patient is seen and examined at bedside. Case discussed with medical team.    ASSESSMENT/PLAN:

## 2025-05-02 NOTE — PROGRESS NOTE ADULT - SUBJECTIVE AND OBJECTIVE BOX
Patient is a 64y old  Female who presents with a chief complaint of Sinus Bradycardia (01 May 2025 11:59)   ID [id number]   pt's daughter Bethany at bedside  Patient denies CP, SOB or palpitations.  Denies lightheadedness or dizziness or nausea    PAST MEDICAL & SURGICAL HISTORY:  H. pylori infection    Positive H. pylori test    Status post vascular bypass        MEDICATIONS  (STANDING):  amLODIPine   Tablet 10 milliGRAM(s) Oral daily  atorvastatin 40 milliGRAM(s) Oral at bedtime  dextrose 5%. 1000 milliLiter(s) (100 mL/Hr) IV Continuous <Continuous>  dextrose 5%. 1000 milliLiter(s) (50 mL/Hr) IV Continuous <Continuous>  dextrose 50% Injectable 25 Gram(s) IV Push once  dextrose 50% Injectable 12.5 Gram(s) IV Push once  dextrose 50% Injectable 25 Gram(s) IV Push once  glucagon  Injectable 1 milliGRAM(s) IntraMuscular once  insulin lispro (ADMELOG) corrective regimen sliding scale   SubCutaneous three times a day before meals  insulin lispro (ADMELOG) corrective regimen sliding scale   SubCutaneous at bedtime  lisinopril 40 milliGRAM(s) Oral daily    MEDICATIONS  (PRN):  acetaminophen     Tablet .. 650 milliGRAM(s) Oral every 6 hours PRN Temp greater or equal to 38C (100.4F), Mild Pain (1 - 3)  dextrose Oral Gel 15 Gram(s) Oral once PRN Blood Glucose LESS THAN 70 milliGRAM(s)/deciliter  melatonin 3 milliGRAM(s) Oral at bedtime PRN Insomnia  ondansetron Injectable 4 milliGRAM(s) IV Push every 8 hours PRN Nausea and/or Vomiting            Vital Signs Last 24 Hrs  T(C): 36.7 (02 May 2025 05:52), Max: 37.1 (02 May 2025 00:36)  T(F): 98.1 (02 May 2025 05:52), Max: 98.7 (02 May 2025 00:36)  HR: 71 (02 May 2025 05:52) (42 - 71)  BP: 128/48 (02 May 2025 05:52) (96/54 - 141/62)  BP(mean): --  RR: 17 (02 May 2025 05:52) (16 - 18)  SpO2: 97% (02 May 2025 05:52) (97% - 100%)    Parameters below as of 02 May 2025 05:52  Patient On (Oxygen Delivery Method): room air                INTERPRETATION OF TELEMETRY: SR 60-90's bpm no recurrent junctional or sinus marci <40 seen on tele    ECG:        LABS:                        9.6    7.19  )-----------( 254      ( 02 May 2025 05:35 )             31.4     05-02    140  |  111[H]  |  20  ----------------------------<  135[H]  4.2   |  18[L]  |  0.98    Ca    8.6      02 May 2025 05:35  Phos  3.8     05-02  Mg     1.90     05-02    TPro  6.8  /  Alb  3.3  /  TBili  0.3  /  DBili  x   /  AST  28  /  ALT  21  /  AlkPhos  110  05-02          Urinalysis Basic - ( 02 May 2025 05:35 )    Color: x / Appearance: x / SG: x / pH: x  Gluc: 135 mg/dL / Ketone: x  / Bili: x / Urobili: x   Blood: x / Protein: x / Nitrite: x   Leuk Esterase: x / RBC: x / WBC x   Sq Epi: x / Non Sq Epi: x / Bacteria: x        BNP  RADIOLOGY & ADDITIONAL STUDIES:    CONCLUSIONS:      1. Left ventricular cavity is normal in size. Left ventricular wall thickness is normal. Left ventricular systolic function is normal with an ejection fraction of 69 % by 3D. There are no regional wall motion abnormalities seen.   2. Normal right ventricular cavity size and probably normal right ventricular systolic function. Tricuspid annular plane systolic excursion (TAPSE) is 1.8 cm (normal >=1.7 cm).   3. Structurally normal mitral valve with normal leaflet excursion. There is calcification of the mitral valve annulus. There is trace mitral regurgitation.   4. The left atrium is moderately dilated with an indexed volume of 43.88 ml/m².   5. Structurally normal tricuspid valve with normal leaflet excursion. There is mild tricuspid regurgitation. Estimated pulmonary artery systolic pressure is 42 mmHg, consistent with mild pulmonary hypertension.    ________________________________________________________________________________________  FINDINGS:     Left Ventricle:  The left ventricular cavity is normal in size. Left ventricular wall thickness is normal. Left ventricular systolic function is normal with a calculated ejection fraction of 69 % by 3D. There are no regional wall motion abnormalities seen.     Right Ventricle:  The right ventricular cavity is normal in size and right ventricular systolic function is probably normal. Tricuspid annular plane systolic excursion (TAPSE) is 1.8 cm (normal >=1.7 cm).     Left Atrium:  The left atrium is moderately dilated with an indexed volume of 43.88 ml/m².     Right Atrium:  The right atriumis normal in size with an indexed volume of 29.72 ml/m² and an indexed area of 10.97 cm²/m².     Aortic Valve:  The aortic valve appears trileaflet with normal systolic excursion. There is calcification of the aortic valve leaflets. There is no evidence of aortic regurgitation.     Mitral Valve:  Structurally normal mitral valve with normal leaflet excursion. There is calcification of the mitral valve annulus. There is trace mitral regurgitation.     Tricuspid Valve:  Structurally normal tricuspid valve with normal leaflet excursion. There is mild tricuspid regurgitation. Estimated pulmonary artery systolic pressure is 42 mmHg, consistent with mild pulmonary hypertension.     Pulmonic Valve:  Structurally normal pulmonic valve with normal leaflet excursion. There is trace pulmonic regurgitation.     Aorta:  The aortic root at the sinuses of Valsalva is normal in size, measuring 2.50 cm (indexed 1.53 cm/m²). The aortic diameter at the sinotubular junction is normal in size, measuring 2.3 cm. The ascending aorta is normal in size, measuring 2.80 cm (indexed 1.72 cm/m²).     Pericardium:  No pericardial effusion seen.     Systemic Veins:  The inferior vena cava is normal in size measuring 1.32 cm in diameter, (normal <2.1cm) with normal inspiratory collapse (normal >50%) consistent with normal right atrial pressure (~3, range 0-5mmHg).  ____________________________________________________________________      PHYSICAL EXAM:    GENERAL: In no apparent distress, well nourished, and hydrated.  NECK: Supple and normal thyroid.  No JVD or carotid bruit.  Carotid pulse is 2+ bilaterally.  HEART: Regular rate and rhythm; No murmurs, rubs, or gallops.  PULMONARY: Clear to auscultation and perfusion.  No rales, wheezing, or rhonchi bilaterally.  ABDOMEN: Soft, Nontender, Nondistended; Bowel sounds present  EXTREMITIES:  2+ Peripheral Pulses, No clubbing, cyanosis, or edema  NEUROLOGICAL: alert oriented x4 speech clear no focal deficit noted

## 2025-05-02 NOTE — PROGRESS NOTE ADULT - PROBLEM SELECTOR PLAN 3
#Hx. R. CI/SMA stenosis, s/p bypass in 2023. Patient on home Xarelto    Plan:   - Will hold xarelto for now pending possible cath  - start hep. gtt #Hx. R. CI/SMA stenosis, s/p bypass in 2023. Patient on home Xarelto    Plan:   - Will hold xarelto for now pending possible cath  - c/w hep. gtt

## 2025-05-02 NOTE — PROGRESS NOTE ADULT - SUBJECTIVE AND OBJECTIVE BOX
INTERVAL HPI/OVERNIGHT EVENTS:  Patient was seen and examined at bedside. As per nurse and patient, no o/n events, patient resting comfortably. No complaints at this time.     VITAL SIGNS:  T(F): 98.1 (05-02-25 @ 05:52)  HR: 71 (05-02-25 @ 05:52)  BP: 128/48 (05-02-25 @ 05:52)  RR: 17 (05-02-25 @ 05:52)  SpO2: 97% (05-02-25 @ 05:52)  Wt(kg): --    PHYSICAL EXAM:    GENERAL: NAD, well-groomed, well-developed  ENMT: No tonsillar erythema, exudates, or enlargement; Moist mucous membranes  NECK: No JVD.  HEART: Regular rate and rhythm; No murmurs, rubs, or gallops  RESPIRATORY: CTA B/L, No W/R/R  ABDOMEN: Soft, Nontender, Nondistended; Bowel sounds present  NEUROLOGY: A&Ox3, nonfocal, moving all extremities  EXTREMITIES:  2+ Peripheral Pulses, No clubbing, cyanosis, or edema  SKIN: warm, dry, normal color, no rash or abnormal lesions      MEDICATIONS  (STANDING):  amLODIPine   Tablet 10 milliGRAM(s) Oral daily  atorvastatin 40 milliGRAM(s) Oral at bedtime  dextrose 5%. 1000 milliLiter(s) (100 mL/Hr) IV Continuous <Continuous>  dextrose 5%. 1000 milliLiter(s) (50 mL/Hr) IV Continuous <Continuous>  dextrose 50% Injectable 25 Gram(s) IV Push once  dextrose 50% Injectable 12.5 Gram(s) IV Push once  dextrose 50% Injectable 25 Gram(s) IV Push once  glucagon  Injectable 1 milliGRAM(s) IntraMuscular once  insulin lispro (ADMELOG) corrective regimen sliding scale   SubCutaneous three times a day before meals  insulin lispro (ADMELOG) corrective regimen sliding scale   SubCutaneous at bedtime  lisinopril 40 milliGRAM(s) Oral daily    MEDICATIONS  (PRN):  acetaminophen     Tablet .. 650 milliGRAM(s) Oral every 6 hours PRN Temp greater or equal to 38C (100.4F), Mild Pain (1 - 3)  dextrose Oral Gel 15 Gram(s) Oral once PRN Blood Glucose LESS THAN 70 milliGRAM(s)/deciliter  melatonin 3 milliGRAM(s) Oral at bedtime PRN Insomnia  ondansetron Injectable 4 milliGRAM(s) IV Push every 8 hours PRN Nausea and/or Vomiting      Allergies    No Known Allergies    Intolerances        LABS:                        9.6    7.19  )-----------( 254      ( 02 May 2025 05:35 )             31.4     05-02    140  |  111[H]  |  20  ----------------------------<  135[H]  4.2   |  18[L]  |  0.98    Ca    8.6      02 May 2025 05:35  Phos  3.8     05-02  Mg     1.90     05-02    TPro  6.8  /  Alb  3.3  /  TBili  0.3  /  DBili  x   /  AST  28  /  ALT  21  /  AlkPhos  110  05-02      Urinalysis Basic - ( 02 May 2025 05:35 )    Color: x / Appearance: x / SG: x / pH: x  Gluc: 135 mg/dL / Ketone: x  / Bili: x / Urobili: x   Blood: x / Protein: x / Nitrite: x   Leuk Esterase: x / RBC: x / WBC x   Sq Epi: x / Non Sq Epi: x / Bacteria: x        RADIOLOGY & ADDITIONAL TESTS:  Reviewed

## 2025-05-02 NOTE — PROGRESS NOTE ADULT - ASSESSMENT
64 year old Icelandic speaking female with a PMH of R DEBI/SMA stenosis s/p vascular bypass with stent on 10/20/2023-maintained on Xarelto, HTN, HLD, fatty liver who presented to ED with c/o intermittent "dizziness" started last night.  She was found to be in sinus marci with arrythmia with HR in low 40's mostly.  Occasionally marci down to 35-39 bpm transiently.  Patient endorses "felt nauseous " associated with dizziness last night.  Presently denies CP/SOB or syncope or dizziness.  She has been on bowel prep for colonoscopy scheduled this Saturday.  Recently underwent a stress test for pre- colonoscopy clearance with "normal result" per son.  Patient appears hemodynamically stable.    Labs reviewed: TSH 3.5, K 5.8 mod hemolyzed, Top T 13; Pro-  TTE done 10/2023 showed normal LVEF and moderately LVH.      symptomatic sinus bradycardia with arrhythmia/junctional -likely sinus node disease  -TTE normal LVEF, mod dilated LA, no wall thickness vs wall motion  abnormality   -Obtain EKG if not done, closely monitor for symptomatic marci <40, consider administer Atropine 1mg  -negative  lyme titer, serum titer for chagas pending   -Avoid AV gabino agents  -HR went up to  bpm with bedside ambulation demonstrates chronotropic competence   -d/w potential benefit of Loop recorder for marci or tachy monitoring, patient and her daughter are amenable,  Consent obtained.   -Patient will benefit from a left heart catheterization to rule out obstructive CAD given her significant atherosclerotic artery diseases   plan for an implantable loop recorder after CMR/LHC  -No acute pacing at present time

## 2025-05-03 LAB
ALBUMIN SERPL ELPH-MCNC: 3.3 G/DL — SIGNIFICANT CHANGE UP (ref 3.3–5)
ALP SERPL-CCNC: 103 U/L — SIGNIFICANT CHANGE UP (ref 40–120)
ALT FLD-CCNC: 23 U/L — SIGNIFICANT CHANGE UP (ref 4–33)
ANION GAP SERPL CALC-SCNC: 14 MMOL/L — SIGNIFICANT CHANGE UP (ref 7–14)
AST SERPL-CCNC: 25 U/L — SIGNIFICANT CHANGE UP (ref 4–32)
BILIRUB SERPL-MCNC: 0.2 MG/DL — SIGNIFICANT CHANGE UP (ref 0.2–1.2)
BUN SERPL-MCNC: 22 MG/DL — SIGNIFICANT CHANGE UP (ref 7–23)
CALCIUM SERPL-MCNC: 8.3 MG/DL — LOW (ref 8.4–10.5)
CHLORIDE SERPL-SCNC: 107 MMOL/L — SIGNIFICANT CHANGE UP (ref 98–107)
CO2 SERPL-SCNC: 15 MMOL/L — LOW (ref 22–31)
CREAT SERPL-MCNC: 0.87 MG/DL — SIGNIFICANT CHANGE UP (ref 0.5–1.3)
EGFR: 73 ML/MIN/1.73M2 — SIGNIFICANT CHANGE UP
EGFR: 73 ML/MIN/1.73M2 — SIGNIFICANT CHANGE UP
GLUCOSE BLDC GLUCOMTR-MCNC: 109 MG/DL — HIGH (ref 70–99)
GLUCOSE BLDC GLUCOMTR-MCNC: 131 MG/DL — HIGH (ref 70–99)
GLUCOSE BLDC GLUCOMTR-MCNC: 140 MG/DL — HIGH (ref 70–99)
GLUCOSE BLDC GLUCOMTR-MCNC: 195 MG/DL — HIGH (ref 70–99)
GLUCOSE SERPL-MCNC: 121 MG/DL — HIGH (ref 70–99)
HCT VFR BLD CALC: 34.8 % — SIGNIFICANT CHANGE UP (ref 34.5–45)
HGB BLD-MCNC: 10.7 G/DL — LOW (ref 11.5–15.5)
MAGNESIUM SERPL-MCNC: 1.7 MG/DL — SIGNIFICANT CHANGE UP (ref 1.6–2.6)
MCHC RBC-ENTMCNC: 24.7 PG — LOW (ref 27–34)
MCHC RBC-ENTMCNC: 30.7 G/DL — LOW (ref 32–36)
MCV RBC AUTO: 80.4 FL — SIGNIFICANT CHANGE UP (ref 80–100)
NRBC # BLD AUTO: 0 K/UL — SIGNIFICANT CHANGE UP (ref 0–0)
NRBC # FLD: 0 K/UL — SIGNIFICANT CHANGE UP (ref 0–0)
NRBC BLD AUTO-RTO: 0 /100 WBCS — SIGNIFICANT CHANGE UP (ref 0–0)
PHOSPHATE SERPL-MCNC: 3.6 MG/DL — SIGNIFICANT CHANGE UP (ref 2.5–4.5)
PLATELET # BLD AUTO: 268 K/UL — SIGNIFICANT CHANGE UP (ref 150–400)
POTASSIUM SERPL-MCNC: 4.1 MMOL/L — SIGNIFICANT CHANGE UP (ref 3.5–5.3)
POTASSIUM SERPL-SCNC: 4.1 MMOL/L — SIGNIFICANT CHANGE UP (ref 3.5–5.3)
PROT SERPL-MCNC: 6.7 G/DL — SIGNIFICANT CHANGE UP (ref 6–8.3)
RBC # BLD: 4.33 M/UL — SIGNIFICANT CHANGE UP (ref 3.8–5.2)
RBC # FLD: 15.3 % — HIGH (ref 10.3–14.5)
SODIUM SERPL-SCNC: 136 MMOL/L — SIGNIFICANT CHANGE UP (ref 135–145)
WBC # BLD: 7.07 K/UL — SIGNIFICANT CHANGE UP (ref 3.8–10.5)
WBC # FLD AUTO: 7.07 K/UL — SIGNIFICANT CHANGE UP (ref 3.8–10.5)

## 2025-05-03 PROCEDURE — 99232 SBSQ HOSP IP/OBS MODERATE 35: CPT | Mod: GC

## 2025-05-03 RX ORDER — BENZONATATE 100 MG
100 CAPSULE ORAL THREE TIMES A DAY
Refills: 0 | Status: DISCONTINUED | OUTPATIENT
Start: 2025-05-03 | End: 2025-05-06

## 2025-05-03 RX ORDER — MAGNESIUM SULFATE 500 MG/ML
2 SYRINGE (ML) INJECTION ONCE
Refills: 0 | Status: COMPLETED | OUTPATIENT
Start: 2025-05-03 | End: 2025-05-03

## 2025-05-03 RX ADMIN — Medication 100 MILLIGRAM(S): at 18:46

## 2025-05-03 RX ADMIN — AMLODIPINE BESYLATE 10 MILLIGRAM(S): 10 TABLET ORAL at 05:48

## 2025-05-03 RX ADMIN — Medication 25 GRAM(S): at 09:22

## 2025-05-03 RX ADMIN — Medication 100 MILLIGRAM(S): at 09:19

## 2025-05-03 RX ADMIN — ATORVASTATIN CALCIUM 40 MILLIGRAM(S): 80 TABLET, FILM COATED ORAL at 21:39

## 2025-05-03 RX ADMIN — RIVAROXABAN 20 MILLIGRAM(S): 10 TABLET, FILM COATED ORAL at 18:46

## 2025-05-03 RX ADMIN — Medication 1 APPLICATION(S): at 18:47

## 2025-05-03 RX ADMIN — LISINOPRIL 40 MILLIGRAM(S): 5 TABLET ORAL at 05:48

## 2025-05-03 NOTE — PHYSICAL THERAPY INITIAL EVALUATION ADULT - PERTINENT HX OF CURRENT PROBLEM, REHAB EVAL
As per chart, patient is a 64 year old [place]-speaking female CKD, R. SMA stenosis s/p bypass 2023 (on xarelto) presenting with intermittent dizziness, palpitations, SOB for 1 day. Patient first noticed dizziness last evening. Denies any prior similar episodes. Endorses numbness/tingling of both hands.

## 2025-05-03 NOTE — PHYSICAL THERAPY INITIAL EVALUATION ADULT - LEVEL OF INDEPENDENCE: SIT/STAND, REHAB EVAL
Mother brings child in for evaluation of billirubin level and decreased feeding, states baby did produce wet diapers today, called  and was instructed to come in for evaluation.
independent

## 2025-05-03 NOTE — PROGRESS NOTE ADULT - SUBJECTIVE AND OBJECTIVE BOX
Medicine Progress Note  --------------------  Waldo Denson M.D.  EM/IM PGY-2  Contact via TEAMS   --------------------      Patient: JULI GREENWOOD, MRN: [id number], : 1960  Admitted on 25 for Bradycardia      LANGUAGE - English     ------------------------------------------------------------------------------------------------------------  SUMMARY (from last progress note through 25 @ 07:33):   -  ------------------------------------------------------------------------------------------------------------  OVERNIGHT/INTERVAL EVENTS  No events overnight. Vitals remained stable on room air. Reviewed all scheduled medications.  In the last 24 hours, patient required the following PRNs: none     SUBJECTIVE  - Pt was seen and examined at bedside this am.       ROS negative unless noted above.  ------------------------------------------------------------------------------------------------------------  OBJECTIVE:  Physical Exam  Vital signs reviewed.  CONSTITUTIONAL: NAD   HEAD: Normocephalic; atraumatic  EYES: EOMI, PERRL   MOUTH/THROAT:  MMM  NECK: Trachea midline, no JVD  CV: Normal S1, S2; no audible murmurs  RESP: normal work of breathing; CTAB   ABD: soft, non-distended; non-tender to palpation   : Deferred  MSK/EXT: no LE edema, no limited ROM  SKIN: No rashes on exposed skin surfaces  NEURO: Moves all extremities spontaneously with no focal deficits, speech is appropriate  PSYCH: calm interactive       Vital Signs Last 24 Hrs  T(F): 97.2, Max: 98.4 (25 @ 11:30)  HR: 73 (66 - 76)  BP: 119/68 (108/52 - 133/56)  RR: 18 (18 - 22)  SpO2: 100% (97% - 100%)        DAILY MEASUREMENTS:  I&O's Summary    Daily Height in cm: 147 (02 May 2025 15:40)    Daily   Weight (kg): 66.5 (25 @ 15:40)  Orthostatic VS        LABS:                        10.7   7.07  )-----------( 268      ( 03 May 2025 06:10 )             34.8     Hgb Trend: 10.7<--, 9.6<--, 10.4<--  05-02    140  |  111[H]  |  20  ----------------------------<  135[H]  4.2   |  18[L]  |  0.98    Ca    8.6      02 May 2025 05:35  Phos  3.8     05-  Mg     1.90     -02    TPro  6.8  /  Alb  3.3  /  TBili  0.3  /  DBili  x   /  AST  28  /  ALT  21  /  AlkPhos  110  05-02    PT/INR - ( 02 May 2025 10:00 )   PT: 12.6 sec;   INR: 1.09 ratio         PTT - ( 02 May 2025 10:00 )  PTT:30.8 sec  CAPILLARY BLOOD GLUCOSE      POCT Blood Glucose.: 160 mg/dL (02 May 2025 21:11)  POCT Blood Glucose.: 172 mg/dL (02 May 2025 18:46)  POCT Blood Glucose.: 120 mg/dL (02 May 2025 12:47)  POCT Blood Glucose.: 120 mg/dL (02 May 2025 08:47)        Urinalysis Basic - ( 02 May 2025 05:35 )    Color: x / Appearance: x / SG: x / pH: x  Gluc: 135 mg/dL / Ketone: x  / Bili: x / Urobili: x   Blood: x / Protein: x / Nitrite: x   Leuk Esterase: x / RBC: x / WBC x   Sq Epi: x / Non Sq Epi: x / Bacteria: x              RADIOLOGY & ADDITIONAL TESTS:  Results Reviewed:     Imaging Reviewed:  Electrocardiogram Reviewed:      ------------------------------------------------------------------------------------------------------------  MEDICATIONS  (STANDING):  amLODIPine   Tablet 10 milliGRAM(s) Oral daily  atorvastatin 40 milliGRAM(s) Oral at bedtime  chlorhexidine 2% Cloths 1 Application(s) Topical daily  dextrose 5%. 1000 milliLiter(s) (100 mL/Hr) IV Continuous <Continuous>  dextrose 5%. 1000 milliLiter(s) (50 mL/Hr) IV Continuous <Continuous>  dextrose 50% Injectable 25 Gram(s) IV Push once  dextrose 50% Injectable 12.5 Gram(s) IV Push once  dextrose 50% Injectable 25 Gram(s) IV Push once  glucagon  Injectable 1 milliGRAM(s) IntraMuscular once  insulin lispro (ADMELOG) corrective regimen sliding scale   SubCutaneous three times a day before meals  insulin lispro (ADMELOG) corrective regimen sliding scale   SubCutaneous at bedtime  lisinopril 40 milliGRAM(s) Oral daily  rivaroxaban 20 milliGRAM(s) Oral with dinner    MEDICATIONS  (PRN):  acetaminophen     Tablet .. 650 milliGRAM(s) Oral every 6 hours PRN Temp greater or equal to 38C (100.4F), Mild Pain (1 - 3)  dextrose Oral Gel 15 Gram(s) Oral once PRN Blood Glucose LESS THAN 70 milliGRAM(s)/deciliter  melatonin 3 milliGRAM(s) Oral at bedtime PRN Insomnia  ondansetron Injectable 4 milliGRAM(s) IV Push every 8 hours PRN Nausea and/or Vomiting    ------------------------------------------------------------------------------------------------------------  COORDINATION OF CARE:  Care discussed with consultants/other providers and notes reviewed [Y]     Medicine Progress Note  --------------------  aWldo Denson M.D.  EM/IM PGY-2  Contact via TEAMS   --------------------      Patient: JULI GREENWOOD, MRN: [id number], : 1960  Admitted on 25 for Bradycardia      LANGUAGE - English     ------------------------------------------------------------------------------------------------------------  SUMMARY (from last progress note through 25 @ 07:33):   -  ------------------------------------------------------------------------------------------------------------  OVERNIGHT/INTERVAL EVENTS  No events overnight. Vitals remained stable on room air. Reviewed all scheduled medications.  In the last 24 hours, patient required the following PRNs: none     SUBJECTIVE  - Pt was seen and examined at bedside this am. Daughter at bedside, requested for Ukrainian translation by pt. Pt complained of nocturnal cough, present on admission as well but worsened. No fever, chills, CP, SOB, n/v/d.       ROS negative unless noted above.  ------------------------------------------------------------------------------------------------------------  OBJECTIVE:  Physical Exam  Vital signs reviewed.  CONSTITUTIONAL: NAD   HEAD: Normocephalic; atraumatic  EYES: EOMI, PERRL   MOUTH/THROAT:  MMM  NECK: Trachea midline, no JVD  CV: Normal S1, S2; no audible murmurs  RESP: normal work of breathing; CTAB   ABD: soft, non-distended; non-tender to palpation   : Deferred  MSK/EXT: no LE edema, no limited ROM  SKIN: No rashes on exposed skin surfaces  NEURO: Moves all extremities spontaneously with no focal deficits, speech is appropriate  PSYCH: calm interactive       Vital Signs Last 24 Hrs  T(F): 97.2, Max: 98.4 (25 @ 11:30)  HR: 73 (66 - 76)  BP: 119/68 (108/52 - 133/56)  RR: 18 (18 - 22)  SpO2: 100% (97% - 100%)        DAILY MEASUREMENTS:  I&O's Summary    Daily Height in cm: 147 (02 May 2025 15:40)    Daily   Weight (kg): 66.5 (25 @ 15:40)  Orthostatic VS        LABS:                        10.7   7.07  )-----------( 268      ( 03 May 2025 06:10 )             34.8     Hgb Trend: 10.7<--, 9.6<--, 10.4<--  05-02    140  |  111[H]  |  20  ----------------------------<  135[H]  4.2   |  18[L]  |  0.98    Ca    8.6      02 May 2025 05:35  Phos  3.8     05-02  Mg     1.90     05-02    TPro  6.8  /  Alb  3.3  /  TBili  0.3  /  DBili  x   /  AST  28  /  ALT  21  /  AlkPhos  110  05-02    PT/INR - ( 02 May 2025 10:00 )   PT: 12.6 sec;   INR: 1.09 ratio         PTT - ( 02 May 2025 10:00 )  PTT:30.8 sec  CAPILLARY BLOOD GLUCOSE      POCT Blood Glucose.: 160 mg/dL (02 May 2025 21:11)  POCT Blood Glucose.: 172 mg/dL (02 May 2025 18:46)  POCT Blood Glucose.: 120 mg/dL (02 May 2025 12:47)  POCT Blood Glucose.: 120 mg/dL (02 May 2025 08:47)        Urinalysis Basic - ( 02 May 2025 05:35 )    Color: x / Appearance: x / SG: x / pH: x  Gluc: 135 mg/dL / Ketone: x  / Bili: x / Urobili: x   Blood: x / Protein: x / Nitrite: x   Leuk Esterase: x / RBC: x / WBC x   Sq Epi: x / Non Sq Epi: x / Bacteria: x              RADIOLOGY & ADDITIONAL TESTS:  Results Reviewed:     Imaging Reviewed:  Electrocardiogram Reviewed:      ------------------------------------------------------------------------------------------------------------  MEDICATIONS  (STANDING):  amLODIPine   Tablet 10 milliGRAM(s) Oral daily  atorvastatin 40 milliGRAM(s) Oral at bedtime  chlorhexidine 2% Cloths 1 Application(s) Topical daily  dextrose 5%. 1000 milliLiter(s) (100 mL/Hr) IV Continuous <Continuous>  dextrose 5%. 1000 milliLiter(s) (50 mL/Hr) IV Continuous <Continuous>  dextrose 50% Injectable 25 Gram(s) IV Push once  dextrose 50% Injectable 12.5 Gram(s) IV Push once  dextrose 50% Injectable 25 Gram(s) IV Push once  glucagon  Injectable 1 milliGRAM(s) IntraMuscular once  insulin lispro (ADMELOG) corrective regimen sliding scale   SubCutaneous three times a day before meals  insulin lispro (ADMELOG) corrective regimen sliding scale   SubCutaneous at bedtime  lisinopril 40 milliGRAM(s) Oral daily  rivaroxaban 20 milliGRAM(s) Oral with dinner    MEDICATIONS  (PRN):  acetaminophen     Tablet .. 650 milliGRAM(s) Oral every 6 hours PRN Temp greater or equal to 38C (100.4F), Mild Pain (1 - 3)  dextrose Oral Gel 15 Gram(s) Oral once PRN Blood Glucose LESS THAN 70 milliGRAM(s)/deciliter  melatonin 3 milliGRAM(s) Oral at bedtime PRN Insomnia  ondansetron Injectable 4 milliGRAM(s) IV Push every 8 hours PRN Nausea and/or Vomiting    ------------------------------------------------------------------------------------------------------------  COORDINATION OF CARE:  Care discussed with consultants/other providers and notes reviewed [Y]

## 2025-05-03 NOTE — PHYSICAL THERAPY INITIAL EVALUATION ADULT - PLANNED THERAPY INTERVENTIONS, PT EVAL
Patient left positioned for safety in bed in NAD, call bell in reach, all lines intact. +bed alarm. Daughter at bedside./gait training

## 2025-05-03 NOTE — PROGRESS NOTE ADULT - ASSESSMENT
63 y/o Citizen of [place]-speaking female CKD, R. SMA stenosis s/p bypass 2023 (on xarelto) presenting with intermittent dizziness, palpitations, SOB for 1 day. Found to have sinus/junctional bradycardia with HR ranging from 30s to 50s. C/f sick sinus vs amyloidopathy s/p LHC non-obstructive now pending cMRI and ILR placement.

## 2025-05-03 NOTE — PROGRESS NOTE ADULT - PROBLEM SELECTOR PLAN 2
#Hx. CKD, stage 2. Not on hemodialysis. Etiology unknown. Patient's know risk factors are HTN, T2DM. As mentioned patient also being worked up for gammopathy    Plan  - U/A trace protein  - f/u serum electrophoresis, immunofixation

## 2025-05-03 NOTE — PHYSICAL THERAPY INITIAL EVALUATION ADULT - ADDITIONAL COMMENTS
patient lives in a private home, 6 steps with railings to enter and 10-12 steps with railings to bedroom.

## 2025-05-03 NOTE — PHYSICAL THERAPY INITIAL EVALUATION ADULT - GENERAL OBSERVATIONS, REHAB EVAL
Pt found semi reclined in bed; patient is Lebanese speaking, daughter at bedside and patient prefers she translates; HR 79 bpm; spoke with HEIDE Lopez, who advised patient may participate.

## 2025-05-03 NOTE — PROGRESS NOTE ADULT - PROBLEM SELECTOR PLAN 6
DVT PPx: Xarelto 20mg with dinner   Diet: DASH/CC   PT/OT: eval  Code Status: Full  Dispo: pending medical improvement

## 2025-05-04 LAB
ANION GAP SERPL CALC-SCNC: 12 MMOL/L — SIGNIFICANT CHANGE UP (ref 7–14)
BASOPHILS # BLD AUTO: 0.05 K/UL — SIGNIFICANT CHANGE UP (ref 0–0.2)
BASOPHILS NFR BLD AUTO: 0.7 % — SIGNIFICANT CHANGE UP (ref 0–2)
BUN SERPL-MCNC: 18 MG/DL — SIGNIFICANT CHANGE UP (ref 7–23)
CALCIUM SERPL-MCNC: 8.2 MG/DL — LOW (ref 8.4–10.5)
CHLORIDE SERPL-SCNC: 109 MMOL/L — HIGH (ref 98–107)
CO2 SERPL-SCNC: 16 MMOL/L — LOW (ref 22–31)
CREAT SERPL-MCNC: 0.88 MG/DL — SIGNIFICANT CHANGE UP (ref 0.5–1.3)
EGFR: 72 ML/MIN/1.73M2 — SIGNIFICANT CHANGE UP
EGFR: 72 ML/MIN/1.73M2 — SIGNIFICANT CHANGE UP
EOSINOPHIL # BLD AUTO: 0.64 K/UL — HIGH (ref 0–0.5)
EOSINOPHIL NFR BLD AUTO: 9.4 % — HIGH (ref 0–6)
GLUCOSE BLDC GLUCOMTR-MCNC: 111 MG/DL — HIGH (ref 70–99)
GLUCOSE BLDC GLUCOMTR-MCNC: 116 MG/DL — HIGH (ref 70–99)
GLUCOSE BLDC GLUCOMTR-MCNC: 119 MG/DL — HIGH (ref 70–99)
GLUCOSE BLDC GLUCOMTR-MCNC: 140 MG/DL — HIGH (ref 70–99)
GLUCOSE SERPL-MCNC: 119 MG/DL — HIGH (ref 70–99)
HCT VFR BLD CALC: 32.3 % — LOW (ref 34.5–45)
HGB BLD-MCNC: 10 G/DL — LOW (ref 11.5–15.5)
IANC: 3.15 K/UL — SIGNIFICANT CHANGE UP (ref 1.8–7.4)
IMM GRANULOCYTES NFR BLD AUTO: 0.3 % — SIGNIFICANT CHANGE UP (ref 0–0.9)
LYMPHOCYTES # BLD AUTO: 2.24 K/UL — SIGNIFICANT CHANGE UP (ref 1–3.3)
LYMPHOCYTES # BLD AUTO: 32.8 % — SIGNIFICANT CHANGE UP (ref 13–44)
MAGNESIUM SERPL-MCNC: 1.9 MG/DL — SIGNIFICANT CHANGE UP (ref 1.6–2.6)
MCHC RBC-ENTMCNC: 24.5 PG — LOW (ref 27–34)
MCHC RBC-ENTMCNC: 31 G/DL — LOW (ref 32–36)
MCV RBC AUTO: 79.2 FL — LOW (ref 80–100)
MONOCYTES # BLD AUTO: 0.73 K/UL — SIGNIFICANT CHANGE UP (ref 0–0.9)
MONOCYTES NFR BLD AUTO: 10.7 % — SIGNIFICANT CHANGE UP (ref 2–14)
NEUTROPHILS # BLD AUTO: 3.15 K/UL — SIGNIFICANT CHANGE UP (ref 1.8–7.4)
NEUTROPHILS NFR BLD AUTO: 46.1 % — SIGNIFICANT CHANGE UP (ref 43–77)
NRBC # BLD AUTO: 0 K/UL — SIGNIFICANT CHANGE UP (ref 0–0)
NRBC # FLD: 0 K/UL — SIGNIFICANT CHANGE UP (ref 0–0)
NRBC BLD AUTO-RTO: 0 /100 WBCS — SIGNIFICANT CHANGE UP (ref 0–0)
PHOSPHATE SERPL-MCNC: 3.8 MG/DL — SIGNIFICANT CHANGE UP (ref 2.5–4.5)
PLATELET # BLD AUTO: 278 K/UL — SIGNIFICANT CHANGE UP (ref 150–400)
POTASSIUM SERPL-MCNC: 4.4 MMOL/L — SIGNIFICANT CHANGE UP (ref 3.5–5.3)
POTASSIUM SERPL-SCNC: 4.4 MMOL/L — SIGNIFICANT CHANGE UP (ref 3.5–5.3)
RBC # BLD: 4.08 M/UL — SIGNIFICANT CHANGE UP (ref 3.8–5.2)
RBC # FLD: 15.1 % — HIGH (ref 10.3–14.5)
SODIUM SERPL-SCNC: 137 MMOL/L — SIGNIFICANT CHANGE UP (ref 135–145)
WBC # BLD: 6.83 K/UL — SIGNIFICANT CHANGE UP (ref 3.8–10.5)
WBC # FLD AUTO: 6.83 K/UL — SIGNIFICANT CHANGE UP (ref 3.8–10.5)

## 2025-05-04 PROCEDURE — 99232 SBSQ HOSP IP/OBS MODERATE 35: CPT

## 2025-05-04 RX ADMIN — RIVAROXABAN 20 MILLIGRAM(S): 10 TABLET, FILM COATED ORAL at 17:05

## 2025-05-04 RX ADMIN — Medication 100 MILLIGRAM(S): at 21:25

## 2025-05-04 RX ADMIN — ATORVASTATIN CALCIUM 40 MILLIGRAM(S): 80 TABLET, FILM COATED ORAL at 21:26

## 2025-05-04 RX ADMIN — LISINOPRIL 40 MILLIGRAM(S): 5 TABLET ORAL at 05:26

## 2025-05-04 RX ADMIN — AMLODIPINE BESYLATE 10 MILLIGRAM(S): 10 TABLET ORAL at 05:26

## 2025-05-04 RX ADMIN — Medication 100 MILLIGRAM(S): at 05:29

## 2025-05-04 RX ADMIN — Medication 1 APPLICATION(S): at 17:05

## 2025-05-04 NOTE — PROGRESS NOTE ADULT - PROBLEM SELECTOR PLAN 1
- consider ICM vs sick sinus syndrome vs. amyloid getting worked up for gammopathy outpatient, hx. CKD)  - EKG w/o ST changes, T-wave changes, BBB and troponins wnl. LHC 5/2 non obstructive   - Troponin 13, BNP elevated 982  - TTE 5/1 --> EF 69%, no RWMA    Plan  - EP plan ILR after cMRI   - f/u lyme serology, t.cruzi neg  - f/u serum electrophoresis, immunofixation to evaluate for gammopathy - consider ICM vs sick sinus syndrome vs. amyloid getting worked up for gammopathy outpatient, hx. CKD)  - EKG w/o ST changes, T-wave changes, BBB and troponins wnl. LHC 5/2 non obstructive   - Troponin 13, BNP elevated 982  - TTE 5/1 --> EF 69%, no RWMA    Plan  - EP plan ILR after cMRI   - plan cMRI 05/5  - f/u lyme serology, t.cruzi neg  - f/u serum electrophoresis, immunofixation to evaluate for gammopathy

## 2025-05-04 NOTE — PROGRESS NOTE ADULT - PROBLEM SELECTOR PLAN 2
#Hx. CKD, stage 2. Not on hemodialysis. Etiology unknown. Patient's know risk factors are HTN, T2DM. As mentioned patient also being worked up for gammopathy    Plan  - U/A trace protein  - Electrophoresis w/immunofixation --> JACY Kappa and JACY Lambda elevated, kappa/lambda free light chain wnl

## 2025-05-04 NOTE — PROGRESS NOTE ADULT - SUBJECTIVE AND OBJECTIVE BOX
INTERVAL HPI/OVERNIGHT EVENTS:  Patient was seen and examined at bedside. As per nurse and patient, no o/n events, patient resting comfortably. No complaints at this time.     VITAL SIGNS:  T(F): 98.7 (05-04-25 @ 05:20)  HR: 74 (05-04-25 @ 05:20)  BP: 126/64 (05-04-25 @ 05:20)  RR: 18 (05-04-25 @ 05:20)  SpO2: 97% (05-04-25 @ 05:20)  Wt(kg): --    PHYSICAL EXAM:    GENERAL: NAD, well-groomed, well-developed  ENMT: No tonsillar erythema, exudates, or enlargement; Moist mucous membranes  NECK: No JVD.  HEART: Regular rate and rhythm; No murmurs, rubs, or gallops  RESPIRATORY: CTA B/L, No W/R/R  ABDOMEN: Soft, Nontender, Nondistended; Bowel sounds present  NEUROLOGY: A&Ox3, nonfocal, moving all extremities  EXTREMITIES:  2+ Peripheral Pulses, No clubbing, cyanosis, or edema  SKIN: warm, dry, normal color, no rash or abnormal lesions    MEDICATIONS  (STANDING):  amLODIPine   Tablet 10 milliGRAM(s) Oral daily  atorvastatin 40 milliGRAM(s) Oral at bedtime  chlorhexidine 2% Cloths 1 Application(s) Topical daily  dextrose 5%. 1000 milliLiter(s) (100 mL/Hr) IV Continuous <Continuous>  dextrose 5%. 1000 milliLiter(s) (50 mL/Hr) IV Continuous <Continuous>  dextrose 50% Injectable 25 Gram(s) IV Push once  dextrose 50% Injectable 12.5 Gram(s) IV Push once  dextrose 50% Injectable 25 Gram(s) IV Push once  glucagon  Injectable 1 milliGRAM(s) IntraMuscular once  insulin lispro (ADMELOG) corrective regimen sliding scale   SubCutaneous three times a day before meals  insulin lispro (ADMELOG) corrective regimen sliding scale   SubCutaneous at bedtime  lisinopril 40 milliGRAM(s) Oral daily  rivaroxaban 20 milliGRAM(s) Oral with dinner    MEDICATIONS  (PRN):  acetaminophen     Tablet .. 650 milliGRAM(s) Oral every 6 hours PRN Temp greater or equal to 38C (100.4F), Mild Pain (1 - 3)  benzonatate 100 milliGRAM(s) Oral three times a day PRN Cough  dextrose Oral Gel 15 Gram(s) Oral once PRN Blood Glucose LESS THAN 70 milliGRAM(s)/deciliter  melatonin 3 milliGRAM(s) Oral at bedtime PRN Insomnia  ondansetron Injectable 4 milliGRAM(s) IV Push every 8 hours PRN Nausea and/or Vomiting      Allergies    No Known Allergies    Intolerances        LABS:                        10.0   6.83  )-----------( 278      ( 04 May 2025 06:10 )             32.3     05-03    136  |  107  |  22  ----------------------------<  121[H]  4.1   |  15[L]  |  0.87    Ca    8.3[L]      03 May 2025 06:10  Phos  3.6     05-03  Mg     1.70     05-03    TPro  6.7  /  Alb  3.3  /  TBili  0.2  /  DBili  x   /  AST  25  /  ALT  23  /  AlkPhos  103  05-03    PT/INR - ( 02 May 2025 10:00 )   PT: 12.6 sec;   INR: 1.09 ratio         PTT - ( 02 May 2025 10:00 )  PTT:30.8 sec  Urinalysis Basic - ( 03 May 2025 06:10 )    Color: x / Appearance: x / SG: x / pH: x  Gluc: 121 mg/dL / Ketone: x  / Bili: x / Urobili: x   Blood: x / Protein: x / Nitrite: x   Leuk Esterase: x / RBC: x / WBC x   Sq Epi: x / Non Sq Epi: x / Bacteria: x        RADIOLOGY & ADDITIONAL TESTS:  Reviewed INTERVAL HPI/OVERNIGHT EVENTS:  Patient was seen and examined at bedside. As per nurse and patient, no o/n events, patient resting comfortably. No complaints at this time. Endorses mild cough, congestion. Denies CP, SOB, palpitations.     VITAL SIGNS:  T(F): 98.7 (05-04-25 @ 05:20)  HR: 74 (05-04-25 @ 05:20)  BP: 126/64 (05-04-25 @ 05:20)  RR: 18 (05-04-25 @ 05:20)  SpO2: 97% (05-04-25 @ 05:20)  Wt(kg): --    PHYSICAL EXAM:    GENERAL: NAD, well-groomed, well-developed  ENMT: No tonsillar erythema, exudates, or enlargement; Moist mucous membranes  NECK: No JVD.  HEART: Regular rate and rhythm; No murmurs, rubs, or gallops  RESPIRATORY: CTA B/L, No W/R/R  ABDOMEN: Soft, Nontender, Nondistended; Bowel sounds present  NEUROLOGY: A&Ox3, nonfocal, moving all extremities  EXTREMITIES:  2+ Peripheral Pulses, No clubbing, cyanosis, or edema  SKIN: warm, dry, normal color, no rash or abnormal lesions    MEDICATIONS  (STANDING):  amLODIPine   Tablet 10 milliGRAM(s) Oral daily  atorvastatin 40 milliGRAM(s) Oral at bedtime  chlorhexidine 2% Cloths 1 Application(s) Topical daily  dextrose 5%. 1000 milliLiter(s) (100 mL/Hr) IV Continuous <Continuous>  dextrose 5%. 1000 milliLiter(s) (50 mL/Hr) IV Continuous <Continuous>  dextrose 50% Injectable 25 Gram(s) IV Push once  dextrose 50% Injectable 12.5 Gram(s) IV Push once  dextrose 50% Injectable 25 Gram(s) IV Push once  glucagon  Injectable 1 milliGRAM(s) IntraMuscular once  insulin lispro (ADMELOG) corrective regimen sliding scale   SubCutaneous three times a day before meals  insulin lispro (ADMELOG) corrective regimen sliding scale   SubCutaneous at bedtime  lisinopril 40 milliGRAM(s) Oral daily  rivaroxaban 20 milliGRAM(s) Oral with dinner    MEDICATIONS  (PRN):  acetaminophen     Tablet .. 650 milliGRAM(s) Oral every 6 hours PRN Temp greater or equal to 38C (100.4F), Mild Pain (1 - 3)  benzonatate 100 milliGRAM(s) Oral three times a day PRN Cough  dextrose Oral Gel 15 Gram(s) Oral once PRN Blood Glucose LESS THAN 70 milliGRAM(s)/deciliter  melatonin 3 milliGRAM(s) Oral at bedtime PRN Insomnia  ondansetron Injectable 4 milliGRAM(s) IV Push every 8 hours PRN Nausea and/or Vomiting      Allergies    No Known Allergies    Intolerances        LABS:                         10.0   6.83  )-----------( 278      ( 04 May 2025 06:10 )             32.3     05-04    137  |  109[H]  |  18  ----------------------------<  119[H]  4.4   |  16[L]  |  0.88    Ca    8.2[L]      04 May 2025 06:10  Phos  3.8     05-04  Mg     1.90     05-04    TPro  6.7  /  Alb  3.3  /  TBili  0.2  /  DBili  x   /  AST  25  /  ALT  23  /  AlkPhos  103  05-03      Urinalysis Basic - ( 04 May 2025 06:10 )    Color: x / Appearance: x / SG: x / pH: x  Gluc: 119 mg/dL / Ketone: x  / Bili: x / Urobili: x   Blood: x / Protein: x / Nitrite: x   Leuk Esterase: x / RBC: x / WBC x   Sq Epi: x / Non Sq Epi: x / Bacteria: x                RADIOLOGY, EKG & ADDITIONAL TESTS:

## 2025-05-04 NOTE — PROGRESS NOTE ADULT - PROBLEM SELECTOR PLAN 6
DVT PPx: Xarelto 20mg with dinner   Diet: DASH/CC   PT/OT: No skilled PT needs   Code Status: Full  Dispo: pending medical improvement

## 2025-05-04 NOTE — PROGRESS NOTE ADULT - ASSESSMENT
65 y/o Icelandic-speaking female CKD, R. SMA stenosis s/p bypass 2023 (on xarelto) presenting with intermittent dizziness, palpitations, SOB for 1 day. Found to have sinus/junctional bradycardia with HR ranging from 30s to 50s. C/f sick sinus vs amyloidopathy s/p LHC non-obstructive now pending cMRI and ILR placement.

## 2025-05-05 LAB
ANION GAP SERPL CALC-SCNC: 12 MMOL/L — SIGNIFICANT CHANGE UP (ref 7–14)
B BURGDOR DNA SPEC QL NAA+PROBE: NEGATIVE — SIGNIFICANT CHANGE UP
BASOPHILS # BLD AUTO: 0.06 K/UL — SIGNIFICANT CHANGE UP (ref 0–0.2)
BASOPHILS NFR BLD AUTO: 0.9 % — SIGNIFICANT CHANGE UP (ref 0–2)
BUN SERPL-MCNC: 19 MG/DL — SIGNIFICANT CHANGE UP (ref 7–23)
CALCIUM SERPL-MCNC: 8.4 MG/DL — SIGNIFICANT CHANGE UP (ref 8.4–10.5)
CHLORIDE SERPL-SCNC: 107 MMOL/L — SIGNIFICANT CHANGE UP (ref 98–107)
CO2 SERPL-SCNC: 16 MMOL/L — LOW (ref 22–31)
CREAT SERPL-MCNC: 0.86 MG/DL — SIGNIFICANT CHANGE UP (ref 0.5–1.3)
EGFR: 74 ML/MIN/1.73M2 — SIGNIFICANT CHANGE UP
EGFR: 74 ML/MIN/1.73M2 — SIGNIFICANT CHANGE UP
EOSINOPHIL # BLD AUTO: 0.65 K/UL — HIGH (ref 0–0.5)
EOSINOPHIL NFR BLD AUTO: 9.4 % — HIGH (ref 0–6)
GLUCOSE BLDC GLUCOMTR-MCNC: 118 MG/DL — HIGH (ref 70–99)
GLUCOSE BLDC GLUCOMTR-MCNC: 139 MG/DL — HIGH (ref 70–99)
GLUCOSE BLDC GLUCOMTR-MCNC: 160 MG/DL — HIGH (ref 70–99)
GLUCOSE SERPL-MCNC: 112 MG/DL — HIGH (ref 70–99)
HCT VFR BLD CALC: 33.4 % — LOW (ref 34.5–45)
HGB BLD-MCNC: 10.5 G/DL — LOW (ref 11.5–15.5)
IANC: 3.77 K/UL — SIGNIFICANT CHANGE UP (ref 1.8–7.4)
IMM GRANULOCYTES NFR BLD AUTO: 0.1 % — SIGNIFICANT CHANGE UP (ref 0–0.9)
LYMPHOCYTES # BLD AUTO: 1.82 K/UL — SIGNIFICANT CHANGE UP (ref 1–3.3)
LYMPHOCYTES # BLD AUTO: 26.3 % — SIGNIFICANT CHANGE UP (ref 13–44)
MAGNESIUM SERPL-MCNC: 1.8 MG/DL — SIGNIFICANT CHANGE UP (ref 1.6–2.6)
MCHC RBC-ENTMCNC: 24.8 PG — LOW (ref 27–34)
MCHC RBC-ENTMCNC: 31.4 G/DL — LOW (ref 32–36)
MCV RBC AUTO: 78.8 FL — LOW (ref 80–100)
MONOCYTES # BLD AUTO: 0.61 K/UL — SIGNIFICANT CHANGE UP (ref 0–0.9)
MONOCYTES NFR BLD AUTO: 8.8 % — SIGNIFICANT CHANGE UP (ref 2–14)
NEUTROPHILS # BLD AUTO: 3.77 K/UL — SIGNIFICANT CHANGE UP (ref 1.8–7.4)
NEUTROPHILS NFR BLD AUTO: 54.5 % — SIGNIFICANT CHANGE UP (ref 43–77)
NRBC # BLD AUTO: 0 K/UL — SIGNIFICANT CHANGE UP (ref 0–0)
NRBC # FLD: 0 K/UL — SIGNIFICANT CHANGE UP (ref 0–0)
NRBC BLD AUTO-RTO: 0 /100 WBCS — SIGNIFICANT CHANGE UP (ref 0–0)
PHOSPHATE SERPL-MCNC: 4 MG/DL — SIGNIFICANT CHANGE UP (ref 2.5–4.5)
PLATELET # BLD AUTO: 268 K/UL — SIGNIFICANT CHANGE UP (ref 150–400)
POTASSIUM SERPL-MCNC: 4.3 MMOL/L — SIGNIFICANT CHANGE UP (ref 3.5–5.3)
POTASSIUM SERPL-SCNC: 4.3 MMOL/L — SIGNIFICANT CHANGE UP (ref 3.5–5.3)
RBC # BLD: 4.24 M/UL — SIGNIFICANT CHANGE UP (ref 3.8–5.2)
RBC # FLD: 15 % — HIGH (ref 10.3–14.5)
SODIUM SERPL-SCNC: 135 MMOL/L — SIGNIFICANT CHANGE UP (ref 135–145)
WBC # BLD: 6.92 K/UL — SIGNIFICANT CHANGE UP (ref 3.8–10.5)
WBC # FLD AUTO: 6.92 K/UL — SIGNIFICANT CHANGE UP (ref 3.8–10.5)

## 2025-05-05 PROCEDURE — 99231 SBSQ HOSP IP/OBS SF/LOW 25: CPT

## 2025-05-05 PROCEDURE — 75561 CARDIAC MRI FOR MORPH W/DYE: CPT | Mod: 26

## 2025-05-05 PROCEDURE — 99232 SBSQ HOSP IP/OBS MODERATE 35: CPT

## 2025-05-05 RX ADMIN — RIVAROXABAN 20 MILLIGRAM(S): 10 TABLET, FILM COATED ORAL at 18:21

## 2025-05-05 RX ADMIN — LISINOPRIL 40 MILLIGRAM(S): 5 TABLET ORAL at 05:17

## 2025-05-05 RX ADMIN — AMLODIPINE BESYLATE 10 MILLIGRAM(S): 10 TABLET ORAL at 05:17

## 2025-05-05 RX ADMIN — ATORVASTATIN CALCIUM 40 MILLIGRAM(S): 80 TABLET, FILM COATED ORAL at 21:43

## 2025-05-05 RX ADMIN — Medication 100 MILLIGRAM(S): at 21:43

## 2025-05-05 RX ADMIN — Medication 1 APPLICATION(S): at 18:22

## 2025-05-05 NOTE — PROGRESS NOTE ADULT - PROBLEM SELECTOR PLAN 1
- consider ICM vs sick sinus syndrome vs. amyloid getting worked up for gammopathy outpatient, hx. CKD)  - EKG w/o ST changes, T-wave changes, BBB and troponins wnl. LHC 5/2 non obstructive   - Troponin 13, BNP elevated 982  - TTE 5/1 --> EF 69%, no RWMA    Plan  - EP plan ILR after cMRI   - plan cMRI 05/5  - f/u lyme serology, t.cruzi neg  - f/u serum electrophoresis, immunofixation to evaluate for gammopathy

## 2025-05-05 NOTE — PROGRESS NOTE ADULT - SUBJECTIVE AND OBJECTIVE BOX
Winston House MD  Interventional Cardiology / Endovascular Specialist  [place] Office : [address] [place] [address] [place] [address]  Tel:   [place] Office : [phone] [facility] [place] [address]  Tel: [phone]    Patient laying in bed, NAD  	  MEDICATIONS:  amLODIPine   Tablet 10 milliGRAM(s) Oral daily  lisinopril 40 milliGRAM(s) Oral daily  rivaroxaban 20 milliGRAM(s) Oral with dinner      benzonatate 100 milliGRAM(s) Oral three times a day PRN    acetaminophen     Tablet .. 650 milliGRAM(s) Oral every 6 hours PRN  melatonin 3 milliGRAM(s) Oral at bedtime PRN  ondansetron Injectable 4 milliGRAM(s) IV Push every 8 hours PRN      atorvastatin 40 milliGRAM(s) Oral at bedtime  dextrose 50% Injectable 25 Gram(s) IV Push once  dextrose 50% Injectable 12.5 Gram(s) IV Push once  dextrose 50% Injectable 25 Gram(s) IV Push once  dextrose Oral Gel 15 Gram(s) Oral once PRN  glucagon  Injectable 1 milliGRAM(s) IntraMuscular once  insulin lispro (ADMELOG) corrective regimen sliding scale   SubCutaneous three times a day before meals  insulin lispro (ADMELOG) corrective regimen sliding scale   SubCutaneous at bedtime    chlorhexidine 2% Cloths 1 Application(s) Topical daily  dextrose 5%. 1000 milliLiter(s) IV Continuous <Continuous>  dextrose 5%. 1000 milliLiter(s) IV Continuous <Continuous>      PAST MEDICAL/SURGICAL HISTORY  PAST MEDICAL & SURGICAL HISTORY:  Positive H. pylori test      Status post vascular bypass          SOCIAL HISTORY: Substance Use (street drugs): ( x ) never used  (  ) other:    FAMILY HISTORY:      PHYSICAL EXAM:  T(C): 36.9 (05-05-25 @ 05:14), Max: 36.9 (05-05-25 @ 05:14)  HR: 65 (05-05-25 @ 05:14) (56 - 84)  BP: 123/61 (05-05-25 @ 05:14) (106/62 - 123/66)  RR: 18 (05-05-25 @ 05:14) (16 - 18)  SpO2: 98% (05-05-25 @ 05:14) (97% - 99%)  Wt(kg): --  I&O's Summary      GENERAL: NAD  EYES: EOMI  Cardiovascular: Normal S1 S2, No JVD, No murmurs, No edema  Respiratory: Lungs clear to auscultation	  Gastrointestinal:  Soft, Non-tender, + BS	  Extremities: Normal range of motion, No clubbing, cyanosis or edema  NERVOUS SYSTEM:  Alert & Oriented X3                            10.5   6.92  )-----------( 268      ( 05 May 2025 07:09 )             33.4     05-05    135  |  107  |  19  ----------------------------<  112[H]  4.3   |  16[L]  |  0.86    Ca    8.4      05 May 2025 07:09  Phos  4.0     05-05  Mg     1.80     05-05      proBNP:   Lipid Profile:   HgA1c:   TSH:     Consultant(s) Notes Reviewed:  [x ] YES  [ ] NO    Care Discussed with Consultants/Other Providers [ x] YES  [ ] NO    Imaging Personally Reviewed independently:  [x] YES  [ ] NO    All labs, radiologic studies, vitals, orders and medications list reviewed. Patient is seen and examined at bedside. Case discussed with medical team.

## 2025-05-05 NOTE — PROGRESS NOTE ADULT - ASSESSMENT
64 year old Hebrew speaking female with a PMH of R DEBI/SMA stenosis s/p vascular bypass with stent on 10/20/2023-maintained on Xarelto, HTN, HLD, fatty liver who presented to ED with c/o intermittent "dizziness" started last night.  She was found to be in sinus marci with arrythmia with HR in low 40's mostly.  Occasionally marci down to 35-39 bpm transiently.  Patient endorses "felt nauseous " associated with dizziness last night.  Presently denies CP/SOB or syncope or dizziness.  She has been on bowel prep for colonoscopy scheduled this Saturday.  Recently underwent a stress test for pre- colonoscopy clearance with "normal result" per son.  Patient appears hemodynamically stable.  LHC done with mild coronary diseases noted.    TTE normal LVEF, mod dilated LA, no wall thickness vs wall motion  abnormality     symptomatic sinus bradycardia with arrhythmia/junctional marci resolved now, chronotropic competence noted    Plan for an implantable loop recorder after CMR to r/o cardiac amyloidosis   -Obtain EKG if not done, closely monitor for symptomatic marci <40, consider administer Atropine 1mg  -negative  lyme titer, serum titer for chagas pending   -Avoid AV gabino agent  -No acute pacing indicated

## 2025-05-05 NOTE — PROGRESS NOTE ADULT - ASSESSMENT
65 y/o [place]-speaking female CKD, R. SMA stenosis s/p bypass 2023 (on xarelto) presenting with intermittent dizziness, palpitations, SOB for 1 day. Found to have sinus/junctional bradycardia with HR ranging from 30s to 50s. C/f sick sinus vs amyloidopathy s/p LHC non-obstructive now pending cMRI and ILR placement.

## 2025-05-05 NOTE — PROGRESS NOTE ADULT - SUBJECTIVE AND OBJECTIVE BOX
Patient is a 64y old  Female who presents with a chief complaint of Sinus Bradycardia (05 May 2025 07:52)     ID: [id number]    Denies lightheadedness or dizziness associated with sinus pauses during sleep hours, tele:  HR 60-90's bpm, no tachy seen (falsely recorded tachy)  PAST MEDICAL & SURGICAL HISTORY:  H. pylori infection    Positive H. pylori test    Status post vascular bypass        MEDICATIONS  (STANDING):  amLODIPine   Tablet 10 milliGRAM(s) Oral daily  atorvastatin 40 milliGRAM(s) Oral at bedtime  chlorhexidine 2% Cloths 1 Application(s) Topical daily  dextrose 5%. 1000 milliLiter(s) (100 mL/Hr) IV Continuous <Continuous>  dextrose 5%. 1000 milliLiter(s) (50 mL/Hr) IV Continuous <Continuous>  dextrose 50% Injectable 25 Gram(s) IV Push once  dextrose 50% Injectable 12.5 Gram(s) IV Push once  dextrose 50% Injectable 25 Gram(s) IV Push once  glucagon  Injectable 1 milliGRAM(s) IntraMuscular once  insulin lispro (ADMELOG) corrective regimen sliding scale   SubCutaneous three times a day before meals  insulin lispro (ADMELOG) corrective regimen sliding scale   SubCutaneous at bedtime  lisinopril 40 milliGRAM(s) Oral daily  rivaroxaban 20 milliGRAM(s) Oral with dinner    MEDICATIONS  (PRN):  acetaminophen     Tablet .. 650 milliGRAM(s) Oral every 6 hours PRN Temp greater or equal to 38C (100.4F), Mild Pain (1 - 3)  benzonatate 100 milliGRAM(s) Oral three times a day PRN Cough  dextrose Oral Gel 15 Gram(s) Oral once PRN Blood Glucose LESS THAN 70 milliGRAM(s)/deciliter  melatonin 3 milliGRAM(s) Oral at bedtime PRN Insomnia  ondansetron Injectable 4 milliGRAM(s) IV Push every 8 hours PRN Nausea and/or Vomiting            Vital Signs Last 24 Hrs  T(C): 36.9 (05 May 2025 05:14), Max: 36.9 (05 May 2025 05:14)  T(F): 98.4 (05 May 2025 05:14), Max: 98.4 (05 May 2025 05:14)  HR: 65 (05 May 2025 05:14) (56 - 84)  BP: 123/61 (05 May 2025 05:14) (106/62 - 123/66)  BP(mean): --  RR: 18 (05 May 2025 05:14) (16 - 18)  SpO2: 98% (05 May 2025 05:14) (97% - 99%)    Parameters below as of 05 May 2025 05:14  Patient On (Oxygen Delivery Method): room air                INTERPRETATION OF TELEMETRY:  SR 60-90's bpm with few sinus pauses up  to 2.3 seconds noted during sleep    ECG:        LABS:                        10.5   6.92  )-----------( 268      ( 05 May 2025 07:09 )             33.4     05-05    135  |  107  |  19  ----------------------------<  112[H]  4.3   |  16[L]  |  0.86    Ca    8.4      05 May 2025 07:09  Phos  4.0     05-05  Mg     1.80     05-05  JACY Kappa: 4.15 mg/dL (05.02.25 @ 05:35)  JACY Lambda: 3.12 mg/dL (05.02.25 @ 05:35)                  Urinalysis Basic - ( 05 May 2025 07:09 )    Color: x / Appearance: x / SG: x / pH: x  Gluc: 112 mg/dL / Ketone: x  / Bili: x / Urobili: x   Blood: x / Protein: x / Nitrite: x   Leuk Esterase: x / RBC: x / WBC x   Sq Epi: x / Non Sq Epi: x / Bacteria: x        BNP  RADIOLOGY & ADDITIONAL STUDIES:    CONCLUSIONS:      1. Left ventricular cavity is normal in size. Left ventricular wall thickness is normal. Left ventricular systolic function is normal with an ejection fraction of 69 % by 3D. There are no regional wall motion abnormalities seen.   2. Normal right ventricular cavity size and probably normal right ventricular systolic function. Tricuspid annular plane systolic excursion (TAPSE) is 1.8 cm (normal >=1.7 cm).   3. Structurally normal mitral valve with normal leaflet excursion. There is calcification of the mitral valve annulus. There is trace mitral regurgitation.   4. The left atrium is moderately dilated with an indexed volume of 43.88 ml/m².   5. Structurally normal tricuspid valve with normal leaflet excursion. There is mild tricuspid regurgitation. Estimated pulmonary artery systolic pressure is 42 mmHg, consistent with mild pulmonary hypertension.        PHYSICAL EXAM:    GENERAL: In no apparent distress, well nourished, and hydrated.  NECK: Supple and normal thyroid.  No JVD or carotid bruit.  Carotid pulse is 2+ bilaterally.  HEART: Regular rate and rhythm; No murmurs, rubs, or gallops.  PULMONARY: Clear to auscultation and perfusion.  No rales, wheezing, or rhonchi bilaterally.  ABDOMEN: Soft, Nontender, Nondistended; Bowel sounds present  EXTREMITIES:  2+ Peripheral Pulses, No clubbing, cyanosis, or edema  NEUROLOGICAL: alert oriented x4 speech clear no focal deficit noted

## 2025-05-06 ENCOUNTER — TRANSCRIPTION ENCOUNTER (OUTPATIENT)
Age: 65
End: 2025-05-06

## 2025-05-06 VITALS
OXYGEN SATURATION: 100 % | HEART RATE: 68 BPM | TEMPERATURE: 98 F | DIASTOLIC BLOOD PRESSURE: 52 MMHG | SYSTOLIC BLOOD PRESSURE: 107 MMHG | RESPIRATION RATE: 17 BRPM

## 2025-05-06 LAB
ANION GAP SERPL CALC-SCNC: 13 MMOL/L — SIGNIFICANT CHANGE UP (ref 7–14)
BASOPHILS # BLD AUTO: 0.05 K/UL — SIGNIFICANT CHANGE UP (ref 0–0.2)
BASOPHILS NFR BLD AUTO: 0.8 % — SIGNIFICANT CHANGE UP (ref 0–2)
BUN SERPL-MCNC: 21 MG/DL — SIGNIFICANT CHANGE UP (ref 7–23)
CALCIUM SERPL-MCNC: 8.6 MG/DL — SIGNIFICANT CHANGE UP (ref 8.4–10.5)
CHLORIDE SERPL-SCNC: 109 MMOL/L — HIGH (ref 98–107)
CO2 SERPL-SCNC: 15 MMOL/L — LOW (ref 22–31)
CREAT SERPL-MCNC: 0.84 MG/DL — SIGNIFICANT CHANGE UP (ref 0.5–1.3)
EGFR: 77 ML/MIN/1.73M2 — SIGNIFICANT CHANGE UP
EGFR: 77 ML/MIN/1.73M2 — SIGNIFICANT CHANGE UP
EOSINOPHIL # BLD AUTO: 0.71 K/UL — HIGH (ref 0–0.5)
EOSINOPHIL NFR BLD AUTO: 11.7 % — HIGH (ref 0–6)
GLUCOSE BLDC GLUCOMTR-MCNC: 115 MG/DL — HIGH (ref 70–99)
GLUCOSE BLDC GLUCOMTR-MCNC: 120 MG/DL — HIGH (ref 70–99)
GLUCOSE SERPL-MCNC: 124 MG/DL — HIGH (ref 70–99)
HCT VFR BLD CALC: 32.9 % — LOW (ref 34.5–45)
HGB BLD-MCNC: 10.2 G/DL — LOW (ref 11.5–15.5)
IANC: 2.58 K/UL — SIGNIFICANT CHANGE UP (ref 1.8–7.4)
IMM GRANULOCYTES NFR BLD AUTO: 0.2 % — SIGNIFICANT CHANGE UP (ref 0–0.9)
LYMPHOCYTES # BLD AUTO: 2.11 K/UL — SIGNIFICANT CHANGE UP (ref 1–3.3)
LYMPHOCYTES # BLD AUTO: 34.8 % — SIGNIFICANT CHANGE UP (ref 13–44)
MAGNESIUM SERPL-MCNC: 1.9 MG/DL — SIGNIFICANT CHANGE UP (ref 1.6–2.6)
MCHC RBC-ENTMCNC: 24.3 PG — LOW (ref 27–34)
MCHC RBC-ENTMCNC: 31 G/DL — LOW (ref 32–36)
MCV RBC AUTO: 78.5 FL — LOW (ref 80–100)
MONOCYTES # BLD AUTO: 0.6 K/UL — SIGNIFICANT CHANGE UP (ref 0–0.9)
MONOCYTES NFR BLD AUTO: 9.9 % — SIGNIFICANT CHANGE UP (ref 2–14)
NEUTROPHILS # BLD AUTO: 2.58 K/UL — SIGNIFICANT CHANGE UP (ref 1.8–7.4)
NEUTROPHILS NFR BLD AUTO: 42.6 % — LOW (ref 43–77)
NRBC # BLD AUTO: 0 K/UL — SIGNIFICANT CHANGE UP (ref 0–0)
NRBC # FLD: 0 K/UL — SIGNIFICANT CHANGE UP (ref 0–0)
NRBC BLD AUTO-RTO: 0 /100 WBCS — SIGNIFICANT CHANGE UP (ref 0–0)
PHOSPHATE SERPL-MCNC: 4 MG/DL — SIGNIFICANT CHANGE UP (ref 2.5–4.5)
PLATELET # BLD AUTO: 299 K/UL — SIGNIFICANT CHANGE UP (ref 150–400)
POTASSIUM SERPL-MCNC: 4.8 MMOL/L — SIGNIFICANT CHANGE UP (ref 3.5–5.3)
POTASSIUM SERPL-SCNC: 4.8 MMOL/L — SIGNIFICANT CHANGE UP (ref 3.5–5.3)
RBC # BLD: 4.19 M/UL — SIGNIFICANT CHANGE UP (ref 3.8–5.2)
RBC # FLD: 14.9 % — HIGH (ref 10.3–14.5)
SODIUM SERPL-SCNC: 137 MMOL/L — SIGNIFICANT CHANGE UP (ref 135–145)
WBC # BLD: 6.06 K/UL — SIGNIFICANT CHANGE UP (ref 3.8–10.5)
WBC # FLD AUTO: 6.06 K/UL — SIGNIFICANT CHANGE UP (ref 3.8–10.5)

## 2025-05-06 PROCEDURE — 99239 HOSP IP/OBS DSCHRG MGMT >30: CPT | Mod: GC

## 2025-05-06 PROCEDURE — 99231 SBSQ HOSP IP/OBS SF/LOW 25: CPT

## 2025-05-06 PROCEDURE — 33285 INSJ SUBQ CAR RHYTHM MNTR: CPT

## 2025-05-06 RX ADMIN — AMLODIPINE BESYLATE 10 MILLIGRAM(S): 10 TABLET ORAL at 05:35

## 2025-05-06 RX ADMIN — LISINOPRIL 40 MILLIGRAM(S): 5 TABLET ORAL at 05:34

## 2025-05-06 NOTE — PROGRESS NOTE ADULT - ASSESSMENT
EKG - not in chart   Echo - EF normal   [facility] - LM normal LAD prox 40%, LCX normal RCA luminal     cMRI 5//5/25  IMPRESSION:  Normal biventricular size and function. Nonischemic late gadolinium   enhancementpattern      a/p   65 y/o Maori-speaking female CKD, R. SMA stenosis s/p bypass 2023 (on xarelto) presenting with intermittent dizziness, palpitations, SOB for 1 day. Found to have sinus/junctional bradycardia with HR ranging from 30s to 50s. C/f sick sinus vs amyloidopathy s/p [facility] non-obstructive now pending cMRI and ILR placement.     1) SOB - cath non obstructive, echo shows normal LV   cMRI nonischemic    2) Bradycardia - pt has chronotropic competence, for loop recorder with EP today    3) SMA stenosis - s/p bypass on xarelto      EKG - not in chart   Echo - EF normal   [facility] - LM normal LAD prox 40%, LCX normal RCA luminal     cMRI 5//5/25  IMPRESSION:  Normal biventricular size and function. Nonischemic late gadolinium   enhancementpattern      a/p   65 y/o Nepali-speaking female CKD, R. SMA stenosis s/p bypass 2023 (on xarelto) presenting with intermittent dizziness, palpitations, SOB for 1 day. Found to have sinus/junctional bradycardia with HR ranging from 30s to 50s. C/f sick sinus vs amyloidopathy s/p [facility] non-obstructive now pending cMRI and ILR placement.     1) SOB - cath non obstructive, echo shows normal LV   cMRI nonischemic op work up for Amyloid CM     2) Bradycardia - pt has chronotropic competence, for loop recorder with EP today    3) SMA stenosis - s/p bypass on xarelto

## 2025-05-06 NOTE — DISCHARGE NOTE NURSING/CASE MANAGEMENT/SOCIAL WORK - FINANCIAL ASSISTANCE
[facility] provides services at a reduced cost to those who are determined to be eligible through [facility]’s financial assistance program. Information regarding [facility]’s financial assistance program can be found by going to [email or web address] or by calling [phone].

## 2025-05-06 NOTE — DISCHARGE NOTE PROVIDER - NSDCFUADDAPPT_GEN_ALL_CORE_FT
APPTS ARE READY TO BE MADE: [X] YES    Best Family or Patient Contact (if needed):    Additional Information about above appointments (if needed):    1: Cardiology within 2 weeks  2: Electrophysiology   3: PCP    Other comments or requests:    APPTS ARE READY TO BE MADE: [X] YES    Best Family or Patient Contact (if needed):    Additional Information about above appointments (if needed):    1: Cardiology within 2 weeks  2: Electrophysiology   3: PCP    Other comments or requests:   Patient was outreached but did not answer nor could a voicemail be left. APPTS ARE READY TO BE MADE: [X] YES    Best Family or Patient Contact (if needed):    Additional Information about above appointments (if needed):    1: Cardiology within 2 weeks  2: Electrophysiology   3: PCP    Other comments or requests:   Patient was outreached but did not answer. A voicemail was left for the patient to return our call.  Prior to outreaching the patient, it was visible that the patient has secured a follow up appointment which was not scheduled by our team.Electrophysiology Dr. Ochoa on 5/20 at 8:30 am APPTS ARE READY TO BE MADE: [X] YES    Best Family or Patient Contact (if needed):    Additional Information about above appointments (if needed):    1: Cardiology within 2 weeks  2: Electrophysiology   3: PCP    Other comments or requests:     Patient informed us they already have secured a follow up appointment which is not visible on [facility] and declined to provide appointment details.     Prior to outreaching the patient, it was visible that the patient has secured a follow up appointment which was not scheduled by our team.Electrophysiology Dr. Ochoa on 5/20 at 8:30 am

## 2025-05-06 NOTE — PROGRESS NOTE ADULT - PROBLEM SELECTOR PLAN 6
DVT PPx: Xarelto 20mg with dinner   Diet: DASH/CC   PT/OT: No skilled PT needs   Code Status: Full  Dispo: dc home pending ILR

## 2025-05-06 NOTE — DISCHARGE NOTE PROVIDER - NSDCMRMEDTOKEN_GEN_ALL_CORE_FT
acetaminophen 500 mg oral tablet: 2 tab(s) orally every 6 hours  amLODIPine 10 mg oral tablet: 1 tab(s) orally once a day  aspirin 81 mg oral delayed release tablet: 1 tab(s) orally once a day  atorvastatin 40 mg oral tablet: 1 tab(s) orally once a day  lisinopril 40 mg oral tablet: 1 tab(s) orally once a day  Rezdiffra 60 mg oral tablet: 1 tab(s) orally once a day  rivaroxaban 20 mg oral tablet: 1 tab(s) orally once a day

## 2025-05-06 NOTE — PROGRESS NOTE ADULT - SUBJECTIVE AND OBJECTIVE BOX
Winston House MD  Interventional Cardiology / Endovascular Specialist  [place] Office : [phone] [place] [address] [place] [address]  Tel:   [place] Office : [phone] [facility] [place] [address]  Tel: [phone]    Patient laying in bed, NAD    	  MEDICATIONS:  amLODIPine   Tablet 10 milliGRAM(s) Oral daily  lisinopril 40 milliGRAM(s) Oral daily  rivaroxaban 20 milliGRAM(s) Oral with dinner      benzonatate 100 milliGRAM(s) Oral three times a day PRN    acetaminophen     Tablet .. 650 milliGRAM(s) Oral every 6 hours PRN  melatonin 3 milliGRAM(s) Oral at bedtime PRN  ondansetron Injectable 4 milliGRAM(s) IV Push every 8 hours PRN      atorvastatin 40 milliGRAM(s) Oral at bedtime  dextrose 50% Injectable 25 Gram(s) IV Push once  dextrose 50% Injectable 12.5 Gram(s) IV Push once  dextrose 50% Injectable 25 Gram(s) IV Push once  dextrose Oral Gel 15 Gram(s) Oral once PRN  glucagon  Injectable 1 milliGRAM(s) IntraMuscular once  insulin lispro (ADMELOG) corrective regimen sliding scale   SubCutaneous three times a day before meals  insulin lispro (ADMELOG) corrective regimen sliding scale   SubCutaneous at bedtime    chlorhexidine 2% Cloths 1 Application(s) Topical daily  dextrose 5%. 1000 milliLiter(s) IV Continuous <Continuous>  dextrose 5%. 1000 milliLiter(s) IV Continuous <Continuous>      PAST MEDICAL/SURGICAL HISTORY  PAST MEDICAL & SURGICAL HISTORY:  Positive H. pylori test      Status post vascular bypass          SOCIAL HISTORY: Substance Use (street drugs): ( x ) never used  (  ) other:    FAMILY HISTORY:      PHYSICAL EXAM:  T(C): 36.7 (05-06-25 @ 12:35), Max: 37.2 (05-06-25 @ 07:15)  HR: 68 (05-06-25 @ 12:35) (64 - 79)  BP: 107/52 (05-06-25 @ 12:35) (107/52 - 120/69)  RR: 17 (05-06-25 @ 12:35) (16 - 20)  SpO2: 100% (05-06-25 @ 12:35) (98% - 100%)  Wt(kg): --  I&O's Summary    Height (cm): 147 (05-06 @ 07:15)  Weight (kg): 66.5 (05-06 @ 07:15)  BMI (kg/m2): 30.8 (05-06 @ 07:15)  BSA (m2): 1.59 (05-06 @ 07:15)      GENERAL: NAD  EYES: EOMI  Cardiovascular: Normal S1 S2, No JVD, No murmurs, No edema  Respiratory: Lungs clear to auscultation	  Gastrointestinal:  Soft, Non-tender, + BS	  Extremities: Normal range of motion, No clubbing, cyanosis or edema  NERVOUS SYSTEM:  Alert & Oriented X3                            10.2   6.06  )-----------( 299      ( 06 May 2025 05:38 )             32.9     05-06    137  |  109[H]  |  21  ----------------------------<  124[H]  4.8   |  15[L]  |  0.84    Ca    8.6      06 May 2025 05:38  Phos  4.0     05-06  Mg     1.90     05-06      proBNP:   Lipid Profile:   HgA1c:   TSH:     Consultant(s) Notes Reviewed:  [x ] YES  [ ] NO    Care Discussed with Consultants/Other Providers [ x] YES  [ ] NO    Imaging Personally Reviewed independently:  [x] YES  [ ] NO    All labs, radiologic studies, vitals, orders and medications list reviewed. Patient is seen and examined at bedside. Case discussed with medical team.

## 2025-05-06 NOTE — DISCHARGE NOTE NURSING/CASE MANAGEMENT/SOCIAL WORK - NSDCPEFALRISK_GEN_ALL_CORE
For information on Fall & Injury Prevention, visit: [email or web address] OR  [email or web address] OR  https://www.cdc.gov/steadi/patient.html

## 2025-05-06 NOTE — PROGRESS NOTE ADULT - PROBLEM SELECTOR PROBLEM 6
Completed form received and faxed to [phone]      Atul Watson      
Need for prophylactic measure

## 2025-05-06 NOTE — PROGRESS NOTE ADULT - SUBJECTIVE AND OBJECTIVE BOX
Patient is a 64y old  Female who presents with a chief complaint of Sinus Bradycardia (06 May 2025 08:42)  Patient requests her daughter to translate denies CP, SOB or palpitations     PAST MEDICAL & SURGICAL HISTORY:  H. pylori infection    Positive H. pylori test    Status post vascular bypass        MEDICATIONS  (STANDING):  amLODIPine   Tablet 10 milliGRAM(s) Oral daily  atorvastatin 40 milliGRAM(s) Oral at bedtime  chlorhexidine 2% Cloths 1 Application(s) Topical daily  dextrose 5%. 1000 milliLiter(s) (100 mL/Hr) IV Continuous <Continuous>  dextrose 5%. 1000 milliLiter(s) (50 mL/Hr) IV Continuous <Continuous>  dextrose 50% Injectable 25 Gram(s) IV Push once  dextrose 50% Injectable 12.5 Gram(s) IV Push once  dextrose 50% Injectable 25 Gram(s) IV Push once  glucagon  Injectable 1 milliGRAM(s) IntraMuscular once  insulin lispro (ADMELOG) corrective regimen sliding scale   SubCutaneous three times a day before meals  insulin lispro (ADMELOG) corrective regimen sliding scale   SubCutaneous at bedtime  lisinopril 40 milliGRAM(s) Oral daily  rivaroxaban 20 milliGRAM(s) Oral with dinner    MEDICATIONS  (PRN):  acetaminophen     Tablet .. 650 milliGRAM(s) Oral every 6 hours PRN Temp greater or equal to 38C (100.4F), Mild Pain (1 - 3)  benzonatate 100 milliGRAM(s) Oral three times a day PRN Cough  dextrose Oral Gel 15 Gram(s) Oral once PRN Blood Glucose LESS THAN 70 milliGRAM(s)/deciliter  melatonin 3 milliGRAM(s) Oral at bedtime PRN Insomnia  ondansetron Injectable 4 milliGRAM(s) IV Push every 8 hours PRN Nausea and/or Vomiting            Vital Signs Last 24 Hrs  T(C): 37.1 (06 May 2025 09:12), Max: 37.2 (06 May 2025 07:15)  T(F): 98.8 (06 May 2025 09:12), Max: 98.9 (06 May 2025 07:15)  HR: 79 (06 May 2025 09:12) (64 - 79)  BP: 119/55 (06 May 2025 09:12) (118/60 - 120/69)  BP(mean): --  RR: 20 (06 May 2025 09:12) (16 - 20)  SpO2: 100% (06 May 2025 09:12) (98% - 100%)    Parameters below as of 06 May 2025 09:12  Patient On (Oxygen Delivery Method): room air                INTERPRETATION OF TELEMETRY: SR 60-80' bpm, no marci seen    ECG:        LABS:                        10.2   6.06  )-----------( 299      ( 06 May 2025 05:38 )             32.9     05-06    137  |  109[H]  |  21  ----------------------------<  124[H]  4.8   |  15[L]  |  0.84    Ca    8.6      06 May 2025 05:38  Phos  4.0     05-06  Mg     1.90     05-06            Urinalysis Basic - ( 06 May 2025 05:38 )    Color: x / Appearance: x / SG: x / pH: x  Gluc: 124 mg/dL / Ketone: x  / Bili: x / Urobili: x   Blood: x / Protein: x / Nitrite: x   Leuk Esterase: x / RBC: x / WBC x   Sq Epi: x / Non Sq Epi: x / Bacteria: x        BNP  RADIOLOGY & ADDITIONAL STUDIES: CMR    INTERPRETATION:  EXAMINATION:  MR CARDIAC MORPHOLOGY FUNCTION WITHOUT AND   WITH IV CONTRAST    CLINICAL INDICATION: eval cardiac amyloidosis    TECHNIQUE:  Multi-sequential, multi-planar cardiac MR was performed   before and after the intravenous administration of 6.5ml cc administered   Gadavist, 1ml cc discarded. Resting myocardial perfusion imaging was   performed.   Delayedviability imaging was performed. The indexed values   were calculated based on a BSA of 1.66 m2.    COMPARISON: There is no prior study available for comparison.    FINDINGS:    CHAMBERS:  Left atrium is enlarged. Normal biventricular size and function. Patchy   mid myocardial late gadolinium enhancement basal and mid lateral wall.    LV EDV = 110 ml  LV EDVi = 66 ml/m2  LV ESV = 34 ml  LV ESVi = 21 ml/m2  LV SV = 75 ml  LV EF = 69 %      VALVES:  Mitral and tricuspid regurgitation.    VESSELS:  The thoracic aorta has normal caliber.  There is a left aortic arch with   typical branching.  The central pulmonary arteries are normal in caliber.  There is typical pulmonary venous return.  There is typical systemic venous return.    THORAX:  Thereis no adenopathy in the thorax.  There is no pleural or pericardial effusion.    UPPER ABDOMEN:  Images of the upper abdomen demonstrate no abnormality.    IMPRESSION:  Normal biventricular size and function. Nonischemic late gadolinium   enhancementpattern    --- End of Report ---            PHYSICAL EXAM:    GENERAL: In no apparent distress, well nourished, and hydrated.  NECK: Supple and normal thyroid.  No JVD or carotid bruit.  Carotid pulse is 2+ bilaterally.  HEART: Regular rate and rhythm; No murmurs, rubs, or gallops.  PULMONARY: Clear to auscultation and perfusion.  No rales, wheezing, or rhonchi bilaterally.  ABDOMEN: Soft, Nontender, Nondistended; Bowel sounds present  EXTREMITIES:  2+ Peripheral Pulses, No clubbing, cyanosis, or edema  NEUROLOGICAL: alert oriented x4 speech clear no focal deficit noted

## 2025-05-06 NOTE — PROGRESS NOTE ADULT - ASSESSMENT
64 year old Indonesian speaking female with a PMH of R DEBI/SMA stenosis s/p vascular bypass with stent on 10/20/2023-maintained on Xarelto, HTN, HLD, fatty liver who presented to ED with c/o intermittent "dizziness" started last night.  She was found to be in sinus marci with arrythmia with HR in low 40's mostly.  Occasionally marci down to 35-39 bpm transiently.  Patient endorses "felt nauseous " associated with dizziness last night.  Presently denies CP/SOB or syncope or dizziness.  She has been on bowel prep for colonoscopy scheduled this Saturday.  Recently underwent a stress test for pre- colonoscopy clearance with "normal result" per son.  Patient appears hemodynamically stable.  LHC done with mild coronary diseases noted.    TTE normal LVEF, mod dilated LA, no wall thickness vs wall motion  abnormality, CMR showed dilated LA, mild LGE w/o scars seen.    symptomatic sinus bradycardia with arrhythmia/junctional marci resolved now, chronotropic competence noted  s/p [employer] implantable loop recorder today  follow up with her cardiologist for abnormal  LGE seen on CMR suspect cardiac amyloidosis ? nuclear PYP perfusion scan vs endomyocardial biopsy   Post loop recorder implantation teaching with instructions provided.  Patient and family verbalized understanding.   A telephone  follow up on May 20 at 8:30am

## 2025-05-06 NOTE — DISCHARGE NOTE NURSING/CASE MANAGEMENT/SOCIAL WORK - NSDCFUADDAPPT_GEN_ALL_CORE_FT
APPTS ARE READY TO BE MADE: [X] YES    Best Family or Patient Contact (if needed):    Additional Information about above appointments (if needed):    1: Cardiology within 2 weeks  2: Electrophysiology   3: PCP    Other comments or requests:

## 2025-05-06 NOTE — DISCHARGE NOTE PROVIDER - CARE PROVIDER_API CALL
Liliana Arevalo  Phone: (   )    -  Fax: (   )    -  Follow Up Time:    Liliana Arevalo  Phone: (   )    -  Fax: (   )    -  Follow Up Time:     avril banegas  Cardiology  [address] [facility], [place] [address]  Phone: (   )    -  Fax: (   )    -  Established Patient  Follow Up Time:    Liliana Arevalo  Phone: (   )    -  Fax: (   )    -  Follow Up Time:     avril banegas  Cardiology  [address] [place] [address]  Phone: (   )    -  Fax: (   )    -  Established Patient  Follow Up Time:     Alejandra Hensley  Internal Medicine  [address] [place] [address]  Phone: [phone]  Fax: [phone]  Follow Up Time:

## 2025-05-06 NOTE — PROGRESS NOTE ADULT - PROBLEM SELECTOR PLAN 4
- c/w home amlodipine and lisinopril

## 2025-05-06 NOTE — DISCHARGE NOTE PROVIDER - NSDCFUSCHEDAPPT_GEN_ALL_CORE_FT
[facility]  VASCULAR [address]  Scheduled Appointment: 05/16/2025    Laura Morales  [facility]  VASCULAR [address]  Scheduled Appointment: 05/16/2025    Kun Ochoa  [facility]  ELECTROPH [phone]  Scheduled Appointment: 05/20/2025     [facility]  VASCULAR [address]  Scheduled Appointment: 05/16/2025    Laura Morales  [facility]  VASCULAR [address]  Scheduled Appointment: 05/16/2025    Kun Ochoa  [facility] [phone]  Scheduled Appointment: 05/20/2025    [facility] [phone]  Scheduled Appointment: 06/10/2025

## 2025-05-06 NOTE — DISCHARGE NOTE PROVIDER - NSDCCPTREATMENT_GEN_ALL_CORE_FT
PRINCIPAL PROCEDURE  Procedure: MRI cardiac function  Findings and Treatment: CHAMBERS:  Left atrium is enlarged. Normal biventricular size and function. Patchy   mid myocardial late gadolinium enhancement basal and mid lateral wall.  LV EDV = 110 ml  LV EDVi = 66 ml/m2  LV ESV = 34 ml  LV ESVi = 21 ml/m2  LV SV = 75 ml  LV EF = 69 %  VALVES:  Mitral and tricuspid regurgitation.  VESSELS:  The thoracic aorta has normal caliber.  There is a left aortic arch with   typical branching.  The central pulmonary arteries are normal in caliber.  There is typical pulmonary venous return.  There is typical systemic venous return.  THORAX:  Thereis no adenopathy in the thorax.  There is no pleural or pericardial effusion.  UPPER ABDOMEN:  Images of the upper abdomen demonstrate no abnormality.  IMPRESSION:  Normal biventricular size and function. Nonischemic late gadolinium   enhancementpattern< from: MR Cardiac w/wo IV Cont (05.05.25 @ 13:42) >        SECONDARY PROCEDURE  Procedure: Complete transthoracic echocardiography (TTE)  Findings and Treatment: CONCLUSIONS:      1. Left ventricular cavity is normal in size. Left ventricular wall thickness is normal. Left ventricular systolic function is normal with an ejection fraction of 69 % by 3D. There are no regional wall motion abnormalities seen.   2. Normal right ventricular cavity size and probably normal right ventricular systolic function. Tricuspid annular plane systolic excursion (TAPSE) is 1.8 cm (normal >=1.7 cm).   3. Structurally normal mitral valve with normal leaflet excursion. There is calcification of the mitral valve annulus. There is trace mitral regurgitation.   4. The left atrium is moderately dilated with an indexed volume of 43.88 ml/m².   5. Structurally normal tricuspid valve with normal leaflet excursion. There is mild tricuspid regurgitation. Estimated pulmonary artery systolic pressure is 42 mmHg, consistent with mild pulmonary hypertension.< from: TTE W or WO Ultrasound Enhancing Agent (05.01.25 @ 13:22) >      Procedure: Left heart cardiac cath  Findings and Treatment: Non-obstructive  LM normal LAD prox 40%, LCX normal RCA luminal

## 2025-05-06 NOTE — PROGRESS NOTE ADULT - PROBLEM SELECTOR PROBLEM 2
Stage 2 chronic kidney disease

## 2025-05-06 NOTE — PROGRESS NOTE ADULT - TIME BILLING
- Ordering, reviewing, and interpreting labs, testing, and imaging.  - Independently obtaining a review of systems and performing a physical exam  - Reviewing prior hospitalization and where necessary, outpatient records.  - Reviewing consultant recommendations/communicating with consultants  - Counselling and educating patient and family regarding interpretation of aforementioned items and plan of care.
- Ordering, reviewing, and interpreting labs, testing, and imaging.  - Independently obtaining a review of systems and performing a physical exam  - Reviewing prior hospitalization and where necessary, outpatient records.  - Reviewing consultant recommendations/communicating with consultants  - Counselling and educating patient and family regarding interpretation of aforementioned items and plan of care.
Time-based billing (NON-critical care).     more than 50% of the visit was spent counseling and / or coordinating care by the attending physician.  The necessity of the time spent during the encounter on this date of service was due to:     review of laboratory data, radiology results, consultants' recommendations, documentation in [place], discussion with patient/ACP and interdisciplinary staff (such as , social workers, etc). Interventions were performed as documented above.
- Ordering, reviewing, and interpreting labs, testing, and imaging.  - Independently obtaining a review of systems and performing a physical exam  - Reviewing prior hospitalization and where necessary, outpatient records.  - Reviewing consultant recommendations/communicating with consultants  - Counselling and educating patient and family regarding interpretation of aforementioned items and plan of care.
- Ordering, reviewing, and interpreting labs, testing, and imaging.  - Independently obtaining a review of systems and performing a physical exam  - Reviewing prior hospitalization and where necessary, outpatient records.  - Reviewing consultant recommendations/communicating with consultants  - Counselling and educating patient and family regarding interpretation of aforementioned items and plan of care.

## 2025-05-06 NOTE — PROGRESS NOTE ADULT - PROBLEM SELECTOR PLAN 1
- consider ICM vs sick sinus syndrome vs. amyloid getting worked up for gammopathy outpatient, hx. CKD)  - EKG w/o ST changes, T-wave changes, BBB and troponins wnl. LHC 5/2 non obstructive   - Troponin 13, BNP elevated 982  - TTE 5/1 --> EF 69%, no RWMA    Plan  - ILR with EP today 5/6  - cMRI 05/5, no ischemia  - lyme serology, t.cruzi neg  - Electrophoresis w/immunofixation --> JACY Kappa and JACY Lambda elevated, kappa/lambda free light chain wnl - consider ICM vs sick sinus syndrome vs. amyloid getting worked up for gammopathy outpatient, (hx. CKD)  - EKG w/o ST changes, T-wave changes, BBB and troponins wnl. LHC 5/2 non obstructive   - Troponin 13, BNP elevated 982  - TTE 5/1 --> EF 69%, no RWMA    Plan  - ILR with EP today 5/6  - cMRI 05/5, no ischemia  - lyme serology, t.cruzi neg  - Electrophoresis w/immunofixation --> JACY Kappa and JACY Lambda elevated, kappa/lambda free light chain wnl

## 2025-05-06 NOTE — DISCHARGE NOTE PROVIDER - HOSPITAL COURSE
HPI:  65 y/o German-speaking female CKD, R. SMA stenosis s/p bypass 2023 (on xarelto) presenting with intermittent dizziness, palpitations, SOB for 1 day. Patient first noticed dizziness last evening. Denies any prior similar episodes. Endorses numbness/tingling of both hands. Denies fevers, chest pain, syncope, pre-syncope, recent travel. Patient denies hx. cardiac disease. Recently saw cardiologist at [place] for pre-op clearance for colonoscopy. TTE and EKG in March 2025 showed EF 60-65% and normal sinus rhythm, respectively. Cardiac perfusion test 10/2024 was also normal on outpatient workup, EF 74%, no ischemic perfusion defects. Patient 's daughter at [facility] states pt. currently getting worked up for gammopathy.     In ED, patient with HR fluctuating between 30s and 50s. Otherwise hemodynamically stable. Troponin wnl, EKG showed junctional bradycardia. EP consulted. (01 May 2025 11:59)    Hospital Course:  Patient was admitted for new onset sinus bradycardia. EKG with sinus bradycardia, no ST changes. EP consulted, recommended LHC, cMRI, ILR. TTE 5/1 revealed EF 69 %, no regional wall motion abnormalities seen, PASP 42 mmHg, consistent with mild pulmonary hypertension. Left heart cath with no obstructive ischemic disease, LM normal LAD prox 40%, LCX normal RCA luminal.  Cardiac MRI without signs of ischemic disease but some signs of possible amyloidosis. ILR placed 5/6 with EP. Will need to follow up with cardiologist for further workup and EP to follow up ILR findings. Include return precautions to ED or reasons to call doctor.c    On day of discharge, patient is clinically stable with no new exam findings or acute symptoms compared to prior. The patient was seen by the attending physician on the date of discharge and deemed stable and acceptable for discharge. The patient's chronic medical conditions were treated accordingly per the patient's home medication regimen. The patient's medication reconciliation (with changes made to chronic medications), follow up appointments, discharge orders, instructions, and significant lab and diagnostic studies are as noted.    Important Medication Changes and Reason:  None    Active or Pending Issues Requiring Follow-up:  Bradycardia  Follow up with her cardiologist for abnormal  LGE seen on CMR suspect cardiac amyloidosis ? nuclear PYP perfusion scan vs endomyocardial biopsy    Advanced Directives:   [X] Full code  [ ] DNR  [ ] Hospice    Discharge Diagnoses:  Sinus Bradycardia         HPI:  63 y/o Upper sorbian-speaking female CKD, R. SMA stenosis s/p bypass 2023 (on xarelto) presenting with intermittent dizziness, palpitations, SOB for 1 day. Patient first noticed dizziness last evening. Denies any prior similar episodes. Endorses numbness/tingling of both hands. Denies fevers, chest pain, syncope, pre-syncope, recent travel. Patient denies hx. cardiac disease. Recently saw cardiologist at [place] for pre-op clearance for colonoscopy. TTE and EKG in March 2025 showed EF 60-65% and normal sinus rhythm, respectively. Cardiac perfusion test 10/2024 was also normal on outpatient workup, EF 74%, no ischemic perfusion defects. Patient 's daughter at [facility] states pt. currently getting worked up for gammopathy.     In ED, patient with HR fluctuating between 30s and 50s. Otherwise hemodynamically stable. Troponin wnl, EKG showed junctional bradycardia. EP consulted. (01 May 2025 11:59)    Hospital Course:  Patient was admitted for new onset sinus bradycardia. EKG with sinus bradycardia, no ST changes. TSH wnl, lyme and T. cruzi serolgies negative. EP consulted, recommended LHC, cMRI, ILR. TTE 5/1 revealed EF 69 %, no regional wall motion abnormalities seen, PASP 42 mmHg, consistent with mild pulmonary hypertension. Left heart cath with no obstructive ischemic disease, LM normal LAD prox 40%, LCX normal RCA luminal.  Serum immunofixation showed JACY lamda and Kappa elevated. Cardiac MRI without signs of ischemic disease but some signs of possible amyloidosis. ILR placed 5/6 with EP. Will need to follow up with cardiologist for further workup and EP to follow up ILR results.    On day of discharge, patient is clinically stable with no new exam findings or acute symptoms compared to prior. The patient was seen by the attending physician on the date of discharge and deemed stable and acceptable for discharge. The patient's chronic medical conditions were treated accordingly per the patient's home medication regimen. The patient's medication reconciliation (with changes made to chronic medications), follow up appointments, discharge orders, instructions, and significant lab and diagnostic studies are as noted.    Important Medication Changes and Reason:  None    Active or Pending Issues Requiring Follow-up:  Bradycardia  Follow up with her cardiologist for abnormal  LGE seen on CMR suspect cardiac amyloidosis ? nuclear PYP perfusion scan vs endomyocardial biopsy    Advanced Directives:   [X] Full code  [ ] DNR  [ ] Hospice    Discharge Diagnoses:  Sinus Bradycardia

## 2025-05-06 NOTE — PROGRESS NOTE ADULT - ASSESSMENT
63 y/o Andorran-speaking female CKD, R. SMA stenosis s/p bypass 2023 (on xarelto) presenting with intermittent dizziness, palpitations, SOB for 1 day. Found to have sinus/junctional bradycardia with HR ranging from 30s to 50s. C/f sick sinus vs amyloidopathy s/p LHC non-obstructive now pending cMRI and ILR placement.

## 2025-05-06 NOTE — DISCHARGE NOTE NURSING/CASE MANAGEMENT/SOCIAL WORK - PATIENT PORTAL LINK FT
You can access the FollowMyHealth Patient Portal offered by [facility] by registering at the following website: [email or web address]. By joining [employer]’s FollowMyHealth portal, you will also be able to view your health information using other applications (apps) compatible with our system.

## 2025-05-06 NOTE — PROGRESS NOTE ADULT - PROBLEM SELECTOR PROBLEM 3
Superior mesenteric artery stenosis

## 2025-05-06 NOTE — PROGRESS NOTE ADULT - ATTENDING COMMENTS
63 y/o Indian-speaking female with hx of CKD, R. SMA stenosis s/p bypass 2023 (on Xarelto) presenting with intermittent dizziness, palpitations, SOB found to have sinus/junctional bradycardia with HR ranging from 30s to 50s. C/f sick sinus vs amyloid s/p LHC (non-obstructive) now pending cMRI and ILR placement.     Patient seen and examined, daughter providing Indian translation per patient request, declined  use. Patient denies chest pain, palpitations, lightheadedness or dizziness. Patient came back from cMRI and was supposed to go down for ILR but wanted to know results of cMRI before procedure. Discussed w/ patient and daughter that cMRI not concerning for cardiac amyloidosis. Patient now agreeable to ILR, will inform EP.     Dc home pending ILR placement   Discussed w/ patient's daughter
Patient admitted for workup of bradycardia. noted bardycardia on admission and has not had significant findings on tele.     EP following, plan for ILR prior to d/c  Cardiology recommended cMRI to rule-out infiltrative dz.     no complaints today. feels well.     c/w tele monitor - no bradycardia events on tele overnight.   pending cMRI  f/u cards and EP recs.
65 y/o Uzbek-speaking female with hx of CKD, R. SMA stenosis s/p bypass 2023 (on Xarelto) presenting with intermittent dizziness, palpitations, SOB found to have sinus/junctional bradycardia with HR ranging from 30s to 50s. C/f sick sinus vs amyloid s/p LHC (non-obstructive). S/p cMRI and ILR placement.     Patient seen and examined, daughter at bedside. Patient had ILR placed this AM, feels well and has no complaints. Daughter discussed with EP - cMRI w/  "abnormal LGE seen on CMR suspect cardiac amyloidosis ? nuclear PYP perfusion scan vs endomyocardial biopsy". Daughter would prefer to follow-up with patient's cardiologist (non-[facility]) for further work-up and management. Discussed need for close outpatient follow-up with general cardiology, EP and PCP. Daughter and patient in agreement with outpatient follow-up and plan for discharge home today.
Patient admitted for workup of bradycardia. noted bardycardia on admission and has not had significant findings on tele.     EP following, plan for ILR prior to d/c  Cardiology recommended cMRI to rule-out infiltrative dz.     She ambulates the unit w/o concners. denied any dizziness, chest pain, lightheadness.     c/w tele monitor  pending cMRI  cards and EP recs appreciated.
PMH of R DEBI/SMA stenosis s/p vascular bypass with stent on 10/20/2023-maintained on Xarelto, HTN, HLD, fatty liver presented with dizziness found to have symptomatic bradycardia      #Symptomatic Bradycardia  patient w/ hx of ischemic disease also being worked up for gammopathy outpatient  - f/u EP recs  - f/u Cardiac MR to assess for infiltrative cardiac disease   - f/u Left Heart Cath -> Loop recorder afterwards

## 2025-05-06 NOTE — DISCHARGE NOTE PROVIDER - PROVIDER TOKENS
FREE:[LAST:[Irina],FIRST:[Liliana],PHONE:[(   )    -],FAX:[(   )    -]] FREE:[LAST:[Irina],FIRST:[Liliana],PHONE:[(   )    -],FAX:[(   )    -]],FREE:[LAST:[bassam],FIRST:[avril],PHONE:[(   )    -],FAX:[(   )    -],ADDRESS:[Cardiology  [address] [place] [address]],ESTABLISHEDPATIENT:[T]] FREE:[LAST:[Irina],FIRST:[Liliana],PHONE:[(   )    -],FAX:[(   )    -]],FREE:[LAST:[bassam],FIRST:[avril],PHONE:[(   )    -],FAX:[(   )    -],ADDRESS:[Cardiology  [address] [place] [address]],ESTABLISHEDPATIENT:[T]],PROVIDER:[TOKEN:[[id number]]]

## 2025-05-06 NOTE — PROGRESS NOTE ADULT - SUBJECTIVE AND OBJECTIVE BOX
INTERVAL HPI/OVERNIGHT EVENTS:  Patient was seen and examined at bedside. As per nurse and patient, no o/n events, patient resting comfortably. No complaints at this time. Patient denies:  CP, palpitations, SOB.    VITAL SIGNS:  T(F): 98.9 (05-06-25 @ 07:15)  HR: 64 (05-06-25 @ 07:15)  BP: 119/54 (05-06-25 @ 07:15)  RR: 16 (05-06-25 @ 07:15)  SpO2: 98% (05-06-25 @ 07:15)  Wt(kg): --    PHYSICAL EXAM:    GENERAL: NAD, well-groomed, well-developed  ENMT: No tonsillar erythema, exudates, or enlargement; Moist mucous membranes  NECK: No JVD.  HEART: Regular rate and rhythm; No murmurs, rubs, or gallops  RESPIRATORY: CTA B/L, No W/R/R  ABDOMEN: Soft, Nontender, Nondistended; Bowel sounds present  NEUROLOGY: A&Ox3, nonfocal, moving all extremities  EXTREMITIES:  2+ Peripheral Pulses, No clubbing, cyanosis, or edema  SKIN: warm, dry, normal color, no rash or abnormal lesions    MEDICATIONS  (STANDING):  amLODIPine   Tablet 10 milliGRAM(s) Oral daily  atorvastatin 40 milliGRAM(s) Oral at bedtime  chlorhexidine 2% Cloths 1 Application(s) Topical daily  dextrose 5%. 1000 milliLiter(s) (100 mL/Hr) IV Continuous <Continuous>  dextrose 5%. 1000 milliLiter(s) (50 mL/Hr) IV Continuous <Continuous>  dextrose 50% Injectable 25 Gram(s) IV Push once  dextrose 50% Injectable 12.5 Gram(s) IV Push once  dextrose 50% Injectable 25 Gram(s) IV Push once  glucagon  Injectable 1 milliGRAM(s) IntraMuscular once  insulin lispro (ADMELOG) corrective regimen sliding scale   SubCutaneous three times a day before meals  insulin lispro (ADMELOG) corrective regimen sliding scale   SubCutaneous at bedtime  lisinopril 40 milliGRAM(s) Oral daily  rivaroxaban 20 milliGRAM(s) Oral with dinner    MEDICATIONS  (PRN):  acetaminophen     Tablet .. 650 milliGRAM(s) Oral every 6 hours PRN Temp greater or equal to 38C (100.4F), Mild Pain (1 - 3)  benzonatate 100 milliGRAM(s) Oral three times a day PRN Cough  dextrose Oral Gel 15 Gram(s) Oral once PRN Blood Glucose LESS THAN 70 milliGRAM(s)/deciliter  melatonin 3 milliGRAM(s) Oral at bedtime PRN Insomnia  ondansetron Injectable 4 milliGRAM(s) IV Push every 8 hours PRN Nausea and/or Vomiting      Allergies    No Known Allergies    Intolerances        LABS:                        10.2   6.06  )-----------( 299      ( 06 May 2025 05:38 )             32.9     05-06    137  |  109[H]  |  21  ----------------------------<  124[H]  4.8   |  15[L]  |  0.84    Ca    8.6      06 May 2025 05:38  Phos  4.0     05-06  Mg     1.90     05-06        Urinalysis Basic - ( 06 May 2025 05:38 )    Color: x / Appearance: x / SG: x / pH: x  Gluc: 124 mg/dL / Ketone: x  / Bili: x / Urobili: x   Blood: x / Protein: x / Nitrite: x   Leuk Esterase: x / RBC: x / WBC x   Sq Epi: x / Non Sq Epi: x / Bacteria: x        RADIOLOGY & ADDITIONAL TESTS:  Reviewed

## 2025-05-06 NOTE — PROGRESS NOTE ADULT - PROVIDER SPECIALTY LIST ADULT
Electrophysiology
Cardiology
Cardiology
Electrophysiology
Electrophysiology
Internal Medicine

## 2025-05-06 NOTE — DISCHARGE NOTE PROVIDER - NSDCCPCAREPLAN_GEN_ALL_CORE_FT
PRINCIPAL DISCHARGE DIAGNOSIS  Diagnosis: Bradycardia  Assessment and Plan of Treatment: You were admitted to the hospital due to a slow heart rate (sinus bradycardia). We did some tests to figure out why. Your echocardiogram (echo), which shows your heart's structure, was normal. A left heart catheterization showed no blockages in the arteries supplying blood to your heart. A small monitor called an implantable loop recorder (ILR) was placed under your skin to track your heart rhythm. Your cardiac MRI showed no signs of damage from poor blood flow but suggested you might have a condition called amyloidosis. You'll need to follow up with a cardiologist to investigate the possible amyloidosis and with an electrophysiologist (EP doctor) to review the data from your ILR. At home, contact your doctor or go to the emergency room if you have lightheadedness, dizziness, fainting or near-fainting spells, chest pain or discomfort, shortness of breath, or unusual fatigue or weakness. For non-emergency questions or to schedule appointments, call your cardiologist’s office. Keep all appointments and take your medications as prescribed. Don't stop any medication without talking to your doctor. Write down any questions you have for your next appointment. This information is for educational purposes only and not a substitute for professional medical advice. Always consult your healthcare provider for any health concerns.

## 2025-05-07 LAB
T CRUZI AB SER-ACNC: SIGNIFICANT CHANGE UP
T CRUZI AB SERPL QL IA: SIGNIFICANT CHANGE UP

## 2025-05-12 LAB
% ALBUMIN: 42.1 % — SIGNIFICANT CHANGE UP
% ALPHA 1: 4.8 % — SIGNIFICANT CHANGE UP
% ALPHA 2: 14.8 % — SIGNIFICANT CHANGE UP
% BETA: 12.6 % — SIGNIFICANT CHANGE UP
% GAMMA: 25.7 % — SIGNIFICANT CHANGE UP
ALBUMIN SERPL ELPH-MCNC: 2.86 G/DL — LOW (ref 3.3–4.4)
ALBUMIN/GLOB SERPL ELPH: 0.7 RATIO — SIGNIFICANT CHANGE UP
ALPHA1 GLOB SERPL ELPH-MCNC: 0.33 G/DL — HIGH (ref 0.1–0.3)
ALPHA2 GLOB SERPL ELPH-MCNC: 1 G/DL — SIGNIFICANT CHANGE UP (ref 0.6–1)
B-GLOBULIN SERPL ELPH-MCNC: 0.86 G/DL — SIGNIFICANT CHANGE UP (ref 0.6–1.1)
GAMMA GLOBULIN: 1.75 G/DL — HIGH (ref 0.7–1.7)
INTERPRETATION SERPL IFE-IMP: SIGNIFICANT CHANGE UP
PROT PATTERN SERPL ELPH-IMP: SIGNIFICANT CHANGE UP
PROT SERPL-MCNC: 6.8 G/DL — SIGNIFICANT CHANGE UP (ref 6–8.3)

## 2025-05-16 ENCOUNTER — APPOINTMENT (OUTPATIENT)
Dept: VASCULAR SURGERY | Facility: CLINIC | Age: 65
End: 2025-05-16
Payer: MEDICARE

## 2025-05-16 VITALS
DIASTOLIC BLOOD PRESSURE: 60 MMHG | OXYGEN SATURATION: 100 % | HEIGHT: 58 IN | BODY MASS INDEX: 30.23 KG/M2 | HEART RATE: 72 BPM | WEIGHT: 144 LBS | SYSTOLIC BLOOD PRESSURE: 101 MMHG | TEMPERATURE: 98.6 F

## 2025-05-16 PROCEDURE — 93976 VASCULAR STUDY: CPT | Mod: RT

## 2025-05-16 PROCEDURE — 99213 OFFICE O/P EST LOW 20 MIN: CPT

## 2025-05-20 ENCOUNTER — APPOINTMENT (OUTPATIENT)
Dept: ELECTROPHYSIOLOGY | Facility: CLINIC | Age: 65
End: 2025-05-20
Payer: MEDICARE

## 2025-05-20 PROCEDURE — 99212 OFFICE O/P EST SF 10 MIN: CPT | Mod: 2W

## 2025-06-10 ENCOUNTER — NON-APPOINTMENT (OUTPATIENT)
Age: 65
End: 2025-06-10

## 2025-06-10 ENCOUNTER — APPOINTMENT (OUTPATIENT)
Dept: ELECTROPHYSIOLOGY | Facility: CLINIC | Age: 65
End: 2025-06-10
Payer: MEDICARE

## 2025-06-10 PROCEDURE — 93298 REM INTERROG DEV EVAL SCRMS: CPT

## 2025-06-12 ENCOUNTER — INPATIENT (INPATIENT)
Facility: HOSPITAL | Age: 65
LOS: 0 days | Discharge: HOME | End: 2025-06-13
Attending: INTERNAL MEDICINE | Admitting: INTERNAL MEDICINE
Payer: MEDICARE

## 2025-06-12 VITALS
HEIGHT: 58 IN | DIASTOLIC BLOOD PRESSURE: 68 MMHG | OXYGEN SATURATION: 97 % | WEIGHT: 147.05 LBS | HEART RATE: 77 BPM | TEMPERATURE: 98 F | SYSTOLIC BLOOD PRESSURE: 121 MMHG | RESPIRATION RATE: 15 BRPM

## 2025-06-12 DIAGNOSIS — R42 DIZZINESS AND GIDDINESS: ICD-10-CM

## 2025-06-12 DIAGNOSIS — K55.9 VASCULAR DISORDER OF INTESTINE, UNSPECIFIED: ICD-10-CM

## 2025-06-12 DIAGNOSIS — I10 ESSENTIAL (PRIMARY) HYPERTENSION: ICD-10-CM

## 2025-06-12 DIAGNOSIS — K76.0 FATTY (CHANGE OF) LIVER, NOT ELSEWHERE CLASSIFIED: ICD-10-CM

## 2025-06-12 DIAGNOSIS — E78.5 HYPERLIPIDEMIA, UNSPECIFIED: ICD-10-CM

## 2025-06-12 DIAGNOSIS — D64.9 ANEMIA, UNSPECIFIED: ICD-10-CM

## 2025-06-12 DIAGNOSIS — Z95.828 PRESENCE OF OTHER VASCULAR IMPLANTS AND GRAFTS: Chronic | ICD-10-CM

## 2025-06-12 LAB
ALBUMIN SERPL ELPH-MCNC: 3.5 G/DL — SIGNIFICANT CHANGE UP (ref 3.3–5)
ALP SERPL-CCNC: 112 U/L — SIGNIFICANT CHANGE UP (ref 40–120)
ALT FLD-CCNC: 22 U/L — SIGNIFICANT CHANGE UP (ref 4–33)
ANION GAP SERPL CALC-SCNC: 10 MMOL/L — SIGNIFICANT CHANGE UP (ref 7–14)
APTT BLD: 36.3 SEC — SIGNIFICANT CHANGE UP (ref 26.1–36.8)
APTT BLD: >200 SEC — CRITICAL HIGH (ref 26.1–36.8)
AST SERPL-CCNC: 28 U/L — SIGNIFICANT CHANGE UP (ref 4–32)
BASOPHILS # BLD AUTO: 0.06 K/UL — SIGNIFICANT CHANGE UP (ref 0–0.2)
BASOPHILS NFR BLD AUTO: 1.6 % — SIGNIFICANT CHANGE UP (ref 0–2)
BILIRUB SERPL-MCNC: 0.3 MG/DL — SIGNIFICANT CHANGE UP (ref 0.2–1.2)
BLD GP AB SCN SERPL QL: NEGATIVE — SIGNIFICANT CHANGE UP
BLOOD GAS VENOUS COMPREHENSIVE RESULT: SIGNIFICANT CHANGE UP
BUN SERPL-MCNC: 13 MG/DL — SIGNIFICANT CHANGE UP (ref 7–23)
CALCIUM SERPL-MCNC: 8.8 MG/DL — SIGNIFICANT CHANGE UP (ref 8.4–10.5)
CHLORIDE SERPL-SCNC: 108 MMOL/L — HIGH (ref 98–107)
CO2 SERPL-SCNC: 22 MMOL/L — SIGNIFICANT CHANGE UP (ref 22–31)
CREAT SERPL-MCNC: 0.82 MG/DL — SIGNIFICANT CHANGE UP (ref 0.5–1.3)
EGFR: 79 ML/MIN/1.73M2 — SIGNIFICANT CHANGE UP
EGFR: 79 ML/MIN/1.73M2 — SIGNIFICANT CHANGE UP
EOSINOPHIL # BLD AUTO: 0.22 K/UL — SIGNIFICANT CHANGE UP (ref 0–0.5)
EOSINOPHIL NFR BLD AUTO: 5.9 % — SIGNIFICANT CHANGE UP (ref 0–6)
GLUCOSE SERPL-MCNC: 169 MG/DL — HIGH (ref 70–99)
HCT VFR BLD CALC: 32 % — LOW (ref 34.5–45)
HCT VFR BLD CALC: 34 % — LOW (ref 34.5–45)
HGB BLD-MCNC: 10.1 G/DL — LOW (ref 11.5–15.5)
HGB BLD-MCNC: 9.6 G/DL — LOW (ref 11.5–15.5)
IANC: 1.37 K/UL — LOW (ref 1.8–7.4)
IMM GRANULOCYTES NFR BLD AUTO: 0.3 % — SIGNIFICANT CHANGE UP (ref 0–0.9)
INR BLD: 1.25 RATIO — HIGH (ref 0.85–1.16)
LYMPHOCYTES # BLD AUTO: 1.51 K/UL — SIGNIFICANT CHANGE UP (ref 1–3.3)
LYMPHOCYTES # BLD AUTO: 40.5 % — SIGNIFICANT CHANGE UP (ref 13–44)
MAGNESIUM SERPL-MCNC: 1.9 MG/DL — SIGNIFICANT CHANGE UP (ref 1.6–2.6)
MCHC RBC-ENTMCNC: 22 PG — LOW (ref 27–34)
MCHC RBC-ENTMCNC: 22.1 PG — LOW (ref 27–34)
MCHC RBC-ENTMCNC: 29.7 G/DL — LOW (ref 32–36)
MCHC RBC-ENTMCNC: 30 G/DL — LOW (ref 32–36)
MCV RBC AUTO: 73.2 FL — LOW (ref 80–100)
MCV RBC AUTO: 74.2 FL — LOW (ref 80–100)
MONOCYTES # BLD AUTO: 0.56 K/UL — SIGNIFICANT CHANGE UP (ref 0–0.9)
MONOCYTES NFR BLD AUTO: 15 % — HIGH (ref 2–14)
NEUTROPHILS # BLD AUTO: 1.37 K/UL — LOW (ref 1.8–7.4)
NEUTROPHILS NFR BLD AUTO: 36.7 % — LOW (ref 43–77)
NRBC # BLD AUTO: 0 K/UL — SIGNIFICANT CHANGE UP (ref 0–0)
NRBC # BLD AUTO: 0 K/UL — SIGNIFICANT CHANGE UP (ref 0–0)
NRBC # FLD: 0 K/UL — SIGNIFICANT CHANGE UP (ref 0–0)
NRBC # FLD: 0 K/UL — SIGNIFICANT CHANGE UP (ref 0–0)
NRBC BLD AUTO-RTO: 0 /100 WBCS — SIGNIFICANT CHANGE UP (ref 0–0)
NRBC BLD AUTO-RTO: 0 /100 WBCS — SIGNIFICANT CHANGE UP (ref 0–0)
PHOSPHATE SERPL-MCNC: 3.7 MG/DL — SIGNIFICANT CHANGE UP (ref 2.5–4.5)
PLATELET # BLD AUTO: 282 K/UL — SIGNIFICANT CHANGE UP (ref 150–400)
PLATELET # BLD AUTO: 312 K/UL — SIGNIFICANT CHANGE UP (ref 150–400)
POTASSIUM SERPL-MCNC: 4.4 MMOL/L — SIGNIFICANT CHANGE UP (ref 3.5–5.3)
POTASSIUM SERPL-SCNC: 4.4 MMOL/L — SIGNIFICANT CHANGE UP (ref 3.5–5.3)
PROT SERPL-MCNC: 7.1 G/DL — SIGNIFICANT CHANGE UP (ref 6–8.3)
PROTHROM AB SERPL-ACNC: 14.9 SEC — HIGH (ref 9.9–13.4)
RBC # BLD: 4.37 M/UL — SIGNIFICANT CHANGE UP (ref 3.8–5.2)
RBC # BLD: 4.58 M/UL — SIGNIFICANT CHANGE UP (ref 3.8–5.2)
RBC # FLD: 16.3 % — HIGH (ref 10.3–14.5)
RBC # FLD: 16.5 % — HIGH (ref 10.3–14.5)
RH IG SCN BLD-IMP: POSITIVE — SIGNIFICANT CHANGE UP
SODIUM SERPL-SCNC: 140 MMOL/L — SIGNIFICANT CHANGE UP (ref 135–145)
TROPONIN T, HIGH SENSITIVITY RESULT: 7 NG/L — SIGNIFICANT CHANGE UP
WBC # BLD: 3.73 K/UL — LOW (ref 3.8–10.5)
WBC # BLD: 6.52 K/UL — SIGNIFICANT CHANGE UP (ref 3.8–10.5)
WBC # FLD AUTO: 3.73 K/UL — LOW (ref 3.8–10.5)
WBC # FLD AUTO: 6.52 K/UL — SIGNIFICANT CHANGE UP (ref 3.8–10.5)

## 2025-06-12 PROCEDURE — 99285 EMERGENCY DEPT VISIT HI MDM: CPT | Mod: 25

## 2025-06-12 PROCEDURE — 99232 SBSQ HOSP IP/OBS MODERATE 35: CPT

## 2025-06-12 PROCEDURE — 99221 1ST HOSP IP/OBS SF/LOW 40: CPT

## 2025-06-12 PROCEDURE — 99223 1ST HOSP IP/OBS HIGH 75: CPT

## 2025-06-12 PROCEDURE — 74174 CTA ABD&PLVS W/CONTRAST: CPT | Mod: 26

## 2025-06-12 RX ORDER — HEPARIN SODIUM 1000 [USP'U]/ML
5500 INJECTION INTRAVENOUS; SUBCUTANEOUS ONCE
Refills: 0 | Status: COMPLETED | OUTPATIENT
Start: 2025-06-12 | End: 2025-06-12

## 2025-06-12 RX ORDER — HEPARIN SODIUM 1000 [USP'U]/ML
5500 INJECTION INTRAVENOUS; SUBCUTANEOUS EVERY 6 HOURS
Refills: 0 | Status: DISCONTINUED | OUTPATIENT
Start: 2025-06-12 | End: 2025-06-13

## 2025-06-12 RX ORDER — ATORVASTATIN CALCIUM 80 MG/1
40 TABLET, FILM COATED ORAL AT BEDTIME
Refills: 0 | Status: DISCONTINUED | OUTPATIENT
Start: 2025-06-12 | End: 2025-06-13

## 2025-06-12 RX ORDER — HEPARIN SODIUM 1000 [USP'U]/ML
2500 INJECTION INTRAVENOUS; SUBCUTANEOUS EVERY 6 HOURS
Refills: 0 | Status: DISCONTINUED | OUTPATIENT
Start: 2025-06-12 | End: 2025-06-13

## 2025-06-12 RX ORDER — ASPIRIN 325 MG
81 TABLET ORAL DAILY
Refills: 0 | Status: DISCONTINUED | OUTPATIENT
Start: 2025-06-12 | End: 2025-06-13

## 2025-06-12 RX ORDER — ACETAMINOPHEN 500 MG/5ML
650 LIQUID (ML) ORAL EVERY 6 HOURS
Refills: 0 | Status: DISCONTINUED | OUTPATIENT
Start: 2025-06-12 | End: 2025-06-13

## 2025-06-12 RX ORDER — AMLODIPINE BESYLATE 10 MG/1
10 TABLET ORAL DAILY
Refills: 0 | Status: DISCONTINUED | OUTPATIENT
Start: 2025-06-12 | End: 2025-06-13

## 2025-06-12 RX ORDER — LISINOPRIL 5 MG/1
40 TABLET ORAL DAILY
Refills: 0 | Status: DISCONTINUED | OUTPATIENT
Start: 2025-06-12 | End: 2025-06-13

## 2025-06-12 RX ORDER — HEPARIN SODIUM 1000 [USP'U]/ML
INJECTION INTRAVENOUS; SUBCUTANEOUS
Qty: 25000 | Refills: 0 | Status: DISCONTINUED | OUTPATIENT
Start: 2025-06-12 | End: 2025-06-13

## 2025-06-12 RX ORDER — FERROUS SULFATE 137(45) MG
325 TABLET, EXTENDED RELEASE ORAL
Refills: 0 | Status: DISCONTINUED | OUTPATIENT
Start: 2025-06-12 | End: 2025-06-13

## 2025-06-12 RX ADMIN — Medication 325 MILLIGRAM(S): at 18:15

## 2025-06-12 RX ADMIN — HEPARIN SODIUM 1200 UNIT(S)/HR: 1000 INJECTION INTRAVENOUS; SUBCUTANEOUS at 11:17

## 2025-06-12 RX ADMIN — HEPARIN SODIUM 5500 UNIT(S): 1000 INJECTION INTRAVENOUS; SUBCUTANEOUS at 11:17

## 2025-06-12 RX ADMIN — ATORVASTATIN CALCIUM 40 MILLIGRAM(S): 80 TABLET, FILM COATED ORAL at 21:50

## 2025-06-12 RX ADMIN — HEPARIN SODIUM 1000 UNIT(S)/HR: 1000 INJECTION INTRAVENOUS; SUBCUTANEOUS at 21:11

## 2025-06-12 RX ADMIN — HEPARIN SODIUM 1000 UNIT(S)/HR: 1000 INJECTION INTRAVENOUS; SUBCUTANEOUS at 18:59

## 2025-06-12 RX ADMIN — HEPARIN SODIUM 0 UNIT(S)/HR: 1000 INJECTION INTRAVENOUS; SUBCUTANEOUS at 17:55

## 2025-06-12 NOTE — ED PROVIDER NOTE - CLINICAL SUMMARY MEDICAL DECISION MAKING FREE TEXT BOX
Patient is a 63 y/o Scottish-speaking female, daughter at bedside translating per patient request, CKD, R. SMA stenosis s/p bypass 2023 (on xarelto) presenting with intermittent dizziness x 5 weeks sent in by her hematologist. She was admitted to the hospital DCd 05/06 for similar complaint which she was found to have HR fluctuating between 30s-50s s/p ILR and CMRi no signs of ischemic disease but some signs of possible amyloidosis. Patient denies any CP, SOB, syncope. Reportedly she was sent in by her hematologist Dr. Micah Aquino for Anemia. Per chart review patient with slowly downtrending HGB 9.6 HCT 32.7 slowly downtrending from 10.7 on 05/03 and 13.88 on 9/18/24 while on Xarelto.  Patient's daughter reports that Dr. Morgan sent them in for possible admission for workup of this anemia. Patient denies any dark or bloody stools, vaginal bleeding, hematuria, abd pain, vomiting.   VS non actionable  EKG NSR, No ST elevations or depressions. No pathologic T wave inversions. No acute ischemic changes noted.   DDx/plan- Will contact Dr. Aquino or Hematology fellow

## 2025-06-12 NOTE — ED ADULT NURSE NOTE - NSFALLUNIVINTERV_ED_ALL_ED
Bed/Stretcher in lowest position, wheels locked, appropriate side rails in place/Call bell, personal items and telephone in reach/Instruct patient to call for assistance before getting out of bed/chair/stretcher/Non-slip footwear applied when patient is off stretcher/[place] to call system/Physically safe environment - no spills, clutter or unnecessary equipment/Purposeful proactive rounding/Room/bathroom lighting operational, light cord in reach [Negative] : Heme/Lymph

## 2025-06-12 NOTE — H&P ADULT - PROBLEM SELECTOR PLAN 2
Reports dizziness improved but still has positional dizziness and lightheadedness  Had ILR placed last month - notfied EP for ILR interrogation    TTE with EF 69% last month.  < from: MR Cardiac w/wo IV Cont (05.05.25 @ 13:42) >  Normal biventricular size and function. Nonischemic late gadolinium enhancement pattern  Concern for cardiac amyloidosis?    Patient to follow with private outpatient cardio for further workup

## 2025-06-12 NOTE — ED PROVIDER NOTE - PHYSICAL EXAMINATION
General: well appearing, interactive, well nourished, no apparent distress, ncat  HEENT: EOMI, PERRLA, normal mucosa, normal oropharynx, no lesions on the lips or on oral mucosa, normal external ear  Neck: supple, no lymphadenopathy, full range of motion, no nuchal rigidity  CV: RRR, normal S1 and S2 with no murmur, capillary refill less than two seconds  Resp: lungs CTA b/l, good aeration bilaterally, symmetric chest wall   Abd: non-distended, soft, non-tender  : no CVA tenderness  MSK: full range of motion, no cyanosis, no edema, no clubbing, no immobility  Neuro:  muscle strength 5/5 in all extremities  Skin: no rashes, skin intact

## 2025-06-12 NOTE — ED ADULT NURSE NOTE - OBJECTIVE STATEMENT
63 y/o female Pt present to the ED with c/o dizziness wand weakness today.  Pt is alert and oriented x4.  Related history of bradycardia,  recent loop recorder placed 2 weeks ago, s/p vascular bypass.  Pt denies chest pain, cough, chills, abdominal pain, N/V/D, h/a, numbness/tingling or any urinary symptoms at this time. Continuous Cardiac monitor in place with HR of  75  BPM  NSR and O2 sat of   100%RA.  Pt made comfortable. All safety precautions maintained.  Pending MD brennan.

## 2025-06-12 NOTE — H&P ADULT - NSHPLABSRESULTS_GEN_ALL_CORE
Recent Vitals  T(C): 36.9 (06-12-25 @ 12:46), Max: 37 (06-12-25 @ 07:59)  HR: 73 (06-12-25 @ 12:46) (68 - 77)  BP: 130/53 (06-12-25 @ 12:46) (121/68 - 130/53)  RR: 20 (06-12-25 @ 12:46) (15 - 20)  SpO2: 98% (06-12-25 @ 12:46) (97% - 98%)                        9.6    3.73  )-----------( 282      ( 12 Jun 2025 08:35 )             32.0     06-12    140  |  108[H]  |  13  ----------------------------<  169[H]  4.4   |  22  |  0.82    Ca    8.8      12 Jun 2025 08:35  Phos  3.7     06-12  Mg     1.90     06-12    TPro  7.1  /  Alb  3.5  /  TBili  0.3  /  DBili  x   /  AST  28  /  ALT  22  /  AlkPhos  112  06-12    PT/INR - ( 12 Jun 2025 08:35 )   PT: 14.9 sec;   INR: 1.25 ratio         PTT - ( 12 Jun 2025 08:35 )  PTT:36.3 sec  LIVER FUNCTIONS - ( 12 Jun 2025 08:35 )  Alb: 3.5 g/dL / Pro: 7.1 g/dL / ALK PHOS: 112 U/L / ALT: 22 U/L / AST: 28 U/L / GGT: x           Urinalysis Basic - ( 12 Jun 2025 08:35 )    Color: x / Appearance: x / SG: x / pH: x  Gluc: 169 mg/dL / Ketone: x  / Bili: x / Urobili: x   Blood: x / Protein: x / Nitrite: x   Leuk Esterase: x / RBC: x / WBC x   Sq Epi: x / Non Sq Epi: x / Bacteria: x          acetaminophen     Tablet .. 650 milliGRAM(s) Oral every 6 hours PRN  amLODIPine   Tablet 10 milliGRAM(s) Oral daily  aspirin enteric coated 81 milliGRAM(s) Oral daily  atorvastatin 40 milliGRAM(s) Oral at bedtime  heparin   Injectable 5500 Unit(s) IV Push every 6 hours PRN  heparin   Injectable 2500 Unit(s) IV Push every 6 hours PRN  heparin  Infusion.  Unit(s)/Hr IV Continuous <Continuous>  lisinopril 40 milliGRAM(s) Oral daily    Home Medications:  acetaminophen 500 mg oral tablet: 2 tab(s) orally every 6 hours (12 Jun 2025 15:53)  amLODIPine 10 mg oral tablet: 1 tab(s) orally once a day (12 Jun 2025 15:53)  aspirin 81 mg oral delayed release tablet: 1 tab(s) orally once a day (12 Jun 2025 15:53)  atorvastatin 40 mg oral tablet: 1 tab(s) orally once a day (12 Jun 2025 15:53)  lisinopril 40 mg oral tablet: 1 tab(s) orally once a day (12 Jun 2025 15:53)  Rezdiffra 60 mg oral tablet: 1 tab(s) orally once a day (12 Jun 2025 15:53)  rivaroxaban 20 mg oral tablet: 1 tab(s) orally once a day (12 Jun 2025 15:53)

## 2025-06-12 NOTE — CONSULT NOTE ADULT - ASSESSMENT
Ms. Espinosa is a 64 year old woman with a history of chronic mesenteric ischemia status post open right common iliac to superior mesenteric artery bypass with PTFE (Dr. Laura Morales, 10/20/2023) complicated by graft thrombosis and hemorrhage from the iliac anastomosis, both resolved after operative revisions (Dr. Morales, 10/29-10/30/2023). She now has developed recent very gradually progressive anemia, and has been sent in by her hematologist for inpatient hematologic workup. Vascular surgery is consulted based on her history of open mesenteric revascularization.     Recommendations  - Pending discussion with vascular surgical fellow.     Igor Staples, PGY-2  Vascular Surgery ([id number]) Ms. Espinosa is a 64 year old woman with a history of chronic mesenteric ischemia status post open right common iliac to superior mesenteric artery bypass with PTFE (Dr. Laura Morales, 10/20/2023) complicated by graft thrombosis and hemorrhage from the iliac anastomosis, both resolved after operative revisions (Dr. Morales, 10/29-10/30/2023). She now has developed recent very gradually progressive anemia, and has been sent in by her hematologist for inpatient hematologic workup. Vascular surgery is consulted based on her history of open mesenteric revascularization.     Recommendations  - Continue therapeutic anticoagulation with heparin drip.   - No further imaging workup required.      Igor Staples, PGY-2  Vascular Surgery ([id number])

## 2025-06-12 NOTE — ED ADULT TRIAGE NOTE - CHIEF COMPLAINT QUOTE
Pt sent in by her Hematologist for a low H&H. Endorses dizziness and weakness. Denies fevers/chills, vision changes, SOB, or chest discomfort. Hx of nonischemic cardiomyopathy, bradycardia, CKD, SMA stenosis s/p bypass, HTN. On xarelto.

## 2025-06-12 NOTE — H&P ADULT - HISTORY OF PRESENT ILLNESS
Patient is a 64F with a PMH of R. SMA stenosis s/p bypass 2023 (on xarelto), CKD? HTN, HLD, hepatic steatosis who presents to the ED due to abnormal labs.  Patient speaks primarily Lao, patient prefers translation assistance by calling her daughter  - Bethany.  Patient states that she was reffered to ED by hematology Dr Aquino for GIB workup.  Patient recently presented to [facility] for dizziness, was found to have bradycardia, had ILR placed.  Patient states that since discharge she has felt much better however does have occasional lightheadedness when standing from a seated position.  Otherwise notes fatigue that has persisted for a few months.  Seen by Dr Aquino - concern for low Hb levels while on Xarelto.  Presented to ED for evaluation.  Per discussion with Dr Aquino and vascular, patient cannot be off AC an extended amount of time secondary to her SMA bypass.  Request GI evaluation for EGD/colon inpatient while patient is on heparin drip.  Patient has no other complaints, ambulatory without assistive devices.  Denies history of melena or hematochezia.

## 2025-06-12 NOTE — ED ADULT NURSE REASSESSMENT NOTE - NS ED NURSE REASSESS COMMENT FT1
Received patient from previous RN, A&O X4 , documentation as noted. Patient denies any pain at this time, LAC #20G IV placed and blood specimen sent to lab. Patient able to speak in clear sentences, respirations equal and unlabored. Patient pending lab results. Patient in no acute distress at this time, family member at bedside, will continue to monitor. VSS as noted in flowsheet.
Report given to CDU nurse Eric, patient updated on plan of care, will continue to monitor.
Patient resting in stretcher, no acute distress noted at this time. Patient updated on plan of care, will continue to monitor.

## 2025-06-12 NOTE — ED PROVIDER NOTE - PROGRESS NOTE DETAILS
Bennie Brandon, DO (PGY-2) Attempt to call Dr. Aquino via Teams. Left a message with his answering service awaiting call back. Bennie Brandon DO (PGY-2)I spoke with the patient's hematologist Dr. Ruiz who states that he is concerned that this anemia is coming from something related to her GI system given her history of mesenteric ischemia.  He also notes he will be reaching out to her vascular surgery team Dr. Morales.  He is requesting a CT angiogram abdomen pelvis and consider p.o. contrast.  He is recommending patient be admitted on heparin for GI workup. Bennie Brandon DO (PGY-2) Hospitalist Dr. Bernal requesting to speak with Hematologist Dr. Aquino for further clarification on reason for admission. group chat made. Patient TBA under Dr. Bernal. Bennie Brandon DO (PGY-2)I spoke with the patient's hematologist Dr. Aquino who states that he is concerned that this anemia is coming from something related to her GI system given her history of mesenteric ischemia.  He also notes he will be reaching out to her vascular surgery team Dr. Morales.  He is requesting a CT angiogram abdomen pelvis and consider p.o. contrast.  He is recommending patient be admitted on heparin for GI workup.

## 2025-06-12 NOTE — ED ADULT TRIAGE NOTE - NSWEIGHTCALCTOOLDRUG_GEN_A_CORE
used Health Care Home - Access Care Plan    About Me:    Patient Name:  Delvin Echevarria    YOB: 1965  Age:                      58 year old   [place] MRN:     [id number] Telephone Information:   Home Phone [phone]   Mobile Not on file.       Address:  [address] [place] [id number] Email address:  No e-mail address on record      Emergency Contact(s)   Name Relationship Lgl Grd Work Phone Home Phone Mobile Phone   1. MAN SMITH Friend   [phone]              Health Maintenance:      My Access Plan  Medical Emergency 911   Questions or concerns during clinic hours Primary Clinic Line, I will call the clinic directly: [facility] - [phone]   24 Hour Appointment Line [phone] or  [phone] [facility] ([phone]) (toll free)   24 Hour Nurse Line [phone] (toll free)   Questions or concerns outside clinic hours 24 Hour Appointment Line, I will call the after-hours on-call line:   [place] [phone] or [phone] [facility] ([phone]) (toll-free)   Preferred Urgent Care [facility], [phone]   Preferred Hospital [facility]  [phone]   Preferred Pharmacy [facility] [address] Behavioral Health Crisis Line The National Suicide Prevention Lifeline at 1-133.114.7862 or 912                     My Care Team Members  Patient Care Team         Relationship Specialty Notifications Start End    Delvin Henning MD PCP - General Family Practice  8/2/19     Phone: [phone] Fax: [phone] [address] [place] [address]    Miguel A Green MD MD Orthopedics  10/4/16     Phone: [phone] Fax: [phone]         [address] [facility] [address]    Delvin Henning MD Assigned PCP   12/6/20     Phone: [phone] Fax: [phone] [address] [place] [address]    Dereje  Sagrario RN Lead Care Coordinator Primary Care -  Admissions 7/26/23     Phone: [phone]                  My Medical and Care Information  Problem List   Patient Active Problem List   Diagnosis    Inguinal hernia    Essential hypertension, benign    Lumbar nerve root impingement    Tobacco dependence syndrome    Marijuana abuse    Hypertensive urgency      Current Medications and Allergies:    Current Outpatient Medications   Medication    acetaminophen (TYLENOL) 325 MG tablet    albuterol (PROAIR HFA/PROVENTIL HFA/VENTOLIN HFA) 108 (90 Base) MCG/ACT inhaler    aspirin 81 MG EC tablet    betamethasone dipropionate (DIPROSONE) 0.05 % external cream    carvedilol (COREG) 6.25 MG tablet    furosemide (LASIX) 20 MG tablet    rosuvastatin (CRESTOR) 10 MG tablet    sacubitril-valsartan (ENTRESTO) 24-26 MG per tablet     No current facility-administered medications for this visit.

## 2025-06-12 NOTE — H&P ADULT - PROBLEM SELECTOR PLAN 1
64F presents complaining of fatigue and occasional dizziness  Sent in by Heme Dr Aquino for GI eval inpatient.  On xarelto for SMA bypass graft  Per heme and vascular,  patient should be on heparin drip while inpatient for GI workup as patient should not be off AC for an extended period of time.    Will email GI to notify them.  Currently on heparin drip  NPO @12 incase patient accepted by GI    Hb 9.6 today.  Hb has been between 9 and 11 since 12/23.  Patient has had no transfusions in that time.    Iron studies low last month - low total iron, low % iron saturation  Will start oral FeSO4

## 2025-06-12 NOTE — H&P ADULT - NSHPPHYSICALEXAM_GEN_ALL_CORE
Physical exam:  General: patient in no acute distress, resting comfortably  Head:  Atraumatic, Normocephalic  Eyes: EOMI, PERRLA, clear sclera  Neck: Supple, thyroid nontender, non enlarged  Cardio: S1/S2 +ve, regular rate and rhythm, no M/G/R  Resp: clear to ausculation bilaterally, no rales or wheezes  GI: abdomen soft, nontender, non distended, no guarding, BS +ve x 4  Ext: no significant pedal edema  Neuro: CN 2-12 intact, no significant motor or sensory deficits.  ambulatory in ED  Skin: No rashes or lesions

## 2025-06-12 NOTE — ED PROVIDER NOTE - ATTENDING CONTRIBUTION TO CARE
64 year old Urdu speaking female with a PMH of R DEBI/SMA stenosis s/p vascular bypass with stent on 10/20/2023-maintained on Xarelto, HTN, HLD, fatty liver HPylori + admission for c/o intermittent "dizziness" for EP consult for sinus bradycardia s/p loop recorder placement 5/6 PTED with similar dizziness and at the request of her hematologist Dr. Micah Aquino for further w/u of Anemia 2/2 downtrending HGB 9.6 HCT 32.7 slowly downtrending from 10.7 on 05/03 and 13.88 on 9/18/24 while on Xarelto.  Pt does not note dark or bloody stools, vaginal bleeding, hematuria, abd pain, vomiting.   VS non actionable   Vital Signs Last 24 Hrs  T(F): 98.6 HR: 75 BP: 122/87 RR: 18 SpO2: 9% (12 Jun 2025 07:59) (97% - 98%)  PE: as described; my additions and exceptions are noted in the chart  DATA:  EKG: pending at time of evaluation   LAB:   Blood Gas Venous - Lactate: 1.2 mmol/L (06-12-25 @ 09:26)                        9.6    3.73  )-----------( 282      ( 12 Jun 2025 08:35 )             32.0   Mean Cell Volume: 73.2 fL (06-12-25 @ 08:35)  PT: 14.9 sec;   INR: 1.25 ratio  PTT:36.3 tve37-15    140  |  108[H]  |  13  ----------------------------<  169[H]  4.4   |  22  |  0.82    Ca    8.8      12 Jun 2025 08:35  Phos  3.7     06-12  Mg     1.90     06-12    TPro  7.1  /  Alb  3.5  /  TBili  0.3  /  DBili  x   /  AST  28  /  ALT  22  /  AlkPhos  112  06-12   Troponin T, High Sensitivity Result: 7 ng/L (06-12-25 @ 08:35)    CTA IMPRESSION:  Patent SMA-right common iliac artery bypass graft. 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    IMPRESSION/RISK:  Dx=  Anemia   Consideration include CTA performed as above althoug unlikely to be + given lack of active bleeding. Gx of HPylori + ?whether > benefit would be obtained from EGD. ? concerns that bradycardia in past could be 2/2 amyloid but HR has been stable   Plan  Pt stable  Will reach out to Onc re plans for further w/u including EGD   Will reassess

## 2025-06-12 NOTE — CONSULT NOTE ADULT - ATTENDING COMMENTS
Patient well known to me with history of SMA disease, bypass with thrombosis and re-do bypass. Patient chronically on DOAC. Dr. Aquino (hematology) sent patient to [facility] for evaluation for bleeding/anemia as it is not safe to hold anticoagulation for prolonged period of time in this patient. Exam benign as above. Hgb has been stable. GI would like to perform endoscopy in outpatient setting. Patient may be placed on Therapeutic Lovenox Injections (last dose day before endoscopy) if outpatient endoscopy planned. Discussed with GI team.

## 2025-06-12 NOTE — H&P ADULT - ASSESSMENT
Patient is a 64F with a PMH of R. SMA stenosis s/p bypass 2023 (on xarelto), CKD? HTN, HLD, hepatic steatosis who presents to the ED due to abnormal labs.

## 2025-06-13 ENCOUNTER — TRANSCRIPTION ENCOUNTER (OUTPATIENT)
Age: 65
End: 2025-06-13

## 2025-06-13 VITALS
DIASTOLIC BLOOD PRESSURE: 65 MMHG | TEMPERATURE: 98 F | RESPIRATION RATE: 18 BRPM | SYSTOLIC BLOOD PRESSURE: 121 MMHG | HEART RATE: 72 BPM | OXYGEN SATURATION: 100 %

## 2025-06-13 LAB
APTT BLD: 158.8 SEC — SIGNIFICANT CHANGE UP (ref 26.1–36.8)
APTT BLD: 42.9 SEC — HIGH (ref 26.1–36.8)
APTT BLD: 99.8 SEC — HIGH (ref 26.1–36.8)
HCT VFR BLD CALC: 31.9 % — LOW (ref 34.5–45)
HCT VFR BLD CALC: 32.1 % — LOW (ref 34.5–45)
HCT VFR BLD CALC: 34.3 % — LOW (ref 34.5–45)
HGB BLD-MCNC: 10.2 G/DL — LOW (ref 11.5–15.5)
HGB BLD-MCNC: 9.6 G/DL — LOW (ref 11.5–15.5)
HGB BLD-MCNC: 9.6 G/DL — LOW (ref 11.5–15.5)
MCHC RBC-ENTMCNC: 21.7 PG — LOW (ref 27–34)
MCHC RBC-ENTMCNC: 21.8 PG — LOW (ref 27–34)
MCHC RBC-ENTMCNC: 21.9 PG — LOW (ref 27–34)
MCHC RBC-ENTMCNC: 29.7 G/DL — LOW (ref 32–36)
MCHC RBC-ENTMCNC: 29.9 G/DL — LOW (ref 32–36)
MCHC RBC-ENTMCNC: 30.1 G/DL — LOW (ref 32–36)
MCV RBC AUTO: 72.3 FL — LOW (ref 80–100)
MCV RBC AUTO: 73 FL — LOW (ref 80–100)
MCV RBC AUTO: 73.1 FL — LOW (ref 80–100)
NRBC # BLD AUTO: 0 K/UL — SIGNIFICANT CHANGE UP (ref 0–0)
NRBC # FLD: 0 K/UL — SIGNIFICANT CHANGE UP (ref 0–0)
NRBC BLD AUTO-RTO: 0 /100 WBCS — SIGNIFICANT CHANGE UP (ref 0–0)
PLATELET # BLD AUTO: 261 K/UL — SIGNIFICANT CHANGE UP (ref 150–400)
PLATELET # BLD AUTO: 288 K/UL — SIGNIFICANT CHANGE UP (ref 150–400)
PLATELET # BLD AUTO: 321 K/UL — SIGNIFICANT CHANGE UP (ref 150–400)
RBC # BLD: 4.39 M/UL — SIGNIFICANT CHANGE UP (ref 3.8–5.2)
RBC # BLD: 4.41 M/UL — SIGNIFICANT CHANGE UP (ref 3.8–5.2)
RBC # BLD: 4.7 M/UL — SIGNIFICANT CHANGE UP (ref 3.8–5.2)
RBC # FLD: 16.2 % — HIGH (ref 10.3–14.5)
RBC # FLD: 16.3 % — HIGH (ref 10.3–14.5)
RBC # FLD: 16.3 % — HIGH (ref 10.3–14.5)
WBC # BLD: 5.32 K/UL — SIGNIFICANT CHANGE UP (ref 3.8–10.5)
WBC # BLD: 5.41 K/UL — SIGNIFICANT CHANGE UP (ref 3.8–10.5)
WBC # BLD: 5.72 K/UL — SIGNIFICANT CHANGE UP (ref 3.8–10.5)
WBC # FLD AUTO: 5.32 K/UL — SIGNIFICANT CHANGE UP (ref 3.8–10.5)
WBC # FLD AUTO: 5.41 K/UL — SIGNIFICANT CHANGE UP (ref 3.8–10.5)
WBC # FLD AUTO: 5.72 K/UL — SIGNIFICANT CHANGE UP (ref 3.8–10.5)

## 2025-06-13 PROCEDURE — 99222 1ST HOSP IP/OBS MODERATE 55: CPT | Mod: GC

## 2025-06-13 PROCEDURE — 70450 CT HEAD/BRAIN W/O DYE: CPT | Mod: 26

## 2025-06-13 PROCEDURE — 99239 HOSP IP/OBS DSCHRG MGMT >30: CPT

## 2025-06-13 RX ORDER — ENOXAPARIN SODIUM 100 MG/ML
70 INJECTION SUBCUTANEOUS
Qty: 30 | Refills: 0
Start: 2025-06-13 | End: 2025-07-12

## 2025-06-13 RX ORDER — HEPARIN SODIUM 1000 [USP'U]/ML
5500 INJECTION INTRAVENOUS; SUBCUTANEOUS EVERY 6 HOURS
Refills: 0 | Status: DISCONTINUED | OUTPATIENT
Start: 2025-06-13 | End: 2025-06-13

## 2025-06-13 RX ORDER — HEPARIN SODIUM 1000 [USP'U]/ML
800 INJECTION INTRAVENOUS; SUBCUTANEOUS
Qty: 25000 | Refills: 0 | Status: DISCONTINUED | OUTPATIENT
Start: 2025-06-13 | End: 2025-06-13

## 2025-06-13 RX ORDER — FERROUS SULFATE 137(45) MG
1 TABLET, EXTENDED RELEASE ORAL
Qty: 60 | Refills: 0
Start: 2025-06-13 | End: 2025-07-12

## 2025-06-13 RX ORDER — HEPARIN SODIUM 1000 [USP'U]/ML
2500 INJECTION INTRAVENOUS; SUBCUTANEOUS EVERY 6 HOURS
Refills: 0 | Status: DISCONTINUED | OUTPATIENT
Start: 2025-06-13 | End: 2025-06-13

## 2025-06-13 RX ORDER — ENOXAPARIN SODIUM 100 MG/ML
70 INJECTION SUBCUTANEOUS
Qty: 60 | Refills: 0
Start: 2025-06-13 | End: 2025-07-12

## 2025-06-13 RX ORDER — ENOXAPARIN SODIUM 100 MG/ML
70 INJECTION SUBCUTANEOUS EVERY 12 HOURS
Refills: 0 | Status: DISCONTINUED | OUTPATIENT
Start: 2025-06-13 | End: 2025-06-13

## 2025-06-13 RX ORDER — ENOXAPARIN SODIUM 100 MG/ML
70 INJECTION SUBCUTANEOUS
Qty: 60 | Refills: 0 | DISCHARGE
Start: 2025-06-13 | End: 2025-07-12

## 2025-06-13 RX ORDER — HEPARIN SODIUM 1000 [USP'U]/ML
5500 INJECTION INTRAVENOUS; SUBCUTANEOUS ONCE
Refills: 0 | Status: DISCONTINUED | OUTPATIENT
Start: 2025-06-13 | End: 2025-06-13

## 2025-06-13 RX ORDER — ENOXAPARIN SODIUM 100 MG/ML
80 INJECTION SUBCUTANEOUS
Qty: 60 | Refills: 0
Start: 2025-06-13 | End: 2025-07-12

## 2025-06-13 RX ADMIN — HEPARIN SODIUM 1000 UNIT(S)/HR: 1000 INJECTION INTRAVENOUS; SUBCUTANEOUS at 00:45

## 2025-06-13 RX ADMIN — AMLODIPINE BESYLATE 10 MILLIGRAM(S): 10 TABLET ORAL at 09:53

## 2025-06-13 RX ADMIN — HEPARIN SODIUM 0 UNIT(S)/HR: 1000 INJECTION INTRAVENOUS; SUBCUTANEOUS at 06:25

## 2025-06-13 RX ADMIN — HEPARIN SODIUM 900 UNIT(S)/HR: 1000 INJECTION INTRAVENOUS; SUBCUTANEOUS at 15:42

## 2025-06-13 RX ADMIN — HEPARIN SODIUM 2500 UNIT(S): 1000 INJECTION INTRAVENOUS; SUBCUTANEOUS at 15:56

## 2025-06-13 RX ADMIN — HEPARIN SODIUM 800 UNIT(S)/HR: 1000 INJECTION INTRAVENOUS; SUBCUTANEOUS at 08:05

## 2025-06-13 RX ADMIN — LISINOPRIL 40 MILLIGRAM(S): 5 TABLET ORAL at 09:52

## 2025-06-13 RX ADMIN — HEPARIN SODIUM UNIT(S)/HR: 1000 INJECTION INTRAVENOUS; SUBCUTANEOUS at 02:17

## 2025-06-13 RX ADMIN — ENOXAPARIN SODIUM 70 MILLIGRAM(S): 100 INJECTION SUBCUTANEOUS at 18:46

## 2025-06-13 RX ADMIN — HEPARIN SODIUM 800 UNIT(S)/HR: 1000 INJECTION INTRAVENOUS; SUBCUTANEOUS at 07:40

## 2025-06-13 RX ADMIN — Medication 325 MILLIGRAM(S): at 18:46

## 2025-06-13 NOTE — DISCHARGE NOTE PROVIDER - NSDCCPCAREPLAN_GEN_ALL_CORE_FT
PRINCIPAL DISCHARGE DIAGNOSIS  Diagnosis: Anemia  Assessment and Plan of Treatment: You presented to the hospital for GI evaluation for anemia. You were seen by our GI and Vascular specialists. Your xarelto was switched to Lovenox. You opted to follow up outpatient for further workup. Make sure you follow up with your GI doctor to have this scheduled within the next 1 week. Monitor for any signs or symptoms of bleeding while on Lovenox.      SECONDARY DISCHARGE DIAGNOSES  Diagnosis: Dizziness  Assessment and Plan of Treatment: You reported some dizziness. We interrogated your loop recorder which showed no events. You had a CT head which showed no acute findings. Continue to monitor your blood pressures closely to make sure they remain stable. Follow up with your PCP for BP management. Follow up with your Cardiologist for further workup of possible cardiac amyloidosis.

## 2025-06-13 NOTE — PATIENT PROFILE ADULT - FALL HARM RISK - HARM RISK INTERVENTIONS
Assistance with ambulation/Assistance OOB with selected safe patient handling equipment/Communicate Risk of Fall with Harm to all staff/Discuss with provider need for PT consult/Monitor gait and stability/Reinforce activity limits and safety measures with patient and family/Review medications for side effects contributing to fall risk/Sit up slowly, dangle for a short time, stand at bedside before walking/Tailored Fall Risk Interventions/Toileting schedule using arm’s reach rule for commode and bathroom/Visual Cue: Yellow wristband and red socks/Bed in lowest position, wheels locked, appropriate side rails in place/Call bell, personal items and telephone in reach/Instruct patient to call for assistance before getting out of bed or chair/Non-slip footwear when patient is out of bed/[place] to call system/Physically safe environment - no spills, clutter or unnecessary equipment/Purposeful Proactive Rounding/Room/bathroom lighting operational, light cord in reach

## 2025-06-13 NOTE — DISCHARGE NOTE PROVIDER - CARE PROVIDERS DIRECT ADDRESSES
,[email or web address],[email or web address] ,[email or web address],DirectAddress_Unknown,DirectAddress_Unknown

## 2025-06-13 NOTE — DISCHARGE NOTE NURSING/CASE MANAGEMENT/SOCIAL WORK - NSDCFUADDAPPT_GEN_ALL_CORE_FT
flux - [facility] [phone]    [facility] VIDEO VISIT PROGRESS NOTE    CHIEF COMPLAINT  Chief Complaint   Patient presents with   • Video Visit   • Congestion   • Cough   • Sinus Problem   • Sore Throat       SUBJECTIVE  HISTORY OF PRESENT ILLNESS:   Linda Collett is a 64 year old female who consents to a two-way Video Visit (V-Visit) evaluation for upper respiratory illness concerns. \"Bad cough sore throat\". Per chart review, patient with a hx of HTN, cardiac disease, chronic dry cough.     Last treated with antibiotic per chart review noted to be: 8/22, Azithromycin.     Patient with symptoms starting 10 days ago with a dry cough and then this past Saturday she noted symptoms getting worse with a runny nose and congestion. Patient with current symptoms: congestion, PND, sore throat, cough, sinus pain and pressure and swollen lymph nodes in cervical chain. Patient has been using halls cough drops, nyquil at night and dylsem for the cough for symptoms thus far. Patient does have some mild wheezing noted with the cough.     COVID-19 vaccine status: The patient has been vaccinated against COVID-19.   The patient has not been tested for COVID-19 for this illness.     The patient denies ill contacts.     Denies chest pain, shortness of breath, palpitations, uncontrolled fever, inability to tolerate fluids, abdominal pain.    ALLERGIES  ALLERGIES:  No Known Allergies     MEDICATIONS  Current Outpatient Medications   Medication Sig   • hydroCHLOROthiazide (HYDRODIURIL) 25 MG tablet TAKE ONE TABLET BY MOUTH ONE TIME DAILY   • Valacyclovir HCl 1000 MG Tab Take two pills by mouth every 12 hours for one day at onset of cold sore or symptoms   • it was encouraged to start on a probiotic daily. Explained that the probiotic should be spaced out from the antibiotic by 2-3 hours.    Discussed guidelines for antibiotic indication of which they do meet.  Encouraged symptomatic control as outlined above.   If antibiotic was prescribed, consumption of antibiotic advised until completion, despite clinical improvement.   Reviewed the guidelines published in 2015 by the American Academy of Otolaryngology-Head and Neck Surgery which constitutes the following:  \"Acute rhinosinusitis that is caused by, or is presumed to be by bacterial infection.  A clinician should diagnose ABRS when:  Symptoms or signs of acute rhinosinusitis fail to improve within 10 days or more beyond onset of upper respiratory symptoms OR symptoms or signs of acute rhinosinusitis worsen within 10 days after an initial improvement (double worsening).\"    FOLLOW-UP   Return for As needed .  If symptoms are no better after 10 days, they can return to [facility] for a follow up visit and discussion for further treatment. If symptoms worsen, they should be seen in Urgent Care, Immediate Care, or the Emergency Department.      PATIENT INSTRUCTIONS  Attached in After Visit Summary  The patient verbalizes understanding of the diagnosis and plan of care. There were no further questions or concerns.   They were advised to contact the [facility] RN with any questions at [phone].    CONSENT  Patient consent was obtained for this Video Visit using the "[employer] Rishabh.   Clinician Location: [facility] Visit- Home office.   Patient is at home, in the [facility] at the time of this visit.    MARCOS Ferro  7/5/2023  10:06 AM   APPTS ARE READY TO BE MADE: [X] YES    Best Family or Patient Contact (if needed):    Additional Information about above appointments (if needed):    1: PCP in 1 week   2: Your outpatient GI for an EGD/colonoscopy  3: Your Cardiologist in 1 week for cardiac amylodosis workup    Other comments or requests:

## 2025-06-13 NOTE — DISCHARGE NOTE PROVIDER - ATTENDING DISCHARGE PHYSICAL EXAMINATION:
GENERAL: No acute distress  HEAD:  Normocephalic  EYES: Conjunctiva and sclera clear  NECK: Supple  CHEST/LUNG: CTAB  HEART: Regular rate and rhythm  ABDOMEN: Soft, non-tender, non-distended  EXTREMITIES:  No clubbing, cyanosis, or edema  NEUROLOGY: A&O x 3  SKIN: No rashes or lesions to visible skin

## 2025-06-13 NOTE — DISCHARGE NOTE PROVIDER - NSDCMRMEDTOKEN_GEN_ALL_CORE_FT
acetaminophen 500 mg oral tablet: 2 tab(s) orally every 6 hours  amLODIPine 10 mg oral tablet: 1 tab(s) orally once a day  aspirin 81 mg oral delayed release tablet: 1 tab(s) orally once a day  atorvastatin 40 mg oral tablet: 1 tab(s) orally once a day  enoxaparin 80 mg/0.8 mL injectable solution: 70 milligram(s) subcutaneously every 12 hours  ferrous sulfate 325 mg (65 mg elemental iron) oral tablet: 1 tab(s) orally 2 times a day  lisinopril 40 mg oral tablet: 1 tab(s) orally once a day  Rezdiffra 60 mg oral tablet: 1 tab(s) orally once a day

## 2025-06-13 NOTE — DISCHARGE NOTE PROVIDER - HOSPITAL COURSE
64-year-old female with history of SMA stenosis status post bypass 2023 (on Xarelto), CKD, hepatic steatosis, recent admission 5/2025 for symptomatic bradycardia status post negative left heart cath, negative cardiac MRI, status post internal loop recorder 5/6 now presenting for workup of anemia noted on outpatient labs HPI:  Patient is a 64F with a PMH of R. SMA stenosis s/p bypass 2023 (on xarelto), CKD? HTN, HLD, hepatic steatosis who presents to the ED due to abnormal labs.  Patient speaks primarily Ug[place]an, patient prefers translation assistance by calling her daughter  - Bethany.  Patient states that she was reffered to ED by hematology Dr Aquino for GIB workup.  Patient recently presented to [facility] for dizziness, was found to have bradycardia, had ILR placed.  Patient states that since discharge she has felt much better however does have occasional lightheadedness when standing from a seated position.  Otherwise notes fatigue that has persisted for a few months.  Seen by Dr Aquino - concern for low Hb levels while on Xarelto.  Presented to ED for evaluation.  Per discussion with Dr Aquino and vascular, patient cannot be off AC an extended amount of time secondary to her SMA bypass.  Request GI evaluation for EGD/colon inpatient while patient is on heparin drip.  Patient has no other complaints, ambulatory without assistive devices.  Denies history of melena or hematochezia.  (12 Jun 2025 15:57)    Hospital Course:  #Anemia.   64F presents complaining of fatigue and occasional dizziness  Sent in by Heme Dr Aquino for GI eval inpatient.  On xarelto for SMA bypass graft  Hb 9.6 on admission.  Hb has been between 9 and 11 since 12/23.  Patient has had no transfusions in that time.    Iron studies low last month - low total iron, low % iron saturation  Started iron supplements  Evaluated by GI who discussed EGD/colo with family- family opted for outpatient procedure.   Per Vascular/GI recs, transitioned to therapeutic Lovenox with instructions to hold 1 day prior to procedure    #Dizziness.   Reports dizziness improved but still has positional dizziness and lightheadedness  Had ILR placed last month - ILR interrogated on 6/12- no events  TTE with EF 69% last month.  < from: MR Cardiac w/wo IV Cont (05.05.25 @ 13:42) >  Normal biventricular size and function. Nonischemic late gadolinium enhancement pattern  Concern for cardiac amyloidosis?  Patient to follow with private outpatient cardio for further workup.  CTH with no acute findings, chronic findings discussed with daughter and son in law at bedside.    #Mesenteric ischemia.   s/p SMA graft in 2023.  Came in on xarelto for AC  Initially on heparin drip here, transitioned to therapeutic lovenox injections per Vascular/GI    #Essential hypertension.   #continue amlodipine, lisinopril.    #Hyperlipidemia.   C/w lipitor.    #Steatosis, liver.   On rezdiffra at home  Will hold for now.

## 2025-06-13 NOTE — DISCHARGE NOTE PROVIDER - CARE PROVIDER_API CALL
Laura Morales  Vascular Surgery  [address], [address]  [place] [address]  Phone: [phone]  Fax: [phone]  Follow Up Time:    Laura Moralesa  Vascular Surgery  [address], [address]  [place] [address]  Phone: [phone]  Fax: [phone]  Follow Up Time:     Liliana Arevalo  Phone: [phone]  Fax: (   )    -  Established Patient  Follow Up Time: 1 week   Laura Morales  Vascular Surgery  [address], [address]  [place] [address]  Phone: [phone]  Fax: [phone]  Follow Up Time:     Liliana Arevalo  Phone: [phone]  Fax: (   )    -  Established Patient  Follow Up Time: 1 week    Stas Armenta  Gastroenterology  [address] [place] [address]  Phone: [phone]  Fax: [phone]  Follow Up Time:

## 2025-06-13 NOTE — DISCHARGE NOTE PROVIDER - NSDCFUSCHEDAPPT_GEN_ALL_CORE_FT
[facility] Physician [facility] [phone]  Scheduled Appointment: 07/15/2025

## 2025-06-13 NOTE — DISCHARGE NOTE NURSING/CASE MANAGEMENT/SOCIAL WORK - FINANCIAL ASSISTANCE
[facility] provides services at a reduced cost to those who are determined to be eligible through [facility]’s financial assistance program. Information regarding [facility]’s financial assistance program can be found by going to [email or web address] or by calling [phone].

## 2025-06-13 NOTE — CONSULT NOTE ADULT - ASSESSMENT
64-year-old female with history of SMA stenosis status post bypass 2023 (on Xarelto), CKD, hepatic steatosis, recent admission 5/2025 for symptomatic bradycardia status post negative left heart cath, negative cardiac MRI, status post internal loop recorder 5/6 now presenting for workup of anemia noted on outpatient labs    Impression  #RENATE  Patient with history of SMA stenosis status post bypass complicated by graft thrombosis 2023 (on Xarelto), recent admission for symptomatic bradycardia presenting for iron deficiency anemia.  Reports intermittent lightheadedness and fatigue with activities; otherwise denies any  GI symptoms; no weight loss.  Last EGD/colonoscopy 5 to 6 years ago with polyps.  No family history of GI cancers. Labs: Hemoglobin 9.6 (baseline around 10 in May 2025); MCV 72; platelets 200s; prior iron studies from 5/2025: Ferritin 22, TSAT 6%. Discussed role for EGD/colonoscopy for evaluation for iron deficiency anemia; however they prefer not to wait the weekend for this procedure if he can be done  as an outpatient.  Discussed with Dr. Morales ( vascular surgery), who is okay with patient being transitioned to Lovenox and holding 1 day prior to procedure as an outpatient.    Recommendations  - Discussed role of EGD/colonoscopy; family opting for outpatient procedure  - Follow-up with patient's hematology doctor (Dr. Aquino)  regarding anticoagulation  - No further GI workup at this time  - GI please call with questions.  Patient can follow-up with her outpatient GI    Note and recommendations are incomplete until reviewed and attested by attending.    Lucas Woods MD  GI/Hepatology Fellow, PGY5  [place] Pager [phone] or [facility] Pager [phone]  Teams preferred (7AM to 5PM); after 5PM, call GI fellow on call    On Weekends/Holidays (All Day) and Weekdays after 5PM to 8AM  For non-urgent consults, please email [email or web address] and [email or web address]  For urgent consults, please contact on call GI team. See Amion schedule ([facility]), Neuralieveing system ([facility] or call hospital  ([facility])

## 2025-06-13 NOTE — PROVIDER CONTACT NOTE (CRITICAL VALUE NOTIFICATION) - ASSESSMENT
Pt. A/Ox4. VSS. No complaints presented. pt on Heparin drip at 10ml/hr. No s/s of active bleeding noted.

## 2025-06-13 NOTE — DISCHARGE NOTE NURSING/CASE MANAGEMENT/SOCIAL WORK - PATIENT PORTAL LINK FT
You can access the FollowMyHealth Patient Portal offered by [facility] by registering at the following website: [email or web address]. By joining [employer]’s FollowMyHealth portal, you will also be able to view your health information using other applications (apps) compatible with our system.

## 2025-06-13 NOTE — DISCHARGE NOTE PROVIDER - NSDCFUADDAPPT_GEN_ALL_CORE_FT
APPTS ARE READY TO BE MADE: [X] YES    Best Family or Patient Contact (if needed):    Additional Information about above appointments (if needed):    1: PCP in 1 week   2: Your outpatient GI for an EGD/colonoscopy  3: Your Cardiologist in 1 week for cardiac amylodosis workup    Other comments or requests:

## 2025-06-13 NOTE — CONSULT NOTE ADULT - ATTENDING COMMENTS
GI consulted for anemia w/u   No overt signs of GI bleeding   She has a hx of SMA stenosis, s/p bypass and on a/c (xarelto).   Per discussion w vascular surgery - can be switched to lovenox for procedure if needed.   Patient/family prefer to pursue outpatient egd/colon for w/u of anemia.   Recommend outpatient f/u w established GI.   GI to sign off, please call w questions

## 2025-06-13 NOTE — DISCHARGE NOTE PROVIDER - PROVIDER TOKENS
PROVIDER:[TOKEN:[[id number]]] PROVIDER:[TOKEN:[[id number]]],FREE:[LAST:[Canolandivar],FIRST:[Liliana],PHONE:[phone]],FAX:[(   )    -],FOLLOWUP:[1 week],ESTABLISHEDPATIENT:[T]] PROVIDER:[TOKEN:[[id number]]],FREE:[LAST:[Canolandmitchr],FIRST:[Liliana],PHONE:[phone]],FAX:[(   )    -],FOLLOWUP:[1 week],ESTABLISHEDPATIENT:[T]],PROVIDER:[TOKEN:[[id number]]]

## 2025-06-13 NOTE — CONSULT NOTE ADULT - SUBJECTIVE AND OBJECTIVE BOX
Vascular Surgery Consultation    History of Present Illness  Ms. Espinosa is a 64 year old woman with a history of chronic mesenteric ischemia requiring surgical intervention and chronic kidney disease presenting for workup of anemia. Her mesenteric ischemia was diagnosed based on both symptoms and severe radiographic celiac and superior mesenteric artery stenosis with heavy calcification in 9/2023. She underwent attempted percutaneous revascularization 10/2023, converted to open right common iliac  to superior mesenteric artery bypass with a synthetic graft (6 mm ringed PTFE) due to inability to traverse her severely calcified and stenosed vessels (Petra Morales and Chang Sterling, 10/20/2023). This was complicated by early graft thrombosis requiring return to the operating room for revision with a new graft (again 6 mm PTFE, Dr. Morales, 10/29/2023). This was further complicated by post-operative hemorrhage, and she was again taken back for re-exploration, found to have bleeding from the lateral edge of the iliac anastomosis that was repaired primarily.     She ultimately recovered well from these surgeries and has since had no clinical or imaging evidence of recurrent mesenteric ischemia. Over the past month she has had progressively worsening dizziness and lethargy, and was sent in by her outpatient hematologist for workup due to outpatient laboratory studies showing very gradually downtrending hemoglobin. She denies fevers, chills, nausea, vomiting, "food fear" or anorexia, melena, hematochezia, dysuria, oliguria, hematuria, chest pain, or shortness of breath.     In the Emergency Department (ED)  is hemodynamically stable and in no acute distress. Laboratory studies show a hemoglobin of 9.6, consistent with her recent baseline, with no other significant laboratory abnormalities on complete blood count, basic metabolic panel or liver chemistries. Vascular surgery is consulted for anemia given her history of prior open mesenteric revascularization.   ___________________________________________  Current Medications  Cardiovascular and Hematologic (includes DVT ppx/AC/Antiplatelet agents)  heparin   Injectable 5500 Unit(s) every 6 hours PRN  heparin   Injectable 2500 Unit(s) every 6 hours PRN  heparin  Infusion.  Unit(s)/Hr <Continuous>  ___________________________________________  Vital Signs  T(C): 37 (06-12-25 @ 07:59), Max: 37 (06-12-25 @ 07:59)  HR: 68 (06-12-25 @ 08:37) (68 - 77)  BP: 130/50 (06-12-25 @ 08:37) (121/68 - 130/50)  RR: 20 (06-12-25 @ 08:37) (15 - 20)  SpO2: 97% (06-12-25 @ 08:37) (97% - 98%)    Height/Weight  Height (cm): 147.3 (06-12)  Weight (kg): 67.5 (06-12)  BMI (kg/m2): 31.1 (06-12)  BSA (m2): 1.61 (06-12)  ____________________________________________  Physical Exam  General: Resting comfortably in bed in no acute distress.   Neurologic: Alert, oriented, and appropriately responds to questions.   Psychiatric: Euthymic mood, calm affect, linear thought process, appropriate thought content.   Cardiovascular: Regular rate and rhythm by palpation of peripheral pulses confirmed by continuous cardiac monitor.    Respiratory: Equal chest wall expansion bilaterally with no accessory muscle use, no tachypnea, and no grossly increased work of breathing on room air.   Abdomen: Soft, nontender and nondistended. No rebound tenderness or guarding is elicited. Well-healed prior midline laparotomy incision.   Extremity: Bilateral lower extremities warm and well-perfused. Strongly palpable dorsalis pedis pulses bilaterally. No edema.   ____________________________________________  Laboratory Studies  CBC Basic (06-12 @ 08:35)  WBC: 3.73 Hgb: 9.6 Hct: 32.0 Plt: 282    Metabolic Panel (06-12 @ 08:35)  140  |  108  |  13  ----------------------------<  169  4.4   |  22  |  0.82  Ca: 8.8/Phos: 3.7/Magnesium: 1.90    Liver Chemistries (06-12 @ 08:35)  Total protein 7.1 | Albumin 3.5  TBili 0.3 | DBili x  AST 28 | ALT 22 | AlkPhos 112    Coagulation Studies (06-12 @ 08:35)  PT/INR: 14.9/1.25, aPTT: 36.3
Chief Complaint:  Patient is a 64y old  Female who presents with a chief complaint of fatigue, r/o GIB (12 Jun 2025 15:57)    HPI:  64-year-old female with history of SMA stenosis status post bypass 2023 (on Xarelto), CKD, hepatic steatosis, recent admission 5/2025 for symptomatic bradycardia status post negative left heart cath, negative cardiac MRI, status post internal loop recorder 5/6 now presenting for workup of anemia noted on outpatient labs. Patient follows with hematology Dr. Aquino and was noted to be anemic and sent for further workup. Patient reports occasional lightheadedness and fatigued usually with activities, about twice weekly; but otherwise denies any fevers, abdominal pain, nausea, vomiting, melena, hematochezia, weight loss, dysphagia. No FHx of GI cancers. Last EGD/Colonoscopy 5-6 years ago, with polyps.    Allergies:  No Known Allergies      Home Medications:    Hospital Medications:  acetaminophen     Tablet .. 650 milliGRAM(s) Oral every 6 hours PRN  amLODIPine   Tablet 10 milliGRAM(s) Oral daily  aspirin enteric coated 81 milliGRAM(s) Oral daily  atorvastatin 40 milliGRAM(s) Oral at bedtime  ferrous    sulfate 325 milliGRAM(s) Oral two times a day  heparin   Injectable 5500 Unit(s) IV Push every 6 hours PRN  heparin   Injectable 2500 Unit(s) IV Push every 6 hours PRN  heparin  Infusion. 800 Unit(s)/Hr IV Continuous <Continuous>  lisinopril 40 milliGRAM(s) Oral daily      PMHX/PSHX:  H. pylori infection    Positive H. pylori test    Mesenteric ischemia    Status post vascular bypass    Family history:  FH: HTN (hypertension)    ROS:     General:  No wt loss, fevers, chills  ENT:  No dysphagia  CV:  No pain, palpitations  Pulm:  No dyspnea, cough  GI:  No pain, No nausea, No vomiting, No diarrhea, No constipation, No rectal bleeding, No tarry stools, No dysphagia  Muscle:  No pain, weakness  Neuro:  No weakness  Heme:  No ecchymosis  Skin:  No rash    PHYSICAL EXAM:  GENERAL:  No acute distress  HEENT:  no scleral icterus  CHEST:  no accessory muscle use  HEART:  Regular rate and rhythm  ABDOMEN:  Soft, non-tender, non-distended  EXTREMITIES: No edema  SKIN:  No rash/ecchymoses  NEURO:  Alert and oriented x 3    Vital Signs:  Vital Signs Last 24 Hrs  T(C): 36.8 (13 Jun 2025 09:06), Max: 37 (13 Jun 2025 05:57)  T(F): 98.3 (13 Jun 2025 09:06), Max: 98.6 (13 Jun 2025 05:57)  HR: 69 (13 Jun 2025 09:06) (63 - 73)  BP: 141/65 (13 Jun 2025 09:06) (121/55 - 141/65)  BP(mean): --  RR: 16 (13 Jun 2025 09:06) (16 - 20)  SpO2: 100% (13 Jun 2025 09:06) (98% - 100%)    Parameters below as of 13 Jun 2025 09:06  Patient On (Oxygen Delivery Method): room air      Daily Height in cm: 147.32 (13 Jun 2025 01:07)    Daily     LABS:                        9.6    5.41  )-----------( 288      ( 13 Jun 2025 05:08 )             31.9     Mean Cell Volume: 72.3 fL (06-13-25 @ 05:08)    06-12    140  |  108[H]  |  13  ----------------------------<  169[H]  4.4   |  22  |  0.82    Ca    8.8      12 Jun 2025 08:35  Phos  3.7     06-12  Mg     1.90     06-12    TPro  7.1  /  Alb  3.5  /  TBili  0.3  /  DBili  x   /  AST  28  /  ALT  22  /  AlkPhos  112  06-12    LIVER FUNCTIONS - ( 12 Jun 2025 08:35 )  Alb: 3.5 g/dL / Pro: 7.1 g/dL / ALK PHOS: 112 U/L / ALT: 22 U/L / AST: 28 U/L / GGT: x           PT/INR - ( 12 Jun 2025 08:35 )   PT: 14.9 sec;   INR: 1.25 ratio         PTT - ( 13 Jun 2025 05:08 )  PTT:158.8 sec  Urinalysis Basic - ( 12 Jun 2025 08:35 )    Color: x / Appearance: x / SG: x / pH: x  Gluc: 169 mg/dL / Ketone: x  / Bili: x / Urobili: x   Blood: x / Protein: x / Nitrite: x   Leuk Esterase: x / RBC: x / WBC x   Sq Epi: x / Non Sq Epi: x / Bacteria: x                              9.6    5.41  )-----------( 288      ( 13 Jun 2025 05:08 )             31.9                         9.6    5.72  )-----------( 261      ( 13 Jun 2025 01:30 )             32.1                         10.1   6.52  )-----------( 312      ( 12 Jun 2025 17:09 )             34.0                         9.6    3.73  )-----------( 282      ( 12 Jun 2025 08:35 )             32.0      < from: CT Angio Abdomen and Pelvis w/ IV Cont (06.12.25 @ 10:32) >  FINDINGS:  LOWER CHEST: Bilateral lower lobe subsegmental atelectasis.    LIVER: Within normal limits.  BILE DUCTS: Normal caliber.  GALLBLADDER: Within normal limits.  SPLEEN: Within normal limits.  PANCREAS: Within normal limits.  ADRENALS: Within normal limits.  KIDNEYS/URETERS: No renal stones or hydronephrosis. Left renal cyst.    BLADDER: Within normal limits.  REPRODUCTIVE ORGANS:Uterus and adnexa within normal limits.    BOWEL: No bowel obstruction. Appendix is not visualized.  PERITONEUM/RETROPERITONEUM: Within normal limits.  VESSELS: Atherosclerotic changes. Severe calcified plaque at the celiac   artery origin resulting in severe stenosis with reconstitution of the   distal celiac artery from collaterals, similar to 11/8/2023. Unchanged   occlusion of the proximal superior mesenteric artery. SMA-right common   iliac artery bypass graft is patent. Patent inferior mesenteric artery.   Right renal artery stenosis, unchanged. Left renal artery is patent.   Patent bilateral common and internal and external iliac arteries.  LYMPH NODES: No lymphadenopathy.  ABDOMINAL WALL: Within normal limits.  BONES: Degenerative changes.    IMPRESSION:  Patent SMA-right common iliac artery bypass graft. 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    < end of copied text >  
stated

## 2025-06-13 NOTE — DISCHARGE NOTE PROVIDER - NSDCCPTREATMENT_GEN_ALL_CORE_FT
PRINCIPAL PROCEDURE  Procedure: CTA abdomen w/w/o contrast  Findings and Treatment: FINDINGS:  LOWER CHEST: Bilateral lower lobe subsegmental atelectasis.  LIVER: Within normal limits.  BILE DUCTS: Normal caliber.  GALLBLADDER: Within normal limits.  SPLEEN: Within normal limits.  PANCREAS: Within normal limits.  ADRENALS: Within normal limits.  KIDNEYS/URETERS: No renal stones or hydronephrosis. Left renal cyst.  BLADDER: Within normal limits.  REPRODUCTIVE ORGANS: Uterus and adnexa within normal limits.  BOWEL: No bowel obstruction. Appendix is not visualized.  PERITONEUM/RETROPERITONEUM: Within normal limits.  VESSELS: Atherosclerotic changes. Severe calcified plaque at the celiac   artery origin resulting in severe stenosis with reconstitution of the   distal celiac artery from collaterals, similar to 11/8/2023. Unchanged   occlusion of the proximal superior mesenteric artery. SMA-right common   iliac artery bypass graft is patent. Patent inferior mesenteric artery.   Right renal artery stenosis, unchanged. Left renal artery is patent.   Patent bilateral common and internal and external iliac arteries.  LYMPH NODES: No lymphadenopathy.  ABDOMINAL WALL: Within normal limits.  BONES: Degenerative changes.  IMPRESSION:  Patent SMA-right common iliac artery bypass graft. 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      SECONDARY PROCEDURE  Procedure: CT head  Findings and Treatment: FINDINGS: There is no acute intracranial hemorrhage, mass effect, shift   of the midline structures, herniation, extra-axial fluid collection, or   hydrocephalus.  A chronic lacunar infarct is seen within the right basal ganglia. There   is a chronic lacunar infarct versus focally dilated sulcus within the   left cerebellar hemisphere.  There is a partially empty sella.  There is very mild diffuse cerebral volume loss with prominence of the   sulci, fissures, and cisternal spaces which is normal for the patient's   age. There is minimal deep and periventricular white matter   hypoattenuation statistically compatible with microvascular changes given   calcific atherosclerotic disease of the intracranial arteries.  The paranasal sinuses and tympanomastoid cavities are clear. The   calvarium is intact. The imaged orbits are unremarkable.  IMPRESSION: No acute intracranial hemorrhage, mass effect, or shift of   the midline structures.  Multiple chronic intracranial findings, as discussed.

## 2025-07-15 ENCOUNTER — APPOINTMENT (OUTPATIENT)
Dept: ELECTROPHYSIOLOGY | Facility: CLINIC | Age: 65
End: 2025-07-15
Payer: MEDICARE

## 2025-07-15 ENCOUNTER — NON-APPOINTMENT (OUTPATIENT)
Age: 65
End: 2025-07-15

## 2025-07-15 PROCEDURE — 93298 REM INTERROG DEV EVAL SCRMS: CPT

## 2025-08-18 ENCOUNTER — NON-APPOINTMENT (OUTPATIENT)
Age: 65
End: 2025-08-18

## 2025-08-18 ENCOUNTER — APPOINTMENT (OUTPATIENT)
Dept: ELECTROPHYSIOLOGY | Facility: CLINIC | Age: 65
End: 2025-08-18
Payer: MEDICARE

## 2025-08-18 PROCEDURE — 93298 REM INTERROG DEV EVAL SCRMS: CPT

## 2025-09-19 ENCOUNTER — APPOINTMENT (OUTPATIENT)
Dept: ELECTROPHYSIOLOGY | Facility: CLINIC | Age: 65
End: 2025-09-19
Payer: MEDICARE

## 2025-09-19 ENCOUNTER — NON-APPOINTMENT (OUTPATIENT)
Age: 65
End: 2025-09-19

## 2025-09-19 PROCEDURE — 93298 REM INTERROG DEV EVAL SCRMS: CPT

## (undated) DEVICE — PROTECTOR HEEL / ELBOW FLUFFY

## (undated) DEVICE — WARMING BLANKET FULL UNDERBODY

## (undated) DEVICE — PACK MINOR WITH LAP

## (undated) DEVICE — FLOW SWITCH

## (undated) DEVICE — TORQUE DEVICE FOR GUIDEWIRE 0.0100.038"

## (undated) DEVICE — DRAPE BACK TABLE COVER 65X90"

## (undated) DEVICE — SUT VICRYL 3-0 27" SH UNDYED

## (undated) DEVICE — SUT PROLENE 6-0 30" C-1

## (undated) DEVICE — SOL IRR POUR NS 0.9% 500ML

## (undated) DEVICE — VISITEC 4X4

## (undated) DEVICE — SOL IRR BAG NS 0.9% 1000ML

## (undated) DEVICE — TAPE SILK 3"

## (undated) DEVICE — GLV 7.5 PROTEXIS (CREAM) MICRO

## (undated) DEVICE — SYR LUER LOK 10CC

## (undated) DEVICE — STAPLER SKIN MULTI DIRECTION W35

## (undated) DEVICE — SUT SILK 2-0 18" TIES

## (undated) DEVICE — GOWN XL

## (undated) DEVICE — VESSEL LOOP MINI-RED 0.7" X 16"

## (undated) DEVICE — GLV 8 PROTEXIS (CREAM) MICRO

## (undated) DEVICE — PACK PERIPHERAL VASC

## (undated) DEVICE — DRAPE TOWEL BLUE 17" X 24"

## (undated) DEVICE — VESSEL LOOP MINI-BLUE 0.075" X 16"

## (undated) DEVICE — SUCTION YANKAUER NO CONTROL VENT

## (undated) DEVICE — POSITIONER STRAP ARMBOARD VELCRO TS-30

## (undated) DEVICE — SYR LUER LOK 5CC

## (undated) DEVICE — DRAPE FEMORAL ANGIOGRAPHY W TROUGH

## (undated) DEVICE — SYR LUER LOK 20CC

## (undated) DEVICE — TUBING ATS SUCTION LINE

## (undated) DEVICE — OLCOTT TORQUE DEVICE

## (undated) DEVICE — DRSG AQUACEL 3.5 X 14"

## (undated) DEVICE — DRAPE COVER SNAP 36X30"

## (undated) DEVICE — DRSG CURITY GAUZE SPONGE 4 X 4" 12-PLY

## (undated) DEVICE — PACK CV SPLIT PACK II

## (undated) DEVICE — Device

## (undated) DEVICE — DRSG TEGADERM 4X4.75"

## (undated) DEVICE — ELCTR BOVIE PENCIL BLADE 10FT

## (undated) DEVICE — PACK BASIN SET

## (undated) DEVICE — SUT MONOCRYL 4-0 27" PS-2 UNDYED

## (undated) DEVICE — TUBING HIGH POWER CONTRAST INJ

## (undated) DEVICE — BLADE SURGICAL #11 CARBON

## (undated) DEVICE — NDL ENTRY PERC MCKNIGHT 18G

## (undated) DEVICE — DRAPE 3/4 SHEET 52X76"

## (undated) DEVICE — VENODYNE/SCD SLEEVE CALF MEDIUM

## (undated) DEVICE — LINE INJECTION HIGH PRESSURE 72IN

## (undated) DEVICE — DRSG TAPE UMBILICAL COTTON 2" X 30 X 1/8"

## (undated) DEVICE — STEALTH CLAMP INSERT SOFT/TRACTION SZ 3 60MM